# Patient Record
Sex: FEMALE | Race: WHITE | NOT HISPANIC OR LATINO | Employment: FULL TIME | URBAN - METROPOLITAN AREA
[De-identification: names, ages, dates, MRNs, and addresses within clinical notes are randomized per-mention and may not be internally consistent; named-entity substitution may affect disease eponyms.]

---

## 2019-06-05 ENCOUNTER — EH NON-PROVIDER (OUTPATIENT)
Dept: OCCUPATIONAL MEDICINE | Facility: CLINIC | Age: 49
End: 2019-06-05

## 2019-06-05 DIAGNOSIS — Z02.1 PRE-EMPLOYMENT DRUG SCREENING: ICD-10-CM

## 2019-06-05 DIAGNOSIS — Z02.1 ENCOUNTER FOR PRE-EMPLOYMENT HEALTH SCREENING EXAMINATION: ICD-10-CM

## 2019-06-05 LAB
AMP AMPHETAMINE: NORMAL
BAR BARBITURATES: NORMAL
BZO BENZODIAZEPINES: NORMAL
COC COCAINE: NORMAL
INT CON NEG: NORMAL
INT CON POS: NORMAL
MDMA ECSTASY: NORMAL
MET METHAMPHETAMINES: NORMAL
MTD METHADONE: NORMAL
OPI OPIATES: NORMAL
OXY OXYCODONE: NORMAL
PCP PHENCYCLIDINE: NORMAL
POC URINE DRUG SCREEN OCDRS: NORMAL
THC: NORMAL

## 2019-06-05 PROCEDURE — 80305 DRUG TEST PRSMV DIR OPT OBS: CPT | Performed by: PREVENTIVE MEDICINE

## 2019-06-05 PROCEDURE — 8911 PR MRO FEE: Performed by: PREVENTIVE MEDICINE

## 2019-07-05 ENCOUNTER — HOSPITAL ENCOUNTER (EMERGENCY)
Facility: MEDICAL CENTER | Age: 49
End: 2019-07-05
Attending: EMERGENCY MEDICINE
Payer: MEDICAID

## 2019-07-05 VITALS
HEART RATE: 80 BPM | HEIGHT: 64 IN | TEMPERATURE: 98 F | SYSTOLIC BLOOD PRESSURE: 118 MMHG | RESPIRATION RATE: 12 BRPM | DIASTOLIC BLOOD PRESSURE: 90 MMHG | BODY MASS INDEX: 20.1 KG/M2 | WEIGHT: 117.73 LBS | OXYGEN SATURATION: 99 %

## 2019-07-05 DIAGNOSIS — M79.18 MUSCULOSKELETAL PAIN: ICD-10-CM

## 2019-07-05 PROCEDURE — 99283 EMERGENCY DEPT VISIT LOW MDM: CPT

## 2019-07-05 RX ORDER — METHOCARBAMOL 750 MG/1
750 TABLET, FILM COATED ORAL 4 TIMES DAILY
Qty: 20 TAB | Refills: 0 | Status: SHIPPED | OUTPATIENT
Start: 2019-07-05 | End: 2019-12-11

## 2019-07-05 ASSESSMENT — ENCOUNTER SYMPTOMS: FALLS: 0

## 2019-07-05 NOTE — ED TRIAGE NOTES
Chief Complaint   Patient presents with   • Shoulder Pain     Pt ambulatory to triage with above complaint.  Pt states that she began to have left shoulder pain this morning.  Pt +CMS in extremity.  Pt denies any known trauma, cause or previous hx of shoulder pain.     Pt returned to lobby, educated on triage process, and to inform staff of any changes or concerns.

## 2019-07-18 ENCOUNTER — APPOINTMENT (OUTPATIENT)
Dept: RADIOLOGY | Facility: MEDICAL CENTER | Age: 49
End: 2019-07-18
Attending: EMERGENCY MEDICINE
Payer: MEDICAID

## 2019-07-18 ENCOUNTER — HOSPITAL ENCOUNTER (EMERGENCY)
Facility: MEDICAL CENTER | Age: 49
End: 2019-07-18
Attending: EMERGENCY MEDICINE
Payer: MEDICAID

## 2019-07-18 VITALS
RESPIRATION RATE: 16 BRPM | DIASTOLIC BLOOD PRESSURE: 68 MMHG | HEIGHT: 64 IN | WEIGHT: 116 LBS | OXYGEN SATURATION: 98 % | TEMPERATURE: 97.7 F | HEART RATE: 78 BPM | BODY MASS INDEX: 19.81 KG/M2 | SYSTOLIC BLOOD PRESSURE: 103 MMHG

## 2019-07-18 DIAGNOSIS — R42 VERTIGO: ICD-10-CM

## 2019-07-18 LAB
ALBUMIN SERPL BCP-MCNC: 4.7 G/DL (ref 3.2–4.9)
ALBUMIN/GLOB SERPL: 1.7 G/DL
ALP SERPL-CCNC: 55 U/L (ref 30–99)
ALT SERPL-CCNC: 14 U/L (ref 2–50)
ANION GAP SERPL CALC-SCNC: 12 MMOL/L (ref 0–11.9)
AST SERPL-CCNC: 29 U/L (ref 12–45)
BASOPHILS # BLD AUTO: 0.4 % (ref 0–1.8)
BASOPHILS # BLD: 0.02 K/UL (ref 0–0.12)
BILIRUB SERPL-MCNC: 0.9 MG/DL (ref 0.1–1.5)
BUN SERPL-MCNC: 13 MG/DL (ref 8–22)
CALCIUM SERPL-MCNC: 10 MG/DL (ref 8.5–10.5)
CHLORIDE SERPL-SCNC: 101 MMOL/L (ref 96–112)
CO2 SERPL-SCNC: 22 MMOL/L (ref 20–33)
CREAT SERPL-MCNC: 1.16 MG/DL (ref 0.5–1.4)
EKG IMPRESSION: NORMAL
EOSINOPHIL # BLD AUTO: 0.05 K/UL (ref 0–0.51)
EOSINOPHIL NFR BLD: 1.1 % (ref 0–6.9)
ERYTHROCYTE [DISTWIDTH] IN BLOOD BY AUTOMATED COUNT: 50.5 FL (ref 35.9–50)
GLOBULIN SER CALC-MCNC: 2.8 G/DL (ref 1.9–3.5)
GLUCOSE BLD-MCNC: 114 MG/DL (ref 65–99)
GLUCOSE SERPL-MCNC: 103 MG/DL (ref 65–99)
HCT VFR BLD AUTO: 39.7 % (ref 37–47)
HGB BLD-MCNC: 13 G/DL (ref 12–16)
IMM GRANULOCYTES # BLD AUTO: 0.01 K/UL (ref 0–0.11)
IMM GRANULOCYTES NFR BLD AUTO: 0.2 % (ref 0–0.9)
LYMPHOCYTES # BLD AUTO: 1.3 K/UL (ref 1–4.8)
LYMPHOCYTES NFR BLD: 28.5 % (ref 22–41)
MCH RBC QN AUTO: 30.5 PG (ref 27–33)
MCHC RBC AUTO-ENTMCNC: 32.7 G/DL (ref 33.6–35)
MCV RBC AUTO: 93.2 FL (ref 81.4–97.8)
MONOCYTES # BLD AUTO: 0.4 K/UL (ref 0–0.85)
MONOCYTES NFR BLD AUTO: 8.8 % (ref 0–13.4)
NEUTROPHILS # BLD AUTO: 2.78 K/UL (ref 2–7.15)
NEUTROPHILS NFR BLD: 61 % (ref 44–72)
NRBC # BLD AUTO: 0 K/UL
NRBC BLD-RTO: 0 /100 WBC
PLATELET # BLD AUTO: 291 K/UL (ref 164–446)
PMV BLD AUTO: 9.2 FL (ref 9–12.9)
POTASSIUM SERPL-SCNC: 3.7 MMOL/L (ref 3.6–5.5)
PROT SERPL-MCNC: 7.5 G/DL (ref 6–8.2)
RBC # BLD AUTO: 4.26 M/UL (ref 4.2–5.4)
SODIUM SERPL-SCNC: 135 MMOL/L (ref 135–145)
TROPONIN T SERPL-MCNC: <6 NG/L (ref 6–19)
WBC # BLD AUTO: 4.6 K/UL (ref 4.8–10.8)

## 2019-07-18 PROCEDURE — 700117 HCHG RX CONTRAST REV CODE 255: Performed by: EMERGENCY MEDICINE

## 2019-07-18 PROCEDURE — 700102 HCHG RX REV CODE 250 W/ 637 OVERRIDE(OP): Performed by: EMERGENCY MEDICINE

## 2019-07-18 PROCEDURE — A9270 NON-COVERED ITEM OR SERVICE: HCPCS | Performed by: EMERGENCY MEDICINE

## 2019-07-18 PROCEDURE — 93005 ELECTROCARDIOGRAM TRACING: CPT

## 2019-07-18 PROCEDURE — 36415 COLL VENOUS BLD VENIPUNCTURE: CPT

## 2019-07-18 PROCEDURE — 85025 COMPLETE CBC W/AUTO DIFF WBC: CPT

## 2019-07-18 PROCEDURE — 70498 CT ANGIOGRAPHY NECK: CPT

## 2019-07-18 PROCEDURE — 99284 EMERGENCY DEPT VISIT MOD MDM: CPT

## 2019-07-18 PROCEDURE — 84484 ASSAY OF TROPONIN QUANT: CPT

## 2019-07-18 PROCEDURE — 71045 X-RAY EXAM CHEST 1 VIEW: CPT

## 2019-07-18 PROCEDURE — 93005 ELECTROCARDIOGRAM TRACING: CPT | Performed by: EMERGENCY MEDICINE

## 2019-07-18 PROCEDURE — 82962 GLUCOSE BLOOD TEST: CPT

## 2019-07-18 PROCEDURE — 70496 CT ANGIOGRAPHY HEAD: CPT

## 2019-07-18 PROCEDURE — 80053 COMPREHEN METABOLIC PANEL: CPT

## 2019-07-18 RX ORDER — PROMETHAZINE HYDROCHLORIDE 25 MG/1
25 TABLET ORAL ONCE
Status: COMPLETED | OUTPATIENT
Start: 2019-07-18 | End: 2019-07-18

## 2019-07-18 RX ORDER — MECLIZINE HYDROCHLORIDE 25 MG/1
25 TABLET ORAL ONCE
Status: COMPLETED | OUTPATIENT
Start: 2019-07-18 | End: 2019-07-18

## 2019-07-18 RX ORDER — MECLIZINE HYDROCHLORIDE 25 MG/1
25 TABLET ORAL 3 TIMES DAILY PRN
Qty: 30 TAB | Refills: 0 | Status: SHIPPED | OUTPATIENT
Start: 2019-07-18 | End: 2019-12-11

## 2019-07-18 RX ORDER — ONDANSETRON 4 MG/1
4 TABLET, ORALLY DISINTEGRATING ORAL EVERY 6 HOURS PRN
Qty: 12 TAB | Refills: 0 | Status: SHIPPED | OUTPATIENT
Start: 2019-07-18 | End: 2019-12-11

## 2019-07-18 RX ADMIN — PROMETHAZINE HYDROCHLORIDE 25 MG: 25 TABLET ORAL at 12:58

## 2019-07-18 RX ADMIN — IOHEXOL 90 ML: 350 INJECTION, SOLUTION INTRAVENOUS at 14:15

## 2019-07-18 RX ADMIN — MECLIZINE HYDROCHLORIDE 25 MG: 25 TABLET ORAL at 12:58

## 2019-07-18 NOTE — ED PROVIDER NOTES
ED Provider Note    CHIEF COMPLAINT  Chief Complaint   Patient presents with   • Dizziness     Pt BIB self to triage. Pt reports mild feeling of dizziness while working, states to see the rm spinning. chest tightness with onset of symptoms (0930), pt reports hx of symptoms. pts BG in triage 114. pt neg for neuro defecits or slurred speech.    • Lightheadedness   • Vertigo   • Headache     Left side of head   • Chest Pressure   • N/V     x1 emesis while at work.   • Diarrhea       HPI  Rhonda Gould is a 48 y.o. female who presents With dizziness that she describes as room spinning that started yesterday.  She had some nausea and vomiting yesterday.  Today, she is just nauseated.  She did have a little bit of diarrhea this morning.  She has a pressure pain on the left side of her head coming from the back of her neck giving her a severe headache that she states started at the same time.  The dizziness is worse when she is standing up or moving her head around.  Symptoms are only moderate when she rolls over in bed.  She has minimal dizziness when she is sitting still.  She denies any fevers, rashes.  At 930 this morning, she had a little pressure over her chest with shortness of breath that she blames on being anxious due to the dizziness.  She denies any head trauma.    REVIEW OF SYSTEMS  See HPI for further details.  No fever, rash.  All other systems are negative.    PAST MEDICAL HISTORY  Past Medical History:   Diagnosis Date   • Hypertension        FAMILY HISTORY  Family History   Problem Relation Age of Onset   • Diabetes Mother    • Hypertension Mother    • Hypertension Father    • Diabetes Father    • Heart Disease Maternal Grandmother    • Cancer Maternal Grandfather        SOCIAL HISTORY  Social History     Social History   • Marital status:      Spouse name: N/A   • Number of children: N/A   • Years of education: N/A     Social History Main Topics   • Smoking status: Current Every Day Smoker  "    Packs/day: 0.30     Years: 30.00   • Smokeless tobacco: Never Used   • Alcohol use 2.0 oz/week     4 Cans of beer per week      Comment: socially couple times per week   • Drug use: No      Comment: marijuana   • Sexual activity: Not Currently     Other Topics Concern   • Not on file     Social History Narrative   • No narrative on file       SURGICAL HISTORY  Past Surgical History:   Procedure Laterality Date   • HYSTERECTOMY LAPAROSCOPY  6/2005       CURRENT MEDICATIONS    Current Facility-Administered Medications:   •  meclizine (ANTIVERT) tablet 25 mg, 25 mg, Oral, Once, Sherry Malone M.D.  •  promethazine (PHENERGAN) tablet 25 mg, 25 mg, Oral, Once, Sherry Malone M.D.    Current Outpatient Prescriptions:   •  methocarbamol (ROBAXIN-750) 750 MG Tab, Take 1 Tab by mouth 4 times a day., Disp: 20 Tab, Rfl: 0  •  albuterol 108 (90 BASE) MCG/ACT Aero Soln inhalation aerosol, Inhale 2 Puffs by mouth every four hours as needed., Disp: 1 Inhaler, Rfl: 0  •  atorvastatin (LIPITOR) 80 MG tablet, Take 1 Tab by mouth every bedtime., Disp: 30 Tab, Rfl: 0  •  lisinopril (PRINIVIL) 10 MG Tab, Take 1 Tab by mouth every day., Disp: 30 Tab, Rfl: 1      ALLERGIES  Allergies   Allergen Reactions   • Codeine Hives, Itching and Swelling     Throat swells closed  RXN=>10 years ago   • Codeine    • Fish Allergy Shortness of Breath     Swelling to throat.       PHYSICAL EXAM  VITAL SIGNS: BP (!) 96/74   Pulse 92   Temp 36.5 °C (97.7 °F) (Temporal)   Resp 20   Ht 1.626 m (5' 4\")   Wt 52.6 kg (116 lb)   LMP 06/11/2005   SpO2 98%   BMI 19.91 kg/m²  @[facility][[id number]::@   Constitutional: Well developed, thin, No acute respiratory distress, Non-toxic appearance.   HENT: Normocephalic, Atraumatic, Bilateral external ears normal, Oropharynx clear, mucous membranes are moist.  Eyes: PERRLA at 4 mm bilaterally, EOMI, Conjunctiva injected with tears, No discharge. No icterus.  Neck: Normal range of motion. Supple, No stridor.  No " tenderness to palpation  Lymphatic: No cervical lymphadenopathy noted.   Cardiovascular: Normal heart rate, Normal rhythm, No murmurs, No rubs, No gallops.   Thorax & Lungs: Clear to auscultation bilaterally, No respiratory distress, No wheezing.  Abdomen: Bowel sounds normal, Soft, No tenderness, No masses, no rebound or guarding   Skin: Warm, Dry, No erythema, No rash.   Extremities: Intact distal pulses, No edema, No tenderness.  5 out of 5 strength bilateral upper and lower extremities against my resistance  Musculoskeletal: Good range of motion in all major joints. No tenderness to palpation or major deformities noted.   Neurologic: Alert & oriented x 3, cranial nerve and cerebellar exams normal. No focal deficits noted. DTRs 2+ bilateral biceps.  Rapid finger taps symmetric bilaterally.  Sensation intact to the hands feet and face  Psychiatric: Affect normal, Judgment normal, Mood normal.       RADIOLOGY/PROCEDURES  CT-CTA NECK WITH & W/O-POST PROCESSING   Final Result      CT angiogram of the neck within normal limits.      CT-CTA HEAD WITH & W/O-POST PROCESS   Final Result      No thrombosis is seen within the [place].      No acute intracranial abnormality is seen.      Mild mucosal thickening in the right maxillary and ethmoid sinuses.            DX-CHEST-PORTABLE (1 VIEW)   Final Result      No acute cardiopulmonary process is seen.        EKG  Twelve-lead EKG by my interpretation is as below.  No ST or T wave changes to indicate ischemia or infarct    COURSE & MEDICAL DECISION MAKING  Pertinent Labs & Imaging studies reviewed. (See chart for details)  Differential diagnosis includes benign positional vertigo, tumor, mass, bleeding, stroke, vertebral dissection  Patient was given Phenergan and meclizine with good relief of her dizziness.  Results for orders placed or performed during the hospital encounter of 07/18/19   CBC with Differential   Result Value Ref Range    WBC 4.6 (L) 4.8 - 10.8 K/uL     RBC 4.26 4.20 - 5.40 M/uL    Hemoglobin 13.0 12.0 - 16.0 g/dL    Hematocrit 39.7 37.0 - 47.0 %    MCV 93.2 81.4 - 97.8 fL    MCH 30.5 27.0 - 33.0 pg    MCHC 32.7 (L) 33.6 - 35.0 g/dL    RDW 50.5 (H) 35.9 - 50.0 fL    Platelet Count 291 164 - 446 K/uL    MPV 9.2 9.0 - 12.9 fL    Neutrophils-Polys 61.00 44.00 - 72.00 %    Lymphocytes 28.50 22.00 - 41.00 %    Monocytes 8.80 0.00 - 13.40 %    Eosinophils 1.10 0.00 - 6.90 %    Basophils 0.40 0.00 - 1.80 %    Immature Granulocytes 0.20 0.00 - 0.90 %    Nucleated RBC 0.00 /100 WBC    Neutrophils (Absolute) 2.78 2.00 - 7.15 K/uL    Lymphs (Absolute) 1.30 1.00 - 4.80 K/uL    Monos (Absolute) 0.40 0.00 - 0.85 K/uL    Eos (Absolute) 0.05 0.00 - 0.51 K/uL    Baso (Absolute) 0.02 0.00 - 0.12 K/uL    Immature Granulocytes (abs) 0.01 0.00 - 0.11 K/uL    NRBC (Absolute) 0.00 K/uL   Complete Metabolic Panel (CMP)   Result Value Ref Range    Sodium 135 135 - 145 mmol/L    Potassium 3.7 3.6 - 5.5 mmol/L    Chloride 101 96 - 112 mmol/L    Co2 22 20 - 33 mmol/L    Anion Gap 12.0 (H) 0.0 - 11.9    Glucose 103 (H) 65 - 99 mg/dL    Bun 13 8 - 22 mg/dL    Creatinine 1.16 0.50 - 1.40 mg/dL    Calcium 10.0 8.5 - 10.5 mg/dL    AST(SGOT) 29 12 - 45 U/L    ALT(SGPT) 14 2 - 50 U/L    Alkaline Phosphatase 55 30 - 99 U/L    Total Bilirubin 0.9 0.1 - 1.5 mg/dL    Albumin 4.7 3.2 - 4.9 g/dL    Total Protein 7.5 6.0 - 8.2 g/dL    Globulin 2.8 1.9 - 3.5 g/dL    A-G Ratio 1.7 g/dL   Troponin   Result Value Ref Range    Troponin T <6 6 - 19 ng/L   ESTIMATED GFR   Result Value Ref Range    GFR If African American 60 >60 mL/min/1.73 m 2    GFR If Non African American 49 (A) >60 mL/min/1.73 m 2   ACCU-CHEK GLUCOSE   Result Value Ref Range    Glucose - Accu-Ck 114 (H) 65 - 99 mg/dL   EKG (NOW)   Result Value Ref Range    Report       [facility] Emergency Dept.    Test Date:  2019-07-18  Pt Name:    JASON SCHMITZ                Department: ER  MRN:        [id number]                       Room:  Gender:     Female                       Technician: [id number]  :        [id number]                   Requested By:ER TRIAGE PROTOCOL  Order #:    [id number]                    Reading MD: WILD MALONE MD    Measurements  Intervals                                Axis  Rate:       69                           P:          81  MI:         152                          QRS:        77  QRSD:       62                           T:          75  QT:         416  QTc:        446    Interpretive Statements  SINUS RHYTHM  SERG, CONSIDER BIATRIAL ABNORMALITIES  NONSPECIFIC T ABNORMALITIES, ANT-LAT LEADS  ST ELEVATION SUGGESTS PERICARDITIS  Compared to ECG 2016 17:26:29  T-wave abnormality now present  Left ventricular hypertrophy no longer present  ST (T wave) deviation still present    Electronically Signed On  14:52:35 PDT by WILD MALONE MD           The patient will return for new or worsening symptoms and is stable at the time of discharge.  Patient verbalized her understanding that she is not to operate a vehicle or get on a ladder until she has been symptom-free for 48 hours.    The patient is referred to a primary physician for blood pressure management, diabetic screening, and for all other preventative health concerns.      DISPOSITION:  Patient will be discharged home in stable condition.    FOLLOW UP:  your primary care provider    Schedule an appointment as soon as possible for a visit         OUTPATIENT MEDICATIONS:  Discharge Medication List as of 2019  2:57 PM      START taking these medications    Details   meclizine (ANTIVERT) 25 MG Tab Take 1 Tab by mouth 3 times a day as needed for Vertigo., Disp-30 Tab, R-0, Normal      ondansetron (ZOFRAN ODT) 4 MG TABLET DISPERSIBLE Take 1 Tab by mouth every 6 hours as needed for Nausea., Disp-12 Tab, R-0, Normal              FINAL IMPRESSION  1. Vertigo               Electronically signed by: Wild Malone, 2019 12:51 PM

## 2019-07-18 NOTE — ED TRIAGE NOTES
Chief Complaint   Patient presents with   • Dizziness     Pt BIB self to triage. Pt reports mild feeling of dizziness while working, states to see the rm spinning. chest tightness with onset of symptoms (0930), pt reports hx of symptoms. pts BG in triage 114. pt neg for neuro defecits or slurred speech.    • Lightheadedness   • Vertigo   • Headache     Left side of head   • Chest Pressure   • N/V     x1 emesis while at work.   • Diarrhea     Explained to pt triage process, made pt aware to tell this RN/staff of any changes/concerns, pt verbalized understanding of process and instructions given. Pt to ER sancho.

## 2019-07-22 ENCOUNTER — HOSPITAL ENCOUNTER (EMERGENCY)
Facility: MEDICAL CENTER | Age: 49
End: 2019-07-22
Attending: EMERGENCY MEDICINE
Payer: MEDICAID

## 2019-07-22 VITALS
DIASTOLIC BLOOD PRESSURE: 94 MMHG | HEIGHT: 68 IN | TEMPERATURE: 97.1 F | BODY MASS INDEX: 16.97 KG/M2 | WEIGHT: 111.99 LBS | SYSTOLIC BLOOD PRESSURE: 123 MMHG | OXYGEN SATURATION: 95 % | RESPIRATION RATE: 18 BRPM | HEART RATE: 57 BPM

## 2019-07-22 DIAGNOSIS — R09.A2 GLOBUS HYSTERICUS: ICD-10-CM

## 2019-07-22 PROCEDURE — A9270 NON-COVERED ITEM OR SERVICE: HCPCS | Performed by: EMERGENCY MEDICINE

## 2019-07-22 PROCEDURE — 700102 HCHG RX REV CODE 250 W/ 637 OVERRIDE(OP): Performed by: EMERGENCY MEDICINE

## 2019-07-22 PROCEDURE — 99283 EMERGENCY DEPT VISIT LOW MDM: CPT

## 2019-07-22 RX ORDER — OMEPRAZOLE 40 MG/1
40 CAPSULE, DELAYED RELEASE ORAL 2 TIMES DAILY
Qty: 20 CAP | Refills: 0 | Status: SHIPPED | OUTPATIENT
Start: 2019-07-22 | End: 2019-08-01

## 2019-07-22 RX ADMIN — LIDOCAINE HYDROCHLORIDE 30 ML: 20 SOLUTION OROPHARYNGEAL at 23:09

## 2019-07-23 NOTE — ED TRIAGE NOTES
"Rhonda Ivette Gould   48 y.o. female   Chief Complaint   Patient presents with   • Difficulty Swallowing     reports she has had pain with swallowing all day today, has a weak cough, no swelling noted in oropharynx, managing secretions well. pt says it feels like there is something stuck in her throat but does not think anything is      Pt amb to triage with steady gait for above complaint. Pt is a long time smoker. Also reports that she was at a [facility] yesterday where shellfish was served to which she is allergic, does not believe her food had any contact but is unsure.      Pt is alert and oriented, speaking in full sentences, follows commands and responds appropriately to questions. Resp are even and unlabored.   Pt placed in lobby. Pt educated on triage process. Pt encouraged to alert staff for any changes.    /101   Pulse (!) 116   Temp 36.2 °C (97.1 °F) (Temporal)   Resp 18   Ht 1.727 m (5' 8\")   Wt 50.8 kg (111 lb 15.9 oz)   LMP 06/11/2005   SpO2 99%   BMI 17.03 kg/m²     "

## 2019-07-23 NOTE — ED PROVIDER NOTES
ED Provider Note    CHIEF COMPLAINT  Chief Complaint   Patient presents with   • Difficulty Swallowing     reports she has had pain with swallowing all day today, has a weak cough, no swelling noted in oropharynx, managing secretions well. pt says it feels like there is something stuck in her throat but does not think anything is       HPI  Rhonda Gould is a 48 y.o. female who presents with difficulty swallowing.  The patient states she woke up this morning with some difficulty with swallowing.  She states it hurts when she swallows she also feels like she has something stuck in her throat.  She does not have any rhinorrhea nor cough.  She has not had any fevers.  She does not have any difficulty with breathing.  She does not have any chest pain.  She does not have any abdominal pain.    REVIEW OF SYSTEMS  See HPI for further details. All other systems are negative.     PAST MEDICAL HISTORY  Past Medical History:   Diagnosis Date   • Hypertension        FAMILY HISTORY  [unfilled]    SOCIAL HISTORY  Social History     Social History   • Marital status:      Spouse name: N/A   • Number of children: N/A   • Years of education: N/A     Social History Main Topics   • Smoking status: Current Every Day Smoker     Packs/day: 0.30     Years: 30.00   • Smokeless tobacco: Never Used   • Alcohol use 2.0 oz/week     4 Cans of beer per week      Comment: socially couple times per week   • Drug use: No      Comment: marijuana   • Sexual activity: Not Currently     Other Topics Concern   • Not on file     Social History Narrative   • No narrative on file       SURGICAL HISTORY  Past Surgical History:   Procedure Laterality Date   • HYSTERECTOMY LAPAROSCOPY  6/2005       CURRENT MEDICATIONS  Home Medications    **Home medications have not yet been reviewed for this encounter**         ALLERGIES  Allergies   Allergen Reactions   • Codeine Hives, Itching and Swelling     Throat swells closed  RXN=>10 years ago   • Codeine   "  • Fish Allergy Shortness of Breath     Swelling to throat.       PHYSICAL EXAM  VITAL SIGNS: /101   Pulse (!) 116   Temp 36.2 °C (97.1 °F) (Temporal)   Resp 18   Ht 1.727 m (5' 8\")   Wt 50.8 kg (111 lb 15.9 oz)   LMP 06/11/2005   SpO2 99%   BMI 17.03 kg/m²  Room air O2: 99    Constitutional: Anxious but nontoxic  HENT: Normocephalic, Atraumatic, Bilateral external ears normal, Oropharynx moist, No oral exudates, Nose normal.   Eyes: PERRLA, EOMI, Conjunctiva normal, No discharge.   Neck: Normal range of motion, No tenderness, Supple, No stridor.   Lymphatic: No lymphadenopathy noted.   Cardiovascular: Tachycardic heart rate, Normal rhythm, No murmurs, No rubs, No gallops.   Thorax & Lungs: Normal breath sounds, No respiratory distress, No wheezing, No chest tenderness.   Abdomen: Bowel sounds normal, Soft, No tenderness, No masses, No pulsatile masses.   Skin: Warm, Dry, No erythema, No rash.   Back: No tenderness, No CVA tenderness.   Extremities: Intact distal pulses, No edema, No tenderness, No cyanosis, No clubbing.   Musculoskeletal: Good range of motion in all major joints. No tenderness to palpation or major deformities noted.   Neurologic: Alert & oriented x 3, Normal motor function, Normal sensory function, No focal deficits noted.   Psychiatric: Anxious    COURSE & MEDICAL DECISION MAKING  Pertinent Labs & Imaging studies reviewed. (See chart for details)  This a 48-year-old female who presents the emergency department with a sensation some stuck in her throat and she has difficulty with swallowing.  I suspect the patient does have globus hystericus.  She did receive a GI cocktail and has had some relief.  She is able to tolerate oral fluids as well as her secretions.  Her lungs are clear.  The patient will be discharged with instructions to take Prilosec twice a day.  She will eat anything 2 hours prior to bedtime and she will try to refrain from further tobacco abuse.  If the patient is " not significantly better in 48 to 72 hours she will return for repeat examination.    FINAL IMPRESSION  1.  Globus hystericus    Disposition  The patient will be discharged in stable condition      Electronically signed by: Patrick Landaverde, 7/22/2019 10:45 PM

## 2019-08-23 ENCOUNTER — APPOINTMENT (OUTPATIENT)
Dept: RADIOLOGY | Facility: MEDICAL CENTER | Age: 49
DRG: 202 | End: 2019-08-23
Attending: EMERGENCY MEDICINE
Payer: MEDICAID

## 2019-08-23 ENCOUNTER — HOSPITAL ENCOUNTER (INPATIENT)
Facility: MEDICAL CENTER | Age: 49
LOS: 1 days | DRG: 202 | End: 2019-08-24
Attending: EMERGENCY MEDICINE | Admitting: HOSPITALIST
Payer: MEDICAID

## 2019-08-23 DIAGNOSIS — E16.2 HYPOGLYCEMIA: ICD-10-CM

## 2019-08-23 DIAGNOSIS — R11.2 NAUSEA AND VOMITING, INTRACTABILITY OF VOMITING NOT SPECIFIED, UNSPECIFIED VOMITING TYPE: ICD-10-CM

## 2019-08-23 DIAGNOSIS — E87.29 ALCOHOLIC KETOACIDOSIS: ICD-10-CM

## 2019-08-23 DIAGNOSIS — R06.02 SOB (SHORTNESS OF BREATH): ICD-10-CM

## 2019-08-23 PROBLEM — J41.0 SIMPLE CHRONIC BRONCHITIS (HCC): Status: ACTIVE | Noted: 2019-08-23

## 2019-08-23 PROBLEM — Z86.79 HISTORY OF HYPERTENSION: Status: ACTIVE | Noted: 2019-08-23

## 2019-08-23 PROBLEM — Z72.0 TOBACCO ABUSE DISORDER: Status: ACTIVE | Noted: 2019-08-23

## 2019-08-23 PROBLEM — R73.9 HYPERGLYCEMIA: Status: ACTIVE | Noted: 2019-08-23

## 2019-08-23 PROBLEM — F10.20 ALCOHOLISM (HCC): Status: ACTIVE | Noted: 2019-08-23

## 2019-08-23 PROBLEM — E87.20 METABOLIC ACIDOSIS: Status: ACTIVE | Noted: 2019-08-23

## 2019-08-23 LAB
ALBUMIN SERPL BCP-MCNC: 4.4 G/DL (ref 3.2–4.9)
ALBUMIN/GLOB SERPL: 1.8 G/DL
ALP SERPL-CCNC: 54 U/L (ref 30–99)
ALT SERPL-CCNC: 13 U/L (ref 2–50)
AMPHET UR QL SCN: NEGATIVE
ANION GAP SERPL CALC-SCNC: 16 MMOL/L (ref 0–11.9)
ANION GAP SERPL CALC-SCNC: 23 MMOL/L (ref 0–11.9)
ANION GAP SERPL CALC-SCNC: 26 MMOL/L (ref 0–11.9)
AST SERPL-CCNC: 24 U/L (ref 12–45)
BARBITURATES UR QL SCN: NEGATIVE
BASE EXCESS BLDV CALC-SCNC: -13 MMOL/L
BASOPHILS # BLD AUTO: 0.5 % (ref 0–1.8)
BASOPHILS # BLD: 0.03 K/UL (ref 0–0.12)
BENZODIAZ UR QL SCN: NEGATIVE
BILIRUB SERPL-MCNC: 0.8 MG/DL (ref 0.1–1.5)
BODY TEMPERATURE: ABNORMAL CENTIGRADE
BUN SERPL-MCNC: 16 MG/DL (ref 8–22)
BUN SERPL-MCNC: 18 MG/DL (ref 8–22)
BUN SERPL-MCNC: 18 MG/DL (ref 8–22)
BZE UR QL SCN: NEGATIVE
CALCIUM SERPL-MCNC: 9 MG/DL (ref 8.5–10.5)
CALCIUM SERPL-MCNC: 9.1 MG/DL (ref 8.5–10.5)
CALCIUM SERPL-MCNC: 9.4 MG/DL (ref 8.5–10.5)
CANNABINOIDS UR QL SCN: POSITIVE
CHLORIDE SERPL-SCNC: 101 MMOL/L (ref 96–112)
CHLORIDE SERPL-SCNC: 96 MMOL/L (ref 96–112)
CHLORIDE SERPL-SCNC: 98 MMOL/L (ref 96–112)
CO2 SERPL-SCNC: 11 MMOL/L (ref 20–33)
CO2 SERPL-SCNC: 14 MMOL/L (ref 20–33)
CO2 SERPL-SCNC: 21 MMOL/L (ref 20–33)
CREAT SERPL-MCNC: 0.79 MG/DL (ref 0.5–1.4)
CREAT SERPL-MCNC: 0.88 MG/DL (ref 0.5–1.4)
CREAT SERPL-MCNC: 0.93 MG/DL (ref 0.5–1.4)
EKG IMPRESSION: NORMAL
EOSINOPHIL # BLD AUTO: 0.1 K/UL (ref 0–0.51)
EOSINOPHIL NFR BLD: 1.6 % (ref 0–6.9)
ERYTHROCYTE [DISTWIDTH] IN BLOOD BY AUTOMATED COUNT: 52.9 FL (ref 35.9–50)
GLOBULIN SER CALC-MCNC: 2.5 G/DL (ref 1.9–3.5)
GLUCOSE BLD-MCNC: 108 MG/DL (ref 65–99)
GLUCOSE BLD-MCNC: 129 MG/DL (ref 65–99)
GLUCOSE BLD-MCNC: 138 MG/DL (ref 65–99)
GLUCOSE BLD-MCNC: 169 MG/DL (ref 65–99)
GLUCOSE BLD-MCNC: 306 MG/DL (ref 65–99)
GLUCOSE BLD-MCNC: 39 MG/DL (ref 65–99)
GLUCOSE SERPL-MCNC: 123 MG/DL (ref 65–99)
GLUCOSE SERPL-MCNC: 38 MG/DL (ref 65–99)
GLUCOSE SERPL-MCNC: 80 MG/DL (ref 65–99)
HCO3 BLDV-SCNC: 11 MMOL/L (ref 24–28)
HCT VFR BLD AUTO: 43.9 % (ref 37–47)
HGB BLD-MCNC: 15.1 G/DL (ref 12–16)
IMM GRANULOCYTES # BLD AUTO: 0.02 K/UL (ref 0–0.11)
IMM GRANULOCYTES NFR BLD AUTO: 0.3 % (ref 0–0.9)
LIPASE SERPL-CCNC: 14 U/L (ref 11–82)
LYMPHOCYTES # BLD AUTO: 3.72 K/UL (ref 1–4.8)
LYMPHOCYTES NFR BLD: 57.7 % (ref 22–41)
MAGNESIUM SERPL-MCNC: 1.4 MG/DL (ref 1.5–2.5)
MCH RBC QN AUTO: 32.2 PG (ref 27–33)
MCHC RBC AUTO-ENTMCNC: 34.4 G/DL (ref 33.6–35)
MCV RBC AUTO: 93.6 FL (ref 81.4–97.8)
METHADONE UR QL SCN: NEGATIVE
MONOCYTES # BLD AUTO: 0.34 K/UL (ref 0–0.85)
MONOCYTES NFR BLD AUTO: 5.3 % (ref 0–13.4)
NEUTROPHILS # BLD AUTO: 2.24 K/UL (ref 2–7.15)
NEUTROPHILS NFR BLD: 34.6 % (ref 44–72)
NRBC # BLD AUTO: 0 K/UL
NRBC BLD-RTO: 0 /100 WBC
OPIATES UR QL SCN: NEGATIVE
OXYCODONE UR QL SCN: NEGATIVE
PCO2 BLDV: 22.7 MMHG (ref 41–51)
PCP UR QL SCN: NEGATIVE
PH BLDV: 7.32 [PH] (ref 7.31–7.45)
PLATELET # BLD AUTO: 325 K/UL (ref 164–446)
PMV BLD AUTO: 9.1 FL (ref 9–12.9)
PO2 BLDV: 67.5 MMHG (ref 25–40)
POC BREATHALIZER: 0.05 PERCENT (ref 0–0.01)
POC BREATHALIZER: 0.11 PERCENT (ref 0–0.01)
POC BREATHALIZER: 0.31 PERCENT (ref 0–0.01)
POTASSIUM SERPL-SCNC: 3.9 MMOL/L (ref 3.6–5.5)
POTASSIUM SERPL-SCNC: 4.2 MMOL/L (ref 3.6–5.5)
POTASSIUM SERPL-SCNC: 5.3 MMOL/L (ref 3.6–5.5)
PROPOXYPH UR QL SCN: NEGATIVE
PROT SERPL-MCNC: 6.9 G/DL (ref 6–8.2)
RBC # BLD AUTO: 4.69 M/UL (ref 4.2–5.4)
SAO2 % BLDV: 88.7 %
SODIUM SERPL-SCNC: 133 MMOL/L (ref 135–145)
SODIUM SERPL-SCNC: 135 MMOL/L (ref 135–145)
SODIUM SERPL-SCNC: 138 MMOL/L (ref 135–145)
TROPONIN T SERPL-MCNC: 6 NG/L (ref 6–19)
TROPONIN T SERPL-MCNC: 8 NG/L (ref 6–19)
WBC # BLD AUTO: 6.5 K/UL (ref 4.8–10.8)

## 2019-08-23 PROCEDURE — 700101 HCHG RX REV CODE 250: Performed by: HOSPITALIST

## 2019-08-23 PROCEDURE — 96366 THER/PROPH/DIAG IV INF ADDON: CPT

## 2019-08-23 PROCEDURE — 81003 URINALYSIS AUTO W/O SCOPE: CPT

## 2019-08-23 PROCEDURE — 82803 BLOOD GASES ANY COMBINATION: CPT

## 2019-08-23 PROCEDURE — 770020 HCHG ROOM/CARE - TELE (206)

## 2019-08-23 PROCEDURE — 90791 PSYCH DIAGNOSTIC EVALUATION: CPT

## 2019-08-23 PROCEDURE — 71046 X-RAY EXAM CHEST 2 VIEWS: CPT

## 2019-08-23 PROCEDURE — 700111 HCHG RX REV CODE 636 W/ 250 OVERRIDE (IP): Performed by: EMERGENCY MEDICINE

## 2019-08-23 PROCEDURE — 302970 POC BREATHALIZER: Performed by: EMERGENCY MEDICINE

## 2019-08-23 PROCEDURE — 700105 HCHG RX REV CODE 258: Performed by: HOSPITALIST

## 2019-08-23 PROCEDURE — 304561 HCHG STAT O2

## 2019-08-23 PROCEDURE — 700102 HCHG RX REV CODE 250 W/ 637 OVERRIDE(OP): Performed by: HOSPITALIST

## 2019-08-23 PROCEDURE — 96365 THER/PROPH/DIAG IV INF INIT: CPT

## 2019-08-23 PROCEDURE — 80053 COMPREHEN METABOLIC PANEL: CPT

## 2019-08-23 PROCEDURE — 302970 POC BREATHALIZER

## 2019-08-23 PROCEDURE — 99223 1ST HOSP IP/OBS HIGH 75: CPT | Performed by: HOSPITALIST

## 2019-08-23 PROCEDURE — A9270 NON-COVERED ITEM OR SERVICE: HCPCS | Performed by: HOSPITALIST

## 2019-08-23 PROCEDURE — 96375 TX/PRO/DX INJ NEW DRUG ADDON: CPT

## 2019-08-23 PROCEDURE — 84439 ASSAY OF FREE THYROXINE: CPT

## 2019-08-23 PROCEDURE — 36415 COLL VENOUS BLD VENIPUNCTURE: CPT

## 2019-08-23 PROCEDURE — 93005 ELECTROCARDIOGRAM TRACING: CPT | Performed by: EMERGENCY MEDICINE

## 2019-08-23 PROCEDURE — 80048 BASIC METABOLIC PNL TOTAL CA: CPT

## 2019-08-23 PROCEDURE — 84484 ASSAY OF TROPONIN QUANT: CPT | Mod: 91

## 2019-08-23 PROCEDURE — 700105 HCHG RX REV CODE 258: Performed by: EMERGENCY MEDICINE

## 2019-08-23 PROCEDURE — 99285 EMERGENCY DEPT VISIT HI MDM: CPT

## 2019-08-23 PROCEDURE — 96372 THER/PROPH/DIAG INJ SC/IM: CPT

## 2019-08-23 PROCEDURE — 83735 ASSAY OF MAGNESIUM: CPT

## 2019-08-23 PROCEDURE — 80307 DRUG TEST PRSMV CHEM ANLYZR: CPT

## 2019-08-23 PROCEDURE — 700111 HCHG RX REV CODE 636 W/ 250 OVERRIDE (IP): Performed by: HOSPITALIST

## 2019-08-23 PROCEDURE — 96376 TX/PRO/DX INJ SAME DRUG ADON: CPT

## 2019-08-23 PROCEDURE — 83690 ASSAY OF LIPASE: CPT

## 2019-08-23 PROCEDURE — 82962 GLUCOSE BLOOD TEST: CPT | Mod: 91

## 2019-08-23 PROCEDURE — 84443 ASSAY THYROID STIM HORMONE: CPT

## 2019-08-23 PROCEDURE — 85025 COMPLETE CBC W/AUTO DIFF WBC: CPT

## 2019-08-23 RX ORDER — AZITHROMYCIN 250 MG/1
500 TABLET, FILM COATED ORAL DAILY
Status: DISCONTINUED | OUTPATIENT
Start: 2019-08-23 | End: 2019-08-24 | Stop reason: HOSPADM

## 2019-08-23 RX ORDER — OXYCODONE HYDROCHLORIDE 5 MG/1
5 TABLET ORAL
Status: DISCONTINUED | OUTPATIENT
Start: 2019-08-23 | End: 2019-08-24 | Stop reason: HOSPADM

## 2019-08-23 RX ORDER — LORAZEPAM 2 MG/ML
1 INJECTION INTRAMUSCULAR
Status: DISCONTINUED | OUTPATIENT
Start: 2019-08-23 | End: 2019-08-24 | Stop reason: HOSPADM

## 2019-08-23 RX ORDER — VALPROIC ACID 250 MG/1
250 CAPSULE, LIQUID FILLED ORAL EVERY 8 HOURS
Status: DISCONTINUED | OUTPATIENT
Start: 2019-08-23 | End: 2019-08-24 | Stop reason: HOSPADM

## 2019-08-23 RX ORDER — DEXTROSE, SODIUM CHLORIDE, SODIUM LACTATE, POTASSIUM CHLORIDE, AND CALCIUM CHLORIDE 5; .6; .31; .03; .02 G/100ML; G/100ML; G/100ML; G/100ML; G/100ML
INJECTION, SOLUTION INTRAVENOUS CONTINUOUS
Status: DISCONTINUED | OUTPATIENT
Start: 2019-08-23 | End: 2019-08-23

## 2019-08-23 RX ORDER — ONDANSETRON 2 MG/ML
4 INJECTION INTRAMUSCULAR; INTRAVENOUS ONCE
Status: COMPLETED | OUTPATIENT
Start: 2019-08-23 | End: 2019-08-23

## 2019-08-23 RX ORDER — AMOXICILLIN 250 MG
2 CAPSULE ORAL 2 TIMES DAILY
Status: DISCONTINUED | OUTPATIENT
Start: 2019-08-23 | End: 2019-08-24 | Stop reason: HOSPADM

## 2019-08-23 RX ORDER — LORAZEPAM 2 MG/1
4 TABLET ORAL
Status: DISCONTINUED | OUTPATIENT
Start: 2019-08-23 | End: 2019-08-24 | Stop reason: HOSPADM

## 2019-08-23 RX ORDER — CHLORDIAZEPOXIDE HYDROCHLORIDE 25 MG/1
25 CAPSULE, GELATIN COATED ORAL EVERY 6 HOURS
Status: DISCONTINUED | OUTPATIENT
Start: 2019-08-24 | End: 2019-08-23

## 2019-08-23 RX ORDER — DEXTROSE, SODIUM CHLORIDE, SODIUM LACTATE, POTASSIUM CHLORIDE, AND CALCIUM CHLORIDE 5; .6; .31; .03; .02 G/100ML; G/100ML; G/100ML; G/100ML; G/100ML
INJECTION, SOLUTION INTRAVENOUS CONTINUOUS
Status: DISCONTINUED | OUTPATIENT
Start: 2019-08-23 | End: 2019-08-24

## 2019-08-23 RX ORDER — LORAZEPAM 1 MG/1
0.5 TABLET ORAL EVERY 4 HOURS PRN
Status: DISCONTINUED | OUTPATIENT
Start: 2019-08-23 | End: 2019-08-24 | Stop reason: HOSPADM

## 2019-08-23 RX ORDER — ALBUTEROL SULFATE 90 UG/1
2 AEROSOL, METERED RESPIRATORY (INHALATION) EVERY 6 HOURS PRN
Status: ON HOLD | COMMUNITY
End: 2019-08-24

## 2019-08-23 RX ORDER — ATORVASTATIN CALCIUM 80 MG/1
80 TABLET, FILM COATED ORAL NIGHTLY
Status: ON HOLD | COMMUNITY
End: 2019-08-30

## 2019-08-23 RX ORDER — NICOTINE 21 MG/24HR
14 PATCH, TRANSDERMAL 24 HOURS TRANSDERMAL
Status: DISCONTINUED | OUTPATIENT
Start: 2019-08-23 | End: 2019-08-24 | Stop reason: HOSPADM

## 2019-08-23 RX ORDER — BISACODYL 10 MG
10 SUPPOSITORY, RECTAL RECTAL
Status: DISCONTINUED | OUTPATIENT
Start: 2019-08-23 | End: 2019-08-24 | Stop reason: HOSPADM

## 2019-08-23 RX ORDER — THIAMINE MONONITRATE (VIT B1) 100 MG
100 TABLET ORAL DAILY
Status: DISCONTINUED | OUTPATIENT
Start: 2019-08-24 | End: 2019-08-24 | Stop reason: HOSPADM

## 2019-08-23 RX ORDER — ONDANSETRON 4 MG/1
4 TABLET, ORALLY DISINTEGRATING ORAL EVERY 6 HOURS PRN
Status: ON HOLD | COMMUNITY
End: 2019-08-30

## 2019-08-23 RX ORDER — LORAZEPAM 2 MG/ML
0.5 INJECTION INTRAMUSCULAR EVERY 4 HOURS PRN
Status: DISCONTINUED | OUTPATIENT
Start: 2019-08-23 | End: 2019-08-24 | Stop reason: HOSPADM

## 2019-08-23 RX ORDER — ALBUTEROL SULFATE 90 UG/1
2 AEROSOL, METERED RESPIRATORY (INHALATION) EVERY 6 HOURS PRN
Qty: 8.5 G | Refills: 0 | Status: ON HOLD | OUTPATIENT
Start: 2019-08-23 | End: 2019-08-24 | Stop reason: SDUPTHER

## 2019-08-23 RX ORDER — LORAZEPAM 2 MG/ML
1.5 INJECTION INTRAMUSCULAR
Status: DISCONTINUED | OUTPATIENT
Start: 2019-08-23 | End: 2019-08-24 | Stop reason: HOSPADM

## 2019-08-23 RX ORDER — GABAPENTIN 100 MG/1
100 CAPSULE ORAL 3 TIMES DAILY
Status: DISCONTINUED | OUTPATIENT
Start: 2019-08-23 | End: 2019-08-24 | Stop reason: HOSPADM

## 2019-08-23 RX ORDER — CHLORDIAZEPOXIDE HYDROCHLORIDE 25 MG/1
50 CAPSULE, GELATIN COATED ORAL EVERY 6 HOURS
Status: DISCONTINUED | OUTPATIENT
Start: 2019-08-23 | End: 2019-08-23

## 2019-08-23 RX ORDER — LORAZEPAM 2 MG/1
2 TABLET ORAL
Status: DISCONTINUED | OUTPATIENT
Start: 2019-08-23 | End: 2019-08-24 | Stop reason: HOSPADM

## 2019-08-23 RX ORDER — POLYETHYLENE GLYCOL 3350 17 G/17G
1 POWDER, FOR SOLUTION ORAL
Status: DISCONTINUED | OUTPATIENT
Start: 2019-08-23 | End: 2019-08-24 | Stop reason: HOSPADM

## 2019-08-23 RX ORDER — LORAZEPAM 1 MG/1
1 TABLET ORAL EVERY 4 HOURS PRN
Status: DISCONTINUED | OUTPATIENT
Start: 2019-08-23 | End: 2019-08-24 | Stop reason: HOSPADM

## 2019-08-23 RX ORDER — CLONIDINE HYDROCHLORIDE 0.1 MG/1
0.1 TABLET ORAL TWICE DAILY
Status: DISCONTINUED | OUTPATIENT
Start: 2019-08-23 | End: 2019-08-24 | Stop reason: HOSPADM

## 2019-08-23 RX ORDER — MECLIZINE HYDROCHLORIDE 25 MG/1
25 TABLET ORAL 3 TIMES DAILY PRN
Status: DISCONTINUED | OUTPATIENT
Start: 2019-08-23 | End: 2019-08-24 | Stop reason: HOSPADM

## 2019-08-23 RX ORDER — ONDANSETRON 4 MG/1
4 TABLET, ORALLY DISINTEGRATING ORAL ONCE
Status: COMPLETED | OUTPATIENT
Start: 2019-08-23 | End: 2019-08-23

## 2019-08-23 RX ORDER — HYDROMORPHONE HYDROCHLORIDE 1 MG/ML
0.25 INJECTION, SOLUTION INTRAMUSCULAR; INTRAVENOUS; SUBCUTANEOUS
Status: DISCONTINUED | OUTPATIENT
Start: 2019-08-23 | End: 2019-08-24 | Stop reason: HOSPADM

## 2019-08-23 RX ORDER — OXYCODONE HYDROCHLORIDE 5 MG/1
2.5 TABLET ORAL
Status: DISCONTINUED | OUTPATIENT
Start: 2019-08-23 | End: 2019-08-24 | Stop reason: HOSPADM

## 2019-08-23 RX ORDER — MECLIZINE HYDROCHLORIDE 25 MG/1
25 TABLET ORAL 3 TIMES DAILY PRN
Status: ON HOLD | COMMUNITY
End: 2019-08-30

## 2019-08-23 RX ORDER — FOLIC ACID 1 MG/1
1 TABLET ORAL DAILY
Status: DISCONTINUED | OUTPATIENT
Start: 2019-08-24 | End: 2019-08-24 | Stop reason: HOSPADM

## 2019-08-23 RX ORDER — METHOCARBAMOL 750 MG/1
750 TABLET, FILM COATED ORAL 4 TIMES DAILY
Status: ON HOLD | COMMUNITY
End: 2019-08-30

## 2019-08-23 RX ORDER — MAGNESIUM SULFATE HEPTAHYDRATE 40 MG/ML
4 INJECTION, SOLUTION INTRAVENOUS ONCE
Status: COMPLETED | OUTPATIENT
Start: 2019-08-23 | End: 2019-08-23

## 2019-08-23 RX ORDER — LISINOPRIL 10 MG/1
10 TABLET ORAL EVERY MORNING
Status: ON HOLD | COMMUNITY
End: 2019-08-30

## 2019-08-23 RX ORDER — LORAZEPAM 2 MG/ML
2 INJECTION INTRAMUSCULAR
Status: DISCONTINUED | OUTPATIENT
Start: 2019-08-23 | End: 2019-08-24 | Stop reason: HOSPADM

## 2019-08-23 RX ADMIN — SODIUM CHLORIDE, SODIUM LACTATE, POTASSIUM CHLORIDE, CALCIUM CHLORIDE AND DEXTROSE MONOHYDRATE: 5; 600; 310; 30; 20 INJECTION, SOLUTION INTRAVENOUS at 10:49

## 2019-08-23 RX ADMIN — LORAZEPAM 2 MG: 2 TABLET ORAL at 13:35

## 2019-08-23 RX ADMIN — SENNOSIDES, DOCUSATE SODIUM 2 TABLET: 50; 8.6 TABLET, FILM COATED ORAL at 18:07

## 2019-08-23 RX ADMIN — GABAPENTIN 100 MG: 100 CAPSULE ORAL at 18:07

## 2019-08-23 RX ADMIN — OXYCODONE HYDROCHLORIDE 5 MG: 5 TABLET ORAL at 22:06

## 2019-08-23 RX ADMIN — SODIUM CHLORIDE, SODIUM LACTATE, POTASSIUM CHLORIDE, CALCIUM CHLORIDE AND DEXTROSE MONOHYDRATE: 5; 600; 310; 30; 20 INJECTION, SOLUTION INTRAVENOUS at 22:00

## 2019-08-23 RX ADMIN — CLONIDINE HYDROCHLORIDE 0.1 MG: 0.1 TABLET ORAL at 18:07

## 2019-08-23 RX ADMIN — MAGNESIUM SULFATE IN WATER 4 G: 40 INJECTION, SOLUTION INTRAVENOUS at 18:07

## 2019-08-23 RX ADMIN — ONDANSETRON 4 MG: 4 TABLET, ORALLY DISINTEGRATING ORAL at 06:21

## 2019-08-23 RX ADMIN — GABAPENTIN 100 MG: 100 CAPSULE ORAL at 12:42

## 2019-08-23 RX ADMIN — SENNOSIDES, DOCUSATE SODIUM 2 TABLET: 50; 8.6 TABLET, FILM COATED ORAL at 12:44

## 2019-08-23 RX ADMIN — VALPROIC ACID 250 MG: 250 CAPSULE, LIQUID FILLED ORAL at 22:06

## 2019-08-23 RX ADMIN — THIAMINE HYDROCHLORIDE: 100 INJECTION, SOLUTION INTRAMUSCULAR; INTRAVENOUS at 13:10

## 2019-08-23 RX ADMIN — AZITHROMYCIN 500 MG: 250 TABLET, FILM COATED ORAL at 12:43

## 2019-08-23 RX ADMIN — CLONIDINE HYDROCHLORIDE 0.1 MG: 0.1 TABLET ORAL at 12:42

## 2019-08-23 RX ADMIN — SODIUM CHLORIDE, SODIUM LACTATE, POTASSIUM CHLORIDE, CALCIUM CHLORIDE AND DEXTROSE MONOHYDRATE: 5; 600; 310; 30; 20 INJECTION, SOLUTION INTRAVENOUS at 13:10

## 2019-08-23 RX ADMIN — OXYCODONE HYDROCHLORIDE 5 MG: 5 TABLET ORAL at 18:08

## 2019-08-23 RX ADMIN — ONDANSETRON 4 MG: 2 INJECTION INTRAMUSCULAR; INTRAVENOUS at 09:42

## 2019-08-23 RX ADMIN — ENOXAPARIN SODIUM 40 MG: 100 INJECTION SUBCUTANEOUS at 12:44

## 2019-08-23 RX ADMIN — ONDANSETRON 4 MG: 2 INJECTION INTRAMUSCULAR; INTRAVENOUS at 08:42

## 2019-08-23 RX ADMIN — DEXTROSE MONOHYDRATE 250 ML: 100 INJECTION, SOLUTION INTRAVENOUS at 08:45

## 2019-08-23 SDOH — HEALTH STABILITY: MENTAL HEALTH: HOW OFTEN DO YOU HAVE A DRINK CONTAINING ALCOHOL?: 2-3 TIMES A WEEK

## 2019-08-23 ASSESSMENT — LIFESTYLE VARIABLES
ANXIETY: NO ANXIETY (AT EASE)
NAUSEA AND VOMITING: NO NAUSEA AND NO VOMITING
ON A TYPICAL DAY WHEN YOU DRINK ALCOHOL HOW MANY DRINKS DO YOU HAVE: 5
TOTAL SCORE: 4
NAUSEA AND VOMITING: NO NAUSEA AND NO VOMITING
VISUAL DISTURBANCES: NOT PRESENT
AGITATION: NORMAL ACTIVITY
AUDITORY DISTURBANCES: NOT PRESENT
HEADACHE, FULLNESS IN HEAD: SEVERE
AGITATION: NORMAL ACTIVITY
HOW MANY TIMES IN THE PAST YEAR HAVE YOU HAD 5 OR MORE DRINKS IN A DAY: 300
EVER FELT BAD OR GUILTY ABOUT YOUR DRINKING: YES
HEADACHE, FULLNESS IN HEAD: NOT PRESENT
HAVE YOU EVER FELT YOU SHOULD CUT DOWN ON YOUR DRINKING: YES
HAVE PEOPLE ANNOYED YOU BY CRITICIZING YOUR DRINKING: YES
TOTAL SCORE: 4
HEADACHE, FULLNESS IN HEAD: MODERATELY SEVERE
EVER_SMOKED: YES
PAROXYSMAL SWEATS: BARELY PERCEPTIBLE SWEATING. PALMS MOIST
ANXIETY: NO ANXIETY (AT EASE)
ALCOHOL_USE: YES
DO YOU DRINK ALCOHOL: YES
TOTAL SCORE: 4
ORIENTATION AND CLOUDING OF SENSORIUM: ORIENTED AND CAN DO SERIAL ADDITIONS
ORIENTATION AND CLOUDING OF SENSORIUM: ORIENTED AND CAN DO SERIAL ADDITIONS
TREMOR: NO TREMOR
TREMOR: *
TOTAL SCORE: 5
DOES PATIENT WANT TO TALK TO SOMEONE ABOUT QUITTING: YES
AGITATION: NORMAL ACTIVITY
TOTAL SCORE: 11
TOTAL SCORE: 4
TOTAL SCORE: 4
PAROXYSMAL SWEATS: NO SWEAT VISIBLE
HOW MANY TIMES IN THE PAST YEAR HAVE YOU HAD 5 OR MORE DRINKS IN A DAY: 260
AUDITORY DISTURBANCES: NOT PRESENT
AVERAGE NUMBER OF DAYS PER WEEK YOU HAVE A DRINK CONTAINING ALCOHOL: 5
AUDITORY DISTURBANCES: NOT PRESENT
VISUAL DISTURBANCES: NOT PRESENT
TOTAL SCORE: 4
NAUSEA AND VOMITING: NO NAUSEA AND NO VOMITING
VISUAL DISTURBANCES: NOT PRESENT
SUBSTANCE_ABUSE: 1
TREMOR: TREMOR NOT VISIBLE BUT CAN BE FELT, FINGERTIP TO FINGERTIP
DOES PATIENT WANT TO STOP DRINKING: YES
TOTAL SCORE: 3
ORIENTATION AND CLOUDING OF SENSORIUM: ORIENTED AND CAN DO SERIAL ADDITIONS
NAUSEA AND VOMITING: MILD NAUSEA WITH NO VOMITING
ORIENTATION AND CLOUDING OF SENSORIUM: ORIENTED AND CAN DO SERIAL ADDITIONS
HAVE PEOPLE ANNOYED YOU BY CRITICIZING YOUR DRINKING: YES
DOES PATIENT WANT TO STOP DRINKING: YES
CONSUMPTION TOTAL: POSITIVE
PAROXYSMAL SWEATS: BARELY PERCEPTIBLE SWEATING. PALMS MOIST
HEADACHE, FULLNESS IN HEAD: MODERATELY SEVERE
CONSUMPTION TOTAL: POSITIVE
TREMOR: NO TREMOR
AVERAGE NUMBER OF DAYS PER WEEK YOU HAVE A DRINK CONTAINING ALCOHOL: 5
VISUAL DISTURBANCES: NOT PRESENT
PAROXYSMAL SWEATS: NO SWEAT VISIBLE
HAVE YOU EVER FELT YOU SHOULD CUT DOWN ON YOUR DRINKING: YES
EVER HAD A DRINK FIRST THING IN THE MORNING TO STEADY YOUR NERVES TO GET RID OF A HANGOVER: YES
ANXIETY: MILDLY ANXIOUS
AGITATION: NORMAL ACTIVITY
AUDITORY DISTURBANCES: NOT PRESENT
EVER FELT BAD OR GUILTY ABOUT YOUR DRINKING: YES
ANXIETY: *
EVER HAD A DRINK FIRST THING IN THE MORNING TO STEADY YOUR NERVES TO GET RID OF A HANGOVER: YES
TOTAL SCORE: 4
DOES PATIENT WANT TO TALK TO SOMEONE ABOUT QUITTING: YES
ON A TYPICAL DAY WHEN YOU DRINK ALCOHOL HOW MANY DRINKS DO YOU HAVE: 5

## 2019-08-23 ASSESSMENT — COGNITIVE AND FUNCTIONAL STATUS - GENERAL
SUGGESTED CMS G CODE MODIFIER DAILY ACTIVITY: CH
DAILY ACTIVITIY SCORE: 24
MOBILITY SCORE: 23
SUGGESTED CMS G CODE MODIFIER MOBILITY: CI
CLIMB 3 TO 5 STEPS WITH RAILING: A LITTLE

## 2019-08-23 ASSESSMENT — ENCOUNTER SYMPTOMS
HEARTBURN: 0
DEPRESSION: 0
FEVER: 0
NERVOUS/ANXIOUS: 1
COUGH: 1
GASTROINTESTINAL NEGATIVE: 1
NAUSEA: 0
CARDIOVASCULAR NEGATIVE: 1
NECK PAIN: 0
WHEEZING: 1
HEADACHES: 1
POLYDIPSIA: 0
WEAKNESS: 0
MUSCULOSKELETAL NEGATIVE: 1
SPUTUM PRODUCTION: 1
PALPITATIONS: 0
FOCAL WEAKNESS: 0
SHORTNESS OF BREATH: 1
BRUISES/BLEEDS EASILY: 0
VOMITING: 0
EYES NEGATIVE: 1
DIZZINESS: 0
BACK PAIN: 0
CHILLS: 0

## 2019-08-23 ASSESSMENT — COPD QUESTIONNAIRES
HAVE YOU SMOKED AT LEAST 100 CIGARETTES IN YOUR ENTIRE LIFE: YES
COPD SCREENING SCORE: 5
DURING THE PAST 4 WEEKS HOW MUCH DID YOU FEEL SHORT OF BREATH: SOME OF THE TIME
DO YOU EVER COUGH UP ANY MUCUS OR PHLEGM?: NO/ONLY WITH OCCASIONAL COLDS OR INFECTIONS
IN THE PAST 12 MONTHS DO YOU DO LESS THAN YOU USED TO BECAUSE OF YOUR BREATHING PROBLEMS: AGREE

## 2019-08-23 ASSESSMENT — PATIENT HEALTH QUESTIONNAIRE - PHQ9
SUM OF ALL RESPONSES TO PHQ9 QUESTIONS 1 AND 2: 0
1. LITTLE INTEREST OR PLEASURE IN DOING THINGS: NOT AT ALL
2. FEELING DOWN, DEPRESSED, IRRITABLE, OR HOPELESS: NOT AT ALL

## 2019-08-23 NOTE — ED NOTES
Pt states to be feeling a little better at this time. Pt encouraged to drink some juice and try and eat some of the sandwich when she is feeling better and able. Pt states understanding.

## 2019-08-23 NOTE — ASSESSMENT & PLAN NOTE
Quite severe per report, watch for withdrawal symptoms, support, referral for outpatient treatment

## 2019-08-23 NOTE — ED NOTES
Lab called with critical result of BS 38. Critical lab result read back to lab.   Dr. Cai notified of critical lab result at 0620.  Critical lab result read back by Dr. Cai.

## 2019-08-23 NOTE — ED NOTES
Lab called with critical result of BS 38 at 0820. Critical lab result read back to lab. Unable to reach Dr. Cowart x multiple attempts.    Spoke with Dr. Cai. Dr. Cai notified of critical lab result at 0835.  Critical lab result read back by Dr. Cai.

## 2019-08-23 NOTE — ED NOTES
Blood sugar 39 via finger stick. Patient is alert, oriented x3. Respirations even and unlabored. Patient c/o nausea, with emesis. Emesis bag given.     Primary RN notified.

## 2019-08-23 NOTE — ED NOTES
Pt given 8oz of apple juice with four packets of sugar and a meal box. Pt encouraged to drink apple juice to help with hypoglycemia and then follow it with a complex carb like the meal box once nausea is better. ERP notified.

## 2019-08-23 NOTE — ED NOTES
Pt ambulatory to bathroom with steady gait with RN. Pt report nausea improved. Given more water per request. Denies further needs.

## 2019-08-23 NOTE — CONSULTS
"[facility]   TRIAGE ASSESSMENT    Name: Jina Gould  MRN: [id number]  : 1970  Age: 48 y.o.  Date of assessment: 2019  PCP: No primary care provider on file.  Persons in attendance: Patient    CHIEF COMPLAINT/PRESENTING ISSUE (as stated by patient):   48 year old female BIB EMS this AM, ETOH intoxication (0.311), respiratory complaint, and verbalized vague SI; pt is a voluntary pt; pt evaluated when ETOH breathalyzer 0.051; pt alert, oriented x 4; calm; cooperative; pleasant; no delusions, paranoia, hallucinations present; insight, judgement intact; currently denies SI, HI, or self-harm ideation; states \"My dumb butt and drinking, I may have told the paramedics I didn't want to be here anymore\" and states stressor as her father, in [place], has cancer; pt denies h/o self-harm or SA; denies h/o inpt MH or CD tx; denies current outpt MH providers; states \"I have been [facility] get to the [facility]\"; denies psych diagnosis or psych meds; states current substance use includes ETOH, 5 Anisha drinks 2 x/week, last use last night, and THC daily, last use  Last night; denies h/o aggression; with 1 arrest, JUAN CARLOS, ; states lives by herself at the [facility]; employed full-time at [employer]; identifies positive support systems as her son, Gavin, age 32, and his girlfriend, and her ex sister-in-law, Marjorie; positive coping skills include dancing, listening to musc        Chief Complaint   Patient presents with   • Shortness of Breath   • Suicidal Ideation        CURRENT LIVING SITUATION/SOCIAL SUPPORT: states lives by herself at the [employer] motel;   identifies positive support systems as her son, Gavin, age 32, and his girlfriend, and her ex sister-in-law, Marjorie    BEHAVIORAL HEALTH TREATMENT HISTORY  Does patient/parent report a history of prior behavioral health treatment for patient?   No:    SAFETY ASSESSMENT - SELF  Does patient acknowledge current or past " "symptoms of dangerousness to self? no  Does parent/significant other report patient has current or past symptoms of dangerousness to self? N\A  Does presenting problem suggest symptoms of dangerousness to self? No    SAFETY ASSESSMENT - OTHERS  Does patient acknowledge current or past symptoms of aggressive behavior or risk to others? yes  Does parent/significant other report patient has current or past symptoms of aggressive behavior or risk to others?  N\A  Does presenting problem suggest symptoms of dangerousness to others? No    Crisis Safety Plan completed and copy given to patient? yes    ABUSE/NEGLECT SCREENING  Does patient report feeling “unsafe” in his/her home, or afraid of anyone?  no  Does patient report any history of physical, sexual, or emotional abuse?  no  Does parent or significant other report any of the above? N\A  Is there evidence of neglect by self?  no  Is there evidence of neglect by a caregiver? no  Does the patient/parent report any history of CPS/APS/police involvement related to suspected abuse/neglect or domestic violence? no  Based on the information provided during the current assessment, is a mandated report of suspected abuse/neglect being made?  No    SUBSTANCE USE SCREENING  Yes:  Gomez all substances used in the past 30 days:      Last Use Amount   [x]   Alcohol 8/22/19 3 joaquim drnks 2 x/weel   [x]   Marijuana 8/22/19 daily   []   Heroin     []   Prescription Opioids  (used without prescription, for    recreation, or in excess of prescribed amount)     []   Other Prescription  (used without prescription, for    recreation, or in excess of prescribed amount)     []   Cocaine      []   Methamphetamine     []   \"\" drugs (ectasy, MDMA)     []   Other substances        UDS results: + THC  Breathalyzer results: 0.311    What consequences does the patient associate with any of the above substance use and or addictive behaviors? Health problems:     Risk factors for detox (check " all that apply):  [x]  Seizures   [x]  Diaphoretic (sweating)   [x]  Tremors   [x]  Hallucinations   [x]  Increased blood pressure   [x]  Decreased blood pressure   []  Other   []  None      [x] Patient education on risk factors for detoxification and instructed to return to ER as needed.      MENTAL STATUS   Participation: Active verbal participation, Attentive, Engaged and Open to feedback  Grooming: Casual and Neat  Orientation: Alert and Fully Oriented  Behavior: Calm  Eye contact: Good  Mood: Euthymic  Affect: Flexible and Full range  Thought process: Logical and Goal-directed  Thought content: Within normal limits  Speech: Rate within normal limits and Volume within normal limits  Perception: Within normal limits  Memory:  No gross evidence of memory deficits  Insight: Adequate  Judgment:  Adequate  Other:    Collateral information:   Source:  [] Significant other present in person:   [] Significant other by telephone  [] [facility]   [x] [facility]  [x] [facility] Medical Record  [] Other:     [] Unable to complete full assessment due to:  [] Acute intoxication  [] Patient declined to participate/engage  [] Patient verbally unresponsive  [] Significant cognitive deficits  [] Significant perceptual distortions or behavioral disorganization  [] Other:      CLINICAL IMPRESSIONS:  Primary:  R/O Alcohol use disorder  Secondary:         IDENTIFIED NEEDS/PLAN:  [Trigger DISPOSITION list for any items marked]    []  Imminent safety risk - self [] Imminent safety risk - others   []  Acute substance withdrawal []  Psychosis/Impaired reality testing   []  Mood/anxiety [x]  Substance use/Addictive behavior   [x]  Maladaptive behaviro []  Parent/child conflict   []  Family/Couples conflict []  Biomedical   []  Housing []  Financial   []   Legal  Occupational/Educational   []  Domestic violence []  Other:     Disposition: Refer to [facility] Emergency Department, [facility], 12 Step program: 12 step AA  mtgs and [employer] Medicaid insurance MH providers; writer RN reviewed with pt the [facility] Medicaid insurance phone # to call to schedule an outpt MH appt, [phone]; pt verbalized understanding; pt to DC to self    Does patient express agreement with the above plan? yes    Referral appointment(s) scheduled? no    Alert team only:   I have discussed findings and recommendations with Dr. Joyce  who is in agreement with these recommendations. Pt is not on a legal hold    Referral information sent to the following community providers :NA    If applicable : Referred  to : for legal hold follow up at (time): MARIZA Shabazz R.N.  8/23/2019

## 2019-08-23 NOTE — ED NOTES
Medication Reconciliation updated and complete per pt at bedside  Allergies have been verified and updated   No oral ABX within the last 14 days  Pt Home [facility]

## 2019-08-23 NOTE — ED NOTES
Alert team at bedside. Per alert team pt has been cleared. ERP updated and is at bedside. IVF initiated. ERP updated pt on intent to admit.

## 2019-08-23 NOTE — DISCHARGE PLANNING
"Care Transition Team Assessment    Patient does not have an advance directive, declined booklet.  Support is her son, Gavin Mejia ([phone]) and will provide a ride home upon discharge.    Patient lives in a motel alone and can do her ADL's.  Uses alcohol and marijuana.  Last use of marijuana was yesterday.  Uses alcohol \"more that she should.\"  Gets food assistance from food stamps and the food bank.      Scripts may be called into the [facility] on the [facility].    Discharge needs:  1.  Arrangement for a PCP      Information Source  Orientation : Oriented x 4  Information Given By: Patient  Informant's Name: (Jina Gould)  Who is responsible for making decisions for patient? : Patient    Elopement Risk  Legal Hold: No    Interdisciplinary Discharge Planning  Does Admitting Nurse Feel This Could be a Complex Discharge?: No  Primary Care Physician: (Patient states none)  Lives with - Patient's Self Care Capacity: Alone and Able to Care For Self  Support Systems: Children  Housing / Facility: Motel  Able to Return to Previous ADL's: Yes  Mobility Issues: No  Prior Services: None  Patient Expects to be Discharged to:: (Home)  Assistance Needed: No  Durable Medical Equipment: Not Applicable    Discharge Preparedness  What is your plan after discharge?: Home with help  What are your discharge supports?: Child  Prior Functional Level: Ambulatory  Difficulity with ADLs: None  Difficulity with IADLs: None    Functional Assesment  Prior Functional Level: Ambulatory    Finances  Prescription Coverage: Yes    Advance Directive  Advance Directive?: None  Advance Directive offered?: AD Booklet refused    Domestic Abuse  Have you ever been the victim of abuse or violence?: Not Sure    Discharge Risks or Barriers  Discharge risks or barriers?: No PCP, Uninsured / underinsured  Patient risk factors: Uninsured or underinsured, No PCP, Homeless, Vulnerable adult    Anticipated Discharge " Information  Anticipated discharge disposition: [facility]  Discharge Address: ([facility] [address])  Discharge Contact Phone Number: ([phone])

## 2019-08-23 NOTE — DISCHARGE PLANNING
[facility]  Crisis/Safety Plan    Name:  Jina Gould  MRN:  [id number]  Date:  2019    [employer] [place] insurance plan [phone]    Warning signs that a crisis may be developing for me or I may be at risk:  1) getting bad news  2) cravings to drink alcohol  3)    Coping strategies I can use on my own (relaxation, physical activity, etc):  1) dancing  2) listening to music  3)     Ways I can make my environment safe:  1) no guns or weapons  2) no alcohol in the residence  3)    Things I want to tell myself when I feel a crisis developin) I am a good person  2) I am a good mother  3)     People I can contact for support or distraction (and their phone numbers):  1) sonGavin, # in cell phone  2)  3)    If I’m not able to reach my support people, or the above strategies don’t help, I can contact the following professionals, agencies, or hotlines:  1) Crisis Call Center ():  [phone] -OR- [phone]  2) Crisis Text Line ():  Text CARE TO [id number]  3)  [facility] insurance plan [phone]  4)     Rafaela Shabazz R.N.

## 2019-08-23 NOTE — ED NOTES
Pt was previously medicated by sheba Jo. Pt states she has been able to tolerate PO juice without N/V. Pt requested more apple juice. Lab at bedside for VBG. Dr. Joyce updated.

## 2019-08-23 NOTE — ED PROVIDER NOTES
ED Provider Note      ER PROVIDER NOTE    Patient initially received from Dr. Cowart in sign out 830 pending sobriety and psychiatric evaluation.  Her clinical condition changed requiring more acute medical intervention and addressing medical pathology    CHIEF COMPLAINT  Chief Complaint   Patient presents with   • Shortness of Breath   • Suicidal Ideation       HPI  Jina Boyle is a 48 y.o. female who presents to the emergency department initially complaining of shortness of breath after drinking alcohol tonight.  States is similar to past episodes, she did have a slight nonproductive cough as well over the last few days.  She has not had any chest pain or fevers or chills.  And initially no nausea vomiting or abdominal pain.  She received 2 nebulizer treatments prior to arrival at the hospital and reports her symptoms improved.  Additionally patient was reporting depression and passive suicidal thoughts but no active plan.  No prior history of suicide attempts.    Does have heavy alcohol abuse    Patient had been observed in the emergency department and noted to have some new nausea, her metabolic panel was checked with hypoglycemia and continue treatment as below    REVIEW OF SYSTEMS  Pertinent positives include suicidal thoughts, alcohol intoxication. Pertinent negatives include no abdominal pain. See HPI for details. All other systems reviewed and are negative.    PAST MEDICAL HISTORY   has a past medical history of Chronic obstructive pulmonary disease (HCC) and Hypertension.    SURGICAL HISTORY  patient denies any surgical history    FAMILY HISTORY  No family history on file.    SOCIAL HISTORY  Social History     Socioeconomic History   • Marital status: Not on file     Spouse name: Not on file   • Number of children: Not on file   • Years of education: Not on file   • Highest education level: Not on file   Occupational History   • Not on file   Social Needs   • Financial resource strain: Not on file   • Food  insecurity:     Worry: Not on file     Inability: Not on file   • Transportation needs:     Medical: Not on file     Non-medical: Not on file   Tobacco Use   • Smoking status: Current Every Day Smoker     Packs/day: 0.25     Types: Cigarettes   • Smokeless tobacco: Never Used   Substance and Sexual Activity   • Alcohol use: Yes     Frequency: 2-3 times a week   • Drug use: Yes     Types: Inhaled     Comment: marijuana   • Sexual activity: Not on file   Lifestyle   • Physical activity:     Days per week: Not on file     Minutes per session: Not on file   • Stress: Not on file   Relationships   • Social connections:     Talks on phone: Not on file     Gets together: Not on file     Attends [employer] service: Not on file     Active member of club or organization: Not on file     Attends meetings of clubs or organizations: Not on file     Relationship status: Not on file   • Intimate partner violence:     Fear of current or ex partner: Not on file     Emotionally abused: Not on file     Physically abused: Not on file     Forced sexual activity: Not on file   Other Topics Concern   • Not on file   Social History Narrative   • Not on file      Social History     Substance and Sexual Activity   Drug Use Yes   • Types: Inhaled    Comment: marijuana       CURRENT MEDICATIONS  Home Medications     Reviewed by Joshua Barnett (Pharmacy Tech) on 08/23/19 at 1055  Med List Status: Complete   Medication Last Dose Status   albuterol 108 (90 Base) MCG/ACT Aero Soln inhalation aerosol <1month Active   atorvastatin (LIPITOR) 80 MG tablet 8/22/2019 Active   lisinopril (PRINIVIL) 10 MG Tab last week Active   meclizine (ANTIVERT) 25 MG Tab 8/20/2019 Active   methocarbamol (ROBAXIN) 750 MG Tab 8/22/2019 Active   ondansetron (ZOFRAN ODT) 4 MG TABLET DISPERSIBLE 8/21/2019 Active                ALLERGIES  Allergies   Allergen Reactions   • Codeine Hives and Itching   • Shellfish Allergy Anaphylaxis       PHYSICAL EXAM  VITAL SIGNS: BP  "121/88   Pulse (!) 116   Temp 36.3 °C (97.3 °F) (Temporal)   Resp 14   Ht 1.727 m (5' 8\")   Wt 52.2 kg (115 lb)   SpO2 96%   BMI 17.49 kg/m²    Pulse ox interpretation: I interpret this pulse ox as normal.    Constitutional: Alert in no apparent distress.  Thin, chronically ill-appearing  HENT: No signs of trauma, Bilateral external ears normal, Nose normal.   Eyes: Pupils are equal and reactive, Conjunctiva normal, Non-icteric.   Neck: Normal range of motion, No tenderness, Supple, No stridor.   Lymphatic: No lymphadenopathy noted.   Cardiovascular: Tachycardic, no murmurs.   Thorax & Lungs: Normal breath sounds, No respiratory distress, No wheezing, No chest tenderness.   Abdomen: Bowel sounds normal, Soft, No tenderness, No masses, No pulsatile masses. No peritoneal signs.  Skin: Warm, Dry, No erythema, No rash.   Back: No bony tenderness, No CVA tenderness.   Extremities: Intact distal pulses, No edema, No tenderness, No cyanosis,   Musculoskeletal: Good range of motion in all major joints. No tenderness to palpation or major deformities noted.   Neurologic: Alert , Normal motor function, Normal sensory function, No focal deficits noted.   Psychiatric: Affect normal, Judgment normal, Mood normal.     DIAGNOSTIC STUDIES / PROCEDURES      LABS  Labs Reviewed   CBC WITH DIFFERENTIAL - Abnormal; Notable for the following components:       Result Value    RDW 52.9 (*)     Neutrophils-Polys 34.60 (*)     Lymphocytes 57.70 (*)     All other components within normal limits   BASIC METABOLIC PANEL - Abnormal; Notable for the following components:    Co2 14 (*)     Anion Gap 23.0 (*)     All other components within normal limits   URINE DRUG SCREEN - Abnormal; Notable for the following components:    Cannabinoid Metab Positive (*)     All other components within normal limits   BASIC METABOLIC PANEL - Abnormal; Notable for the following components:    Co2 11 (*)     Glucose 38 (*)     Anion Gap 26.0 (*)     All " other components within normal limits   VENOUS BLOOD GAS - Abnormal; Notable for the following components:    Venous Bg Pco2 22.7 (*)     Venous Bg Po2 67.5 (*)     Venous Bg Hco3 11 (*)     All other components within normal limits   POC BREATHALIZER - Abnormal; Notable for the following components:    POC Breathalizer 0.311 (*)     All other components within normal limits   POC BREATHALIZER - Abnormal; Notable for the following components:    POC Breathalizer 0.106 (*)     All other components within normal limits   ACCU-CHEK GLUCOSE - Abnormal; Notable for the following components:    Glucose - Accu-Ck 39 (*)     All other components within normal limits   ACCU-CHEK GLUCOSE - Abnormal; Notable for the following components:    Glucose - Accu-Ck 306 (*)     All other components within normal limits   POC BREATHALIZER - Abnormal; Notable for the following components:    POC Breathalizer 0.051 (*)     All other components within normal limits   ACCU-CHEK GLUCOSE - Abnormal; Notable for the following components:    Glucose - Accu-Ck 169 (*)     All other components within normal limits   TROPONIN   TROPONIN   ESTIMATED GFR   ESTIMATED GFR   BASIC METABOLIC PANEL       All labs reviewed by me.    RADIOLOGY  DX-CHEST-2 VIEWS   Final Result      No evidence of acute cardiopulmonary disease        The radiologist's interpretation of all radiological studies have been reviewed by me.    COURSE & MEDICAL DECISION MAKING  Nursing notes, VS, PMSFHx reviewed in chart.    8:32 AM Patient seen and examined at bedside.  She will be treated with the 10, some mild nausea at this point.  Additionally given food to eat    On reevaluation, patient states she is feeling somewhat improved although still does not feel like eating.  She did try some juice    Repeat blood sugar 306    Patient with return of emesis, Zofran ordered    Patient reevaluated again, she is still nauseated and not tolerating orals, her blood sugar is beginning to  drop again.  I ordered for D5 infusion, will plan for admission      HYDRATION: Based on the patient's presentation of Acute Vomiting and Inability to take oral fluids the patient was given IV fluids. IV Hydration was used because oral hydration was not as rapid as required. Upon recheck following hydration, the patient was improved.     Discussed case with Dr. Gonzalez for admission      The total critical care time spent on this patient was 40 minutes, resuscitating patient, speaking with admitting physician, and interpreting test results. This 40 minutes is exclusive of separately billable procedures.      Decision Making:  This is a 48 y.o. female initially presenting with some depression in the setting of alcohol intoxication.  Patient did sober appropriately however appears to have alcoholic ketoacidosis that she has had persistence nausea and vomiting and inability to tolerate orals as well as hypoglycemia requiring dextrose containing IV fluids.  Patient is admitted as above for further care, and continued and fusion and close blood glucose monitoring.  At this point she does not appear to have any focal infectious symptoms contributing to this today, she did not initially have some shortness of breath, likely mild COPD exacerbation as this did improve with treatments prior to arrival.  No evidence of pneumonia or pulmonary function on her x-ray and her symptoms have improved from this regard.  Regarding her initial depression, as she sobered, she was evaluated by life skills and cleared from a mental health perspective    Patient is admitted in guarded condition    FINAL IMPRESSION  1. SOB (shortness of breath)    2. Nausea and vomiting, intractability of vomiting not specified, unspecified vomiting type    3. Hypoglycemia    4. Alcoholic ketoacidosis          The note accurately reflects work and decisions made by me.  Thai Joyce  8/23/2019  11:01 AM

## 2019-08-23 NOTE — ED PROVIDER NOTES
"ED Provider Note    Scribed for Wily Cowart M.D. by Alli Rosales. 8/23/2019, 12:49 AM.    Primary care provider: None noted  Means of arrival: Ambulance  History obtained from: Patient  History limited by: None    CHIEF COMPLAINT  Chief Complaint   Patient presents with   • Shortness of Breath   • Suicidal Ideation       HPI  Jina Boyle is a 48 y.o. female with a history of COPD who presents to the Emergency Department for evaluation of shortness of breath that onset prior to arrival after consuming several alcoholic beverages earlier tonight. She states her shortness of breath at that time was exacerbated by walking, but was alleviated by rest. The patient notes she has had a mild dry cough but denies any associated chest pain, fever, nausea, vomiting, or hemoptysis. She did use a nebulizer that helped alleviate her symptoms and reportedly received a second DuoNeb treatment in route with EMS.  She notes her shortness of breath has improved upon arrival to the ED. The patient additionally reports she has been mildly depressed as her son currently lives in [facility] and recently had thoughts of \"not wanting to be here anymore\", but she denies any current suicidal ideations. The patient reports no prior suicide attempts in the past and has never been admitted to a psychiatric facility. She does admit to alcohol use at least 3 times a week, but has no history of alcohol withdrawal seizures. The patient has no prior history of deep vein thrombosis or pulmonary embolism and states she is not on any hormones. She further denies leg swelling or any recent long distance travel.  She notes that she is currently out of her albuterol inhaler.    REVIEW OF SYSTEMS  Pertinent positives include shortness of breath, cough, and depresion. Pertinent negatives include no chest pain, fever, nausea, vomiting, or hemoptysis. As above, all other systems reviewed and are negative.   See HPI for further details.     PAST MEDICAL " "HISTORY   has a past medical history of Chronic obstructive pulmonary disease (HCC) and Hypertension.    SURGICAL HISTORY  patient denies any surgical history    SOCIAL HISTORY  Social History     Tobacco Use   • Smoking status: Current Every Day Smoker     Packs/day: 0.25     Types: Cigarettes   • Smokeless tobacco: Never Used   Substance Use Topics   • Alcohol use: Yes     Frequency: 2-3 times a week   • Drug use: Yes     Types: Inhaled     Comment: marijuana      Social History     Substance and Sexual Activity   Drug Use Yes   • Types: Inhaled    Comment: marijuana       FAMILY HISTORY  No family history on file.    CURRENT MEDICATIONS  Home Medications    **Home medications have not yet been reviewed for this encounter**         ALLERGIES  Allergies   Allergen Reactions   • Codeine    • Shellfish Allergy        PHYSICAL EXAM  VITAL SIGNS: /56   Pulse (!) 113   Temp 36.8 °C (98.2 °F) (Temporal)   Resp 20   Ht 1.727 m (5' 8\")   Wt 52.2 kg (115 lb)   SpO2 96%   BMI 17.49 kg/m²   Vitals reviewed.  Constitutional: Alert in no apparent distress.  HENT: No signs of trauma, Bilateral external ears normal, Nose normal. Moist mucous membranes.  Eyes: Pupils are equal and reactive, Conjunctiva normal, Non-icteric.   Neck: Normal range of motion, No tenderness, Supple, No stridor.   Lymphatic: No lymphadenopathy noted.   Cardiovascular: Tachycardic with regular rhythm, no murmurs.   Thorax & Lungs: Vesicular breath sounds, No respiratory distress, No wheezing, No chest tenderness.   Abdomen: Bowel sounds normal, Soft, No tenderness, No peritoneal signs, No masses, No pulsatile masses.   Skin: Warm, Dry, No erythema, No rash.   Back: Normal alignment.   Extremities: Intact distal pulses, No edema, No tenderness, No cyanosis  Musculoskeletal: Good range of motion in all major joints. No major deformities noted.   Neurologic: Alert, Normal motor function, Normal sensory function, No focal deficits noted. "   Psychiatric: Affect normal, Judgment normal, Mood normal.       DIAGNOSTIC STUDIES / PROCEDURES    LABS  Labs Reviewed   CBC WITH DIFFERENTIAL - Abnormal; Notable for the following components:       Result Value    RDW 52.9 (*)     Neutrophils-Polys 34.60 (*)     Lymphocytes 57.70 (*)     All other components within normal limits   BASIC METABOLIC PANEL - Abnormal; Notable for the following components:    Co2 14 (*)     Anion Gap 23.0 (*)     All other components within normal limits   URINE DRUG SCREEN - Abnormal; Notable for the following components:    Cannabinoid Metab Positive (*)     All other components within normal limits   POC BREATHALIZER - Abnormal; Notable for the following components:    POC Breathalizer 0.311 (*)     All other components within normal limits   TROPONIN   TROPONIN   ESTIMATED GFR      All labs reviewed by me.      EKG Interpretation:  Interpreted by me    12 Lead EKG interpreted by me to show:  Normal sinus rhythm  Rate 93  Axis: Normal  Borderline prolonged QT interval  T wave inversion in aVL and V1, peaked T waves.   My impression of this EKG: No STEMI. Potential ischemia.     RADIOLOGY  DX-CHEST-2 VIEWS   Final Result      No evidence of acute cardiopulmonary disease        The radiologist's interpretation of all radiological studies have been reviewed by me.    COURSE & MEDICAL DECISION MAKING  Nursing notes, VS, PMSFHx reviewed in chart.  Differential diagnoses include but not limited to: alcohol intoxication, drug ingestion, suicidal ideation, situational depression, acute coronary syndrome, PE, pneumonia, COPD exacerbation     12:49 AM Patient seen and examined at bedside. Patient arrives tachycardic but afebrile with otherwise normal vital signs. Patient appears well hydrated and non-toxic.  Patient currently denying suicidal ideation but per nursing staff, the patient was quite agitated and tearful upon arrival and was endorsing suicidal ideation.  Low suspicion for PE  given lack of risk factors.  Ordered for DX-Chest (2 views), Urine Drug Screen, CBC with differential, BMP, Troponin, POC Breathalizer, Estimated GFR, and EKG to evaluate.     Chest x-ray unremarkable.  BMP reveals elevated anion gap likely secondary to alcohol intoxication and suspected dehydration.  Patient tolerating p.o. so will attempt p.o. hydration.  Repeat BMP ordered.  2 troponins are negative.    Patient will be seen by behavioral health once her alcohol level drops below 0.08.  Patient care transferred to my colleague pending repeat BMP and behavioral health evaluation.  Albuterol inhaler was refilled at her request.  She is to follow-up with her primary care physician and return to the ER at anytime with new or worsening symptoms.    FINAL IMPRESSION  1. Alcoholic intoxication without complication (HCC)    2. SOB (shortness of breath)          Alli STARR (Clintibkrzysztof), am scribing for, and in the presence of, Wily Cowart M.D..    Electronically signed by: Alli Rosales (Olaf), 8/23/2019    IWily M.D. personally performed the services described in this documentation, as scribed by Alli Rosales in my presence, and it is both accurate and complete.    C.    The note accurately reflects work and decisions made by me.  Wily Cowart  8/23/2019  7:40 AM

## 2019-08-23 NOTE — H&P
Hospital Medicine History & Physical Note    Date of Service  8/23/2019    Primary Care Physician  [facility] clinic    Consultants  None    Code Status  Full code    Chief Complaint  Dyspnea, cough    History of Presenting Illness  48 y.o. female who presented 8/23/2019 with history of reported COPD, ongoing tobacco abuse, hypertension, chronic back pain and alcoholism reported to the emergency room with being intoxicated initially stating that she had suicidal ideation, this was then cleared in the emergency room by psychiatry.  The patient did have difficulty with her dyspnea and wheezing and was treated for COPD.  On follow-up the patient was found with significant metabolic acidosis and labile blood sugars therefore admission is requested by internal medicine.  The patient on examination and evaluation by myself is currently in no distress she is afebrile she declines pain she does have cough which is right now dry and nonproductive.  She did have some nausea and was unable to eat much earlier, the patient drinks several joaquim drinks a day as well as tobacco abuse for 30+ years she uses cannabis products but denies other drug use she is single she currently homeless.    Review of Systems  Review of Systems   Constitutional: Positive for malaise/fatigue. Negative for chills and fever.   HENT: Negative.    Eyes: Negative.    Respiratory: Positive for cough, sputum production, shortness of breath and wheezing.    Cardiovascular: Negative.  Negative for chest pain and palpitations.   Gastrointestinal: Negative.  Negative for heartburn, nausea and vomiting.   Genitourinary: Negative.  Negative for dysuria and frequency.   Musculoskeletal: Negative.  Negative for back pain and neck pain.   Skin: Negative.  Negative for itching and rash.   Neurological: Positive for headaches. Negative for dizziness, focal weakness and weakness.   Endo/Heme/Allergies: Negative.  Negative for polydipsia. Does not bruise/bleed  easily.   Psychiatric/Behavioral: Positive for substance abuse. Negative for depression. The patient is nervous/anxious.        Past Medical History   has a past medical history of Chronic obstructive pulmonary disease (HCC) and Hypertension.  Alcoholism, homeless status, chronic low back pain    Surgical History  Patient denies prior surgery    Family History  Cancer and her father currently he was recently diagnosed with prostate cancer  Mother with diabetes    Social History   reports that she has been smoking cigarettes. She has been smoking about 0.25 packs per day. She has never used smokeless tobacco. She reports that she drinks alcohol. She reports that she has current or past drug history. Drug: Inhaled.  The patient reports to be single  Allergies  Allergies   Allergen Reactions   • Codeine Hives and Itching   • Shellfish Allergy Anaphylaxis       Medications  Prior to Admission Medications   Prescriptions Last Dose Informant Patient Reported? Taking?   albuterol 108 (90 Base) MCG/ACT Aero Soln inhalation aerosol <1month at prn Patient Yes Yes   Sig: Inhale 2 Puffs by mouth every 6 hours as needed for Shortness of Breath.   atorvastatin (LIPITOR) 80 MG tablet 8/22/2019 at hs Patient Yes Yes   Sig: Take 80 mg by mouth every evening.   lisinopril (PRINIVIL) 10 MG Tab last week at ran out Patient Yes Yes   Sig: Take 10 mg by mouth every morning.   meclizine (ANTIVERT) 25 MG Tab 8/20/2019 at unk Patient Yes Yes   Sig: Take 25 mg by mouth 3 times a day as needed for Nausea/Vomiting or Vertigo.   methocarbamol (ROBAXIN) 750 MG Tab 8/22/2019 at am Patient Yes Yes   Sig: Take 750 mg by mouth 4 times a day.   ondansetron (ZOFRAN ODT) 4 MG TABLET DISPERSIBLE 8/21/2019 at am Patient Yes Yes   Sig: Take 4 mg by mouth every 6 hours as needed for Nausea.      Facility-Administered Medications: None       Physical Exam  Temp:  [36.3 °C (97.3 °F)-36.8 °C (98.2 °F)] 36.3 °C (97.3 °F)  Pulse:  [] 96  Resp:  [14-20]  14  BP: ()/(56-88) 109/75  SpO2:  [93 %-98 %] 93 %    Physical Exam   Constitutional: She is oriented to person, place, and time. She appears well-developed and well-nourished.   Disheveled middle-aged female in no acute distress   HENT:   Head: Normocephalic and atraumatic.   Nose: Nose normal.   Mouth/Throat: Oropharynx is clear and moist.   Eyes: Pupils are equal, round, and reactive to light. Conjunctivae and EOM are normal.   Neck: Normal range of motion. Neck supple. No JVD present. No thyromegaly present.   Cardiovascular: Normal rate, regular rhythm, normal heart sounds and intact distal pulses. Exam reveals no gallop and no friction rub.   Capillary refill <3 secs   Pulmonary/Chest: Effort normal. She has wheezes. She has no rales.   Abdominal: Soft. Bowel sounds are normal. She exhibits no distension and no mass. There is no tenderness. There is no rebound and no guarding.   Musculoskeletal: Normal range of motion. She exhibits no edema or tenderness.   Lymphadenopathy:     She has no cervical adenopathy.   Neurological: She is alert and oriented to person, place, and time. No cranial nerve deficit.   Skin: Skin is warm and dry. She is not diaphoretic. No cyanosis.   Psychiatric: She has a normal mood and affect. Her behavior is normal.   Nursing note and vitals reviewed.      Laboratory:  Recent Labs     08/23/19  0010   WBC 6.5   RBC 4.69   HEMOGLOBIN 15.1   HEMATOCRIT 43.9   MCV 93.6   MCH 32.2   MCHC 34.4   RDW 52.9*   PLATELETCT 325   MPV 9.1     Recent Labs     08/23/19  0010 08/23/19  0739 08/23/19  1243   SODIUM 138 135 133*   POTASSIUM 5.3 4.2 3.9   CHLORIDE 101 98 96   CO2 14* 11* 21   GLUCOSE 80 38* 123*   BUN 16 18 18   CREATININE 0.93 0.79 0.88   CALCIUM 9.4 9.1 9.0     Recent Labs     08/23/19  0010 08/23/19  0739 08/23/19  1243   ALTSGPT  --   --  13   ASTSGOT  --   --  24   ALKPHOSPHAT  --   --  54   TBILIRUBIN  --   --  0.8   LIPASE  --   --  14   GLUCOSE 80 38* 123*         No  results for input(s): NTPROBNP in the last 72 hours.      Recent Labs     08/23/19  0010 08/23/19  0320   TROPONINT 8 6       Urinalysis:    No results found     Imaging:  DX-CHEST-2 VIEWS   Final Result      No evidence of acute cardiopulmonary disease      EKG with sinus rhythm rate of 93 without acute ST-T changes      Assessment/Plan:  I anticipate this patient will require at least two midnights for appropriate medical management, necessitating inpatient admission.    Metabolic acidosis- (present on admission)  Assessment & Plan  Likely alcoholic/starvation acidosis, hydrate vigorously, recheck    Hyperglycemia- (present on admission)  Assessment & Plan  With prior hypoglycemia likely secondary to starvation, monitor closely, check hemoglobin A1c    Simple chronic bronchitis (HCC)- (present on admission)  Assessment & Plan  Respiratory protocol, COPD protocol, outpatient referral to    Tobacco abuse disorder  Assessment & Plan  Nicotine replacement, cessation is advised    History of hypertension  Assessment & Plan  Monitor    Alcoholism (HCC)- (present on admission)  Assessment & Plan  Quite severe per report, watch for withdrawal symptoms, support, referral for outpatient treatment  Plan  The patient is quite severe metabolic acidosis likely from starvation but needs to be closely followed, vigorously hydrated and repeat laboratory data is pending currently  Labile blood sugars, possible developing diabetes versus current labile metabolic state  Will need to be monitored closely to avoid further hypoglycemia, the patient is on IV dextrose  Alcoholism and tobacco abuse with significant risk for withdrawal  Further evaluate and monitor closely  Patient this time is unable to be discharged will need to inpatient treatment acutely for the above-mentioned diagnosis and is likely to stay in the hospital 2+ overnight stays  Medically complex high risk  See orders    VTE prophylaxis: Lovenox

## 2019-08-23 NOTE — ED NOTES
Pt reports feeling nauseated, will notify provider. Denies SI/HI this morning. Denies further needs.

## 2019-08-23 NOTE — ED NOTES
Pt has continued N/V. ERP notified. Orders received. Pt medicated, see MAR. Pt now tachycardiac and diaphoretic. Pt remains on monitor.

## 2019-08-23 NOTE — ED NOTES
Report received. Care assumed. Pt contact made. Pt sitting upright in bed with HOB elevated. Pt is calm and cooperative at this time. Pt reports a headache. Pts work was notified that she will not be in today per request.

## 2019-08-23 NOTE — ED TRIAGE NOTES
"Pt brought in by EMS from home, pt called EMS because she thought she was having a COPD exacerbation. Pt took neb tx without effect at home, EMS also gave one tx. On arrival rr even and unlabored. Able to speak full sentences. Pt appears anxious and tearful, reports she has been having thoughts of wanting to \"not be here anymore\", wishes she was dead. +SI. Does not have a plan. Denies HI and hallucinations. Pt also reports she has been drinking tonight, drinks aprox 3-4 times a week. Also admits to smoking marijuana. Per EMS, pt disclosed she is upset because son moved out to his s house and pt has been sad/ anxious since this move. Pt changed into hospital gown. Low risk suicide precautions in place. Awaiting provider eval.  "

## 2019-08-24 VITALS
BODY MASS INDEX: 16.27 KG/M2 | RESPIRATION RATE: 16 BRPM | HEIGHT: 68 IN | WEIGHT: 107.36 LBS | OXYGEN SATURATION: 97 % | SYSTOLIC BLOOD PRESSURE: 113 MMHG | TEMPERATURE: 97.7 F | DIASTOLIC BLOOD PRESSURE: 86 MMHG | HEART RATE: 69 BPM

## 2019-08-24 PROBLEM — E16.2 HYPOGLYCEMIA: Status: ACTIVE | Noted: 2019-08-23

## 2019-08-24 PROBLEM — R79.89 LOW TSH LEVEL: Status: ACTIVE | Noted: 2019-08-24

## 2019-08-24 LAB
ALBUMIN SERPL BCP-MCNC: 3.7 G/DL (ref 3.2–4.9)
ALBUMIN/GLOB SERPL: 1.7 G/DL
ALP SERPL-CCNC: 45 U/L (ref 30–99)
ALT SERPL-CCNC: 9 U/L (ref 2–50)
ANION GAP SERPL CALC-SCNC: 8 MMOL/L (ref 0–11.9)
ANION GAP SERPL CALC-SCNC: 9 MMOL/L (ref 0–11.9)
APPEARANCE UR: CLEAR
AST SERPL-CCNC: 19 U/L (ref 12–45)
BASOPHILS # BLD AUTO: 0.5 % (ref 0–1.8)
BASOPHILS # BLD: 0.02 K/UL (ref 0–0.12)
BILIRUB SERPL-MCNC: 0.9 MG/DL (ref 0.1–1.5)
BILIRUB UR QL STRIP.AUTO: NEGATIVE
BUN SERPL-MCNC: 11 MG/DL (ref 8–22)
BUN SERPL-MCNC: 16 MG/DL (ref 8–22)
CALCIUM SERPL-MCNC: 8.3 MG/DL (ref 8.5–10.5)
CALCIUM SERPL-MCNC: 8.7 MG/DL (ref 8.5–10.5)
CHLORIDE SERPL-SCNC: 103 MMOL/L (ref 96–112)
CHLORIDE SERPL-SCNC: 99 MMOL/L (ref 96–112)
CHOLEST SERPL-MCNC: 146 MG/DL (ref 100–199)
CO2 SERPL-SCNC: 23 MMOL/L (ref 20–33)
CO2 SERPL-SCNC: 27 MMOL/L (ref 20–33)
COLOR UR: YELLOW
CREAT SERPL-MCNC: 0.87 MG/DL (ref 0.5–1.4)
CREAT SERPL-MCNC: 1.03 MG/DL (ref 0.5–1.4)
EOSINOPHIL # BLD AUTO: 0.05 K/UL (ref 0–0.51)
EOSINOPHIL NFR BLD: 1.3 % (ref 0–6.9)
ERYTHROCYTE [DISTWIDTH] IN BLOOD BY AUTOMATED COUNT: 54.7 FL (ref 35.9–50)
EST. AVERAGE GLUCOSE BLD GHB EST-MCNC: 108 MG/DL
GLOBULIN SER CALC-MCNC: 2.2 G/DL (ref 1.9–3.5)
GLUCOSE BLD-MCNC: 112 MG/DL (ref 65–99)
GLUCOSE BLD-MCNC: 133 MG/DL (ref 65–99)
GLUCOSE BLD-MCNC: 136 MG/DL (ref 65–99)
GLUCOSE SERPL-MCNC: 129 MG/DL (ref 65–99)
GLUCOSE SERPL-MCNC: 138 MG/DL (ref 65–99)
GLUCOSE UR STRIP.AUTO-MCNC: NEGATIVE MG/DL
HBA1C MFR BLD: 5.4 % (ref 0–5.6)
HCT VFR BLD AUTO: 34.9 % (ref 37–47)
HDLC SERPL-MCNC: 93 MG/DL
HGB BLD-MCNC: 11.6 G/DL (ref 12–16)
IMM GRANULOCYTES # BLD AUTO: 0.01 K/UL (ref 0–0.11)
IMM GRANULOCYTES NFR BLD AUTO: 0.3 % (ref 0–0.9)
KETONES UR STRIP.AUTO-MCNC: NEGATIVE MG/DL
LDLC SERPL CALC-MCNC: 39 MG/DL
LEUKOCYTE ESTERASE UR QL STRIP.AUTO: NEGATIVE
LYMPHOCYTES # BLD AUTO: 1.86 K/UL (ref 1–4.8)
LYMPHOCYTES NFR BLD: 47.3 % (ref 22–41)
MAGNESIUM SERPL-MCNC: 3.4 MG/DL (ref 1.5–2.5)
MCH RBC QN AUTO: 32 PG (ref 27–33)
MCHC RBC AUTO-ENTMCNC: 33.2 G/DL (ref 33.6–35)
MCV RBC AUTO: 96.1 FL (ref 81.4–97.8)
MICRO URNS: NORMAL
MONOCYTES # BLD AUTO: 0.36 K/UL (ref 0–0.85)
MONOCYTES NFR BLD AUTO: 9.2 % (ref 0–13.4)
NEUTROPHILS # BLD AUTO: 1.63 K/UL (ref 2–7.15)
NEUTROPHILS NFR BLD: 41.4 % (ref 44–72)
NITRITE UR QL STRIP.AUTO: NEGATIVE
NRBC # BLD AUTO: 0 K/UL
NRBC BLD-RTO: 0 /100 WBC
PH UR STRIP.AUTO: 6.5 [PH] (ref 5–8)
PHOSPHATE SERPL-MCNC: 2.1 MG/DL (ref 2.5–4.5)
PLATELET # BLD AUTO: 233 K/UL (ref 164–446)
PMV BLD AUTO: 9.2 FL (ref 9–12.9)
POTASSIUM SERPL-SCNC: 3.6 MMOL/L (ref 3.6–5.5)
POTASSIUM SERPL-SCNC: 4.5 MMOL/L (ref 3.6–5.5)
PROT SERPL-MCNC: 5.9 G/DL (ref 6–8.2)
PROT UR QL STRIP: NEGATIVE MG/DL
RBC # BLD AUTO: 3.63 M/UL (ref 4.2–5.4)
RBC UR QL AUTO: NEGATIVE
SODIUM SERPL-SCNC: 134 MMOL/L (ref 135–145)
SODIUM SERPL-SCNC: 135 MMOL/L (ref 135–145)
SP GR UR STRIP.AUTO: 1.01
T4 FREE SERPL-MCNC: 0.68 NG/DL (ref 0.53–1.43)
TRIGL SERPL-MCNC: 71 MG/DL (ref 0–149)
TSH SERPL DL<=0.005 MIU/L-ACNC: 0.34 UIU/ML (ref 0.38–5.33)
UROBILINOGEN UR STRIP.AUTO-MCNC: 0.2 MG/DL
WBC # BLD AUTO: 3.9 K/UL (ref 4.8–10.8)

## 2019-08-24 PROCEDURE — 36415 COLL VENOUS BLD VENIPUNCTURE: CPT

## 2019-08-24 PROCEDURE — 83036 HEMOGLOBIN GLYCOSYLATED A1C: CPT

## 2019-08-24 PROCEDURE — A9270 NON-COVERED ITEM OR SERVICE: HCPCS | Performed by: FAMILY MEDICINE

## 2019-08-24 PROCEDURE — 80048 BASIC METABOLIC PNL TOTAL CA: CPT

## 2019-08-24 PROCEDURE — 700105 HCHG RX REV CODE 258: Performed by: HOSPITALIST

## 2019-08-24 PROCEDURE — 700102 HCHG RX REV CODE 250 W/ 637 OVERRIDE(OP): Performed by: FAMILY MEDICINE

## 2019-08-24 PROCEDURE — 83735 ASSAY OF MAGNESIUM: CPT

## 2019-08-24 PROCEDURE — 94664 DEMO&/EVAL PT USE INHALER: CPT

## 2019-08-24 PROCEDURE — 80061 LIPID PANEL: CPT

## 2019-08-24 PROCEDURE — A9270 NON-COVERED ITEM OR SERVICE: HCPCS | Performed by: HOSPITALIST

## 2019-08-24 PROCEDURE — 85025 COMPLETE CBC W/AUTO DIFF WBC: CPT

## 2019-08-24 PROCEDURE — 84100 ASSAY OF PHOSPHORUS: CPT

## 2019-08-24 PROCEDURE — 80053 COMPREHEN METABOLIC PANEL: CPT

## 2019-08-24 PROCEDURE — 82962 GLUCOSE BLOOD TEST: CPT

## 2019-08-24 PROCEDURE — 99239 HOSP IP/OBS DSCHRG MGMT >30: CPT | Performed by: FAMILY MEDICINE

## 2019-08-24 PROCEDURE — 700102 HCHG RX REV CODE 250 W/ 637 OVERRIDE(OP): Performed by: HOSPITALIST

## 2019-08-24 RX ORDER — ALBUTEROL SULFATE 90 UG/1
2 AEROSOL, METERED RESPIRATORY (INHALATION) EVERY 6 HOURS PRN
Qty: 1 INHALER | Refills: 0 | Status: ON HOLD | OUTPATIENT
Start: 2019-08-24 | End: 2019-08-30

## 2019-08-24 RX ORDER — LANOLIN ALCOHOL/MO/W.PET/CERES
100 CREAM (GRAM) TOPICAL DAILY
Qty: 30 TAB | Refills: 0 | Status: SHIPPED | OUTPATIENT
Start: 2019-08-25 | End: 2019-12-11

## 2019-08-24 RX ORDER — FOLIC ACID 1 MG/1
1 TABLET ORAL DAILY
Qty: 30 TAB | Refills: 0 | Status: SHIPPED | OUTPATIENT
Start: 2019-08-25 | End: 2019-12-11

## 2019-08-24 RX ORDER — AZITHROMYCIN 500 MG/1
500 TABLET, FILM COATED ORAL DAILY
Qty: 2 TAB | Refills: 0 | Status: SHIPPED | OUTPATIENT
Start: 2019-08-25 | End: 2019-08-27

## 2019-08-24 RX ADMIN — VALPROIC ACID 250 MG: 250 CAPSULE, LIQUID FILLED ORAL at 04:42

## 2019-08-24 RX ADMIN — FOLIC ACID 1 MG: 1 TABLET ORAL at 04:42

## 2019-08-24 RX ADMIN — GABAPENTIN 100 MG: 100 CAPSULE ORAL at 04:42

## 2019-08-24 RX ADMIN — THERA TABS 1 TABLET: TAB at 04:42

## 2019-08-24 RX ADMIN — SODIUM CHLORIDE, SODIUM LACTATE, POTASSIUM CHLORIDE, CALCIUM CHLORIDE AND DEXTROSE MONOHYDRATE: 5; 600; 310; 30; 20 INJECTION, SOLUTION INTRAVENOUS at 08:34

## 2019-08-24 RX ADMIN — AZITHROMYCIN 500 MG: 250 TABLET, FILM COATED ORAL at 04:41

## 2019-08-24 RX ADMIN — CLONIDINE HYDROCHLORIDE 0.1 MG: 0.1 TABLET ORAL at 04:42

## 2019-08-24 RX ADMIN — VALPROIC ACID 250 MG: 250 CAPSULE, LIQUID FILLED ORAL at 13:13

## 2019-08-24 RX ADMIN — ACETAMINOPHEN, ASPIRIN AND CAFFEINE 1 TABLET: 250; 250; 65 TABLET, FILM COATED ORAL at 13:10

## 2019-08-24 RX ADMIN — LORAZEPAM 1 MG: 1 TABLET ORAL at 04:42

## 2019-08-24 RX ADMIN — LORAZEPAM 1 MG: 1 TABLET ORAL at 00:21

## 2019-08-24 RX ADMIN — Medication 100 MG: at 04:42

## 2019-08-24 RX ADMIN — GABAPENTIN 100 MG: 100 CAPSULE ORAL at 11:15

## 2019-08-24 ASSESSMENT — LIFESTYLE VARIABLES
PAROXYSMAL SWEATS: NO SWEAT VISIBLE
TOTAL SCORE: 9
NAUSEA AND VOMITING: NO NAUSEA AND NO VOMITING
NAUSEA AND VOMITING: NO NAUSEA AND NO VOMITING
VISUAL DISTURBANCES: NOT PRESENT
HEADACHE, FULLNESS IN HEAD: MODERATELY SEVERE
TREMOR: *
AUDITORY DISTURBANCES: NOT PRESENT
ANXIETY: MILDLY ANXIOUS
NAUSEA AND VOMITING: NO NAUSEA AND NO VOMITING
TREMOR: *
HEADACHE, FULLNESS IN HEAD: NOT PRESENT
HEADACHE, FULLNESS IN HEAD: NOT PRESENT
AUDITORY DISTURBANCES: NOT PRESENT
ORIENTATION AND CLOUDING OF SENSORIUM: ORIENTED AND CAN DO SERIAL ADDITIONS
TREMOR: TREMOR NOT VISIBLE BUT CAN BE FELT, FINGERTIP TO FINGERTIP
VISUAL DISTURBANCES: NOT PRESENT
TOTAL SCORE: 4
ANXIETY: *
VISUAL DISTURBANCES: NOT PRESENT
AUDITORY DISTURBANCES: NOT PRESENT
AUDITORY DISTURBANCES: NOT PRESENT
NAUSEA AND VOMITING: MILD NAUSEA WITH NO VOMITING
AGITATION: NORMAL ACTIVITY
PAROXYSMAL SWEATS: NO SWEAT VISIBLE
ORIENTATION AND CLOUDING OF SENSORIUM: ORIENTED AND CAN DO SERIAL ADDITIONS
ANXIETY: NO ANXIETY (AT EASE)
TOTAL SCORE: 8
VISUAL DISTURBANCES: NOT PRESENT
ANXIETY: *
TOTAL SCORE: 4
PAROXYSMAL SWEATS: NO SWEAT VISIBLE
ORIENTATION AND CLOUDING OF SENSORIUM: ORIENTED AND CAN DO SERIAL ADDITIONS
ORIENTATION AND CLOUDING OF SENSORIUM: ORIENTED AND CAN DO SERIAL ADDITIONS
HEADACHE, FULLNESS IN HEAD: SEVERE
PAROXYSMAL SWEATS: NO SWEAT VISIBLE
TREMOR: TREMOR NOT VISIBLE BUT CAN BE FELT, FINGERTIP TO FINGERTIP
AGITATION: NORMAL ACTIVITY

## 2019-08-24 NOTE — ED NOTES
"Accepting unit RN states room is not clean yet and it needs to be \"zapped\". RN states she will let us know when it is ready.   "

## 2019-08-24 NOTE — PROGRESS NOTES
2 RN skin check complete with Allison ORTIZ.   Devices in place N/A.  Skin assessed under devices N/A.  Confirmed pressure ulcers found on N/A.  New potential pressure ulcers noted on N/A.   The following interventions in place pillows in use to float heels/positioning.     Skin intact.

## 2019-08-24 NOTE — PROGRESS NOTES
Patient admitted to -9 . Tele box on, patient assessed, vital signs stable. Oriented patient to room, skylight, and how to use call light. Call light within reach, bed alarm on, treaded socks on.

## 2019-08-24 NOTE — RESPIRATORY CARE
COPD EDUCATION by COPD CLINICAL EDUCATOR  8/24/2019  at  11:34 AM by Jeanette Cherry     Patient interviewed by COPD education team.  Patient unable to participate in full program.  Short intervention has been conducted.  A comprehensive packet including information about COPD, treatments, and smoking cessation given.

## 2019-08-24 NOTE — PROGRESS NOTES
Discharge order received. Patient A&Ox4. Discharge instructions reviewed with patient. Instructions include but no limited to newly prescribed medications, specific pharmacy where prescriptions were sent, S/S to monitor for, and when to seek medical attention. Monitor room notified of DC order and cardiac monitor cleaned and placed at nurses station. PIV DCd. RN transported patient down to hospital exit where family member was there to transport patient home.

## 2019-08-24 NOTE — DISCHARGE INSTRUCTIONS
Discharge Instructions    Discharged to home by car with relative. Discharged via wheelchair, hospital escort: Yes.  Special equipment needed: Not Applicable    Be sure to schedule a follow-up appointment with your primary care doctor or any specialists as instructed.     Discharge Plan:   Diet Plan: Discussed  Activity Level: Discussed  Smoking Cessation Offered: Patient Refused  Confirmed Follow up Appointment: Patient to Call and Schedule Appointment  Confirmed Symptoms Management: Discussed  Medication Reconciliation Updated: Yes  Influenza Vaccine Indication: Not indicated: Previously immunized this influenza season and > 8 years of age    I understand that a diet low in cholesterol, fat, and sodium is recommended for good health. Unless I have been given specific instructions below for another diet, I accept this instruction as my diet prescription.       Special Instructions: None    · Is patient discharged on Warfarin / Coumadin? No     Depression / Suicide Risk    As you are discharged from this [facility] facility, it is important to learn how to keep safe from harming yourself.    Recognize the warning signs:  · Abrupt changes in personality, positive or negative- including increase in energy   · Giving away possessions  · Change in eating patterns- significant weight changes-  positive or negative  · Change in sleeping patterns- unable to sleep or sleeping all the time   · Unwillingness or inability to communicate  · Depression  · Unusual sadness, discouragement and loneliness  · Talk of wanting to die  · Neglect of personal appearance   · Rebelliousness- reckless behavior  · Withdrawal from people/activities they love  · Confusion- inability to concentrate     If you or a loved one observes any of these behaviors or has concerns about self-harm, here's what you can do:  · Talk about it- your feelings and reasons for harming yourself  · Remove any means that you might use to hurt yourself (examples:  pills, rope, extension cords, firearm)  · Get professional help from the community (Mental Health, Substance Abuse, psychological counseling)  · Do not be alone:Call your Safe Contact- someone whom you trust who will be there for you.  · Call your local CRISIS HOTLINE 587-4865 or 803-929-6947  · Call your local Children's Mobile Crisis Response Team [place] (421) 937-7690 or www.CL3VER  · Call the toll free National Suicide Prevention Hotlines   · National Suicide Prevention Lifeline 921-682-XZXH (2299)  · [place] [employer] [facility] 800-SUICIDE (377-9381)    You were seen in the ER for alcohol intoxication and shortness of breath.  You were also seen by a behavioral health professional who is given you outpatient resources.  I would also like you to establish with a primary care physician and I have given you a list of local clinics we can do this, please call in today to make a follow-up appointment.  I have refilled your albuterol prescription at your request.  Return to the ER at anytime with new or worsening symptoms.

## 2019-08-24 NOTE — DISCHARGE SUMMARY
Discharge Summary    CHIEF COMPLAINT ON ADMISSION  Chief Complaint   Patient presents with   • Shortness of Breath   • Suicidal Ideation       Reason for Admission  ems     Admission Date  8/23/2019    CODE STATUS  Full Code    HPI & HOSPITAL COURSE  This is a 48 y.o. female here with bronchitis, diabetic acidosis likely secondary to dehydration, and hypoglycemia.  For her bronchitis she was started on azithromycin responded well to treatment.  She had hypoglycemia with blood sugar level of in the 30s, she was started on IVF D5, her blood pressure responded well she is now in the mid 100s.  IVF D5 was stopped and she continued to remain in the mid 100s.  Hemoglobin A1c level was 5.4.  She does not have any history of diabetes.  She does have a history of alcoholism CIWA protocol was done and her scores remained low.  TSH level was low, free T4 and free T3 were done which are still pending at this time.  Patient denies any symptoms of tachycardia, palpitations, weight loss, diarrhea or tremors.  Patient did not want to wait for the results and would prefer to follow-up as outpatient.       Therefore, she is discharged in good and stable condition to home with close outpatient follow-up.    The patient recovered much more quickly than anticipated on admission.    Discharge Date  8/24/2019    FOLLOW UP ITEMS POST DISCHARGE  Aloe up with PCP to follow-up with low TSH level, FT4 and FT3 are pending    DISCHARGE DIAGNOSES  Active Problems:    Metabolic acidosis POA: Yes    Simple chronic bronchitis (HCC) POA: Yes    Hypoglycemia POA: Yes    Alcoholism (HCC) POA: Yes    History of hypertension POA: Unknown    Tobacco abuse disorder POA: Unknown    Low TSH level POA: Unknown  Resolved Problems:    * No resolved hospital problems. *      FOLLOW UP  No future appointments.  [address] [address]  [phone]  Schedule an appointment as soon as possible for a visit in 1 day  to  establish with a PCP, For recheck    [address]  [place]iel Ragland [id number] [phone]  Schedule an appointment as soon as possible for a visit in 1 day  to establish with a PCP, For recheck      MEDICATIONS ON DISCHARGE     Medication List      START taking these medications      Instructions   azithromycin 500 MG tablet  Start taking on:  8/25/2019  Commonly known as:  ZITHROMAX   Take 1 Tab by mouth every day for 2 days.  Dose:  500 mg     folic acid 1 MG Tabs  Start taking on:  8/25/2019  Commonly known as:  FOLVITE   Take 1 Tab by mouth every day.  Dose:  1 mg     thiamine 100 MG tablet  Start taking on:  8/25/2019  Commonly known as:  THIAMINE   Take 1 Tab by mouth every day.  Dose:  100 mg        CHANGE how you take these medications      Instructions   albuterol 108 (90 Base) MCG/ACT Aers inhalation aerosol  What changed:  Another medication with the same name was removed. Continue taking this medication, and follow the directions you see here.   Inhale 2 Puffs by mouth every 6 hours as needed for Shortness of Breath.  Dose:  2 Puff        CONTINUE taking these medications      Instructions   LIPITOR 80 MG tablet  Generic drug:  atorvastatin   Take 80 mg by mouth every evening.  Dose:  80 mg     lisinopril 10 MG Tabs  Commonly known as:  PRINIVIL   Take 10 mg by mouth every morning.  Dose:  10 mg     meclizine 25 MG Tabs  Commonly known as:  ANTIVERT   Take 25 mg by mouth 3 times a day as needed for Nausea/Vomiting or Vertigo.  Dose:  25 mg     methocarbamol 750 MG Tabs  Commonly known as:  ROBAXIN   Take 750 mg by mouth 4 times a day.  Dose:  750 mg     ondansetron 4 MG Tbdp  Commonly known as:  ZOFRAN ODT   Take 4 mg by mouth every 6 hours as needed for Nausea.  Dose:  4 mg            Allergies  Allergies   Allergen Reactions   • Codeine Hives and Itching   • Shellfish Allergy Anaphylaxis       DIET  Orders Placed This Encounter   Procedures   • Diet Order Regular      Standing Status:   Standing     Number of Occurrences:   1     Order Specific Question:   Diet:     Answer:   Regular [1]   • Discontinue Diet Tray     Standing Status:   Standing     Number of Occurrences:   1       ACTIVITY  As tolerated.  Weight bearing as tolerated    CONSULTATIONS  None    PROCEDURES  None    LABORATORY  Lab Results   Component Value Date    SODIUM 134 (L) 08/24/2019    POTASSIUM 3.6 08/24/2019    CHLORIDE 103 08/24/2019    CO2 23 08/24/2019    GLUCOSE 138 (H) 08/24/2019    BUN 11 08/24/2019    CREATININE 0.87 08/24/2019        Lab Results   Component Value Date    WBC 3.9 (L) 08/24/2019    HEMOGLOBIN 11.6 (L) 08/24/2019    HEMATOCRIT 34.9 (L) 08/24/2019    PLATELETCT 233 08/24/2019        Total time of the discharge process exceeds 40 minutes.

## 2019-08-28 ENCOUNTER — HOSPITAL ENCOUNTER (INPATIENT)
Facility: MEDICAL CENTER | Age: 49
LOS: 1 days | DRG: 641 | End: 2019-08-30
Attending: EMERGENCY MEDICINE | Admitting: HOSPITALIST
Payer: MEDICAID

## 2019-08-28 DIAGNOSIS — E87.29 ALCOHOLIC KETOACIDOSIS: ICD-10-CM

## 2019-08-28 LAB
ALBUMIN SERPL BCP-MCNC: 4.1 G/DL (ref 3.2–4.9)
ALBUMIN/GLOB SERPL: 1.7 G/DL
ALP SERPL-CCNC: 63 U/L (ref 30–99)
ALT SERPL-CCNC: 28 U/L (ref 2–50)
ANION GAP SERPL CALC-SCNC: 26 MMOL/L (ref 0–11.9)
AST SERPL-CCNC: 52 U/L (ref 12–45)
BASOPHILS # BLD AUTO: 0.3 % (ref 0–1.8)
BASOPHILS # BLD: 0.02 K/UL (ref 0–0.12)
BILIRUB SERPL-MCNC: 0.8 MG/DL (ref 0.1–1.5)
BUN SERPL-MCNC: 18 MG/DL (ref 8–22)
CALCIUM SERPL-MCNC: 8.6 MG/DL (ref 8.5–10.5)
CHLORIDE SERPL-SCNC: 93 MMOL/L (ref 96–112)
CO2 SERPL-SCNC: 11 MMOL/L (ref 20–33)
CREAT SERPL-MCNC: 0.85 MG/DL (ref 0.5–1.4)
EOSINOPHIL # BLD AUTO: 0 K/UL (ref 0–0.51)
EOSINOPHIL NFR BLD: 0 % (ref 0–6.9)
ERYTHROCYTE [DISTWIDTH] IN BLOOD BY AUTOMATED COUNT: 52.5 FL (ref 35.9–50)
ETHANOL BLD-MCNC: 0.01 G/DL
GLOBULIN SER CALC-MCNC: 2.4 G/DL (ref 1.9–3.5)
GLUCOSE SERPL-MCNC: 72 MG/DL (ref 65–99)
HCT VFR BLD AUTO: 36.7 % (ref 37–47)
HGB BLD-MCNC: 11.9 G/DL (ref 12–16)
IMM GRANULOCYTES # BLD AUTO: 0.01 K/UL (ref 0–0.11)
IMM GRANULOCYTES NFR BLD AUTO: 0.1 % (ref 0–0.9)
LIPASE SERPL-CCNC: 19 U/L (ref 11–82)
LYMPHOCYTES # BLD AUTO: 1.24 K/UL (ref 1–4.8)
LYMPHOCYTES NFR BLD: 16.9 % (ref 22–41)
MCH RBC QN AUTO: 31.1 PG (ref 27–33)
MCHC RBC AUTO-ENTMCNC: 32.4 G/DL (ref 33.6–35)
MCV RBC AUTO: 95.8 FL (ref 81.4–97.8)
MONOCYTES # BLD AUTO: 0.49 K/UL (ref 0–0.85)
MONOCYTES NFR BLD AUTO: 6.7 % (ref 0–13.4)
NEUTROPHILS # BLD AUTO: 5.57 K/UL (ref 2–7.15)
NEUTROPHILS NFR BLD: 76 % (ref 44–72)
NRBC # BLD AUTO: 0 K/UL
NRBC BLD-RTO: 0 /100 WBC
PLATELET # BLD AUTO: 256 K/UL (ref 164–446)
PMV BLD AUTO: 10 FL (ref 9–12.9)
POTASSIUM SERPL-SCNC: 4.8 MMOL/L (ref 3.6–5.5)
PROT SERPL-MCNC: 6.5 G/DL (ref 6–8.2)
RBC # BLD AUTO: 3.83 M/UL (ref 4.2–5.4)
SODIUM SERPL-SCNC: 130 MMOL/L (ref 135–145)
WBC # BLD AUTO: 7.3 K/UL (ref 4.8–10.8)

## 2019-08-28 PROCEDURE — 85025 COMPLETE CBC W/AUTO DIFF WBC: CPT

## 2019-08-28 PROCEDURE — 83690 ASSAY OF LIPASE: CPT

## 2019-08-28 PROCEDURE — 700111 HCHG RX REV CODE 636 W/ 250 OVERRIDE (IP): Performed by: EMERGENCY MEDICINE

## 2019-08-28 PROCEDURE — 99285 EMERGENCY DEPT VISIT HI MDM: CPT

## 2019-08-28 PROCEDURE — 80307 DRUG TEST PRSMV CHEM ANLYZR: CPT

## 2019-08-28 PROCEDURE — 82010 KETONE BODYS QUAN: CPT

## 2019-08-28 PROCEDURE — 96375 TX/PRO/DX INJ NEW DRUG ADDON: CPT

## 2019-08-28 PROCEDURE — 700105 HCHG RX REV CODE 258: Performed by: EMERGENCY MEDICINE

## 2019-08-28 PROCEDURE — 94760 N-INVAS EAR/PLS OXIMETRY 1: CPT

## 2019-08-28 PROCEDURE — 93005 ELECTROCARDIOGRAM TRACING: CPT

## 2019-08-28 PROCEDURE — 93005 ELECTROCARDIOGRAM TRACING: CPT | Performed by: EMERGENCY MEDICINE

## 2019-08-28 PROCEDURE — 80053 COMPREHEN METABOLIC PANEL: CPT

## 2019-08-28 PROCEDURE — 36415 COLL VENOUS BLD VENIPUNCTURE: CPT

## 2019-08-28 RX ORDER — LORAZEPAM 2 MG/ML
1.5 INJECTION INTRAMUSCULAR
Status: DISCONTINUED | OUTPATIENT
Start: 2019-08-28 | End: 2019-08-29

## 2019-08-28 RX ORDER — FOLIC ACID 1 MG/1
1 TABLET ORAL DAILY
Status: DISCONTINUED | OUTPATIENT
Start: 2019-08-29 | End: 2019-08-29

## 2019-08-28 RX ORDER — LORAZEPAM 2 MG/ML
0.5 INJECTION INTRAMUSCULAR EVERY 4 HOURS PRN
Status: DISCONTINUED | OUTPATIENT
Start: 2019-08-28 | End: 2019-08-29

## 2019-08-28 RX ORDER — THIAMINE MONONITRATE (VIT B1) 100 MG
100 TABLET ORAL DAILY
Status: DISCONTINUED | OUTPATIENT
Start: 2019-08-29 | End: 2019-08-29

## 2019-08-28 RX ORDER — LORAZEPAM 2 MG/1
2 TABLET ORAL
Status: DISCONTINUED | OUTPATIENT
Start: 2019-08-28 | End: 2019-08-29

## 2019-08-28 RX ORDER — LORAZEPAM 2 MG/1
4 TABLET ORAL
Status: DISCONTINUED | OUTPATIENT
Start: 2019-08-28 | End: 2019-08-29

## 2019-08-28 RX ORDER — LORAZEPAM 2 MG/ML
1 INJECTION INTRAMUSCULAR
Status: DISCONTINUED | OUTPATIENT
Start: 2019-08-28 | End: 2019-08-29

## 2019-08-28 RX ORDER — LORAZEPAM 1 MG/1
1 TABLET ORAL EVERY 4 HOURS PRN
Status: DISCONTINUED | OUTPATIENT
Start: 2019-08-28 | End: 2019-08-29

## 2019-08-28 RX ORDER — LORAZEPAM 2 MG/ML
2 INJECTION INTRAMUSCULAR ONCE
Status: COMPLETED | OUTPATIENT
Start: 2019-08-28 | End: 2019-08-28

## 2019-08-28 RX ORDER — LORAZEPAM 2 MG/ML
2 INJECTION INTRAMUSCULAR
Status: DISCONTINUED | OUTPATIENT
Start: 2019-08-28 | End: 2019-08-29

## 2019-08-28 RX ORDER — LORAZEPAM 1 MG/1
0.5 TABLET ORAL EVERY 4 HOURS PRN
Status: DISCONTINUED | OUTPATIENT
Start: 2019-08-28 | End: 2019-08-29

## 2019-08-28 RX ORDER — SODIUM CHLORIDE 9 MG/ML
2000 INJECTION, SOLUTION INTRAVENOUS ONCE
Status: COMPLETED | OUTPATIENT
Start: 2019-08-29 | End: 2019-08-29

## 2019-08-28 RX ORDER — DEXTROSE MONOHYDRATE 25 G/50ML
50 INJECTION, SOLUTION INTRAVENOUS ONCE
Status: DISCONTINUED | OUTPATIENT
Start: 2019-08-29 | End: 2019-08-29

## 2019-08-28 RX ADMIN — SODIUM CHLORIDE 2000 ML: 9 INJECTION, SOLUTION INTRAVENOUS at 23:53

## 2019-08-28 RX ADMIN — LORAZEPAM 2 MG: 2 INJECTION INTRAMUSCULAR; INTRAVENOUS at 22:46

## 2019-08-28 ASSESSMENT — LIFESTYLE VARIABLES
DOES PATIENT WANT TO TALK TO SOMEONE ABOUT QUITTING: YES
AVERAGE NUMBER OF DAYS PER WEEK YOU HAVE A DRINK CONTAINING ALCOHOL: 7
HOW MANY TIMES IN THE PAST YEAR HAVE YOU HAD 5 OR MORE DRINKS IN A DAY: 300
EVER HAD A DRINK FIRST THING IN THE MORNING TO STEADY YOUR NERVES TO GET RID OF A HANGOVER: YES
ON A TYPICAL DAY WHEN YOU DRINK ALCOHOL HOW MANY DRINKS DO YOU HAVE: 15
HAVE YOU EVER FELT YOU SHOULD CUT DOWN ON YOUR DRINKING: YES
DO YOU DRINK ALCOHOL: YES
CONSUMPTION TOTAL: POSITIVE
TOTAL SCORE: 4
EVER FELT BAD OR GUILTY ABOUT YOUR DRINKING: YES
TOTAL SCORE: 4
TOTAL SCORE: 4
DOES PATIENT WANT TO STOP DRINKING: YES
HAVE PEOPLE ANNOYED YOU BY CRITICIZING YOUR DRINKING: YES

## 2019-08-29 ENCOUNTER — PATIENT OUTREACH (OUTPATIENT)
Dept: HEALTH INFORMATION MANAGEMENT | Facility: OTHER | Age: 49
End: 2019-08-29

## 2019-08-29 PROBLEM — F10.939 ALCOHOL WITHDRAWAL (HCC): Status: ACTIVE | Noted: 2019-08-29

## 2019-08-29 PROBLEM — E78.5 HLD (HYPERLIPIDEMIA): Status: ACTIVE | Noted: 2019-08-29

## 2019-08-29 PROBLEM — E87.1 HYPONATREMIA: Status: ACTIVE | Noted: 2019-08-29

## 2019-08-29 PROBLEM — D64.9 ANEMIA: Status: ACTIVE | Noted: 2019-08-29

## 2019-08-29 PROBLEM — E87.29 METABOLIC ACIDOSIS, INCREASED ANION GAP: Status: ACTIVE | Noted: 2019-08-29

## 2019-08-29 LAB
ANION GAP SERPL CALC-SCNC: 10 MMOL/L (ref 0–11.9)
ANION GAP SERPL CALC-SCNC: 9 MMOL/L (ref 0–11.9)
B-OH-BUTYR SERPL-MCNC: 6.38 MMOL/L (ref 0.02–0.27)
BASE EXCESS BLDV CALC-SCNC: -5 MMOL/L
BODY TEMPERATURE: ABNORMAL CENTIGRADE
BUN SERPL-MCNC: 11 MG/DL (ref 8–22)
BUN SERPL-MCNC: 12 MG/DL (ref 8–22)
CALCIUM SERPL-MCNC: 7.8 MG/DL (ref 8.5–10.5)
CALCIUM SERPL-MCNC: 8 MG/DL (ref 8.5–10.5)
CHLORIDE SERPL-SCNC: 105 MMOL/L (ref 96–112)
CHLORIDE SERPL-SCNC: 108 MMOL/L (ref 96–112)
CO2 SERPL-SCNC: 20 MMOL/L (ref 20–33)
CO2 SERPL-SCNC: 21 MMOL/L (ref 20–33)
CREAT SERPL-MCNC: 0.73 MG/DL (ref 0.5–1.4)
CREAT SERPL-MCNC: 0.81 MG/DL (ref 0.5–1.4)
EKG IMPRESSION: NORMAL
FERRITIN SERPL-MCNC: 172.8 NG/ML (ref 10–291)
FOLATE SERPL-MCNC: >22.4 NG/ML
GLUCOSE BLD-MCNC: 112 MG/DL (ref 65–99)
GLUCOSE BLD-MCNC: 151 MG/DL (ref 65–99)
GLUCOSE BLD-MCNC: 171 MG/DL (ref 65–99)
GLUCOSE BLD-MCNC: 239 MG/DL (ref 65–99)
GLUCOSE SERPL-MCNC: 119 MG/DL (ref 65–99)
GLUCOSE SERPL-MCNC: 225 MG/DL (ref 65–99)
HCO3 BLDV-SCNC: 18 MMOL/L (ref 24–28)
HGB RETIC QN AUTO: 36.4 PG/CELL (ref 29–35)
IMM RETICS NFR: 13.7 % (ref 9.3–17.4)
IRON SATN MFR SERPL: 94 % (ref 15–55)
IRON SERPL-MCNC: 224 UG/DL (ref 40–170)
LACTATE BLD-SCNC: 1.1 MMOL/L (ref 0.5–2)
LACTATE BLD-SCNC: 2.4 MMOL/L (ref 0.5–2)
PCO2 BLDV: 29.1 MMHG (ref 41–51)
PH BLDV: 7.42 [PH] (ref 7.31–7.45)
PO2 BLDV: 50.6 MMHG (ref 25–40)
POTASSIUM SERPL-SCNC: 3.7 MMOL/L (ref 3.6–5.5)
POTASSIUM SERPL-SCNC: 3.8 MMOL/L (ref 3.6–5.5)
RETICS # AUTO: 0.03 M/UL (ref 0.04–0.06)
RETICS/RBC NFR: 0.9 % (ref 0.8–2.1)
SAO2 % BLDV: 81.8 %
SODIUM SERPL-SCNC: 135 MMOL/L (ref 135–145)
SODIUM SERPL-SCNC: 138 MMOL/L (ref 135–145)
TIBC SERPL-MCNC: 239 UG/DL (ref 250–450)
VIT B12 SERPL-MCNC: 671 PG/ML (ref 211–911)

## 2019-08-29 PROCEDURE — 770020 HCHG ROOM/CARE - TELE (206)

## 2019-08-29 PROCEDURE — 96365 THER/PROPH/DIAG IV INF INIT: CPT

## 2019-08-29 PROCEDURE — 82607 VITAMIN B-12: CPT

## 2019-08-29 PROCEDURE — 36415 COLL VENOUS BLD VENIPUNCTURE: CPT

## 2019-08-29 PROCEDURE — 82962 GLUCOSE BLOOD TEST: CPT

## 2019-08-29 PROCEDURE — 83540 ASSAY OF IRON: CPT

## 2019-08-29 PROCEDURE — 96367 TX/PROPH/DG ADDL SEQ IV INF: CPT

## 2019-08-29 PROCEDURE — 700101 HCHG RX REV CODE 250: Performed by: HOSPITALIST

## 2019-08-29 PROCEDURE — 700102 HCHG RX REV CODE 250 W/ 637 OVERRIDE(OP): Performed by: HOSPITALIST

## 2019-08-29 PROCEDURE — 82746 ASSAY OF FOLIC ACID SERUM: CPT

## 2019-08-29 PROCEDURE — 83550 IRON BINDING TEST: CPT

## 2019-08-29 PROCEDURE — 82803 BLOOD GASES ANY COMBINATION: CPT

## 2019-08-29 PROCEDURE — 82728 ASSAY OF FERRITIN: CPT

## 2019-08-29 PROCEDURE — 700105 HCHG RX REV CODE 258: Performed by: EMERGENCY MEDICINE

## 2019-08-29 PROCEDURE — 85046 RETICYTE/HGB CONCENTRATE: CPT

## 2019-08-29 PROCEDURE — A9270 NON-COVERED ITEM OR SERVICE: HCPCS | Performed by: HOSPITALIST

## 2019-08-29 PROCEDURE — 94760 N-INVAS EAR/PLS OXIMETRY 1: CPT

## 2019-08-29 PROCEDURE — 99407 BEHAV CHNG SMOKING > 10 MIN: CPT | Performed by: HOSPITALIST

## 2019-08-29 PROCEDURE — 700105 HCHG RX REV CODE 258: Performed by: HOSPITALIST

## 2019-08-29 PROCEDURE — 99223 1ST HOSP IP/OBS HIGH 75: CPT | Mod: 25 | Performed by: HOSPITALIST

## 2019-08-29 PROCEDURE — 83605 ASSAY OF LACTIC ACID: CPT

## 2019-08-29 PROCEDURE — 700111 HCHG RX REV CODE 636 W/ 250 OVERRIDE (IP): Performed by: HOSPITALIST

## 2019-08-29 PROCEDURE — 80048 BASIC METABOLIC PNL TOTAL CA: CPT | Mod: 91

## 2019-08-29 RX ORDER — THIAMINE MONONITRATE (VIT B1) 100 MG
100 TABLET ORAL DAILY
Status: DISCONTINUED | OUTPATIENT
Start: 2019-08-30 | End: 2019-08-30 | Stop reason: HOSPADM

## 2019-08-29 RX ORDER — PROMETHAZINE HYDROCHLORIDE 25 MG/1
12.5-25 TABLET ORAL EVERY 4 HOURS PRN
Status: DISCONTINUED | OUTPATIENT
Start: 2019-08-29 | End: 2019-08-30 | Stop reason: HOSPADM

## 2019-08-29 RX ORDER — ONDANSETRON 4 MG/1
4 TABLET, ORALLY DISINTEGRATING ORAL EVERY 4 HOURS PRN
Status: DISCONTINUED | OUTPATIENT
Start: 2019-08-29 | End: 2019-08-30 | Stop reason: HOSPADM

## 2019-08-29 RX ORDER — PROCHLORPERAZINE EDISYLATE 5 MG/ML
5-10 INJECTION INTRAMUSCULAR; INTRAVENOUS EVERY 4 HOURS PRN
Status: DISCONTINUED | OUTPATIENT
Start: 2019-08-29 | End: 2019-08-30 | Stop reason: HOSPADM

## 2019-08-29 RX ORDER — BISACODYL 10 MG
10 SUPPOSITORY, RECTAL RECTAL
Status: DISCONTINUED | OUTPATIENT
Start: 2019-08-29 | End: 2019-08-30 | Stop reason: HOSPADM

## 2019-08-29 RX ORDER — LORAZEPAM 2 MG/ML
1.5 INJECTION INTRAMUSCULAR
Status: DISCONTINUED | OUTPATIENT
Start: 2019-08-29 | End: 2019-08-30 | Stop reason: HOSPADM

## 2019-08-29 RX ORDER — LORAZEPAM 2 MG/1
4 TABLET ORAL
Status: DISCONTINUED | OUTPATIENT
Start: 2019-08-29 | End: 2019-08-30 | Stop reason: HOSPADM

## 2019-08-29 RX ORDER — LORAZEPAM 2 MG/1
2 TABLET ORAL
Status: DISCONTINUED | OUTPATIENT
Start: 2019-08-29 | End: 2019-08-30 | Stop reason: HOSPADM

## 2019-08-29 RX ORDER — METHOCARBAMOL 750 MG/1
750 TABLET, FILM COATED ORAL 4 TIMES DAILY
Status: DISCONTINUED | OUTPATIENT
Start: 2019-08-29 | End: 2019-08-30 | Stop reason: HOSPADM

## 2019-08-29 RX ORDER — MORPHINE SULFATE 4 MG/ML
2 INJECTION, SOLUTION INTRAMUSCULAR; INTRAVENOUS
Status: DISCONTINUED | OUTPATIENT
Start: 2019-08-29 | End: 2019-08-30 | Stop reason: HOSPADM

## 2019-08-29 RX ORDER — LORAZEPAM 2 MG/ML
1 INJECTION INTRAMUSCULAR
Status: DISCONTINUED | OUTPATIENT
Start: 2019-08-29 | End: 2019-08-30 | Stop reason: HOSPADM

## 2019-08-29 RX ORDER — LORAZEPAM 1 MG/1
0.5 TABLET ORAL EVERY 4 HOURS PRN
Status: DISCONTINUED | OUTPATIENT
Start: 2019-08-29 | End: 2019-08-30 | Stop reason: HOSPADM

## 2019-08-29 RX ORDER — ONDANSETRON 2 MG/ML
4 INJECTION INTRAMUSCULAR; INTRAVENOUS EVERY 4 HOURS PRN
Status: DISCONTINUED | OUTPATIENT
Start: 2019-08-29 | End: 2019-08-30 | Stop reason: HOSPADM

## 2019-08-29 RX ORDER — AMOXICILLIN 250 MG
2 CAPSULE ORAL 2 TIMES DAILY
Status: DISCONTINUED | OUTPATIENT
Start: 2019-08-29 | End: 2019-08-30 | Stop reason: HOSPADM

## 2019-08-29 RX ORDER — PROMETHAZINE HYDROCHLORIDE 25 MG/1
12.5-25 SUPPOSITORY RECTAL EVERY 4 HOURS PRN
Status: DISCONTINUED | OUTPATIENT
Start: 2019-08-29 | End: 2019-08-30 | Stop reason: HOSPADM

## 2019-08-29 RX ORDER — POLYETHYLENE GLYCOL 3350 17 G/17G
1 POWDER, FOR SOLUTION ORAL
Status: DISCONTINUED | OUTPATIENT
Start: 2019-08-29 | End: 2019-08-30 | Stop reason: HOSPADM

## 2019-08-29 RX ORDER — NICOTINE 21 MG/24HR
14 PATCH, TRANSDERMAL 24 HOURS TRANSDERMAL
Status: DISCONTINUED | OUTPATIENT
Start: 2019-08-29 | End: 2019-08-30 | Stop reason: HOSPADM

## 2019-08-29 RX ORDER — ACETAMINOPHEN 325 MG/1
650 TABLET ORAL EVERY 6 HOURS PRN
Status: DISCONTINUED | OUTPATIENT
Start: 2019-08-29 | End: 2019-08-30 | Stop reason: HOSPADM

## 2019-08-29 RX ORDER — HEPARIN SODIUM 5000 [USP'U]/ML
5000 INJECTION, SOLUTION INTRAVENOUS; SUBCUTANEOUS EVERY 8 HOURS
Status: DISCONTINUED | OUTPATIENT
Start: 2019-08-29 | End: 2019-08-30 | Stop reason: HOSPADM

## 2019-08-29 RX ORDER — LISINOPRIL 10 MG/1
10 TABLET ORAL DAILY
Status: DISCONTINUED | OUTPATIENT
Start: 2019-08-29 | End: 2019-08-30 | Stop reason: HOSPADM

## 2019-08-29 RX ORDER — LORAZEPAM 2 MG/ML
0.5 INJECTION INTRAMUSCULAR EVERY 4 HOURS PRN
Status: DISCONTINUED | OUTPATIENT
Start: 2019-08-29 | End: 2019-08-30 | Stop reason: HOSPADM

## 2019-08-29 RX ORDER — ATORVASTATIN CALCIUM 80 MG/1
80 TABLET, FILM COATED ORAL
Status: DISCONTINUED | OUTPATIENT
Start: 2019-08-30 | End: 2019-08-30 | Stop reason: HOSPADM

## 2019-08-29 RX ORDER — DEXTROSE AND SODIUM CHLORIDE 5; .9 G/100ML; G/100ML
INJECTION, SOLUTION INTRAVENOUS CONTINUOUS
Status: DISCONTINUED | OUTPATIENT
Start: 2019-08-29 | End: 2019-08-30 | Stop reason: HOSPADM

## 2019-08-29 RX ORDER — LORAZEPAM 2 MG/ML
2 INJECTION INTRAMUSCULAR
Status: DISCONTINUED | OUTPATIENT
Start: 2019-08-29 | End: 2019-08-30 | Stop reason: HOSPADM

## 2019-08-29 RX ORDER — LORAZEPAM 1 MG/1
1 TABLET ORAL EVERY 4 HOURS PRN
Status: DISCONTINUED | OUTPATIENT
Start: 2019-08-29 | End: 2019-08-30 | Stop reason: HOSPADM

## 2019-08-29 RX ORDER — FOLIC ACID 1 MG/1
1 TABLET ORAL DAILY
Status: DISCONTINUED | OUTPATIENT
Start: 2019-08-30 | End: 2019-08-30 | Stop reason: HOSPADM

## 2019-08-29 RX ADMIN — MORPHINE SULFATE 2 MG: 4 INJECTION INTRAVENOUS at 02:33

## 2019-08-29 RX ADMIN — LISINOPRIL 10 MG: 10 TABLET ORAL at 05:40

## 2019-08-29 RX ADMIN — ACETAMINOPHEN 650 MG: 325 TABLET, FILM COATED ORAL at 12:16

## 2019-08-29 RX ADMIN — INSULIN HUMAN 2 UNITS: 100 INJECTION, SOLUTION PARENTERAL at 03:32

## 2019-08-29 RX ADMIN — HEPARIN SODIUM 5000 UNITS: 5000 INJECTION, SOLUTION INTRAVENOUS; SUBCUTANEOUS at 23:35

## 2019-08-29 RX ADMIN — INSULIN HUMAN 1 UNITS: 100 INJECTION, SOLUTION PARENTERAL at 12:24

## 2019-08-29 RX ADMIN — DEXTROSE AND SODIUM CHLORIDE: 5; 900 INJECTION, SOLUTION INTRAVENOUS at 05:40

## 2019-08-29 RX ADMIN — DEXTROSE AND SODIUM CHLORIDE: 5; 900 INJECTION, SOLUTION INTRAVENOUS at 13:43

## 2019-08-29 RX ADMIN — DEXTROSE MONOHYDRATE 250 ML: 100 INJECTION, SOLUTION INTRAVENOUS at 00:35

## 2019-08-29 RX ADMIN — HEPARIN SODIUM 5000 UNITS: 5000 INJECTION, SOLUTION INTRAVENOUS; SUBCUTANEOUS at 01:57

## 2019-08-29 RX ADMIN — HEPARIN SODIUM 5000 UNITS: 5000 INJECTION, SOLUTION INTRAVENOUS; SUBCUTANEOUS at 15:26

## 2019-08-29 RX ADMIN — DEXTROSE AND SODIUM CHLORIDE: 5; 900 INJECTION, SOLUTION INTRAVENOUS at 20:17

## 2019-08-29 RX ADMIN — HEPARIN SODIUM 5000 UNITS: 5000 INJECTION, SOLUTION INTRAVENOUS; SUBCUTANEOUS at 09:36

## 2019-08-29 RX ADMIN — INSULIN HUMAN 1 UNITS: 100 INJECTION, SOLUTION PARENTERAL at 17:04

## 2019-08-29 RX ADMIN — THIAMINE HYDROCHLORIDE: 100 INJECTION, SOLUTION INTRAMUSCULAR; INTRAVENOUS at 01:00

## 2019-08-29 ASSESSMENT — ENCOUNTER SYMPTOMS
WEIGHT LOSS: 1
SPEECH CHANGE: 0
WEAKNESS: 0
MYALGIAS: 0
HEMOPTYSIS: 0
CHILLS: 0
NAUSEA: 1
FLANK PAIN: 0
BLURRED VISION: 0
COUGH: 0
DIZZINESS: 0
SENSORY CHANGE: 0
SHORTNESS OF BREATH: 0
BRUISES/BLEEDS EASILY: 0
VOMITING: 0
ABDOMINAL PAIN: 1
FEVER: 0
FOCAL WEAKNESS: 0
DEPRESSION: 0
HALLUCINATIONS: 0
DOUBLE VISION: 0
PALPITATIONS: 0
HEARTBURN: 0
EYE DISCHARGE: 0

## 2019-08-29 ASSESSMENT — LIFESTYLE VARIABLES
AUDITORY DISTURBANCES: NOT PRESENT
AUDITORY DISTURBANCES: NOT PRESENT
NAUSEA AND VOMITING: NO NAUSEA AND NO VOMITING
VISUAL DISTURBANCES: NOT PRESENT
PAROXYSMAL SWEATS: NO SWEAT VISIBLE
VISUAL DISTURBANCES: NOT PRESENT
HEADACHE, FULLNESS IN HEAD: NOT PRESENT
PAROXYSMAL SWEATS: NO SWEAT VISIBLE
ANXIETY: NO ANXIETY (AT EASE)
VISUAL DISTURBANCES: NOT PRESENT
VISUAL DISTURBANCES: NOT PRESENT
HEADACHE, FULLNESS IN HEAD: MILD
HEADACHE, FULLNESS IN HEAD: VERY MILD
TOTAL SCORE: 3
NAUSEA AND VOMITING: NO NAUSEA AND NO VOMITING
TOTAL SCORE: 0
AUDITORY DISTURBANCES: NOT PRESENT
AUDITORY DISTURBANCES: NOT PRESENT
EVER_SMOKED: YES
HEADACHE, FULLNESS IN HEAD: NOT PRESENT
NAUSEA AND VOMITING: NO NAUSEA AND NO VOMITING
TREMOR: NO TREMOR
NAUSEA AND VOMITING: NO NAUSEA AND NO VOMITING
HEADACHE, FULLNESS IN HEAD: MILD
AGITATION: NORMAL ACTIVITY
ORIENTATION AND CLOUDING OF SENSORIUM: ORIENTED AND CAN DO SERIAL ADDITIONS
SUBSTANCE_ABUSE: 0
VISUAL DISTURBANCES: NOT PRESENT
PAROXYSMAL SWEATS: NO SWEAT VISIBLE
TOTAL SCORE: 2
VISUAL DISTURBANCES: NOT PRESENT
TOTAL SCORE: 0
EVER_SMOKED: YES
NAUSEA AND VOMITING: NO NAUSEA AND NO VOMITING
ORIENTATION AND CLOUDING OF SENSORIUM: ORIENTED AND CAN DO SERIAL ADDITIONS
TREMOR: NO TREMOR
TOTAL SCORE: 1
NAUSEA AND VOMITING: NO NAUSEA AND NO VOMITING
PAROXYSMAL SWEATS: NO SWEAT VISIBLE
AUDITORY DISTURBANCES: NOT PRESENT
ANXIETY: NO ANXIETY (AT EASE)
HEADACHE, FULLNESS IN HEAD: MODERATELY SEVERE
ANXIETY: NO ANXIETY (AT EASE)
TREMOR: NO TREMOR
PAROXYSMAL SWEATS: NO SWEAT VISIBLE
ORIENTATION AND CLOUDING OF SENSORIUM: ORIENTED AND CAN DO SERIAL ADDITIONS
TOTAL SCORE: 4
ANXIETY: NO ANXIETY (AT EASE)
TREMOR: NO TREMOR
TREMOR: TREMOR NOT VISIBLE BUT CAN BE FELT, FINGERTIP TO FINGERTIP
AGITATION: NORMAL ACTIVITY
TREMOR: NO TREMOR
AGITATION: NORMAL ACTIVITY
ORIENTATION AND CLOUDING OF SENSORIUM: ORIENTED AND CAN DO SERIAL ADDITIONS
AUDITORY DISTURBANCES: NOT PRESENT
ANXIETY: NO ANXIETY (AT EASE)
ORIENTATION AND CLOUDING OF SENSORIUM: ORIENTED AND CAN DO SERIAL ADDITIONS
PAROXYSMAL SWEATS: NO SWEAT VISIBLE
ORIENTATION AND CLOUDING OF SENSORIUM: ORIENTED AND CAN DO SERIAL ADDITIONS
ANXIETY: NO ANXIETY (AT EASE)

## 2019-08-29 ASSESSMENT — COGNITIVE AND FUNCTIONAL STATUS - GENERAL
SUGGESTED CMS G CODE MODIFIER DAILY ACTIVITY: CK
HELP NEEDED FOR BATHING: A LITTLE
TOILETING: A LITTLE
DAILY ACTIVITIY SCORE: 18
PERSONAL GROOMING: A LITTLE
DRESSING REGULAR LOWER BODY CLOTHING: A LITTLE
DRESSING REGULAR UPPER BODY CLOTHING: A LITTLE
SUGGESTED CMS G CODE MODIFIER MOBILITY: CH
EATING MEALS: A LITTLE
MOBILITY SCORE: 24

## 2019-08-29 ASSESSMENT — COPD QUESTIONNAIRES
DURING THE PAST 4 WEEKS HOW MUCH DID YOU FEEL SHORT OF BREATH: SOME OF THE TIME
COPD SCREENING SCORE: 5
HAVE YOU SMOKED AT LEAST 100 CIGARETTES IN YOUR ENTIRE LIFE: YES
DO YOU EVER COUGH UP ANY MUCUS OR PHLEGM?: NO/ONLY WITH OCCASIONAL COLDS OR INFECTIONS

## 2019-08-29 ASSESSMENT — PATIENT HEALTH QUESTIONNAIRE - PHQ9
1. LITTLE INTEREST OR PLEASURE IN DOING THINGS: NOT AT ALL
2. FEELING DOWN, DEPRESSED, IRRITABLE, OR HOPELESS: NOT AT ALL
SUM OF ALL RESPONSES TO PHQ9 QUESTIONS 1 AND 2: 0

## 2019-08-29 NOTE — DISCHARGE PLANNING
Care Transition Team Assessment     RN ROBBIE met with patient at bedside to verify information from last admit 6 days ago was still current. Patient expressing that she would like assistance with getting a PCP, scheduling notified for assistance. Patient gets assistance from food bank and uses food stamps.    Patient currently employed at [employer] and has been for about one month. She takes the bus to work, and occasionally gets rides from her son who will be ride home upon discharge.   Resources given for alcohol and drug abuse. Patient states that she is hoping to find a facility that is in walking distance of her home.        Information Source  Orientation : Oriented x 4  Information Given By: Patient  Informant's Name: Rhonda  Who is responsible for making decisions for patient? : Patient    Readmission Evaluation  Is this a readmission?: Yes - unplanned readmission  Was an appointment arranged for you prior to discharge?: No  Did you understand your discharge instructions?: Yes  Did you have enough support after your last discharge?: Yes    Elopement Risk  Legal Hold: No  Ambulatory or Self Mobile in Wheelchair: No-Not an Elopement Risk  Disoriented: No  Psychiatric Symptoms: None  History of Wandering: No  Elopement this Admit: No  Vocalizing Wanting to Leave: No  Displays Behaviors, Body Language Wanting to Leave: No-Not at Risk for Elopement    Interdisciplinary Discharge Planning  Does Admitting Nurse Feel This Could be a Complex Discharge?: No  Lives with - Patient's Self Care Capacity: Alone and Able to Care For Self  Patient or legal guardian wants to designate a caregiver (see row info): No  Support Systems: Children  Housing / Facility: Motel  Able to Return to Previous ADL's: Yes  Mobility Issues: No  Prior Services: Other (Comments)(Food bank/food stamps)  Patient Expects to be Discharged to:: home  Assistance Needed: Unknown at this Time  Durable Medical Equipment: Not Applicable    Discharge  Preparedness  What is your plan after discharge?: (home)  What are your discharge supports?: Child  Prior Functional Level: Ambulatory, Independent with Activities of Daily Living, Independent with Medication Management  Difficulity with ADLs: None  Difficulity with IADLs: None    Functional Assesment  Prior Functional Level: Ambulatory, Independent with Activities of Daily Living, Independent with Medication Management    Finances  Prescription Coverage: Yes    Vision / Hearing Impairment  Vision Impairment : No  Right Eye Vision: Wears Glasses  Left Eye Vision: Wears Glasses  Hearing Impairment : No     Advance Directive  Advance Directive?: None  Advance Directive offered?: AD Booklet refused    Domestic Abuse  Have you ever been the victim of abuse or violence?: No  Physical Abuse or Sexual Abuse: No  Verbal Abuse or Emotional Abuse: No  Possible Abuse Reported to:: Not Applicable    Psychological Assessment  History of Substance Abuse: Alcohol, Marijuana  Date Last Used - Alcohol: 8/26/19    Discharge Risks or Barriers  Discharge risks or barriers?: Uninsured / underinsured, Substance abuse  Patient risk factors: Uninsured or underinsured, No PCP, Homeless, Vulnerable adult    Anticipated Discharge Information  Anticipated discharge disposition: Home  Discharge Address: [facility] [address] [place] [address] [address] [place] [address]  Discharge Contact Phone Number: [phone]

## 2019-08-29 NOTE — DISCHARGE PLANNING
Patient is eligible for Medicaid Meds to Beds at discharge Monday-Friday 8 a.m. - 4 p.m. Preferred pharmacy changed to [facility]. Please call [phone] prior to discharge.

## 2019-08-29 NOTE — ED TRIAGE NOTES
"Chief Complaint   Patient presents with   • Palpitations     PT reports palpiations starting this AM. Hx HTN. Out of lisinopril past week.   • Detox     PT was drinking pint hard alcohol daily, last drink was sunday. EMS EKG strip shows peaked T waves.     /112   Pulse (!) 112   Temp 37 °C (98.6 °F) (Temporal)   Resp 20   Ht 1.727 m (5' 8\")   Wt 47.6 kg (105 lb)   LMP 06/11/2005   SpO2 100%   BMI 15.97 kg/m²     PT to ER for above complaint.  PT reports her last drink was Sunday, pt is normally a heavy drinker. PT is tachycardic and hypertensive upon arrival.    PT received 200 ml NS from EMS.  "

## 2019-08-29 NOTE — DIETARY
"Nutrition services: Day 0 of admit.  Rhonda Gould is a 48 y.o. female with admitting DX of Alcohol ketoacidosis, Alcohol Withdrawal.  History includes Diabetes, HTN, COPD.  Pt with BMI <19, MST score of 1 for decreased appetite.  Pt with recent admit with nutrition screen 8/23 indicating unplanned weight loss of 2-13 lbs x 3 months.      Assessment:  Height: 172.7 cm (5' 8\")  Weight: 47.9 kg (105 lb 9.6 oz)  Body mass index is 16.06 kg/m²., BMI classification: Underweight  Diet/Intake: Diabetic    Evaluation:   1. Discussed weight history with pt.  Current weight of 105 lbs is about her normal.  States the highest she's weighed is 117 lbs.  2. Pt admits to poor intake past few days.  Pt states she is now hungry and would like extra snacks.  3. Medications include SSI, MVI, Thiamine, Folic Acid.  4. Labs include (8/29) Glu 119 (H), POC glucose 8/29 ranging 112-239.    Malnutrition Risk: Pt at risk with low BMI, unable to meet criteria at this time.    Recommendations/Plan:  1. Diabetic diet as ordered.  2. Offer snacks between meals.   3. Encourage intake of >50% of meals and snacks.  4. Document intake of all meals and snacks as % taken in ADL's to provide interdisciplinary communication across all shifts.   5. Monitor weight.  6. Nutrition rep will continue to see patient for ongoing meal and snack preferences.   7. RD following.            "

## 2019-08-29 NOTE — CARE PLAN
Problem: Nutritional:  Goal: Achieve adequate nutritional intake  Description  Patient will consume 50% of meals   Outcome: PROGRESSING AS EXPECTED

## 2019-08-29 NOTE — PROGRESS NOTES
Please refer to full H&P by Dr. Hanna,     She was admitted for etoh withdrawal and placed on CIWA, however per patient her last drink last weekend, ciwa scores are low.     Replace electrolytes  BMP normalized    If stable tomorrow will dc tomorrow

## 2019-08-29 NOTE — ASSESSMENT & PLAN NOTE
Greater than 10 minutes spent with patient smoking cessation counseling, discussed cardiovascular risk factors of smoking. Nicotine patch provided.

## 2019-08-29 NOTE — H&P
Hospital Medicine History & Physical Note    Date of Service  8/29/2019    Primary Care Physician  No primary care provider on file.    Consultants  none    Code Status  full    Chief Complaint  Alcohol abuse and withdrawal     History of Presenting Illness  48 y.o. female who presented 8/28/2019 with Past medical history of COPD and hypertension who presents with alcohol withdrawals.  This patient drinks a pint of hard liquor daily.  Her last drink was 2 days ago.  She has had poor oral intake since that time.  She is also noticed increased tremulous.  She is also noticed agitation and shakiness.  In the emergency department she appears to be in alcohol withdrawal.  She is also noticed to be with significant ketoacidosis.  She will be admitted to the hospital for IV hydration and monitor for withdrawal.  Otherwise she has no known alleviating or exacerbating factors to her symptoms.    Review of Systems  Review of Systems   Constitutional: Positive for weight loss. Negative for chills and fever.   HENT: Negative for congestion, hearing loss and tinnitus.    Eyes: Negative for blurred vision, double vision and discharge.   Respiratory: Negative for cough, hemoptysis and shortness of breath.    Cardiovascular: Negative for chest pain, palpitations and leg swelling.   Gastrointestinal: Positive for abdominal pain and nausea. Negative for heartburn and vomiting.   Genitourinary: Negative for dysuria and flank pain.   Musculoskeletal: Negative for joint pain and myalgias.   Skin: Negative for rash.   Neurological: Negative for dizziness, sensory change, speech change, focal weakness and weakness.   Endo/Heme/Allergies: Negative for environmental allergies. Does not bruise/bleed easily.   Psychiatric/Behavioral: Negative for depression, hallucinations and substance abuse.       Past Medical History   has a past medical history of Chronic obstructive pulmonary disease (HCC) and Hypertension.    Surgical History   has a  past surgical history that includes hysterectomy laparoscopy (6/2005).     Family History  family history includes Cancer in her maternal grandfather; Diabetes in her father and mother; Heart Disease in her maternal grandmother; Hypertension in her father and mother.     Social History   reports that she has been smoking cigarettes. She has a 7.50 pack-year smoking history. She has never used smokeless tobacco. She reports that she drinks alcohol. She reports that she has current or past drug history. Drugs: Inhaled and Marijuana. Frequency: 2.00 times per week.    Allergies  Allergies   Allergen Reactions   • Codeine Hives and Itching   • Shellfish Allergy Anaphylaxis   • Codeine Hives, Itching and Swelling     Throat swells closed  RXN=>10 years ago   • Codeine    • Fish Allergy Shortness of Breath     Swelling to throat.       Medications  Prior to Admission Medications   Prescriptions Last Dose Informant Patient Reported? Taking?   albuterol 108 (90 BASE) MCG/ACT Aero Soln inhalation aerosol   No No   Sig: Inhale 2 Puffs by mouth every four hours as needed.   albuterol 108 (90 Base) MCG/ACT Aero Soln inhalation aerosol   No No   Sig: Inhale 2 Puffs by mouth every 6 hours as needed for Shortness of Breath.   atorvastatin (LIPITOR) 80 MG tablet   No No   Sig: Take 1 Tab by mouth every bedtime.   atorvastatin (LIPITOR) 80 MG tablet  Patient Yes No   Sig: Take 80 mg by mouth every evening.   folic acid (FOLVITE) 1 MG Tab   No No   Sig: Take 1 Tab by mouth every day.   lisinopril (PRINIVIL) 10 MG Tab   No No   Sig: Take 1 Tab by mouth every day.   lisinopril (PRINIVIL) 10 MG Tab  Patient Yes No   Sig: Take 10 mg by mouth every morning.   meclizine (ANTIVERT) 25 MG Tab   No No   Sig: Take 1 Tab by mouth 3 times a day as needed for Vertigo.   meclizine (ANTIVERT) 25 MG Tab  Patient Yes No   Sig: Take 25 mg by mouth 3 times a day as needed for Nausea/Vomiting or Vertigo.   methocarbamol (ROBAXIN) 750 MG Tab  Patient Yes  No   Sig: Take 750 mg by mouth 4 times a day.   methocarbamol (ROBAXIN-750) 750 MG Tab   No No   Sig: Take 1 Tab by mouth 4 times a day.   ondansetron (ZOFRAN ODT) 4 MG TABLET DISPERSIBLE   No No   Sig: Take 1 Tab by mouth every 6 hours as needed for Nausea.   ondansetron (ZOFRAN ODT) 4 MG TABLET DISPERSIBLE  Patient Yes No   Sig: Take 4 mg by mouth every 6 hours as needed for Nausea.   thiamine (THIAMINE) 100 MG tablet   No No   Sig: Take 1 Tab by mouth every day.      Facility-Administered Medications: None       Physical Exam  Temp:  [37 °C (98.6 °F)] 37 °C (98.6 °F)  Pulse:  [112-116] 116  Resp:  [20] 20  BP: (126-157)/() 126/98  SpO2:  [98 %-100 %] 98 %    Physical Exam   Constitutional: She is oriented to person, place, and time. She appears well-developed and well-nourished. No distress.   HENT:   Head: Normocephalic and atraumatic.   Dry oral mucosa    Eyes: Pupils are equal, round, and reactive to light. Conjunctivae and EOM are normal.   Neck: Normal range of motion. Neck supple. No JVD present.   Cardiovascular: Regular rhythm, normal heart sounds and intact distal pulses.   No murmur heard.  Tachycardic    Pulmonary/Chest: Effort normal and breath sounds normal. No respiratory distress. She has no wheezes.   Abdominal: Soft. Bowel sounds are normal. She exhibits no distension. There is tenderness.   Epigastric ttp    Musculoskeletal: Normal range of motion. She exhibits no edema.   Neurological: She is alert and oriented to person, place, and time. She exhibits normal muscle tone.   Tremulous    Skin: Skin is warm and dry.   Psychiatric: She has a normal mood and affect. Her behavior is normal. Judgment and thought content normal.   Nursing note and vitals reviewed.      Laboratory:  Recent Labs     08/28/19 2153   WBC 7.3   RBC 3.83*   HEMOGLOBIN 11.9*   HEMATOCRIT 36.7*   MCV 95.8   MCH 31.1   MCHC 32.4*   RDW 52.5*   PLATELETCT 256   MPV 10.0     Recent Labs     08/28/19 2153   SODIUM 130*    POTASSIUM 4.8   CHLORIDE 93*   CO2 11*   GLUCOSE 72   BUN 18   CREATININE 0.85   CALCIUM 8.6     Recent Labs     08/28/19  2153   ALTSGPT 28   ASTSGOT 52*   ALKPHOSPHAT 63   TBILIRUBIN 0.8   LIPASE 19   GLUCOSE 72         No results for input(s): NTPROBNP in the last 72 hours.      No results for input(s): TROPONINT in the last 72 hours.    Urinalysis:    No results found     Imaging:  No orders to display         Assessment/Plan:  I anticipate this patient will require at least two midnights for appropriate medical management, necessitating inpatient admission.    * Alcohol withdrawal (HCC)  Assessment & Plan  Drinks pint or alcohol daily   Cont with CIWA Protocol   Rally bag   Prn ativan   Monitor and replace lytes as appropriate  Cardiac monitoring for now     Hyponatremia  Assessment & Plan  Appears hypovolemic   Cont with IVF and montior closely     Anemia  Assessment & Plan  Mild, no evidence of bleeding  Cont to monitor hgb closely  Lab workup ordered    HLD (hyperlipidemia)  Assessment & Plan  Resume high dose statin therapy     Metabolic acidosis, increased anion gap  Assessment & Plan  Suspect likely combined starvation ketosis and alcoholic ketoacidosis   Rally bag now   Cont with D5NS   Serial BMP   Check vbg       Tobacco use- (present on admission)  Assessment & Plan  Greater than 10 minutes spent with patient smoking cessation counseling, discussed cardiovascular risk factors of smoking. Nicotine patch provided.     Hypertension- (present on admission)  Assessment & Plan  Resume home ace       VTE prophylaxis: heparin

## 2019-08-29 NOTE — PROGRESS NOTES
Received bedside report from PM nurse. Assumed patient care. Chart reviewed. Pt was resting in bed. A&O x 4. No concerns, complaints or distress. Patient denies pain. CIWA negative. POC updated with pt and on the patient communication board. Bed locked and in the lowest position. Call light within reach. Tele box on. Will continue to monitor.

## 2019-08-29 NOTE — ASSESSMENT & PLAN NOTE
Suspect likely combined starvation ketosis and alcoholic ketoacidosis   Rally bag now   Cont with D5NS   Serial BMP   Check vbg

## 2019-08-29 NOTE — ASSESSMENT & PLAN NOTE
Drinks pint or alcohol daily   Cont with CIWA Protocol   Rally bag   Prn ativan   Monitor and replace lytes as appropriate  Cardiac monitoring for now

## 2019-08-29 NOTE — ED PROVIDER NOTES
ED Provider Note    ED Provider Note      Primary care provider: No primary care provider on file.    CHIEF COMPLAINT  Chief Complaint   Patient presents with   • Palpitations     PT reports palpiations starting this AM. Hx HTN. Out of lisinopril past week.   • Detox     PT was drinking pint hard alcohol daily, last drink was sunday. EMS EKG strip shows peaked T waves.       HPI  Rhonda Gould is a 48 y.o. female who presents to the Emergency Department with chief complaint of palpitations alcohol withdrawal.  Patient was admitted to the hospital last week for several days for diagnosis of patient returns this evening stating that earlier in the heart was racing out of her chest.  Also had minor shortness of breath.  She reports that she is also felt very shaky and had some moderate nausea patient reports that she does think she is the detoxing from alcohol.  She states that she was drinking approximately 2 pints of hard alcohol daily for the last several months.  Moderate emesis no blood in emesis no bleeding or discharge.  She is a type II diabetic.    REVIEW OF SYSTEMS  10 systems reviewed and otherwise negative, pertinent positives and negatives listed in the history of present illness.    PAST MEDICAL HISTORY   has a past medical history of Chronic obstructive pulmonary disease (HCC) and Hypertension.    SURGICAL HISTORY   has a past surgical history that includes hysterectomy laparoscopy (6/2005).    SOCIAL HISTORY  Social History     Tobacco Use   • Smoking status: Current Every Day Smoker     Packs/day: 0.25     Years: 30.00     Pack years: 7.50     Types: Cigarettes   • Smokeless tobacco: Never Used   Substance Use Topics   • Alcohol use: Yes     Frequency: 2-3 times a week     Comment: socially couple times per week   • Drug use: Yes     Frequency: 2.0 times per week     Types: Inhaled, Marijuana     Comment: marijuana      Social History     Substance and Sexual Activity   Drug Use Yes   • Frequency:  "2.0 times per week   • Types: Inhaled, Marijuana    Comment: marijuana       FAMILY HISTORY  Non-Contributory    CURRENT MEDICATIONS  Home Medications    **Home medications have not yet been reviewed for this encounter**         ALLERGIES  Allergies   Allergen Reactions   • Codeine Hives and Itching   • Shellfish Allergy Anaphylaxis   • Codeine Hives, Itching and Swelling     Throat swells closed  RXN=>10 years ago   • Codeine    • Fish Allergy Shortness of Breath     Swelling to throat.       PHYSICAL EXAM  VITAL SIGNS: /112   Pulse (!) 112   Temp 37 °C (98.6 °F) (Temporal)   Resp 20   Ht 1.727 m (5' 8\")   Wt 47.6 kg (105 lb)   LMP 06/11/2005   SpO2 100%   BMI 15.97 kg/m²   Pulse ox interpretation: I interpret this pulse ox as normal.  Constitutional: Alert and oriented x 3, minimal distress  HEENT: Atraumatic normocephalic, pupils are equal round reactive to light extraocular movements are intact. The nares is clear, external ears are normal, mouth shows moist mucous membranes  Neck: Supple, no JVD no tracheal deviation  Cardiovascular: Regular rate and rhythm no murmur rub or gallop 2+ pulses peripherally x4  Thorax & Lungs: No respiratory distress, no wheezes rales or rhonchi, No chest tenderness.   GI: Soft nontender nondistended positive bowel sounds, no peritoneal signs  Skin: Warm dry no acute rash or lesion  Musculoskeletal: Moving all extremities with full range and 5 of 5 strength, no acute  deformity  Neurologic: Cranial nerves III through XII are grossly intact, no sensory deficit, no cerebellar dysfunction   Psychiatric: Appropriate affect for situation at this time      DIAGNOSTIC STUDIES / PROCEDURES  LABS    Results for orders placed or performed during the hospital encounter of 08/28/19   CBC WITH DIFFERENTIAL   Result Value Ref Range    WBC 7.3 4.8 - 10.8 K/uL    RBC 3.83 (L) 4.20 - 5.40 M/uL    Hemoglobin 11.9 (L) 12.0 - 16.0 g/dL    Hematocrit 36.7 (L) 37.0 - 47.0 %    MCV 95.8 " 81.4 - 97.8 fL    MCH 31.1 27.0 - 33.0 pg    MCHC 32.4 (L) 33.6 - 35.0 g/dL    RDW 52.5 (H) 35.9 - 50.0 fL    Platelet Count 256 164 - 446 K/uL    MPV 10.0 9.0 - 12.9 fL    Neutrophils-Polys 76.00 (H) 44.00 - 72.00 %    Lymphocytes 16.90 (L) 22.00 - 41.00 %    Monocytes 6.70 0.00 - 13.40 %    Eosinophils 0.00 0.00 - 6.90 %    Basophils 0.30 0.00 - 1.80 %    Immature Granulocytes 0.10 0.00 - 0.90 %    Nucleated RBC 0.00 /100 WBC    Neutrophils (Absolute) 5.57 2.00 - 7.15 K/uL    Lymphs (Absolute) 1.24 1.00 - 4.80 K/uL    Monos (Absolute) 0.49 0.00 - 0.85 K/uL    Eos (Absolute) 0.00 0.00 - 0.51 K/uL    Baso (Absolute) 0.02 0.00 - 0.12 K/uL    Immature Granulocytes (abs) 0.01 0.00 - 0.11 K/uL    NRBC (Absolute) 0.00 K/uL   COMP METABOLIC PANEL   Result Value Ref Range    Sodium 130 (L) 135 - 145 mmol/L    Potassium 4.8 3.6 - 5.5 mmol/L    Chloride 93 (L) 96 - 112 mmol/L    Co2 11 (L) 20 - 33 mmol/L    Anion Gap 26.0 (H) 0.0 - 11.9    Glucose 72 65 - 99 mg/dL    Bun 18 8 - 22 mg/dL    Creatinine 0.85 0.50 - 1.40 mg/dL    Calcium 8.6 8.5 - 10.5 mg/dL    AST(SGOT) 52 (H) 12 - 45 U/L    ALT(SGPT) 28 2 - 50 U/L    Alkaline Phosphatase 63 30 - 99 U/L    Total Bilirubin 0.8 0.1 - 1.5 mg/dL    Albumin 4.1 3.2 - 4.9 g/dL    Total Protein 6.5 6.0 - 8.2 g/dL    Globulin 2.4 1.9 - 3.5 g/dL    A-G Ratio 1.7 g/dL   LIPASE   Result Value Ref Range    Lipase 19 11 - 82 U/L   DIAGNOSTIC ALCOHOL   Result Value Ref Range    Diagnostic Alcohol 0.01 (H) 0.00 g/dL   ESTIMATED GFR   Result Value Ref Range    GFR If African American >60 >60 mL/min/1.73 m 2    GFR If Non African American >60 >60 mL/min/1.73 m 2   BETA-HYDROXYBUTYRIC ACID   Result Value Ref Range    beta-Hydroxybutyric Acid 6.38 (H) 0.02 - 0.27 mmol/L   LACTIC ACID   Result Value Ref Range    Lactic Acid 2.4 (H) 0.5 - 2.0 mmol/L   Basic Metabolic Panel   Result Value Ref Range    Sodium 135 135 - 145 mmol/L    Potassium 3.8 3.6 - 5.5 mmol/L    Chloride 105 96 - 112 mmol/L     Co2 20 20 - 33 mmol/L    Glucose 225 (H) 65 - 99 mg/dL    Bun 12 8 - 22 mg/dL    Creatinine 0.81 0.50 - 1.40 mg/dL    Calcium 8.0 (L) 8.5 - 10.5 mg/dL    Anion Gap 10.0 0.0 - 11.9   VENOUS BLOOD GAS   Result Value Ref Range    Venous Bg Ph 7.42 7.31 - 7.45    Venous Bg Pco2 29.1 (L) 41.0 - 51.0 mmHg    Venous Bg Po2 50.6 (H) 25.0 - 40.0 mmHg    Venous Bg O2 Saturation 81.8 %    Venous Bg Hco3 18 (L) 24 - 28 mmol/L    Venous Bg Base Excess -5 mmol/L    Body Temp see below Centigrade   ESTIMATED GFR   Result Value Ref Range    GFR If African American >60 >60 mL/min/1.73 m 2    GFR If Non African American >60 >60 mL/min/1.73 m 2   LACTIC ACID   Result Value Ref Range    Lactic Acid 1.1 0.5 - 2.0 mmol/L   ACCU-CHEK GLUCOSE   Result Value Ref Range    Glucose - Accu-Ck 239 (H) 65 - 99 mg/dL   EKG (NOW)   Result Value Ref Range    Report       [facility] Emergency [facility]    Test Date:  2019  Pt Name:    JASON SCHMITZ                Department: ER  MRN:        [id number]                      Room:        04  Gender:     Female                       Technician: [id number]  :        1970                   Requested By:ER TRIAGE PROTOCOL  Order #:    [id number]                    Reading MD:    Measurements  Intervals                                Axis  Rate:       108                          P:          85  PA:         128                          QRS:        72  QRSD:       68                           T:          74  QT:         348  QTc:        467    Interpretive Statements  SINUS TACHYCARDIA  BIATRIAL ABNORMALITIES  RSR' IN V1 OR V2, PROBABLY NORMAL VARIANT  BORDERLINE T ABNORMALITIES, ANT-LAT LEADS  Compared to ECG 2019 01:11:51  T-wave abnormality now present  Sinus rhythm no longer present         All labs reviewed by me.      RADIOLOGY  No orders to display     The radiologist's interpretation of all radiological studies have been reviewed by me.    COURSE & MEDICAL DECISION  "MAKING  Pertinent Labs & Imaging studies reviewed. (See chart for details)    10:42 PM - Patient seen and examined at bedside.         Patient noted to have slightly elevated blood pressure likely circumstantial secondary to presenting complaint. Referred to primary care physician for further evaluation.     Patient was given IV fluids based on anion gap metabolic acidosis, oral hydration was not attempted due to insufficiency for hydration, after fluids had improvement of vital signs    Medical Decision Making: Patient presents with profound tachycardia labs demonstrate an anion gap of 26.  Her glucose is 72.  She does have a history of diabetes this is much more likely alcoholic ketoacidosis rather than diabetic ketoacidosis therefore patient was given glucose while in the emergency department as well as 2 L of fluid.  Beta hydroxybutyric acid is pending at this point as his lactic acid I discussed case with hospitalist Dr. Hanna and the patient's admitted to the ICU for ongoing management.    /72   Pulse (!) 101   Temp 36.6 °C (97.9 °F) (Temporal)   Resp 16   Ht 1.727 m (5' 8\")   Wt 47.9 kg (105 lb 9.6 oz)   LMP 06/11/2005   SpO2 98%   BMI 16.06 kg/m²             FINAL IMPRESSION  1. Alcoholic ketoacidosis Active    2.  Alcohol withdrawal  3.  Nausea vomiting  4.  Metabolic acidosis    This dictation has been created using voice recognition software and/or scribes. The accuracy of the dictation is limited by the abilities of the software and the expertise of the scribes. I expect there may be some errors of grammar and possibly content. I made every attempt to manually correct the errors within my dictation. However, errors related to voice recognition software and/or scribes may still exist and should be interpreted within the appropriate context.            "

## 2019-08-29 NOTE — PROGRESS NOTES
2 RN skin check completed with Michel ORTIZ.  Devices in place, none.  Skin assessed, no evidence of breakdown or ulcers.    The following interventions in place: Pressure redistribution mattress and education on frequent position changes.

## 2019-08-29 NOTE — CARE PLAN
Problem: Safety  Goal: Will remain free from injury  Outcome: PROGRESSING AS EXPECTED  Goal: Will remain free from falls  Outcome: PROGRESSING AS EXPECTED     Problem: Pain Management  Goal: Pain level will decrease to patient's comfort goal  Outcome: PROGRESSING AS EXPECTED     Problem: Fluid Volume:  Goal: Will maintain balanced intake and output  Outcome: PROGRESSING AS EXPECTED     Problem: Safety:  Goal: Will remain free from injury  Outcome: PROGRESSING AS EXPECTED     Problem: Respiratory:  Goal: Ability to maintain adequate ventilation will improve  Outcome: PROGRESSING AS EXPECTED

## 2019-08-30 VITALS
HEART RATE: 96 BPM | SYSTOLIC BLOOD PRESSURE: 134 MMHG | TEMPERATURE: 97.6 F | WEIGHT: 108.25 LBS | OXYGEN SATURATION: 95 % | BODY MASS INDEX: 16.41 KG/M2 | HEIGHT: 68 IN | RESPIRATION RATE: 16 BRPM | DIASTOLIC BLOOD PRESSURE: 99 MMHG

## 2019-08-30 PROBLEM — E87.29 METABOLIC ACIDOSIS, INCREASED ANION GAP: Status: RESOLVED | Noted: 2019-08-29 | Resolved: 2019-08-30

## 2019-08-30 PROBLEM — F10.939 ALCOHOL WITHDRAWAL (HCC): Status: RESOLVED | Noted: 2019-08-29 | Resolved: 2019-08-30

## 2019-08-30 PROBLEM — E87.1 HYPONATREMIA: Status: RESOLVED | Noted: 2019-08-29 | Resolved: 2019-08-30

## 2019-08-30 LAB
ALBUMIN SERPL BCP-MCNC: 3.3 G/DL (ref 3.2–4.9)
ALBUMIN/GLOB SERPL: 1.7 G/DL
ALP SERPL-CCNC: 52 U/L (ref 30–99)
ALT SERPL-CCNC: 20 U/L (ref 2–50)
ANION GAP SERPL CALC-SCNC: 9 MMOL/L (ref 0–11.9)
AST SERPL-CCNC: 38 U/L (ref 12–45)
BASOPHILS # BLD AUTO: 0.4 % (ref 0–1.8)
BASOPHILS # BLD: 0.01 K/UL (ref 0–0.12)
BILIRUB SERPL-MCNC: 0.7 MG/DL (ref 0.1–1.5)
BUN SERPL-MCNC: 4 MG/DL (ref 8–22)
CALCIUM SERPL-MCNC: 8.2 MG/DL (ref 8.5–10.5)
CHLORIDE SERPL-SCNC: 109 MMOL/L (ref 96–112)
CO2 SERPL-SCNC: 22 MMOL/L (ref 20–33)
CREAT SERPL-MCNC: 0.71 MG/DL (ref 0.5–1.4)
EOSINOPHIL # BLD AUTO: 0.08 K/UL (ref 0–0.51)
EOSINOPHIL NFR BLD: 2.9 % (ref 0–6.9)
ERYTHROCYTE [DISTWIDTH] IN BLOOD BY AUTOMATED COUNT: 53.1 FL (ref 35.9–50)
GLOBULIN SER CALC-MCNC: 2 G/DL (ref 1.9–3.5)
GLUCOSE BLD-MCNC: 117 MG/DL (ref 65–99)
GLUCOSE BLD-MCNC: 145 MG/DL (ref 65–99)
GLUCOSE SERPL-MCNC: 127 MG/DL (ref 65–99)
HCT VFR BLD AUTO: 33.8 % (ref 37–47)
HGB BLD-MCNC: 10.9 G/DL (ref 12–16)
IMM GRANULOCYTES # BLD AUTO: 0 K/UL (ref 0–0.11)
IMM GRANULOCYTES NFR BLD AUTO: 0 % (ref 0–0.9)
LYMPHOCYTES # BLD AUTO: 1.49 K/UL (ref 1–4.8)
LYMPHOCYTES NFR BLD: 53.6 % (ref 22–41)
MCH RBC QN AUTO: 30.9 PG (ref 27–33)
MCHC RBC AUTO-ENTMCNC: 32.2 G/DL (ref 33.6–35)
MCV RBC AUTO: 95.8 FL (ref 81.4–97.8)
MONOCYTES # BLD AUTO: 0.24 K/UL (ref 0–0.85)
MONOCYTES NFR BLD AUTO: 8.6 % (ref 0–13.4)
NEUTROPHILS # BLD AUTO: 0.96 K/UL (ref 2–7.15)
NEUTROPHILS NFR BLD: 34.5 % (ref 44–72)
NRBC # BLD AUTO: 0 K/UL
NRBC BLD-RTO: 0 /100 WBC
PLATELET # BLD AUTO: 198 K/UL (ref 164–446)
PMV BLD AUTO: 9.9 FL (ref 9–12.9)
POTASSIUM SERPL-SCNC: 3.2 MMOL/L (ref 3.6–5.5)
PROT SERPL-MCNC: 5.3 G/DL (ref 6–8.2)
RBC # BLD AUTO: 3.53 M/UL (ref 4.2–5.4)
SODIUM SERPL-SCNC: 140 MMOL/L (ref 135–145)
WBC # BLD AUTO: 2.8 K/UL (ref 4.8–10.8)

## 2019-08-30 PROCEDURE — 700102 HCHG RX REV CODE 250 W/ 637 OVERRIDE(OP): Performed by: HOSPITALIST

## 2019-08-30 PROCEDURE — 99239 HOSP IP/OBS DSCHRG MGMT >30: CPT | Performed by: HOSPITALIST

## 2019-08-30 PROCEDURE — 700105 HCHG RX REV CODE 258: Performed by: HOSPITALIST

## 2019-08-30 PROCEDURE — 80053 COMPREHEN METABOLIC PANEL: CPT

## 2019-08-30 PROCEDURE — 99407 BEHAV CHNG SMOKING > 10 MIN: CPT

## 2019-08-30 PROCEDURE — 85025 COMPLETE CBC W/AUTO DIFF WBC: CPT

## 2019-08-30 PROCEDURE — 82962 GLUCOSE BLOOD TEST: CPT

## 2019-08-30 PROCEDURE — A9270 NON-COVERED ITEM OR SERVICE: HCPCS | Performed by: HOSPITALIST

## 2019-08-30 PROCEDURE — 94664 DEMO&/EVAL PT USE INHALER: CPT

## 2019-08-30 PROCEDURE — 36415 COLL VENOUS BLD VENIPUNCTURE: CPT

## 2019-08-30 PROCEDURE — 700111 HCHG RX REV CODE 636 W/ 250 OVERRIDE (IP): Performed by: HOSPITALIST

## 2019-08-30 RX ORDER — ATORVASTATIN CALCIUM 80 MG/1
80 TABLET, FILM COATED ORAL
Qty: 30 TAB | Refills: 0 | Status: SHIPPED
Start: 2019-08-30 | End: 2019-12-11

## 2019-08-30 RX ORDER — LISINOPRIL 10 MG/1
10 TABLET ORAL DAILY
Qty: 30 TAB | Refills: 1 | Status: SHIPPED
Start: 2019-08-30 | End: 2019-12-11

## 2019-08-30 RX ORDER — POTASSIUM CHLORIDE 20 MEQ/1
20 TABLET, EXTENDED RELEASE ORAL DAILY
Qty: 5 TAB | Refills: 0 | Status: SHIPPED | OUTPATIENT
Start: 2019-08-30 | End: 2019-09-04

## 2019-08-30 RX ADMIN — METHOCARBAMOL TABLETS 750 MG: 750 TABLET, COATED ORAL at 08:01

## 2019-08-30 RX ADMIN — THERA TABS 1 TABLET: TAB at 05:08

## 2019-08-30 RX ADMIN — DEXTROSE AND SODIUM CHLORIDE: 5; 900 INJECTION, SOLUTION INTRAVENOUS at 05:07

## 2019-08-30 RX ADMIN — LISINOPRIL 10 MG: 10 TABLET ORAL at 05:09

## 2019-08-30 RX ADMIN — Medication 100 MG: at 05:08

## 2019-08-30 RX ADMIN — FOLIC ACID 1 MG: 1 TABLET ORAL at 05:09

## 2019-08-30 RX ADMIN — HEPARIN SODIUM 5000 UNITS: 5000 INJECTION, SOLUTION INTRAVENOUS; SUBCUTANEOUS at 05:13

## 2019-08-30 NOTE — CARE PLAN
Problem: Communication  Goal: The ability to communicate needs accurately and effectively will improve  Outcome: PROGRESSING AS EXPECTED  Intervention: Educate patient and significant other/support system about the plan of care, procedures, treatments, medications and allow for questions  Note:   White board updated with POC and care team information during bedside report.      Problem: Safety  Goal: Will remain free from injury  Outcome: PROGRESSING AS EXPECTED  Goal: Will remain free from falls  Outcome: PROGRESSING AS EXPECTED     Problem: Pain Management  Goal: Pain level will decrease to patient's comfort goal  Outcome: PROGRESSING AS EXPECTED     Problem: Safety:  Goal: Will remain free from injury  Outcome: PROGRESSING AS EXPECTED

## 2019-08-30 NOTE — RESPIRATORY CARE
COPD EDUCATION by COPD CLINICAL EDUCATOR  8/30/2019  at  9:02 AM by Lemuel Ponec     Patient interviewed by COPD education team.  Patient unable to participate in full program due to being homeless.  Pt. States she would like to participate in OP program when she obtains a residence  Short intervention has been conducted.  A comprehensive packet including information about COPD, treatments, and smoking cessation given.

## 2019-08-30 NOTE — DISCHARGE SUMMARY
Discharge Summary    CHIEF COMPLAINT ON ADMISSION  Chief Complaint   Patient presents with   • Palpitations     PT reports palpiations starting this AM. Hx HTN. Out of lisinopril past week.   • Detox     PT was drinking pint hard alcohol daily, last drink was sunday. EMS EKG strip shows peaked T waves.       Reason for Admission  EMS     Admission Date  8/28/2019    CODE STATUS  Full Code    HPI & HOSPITAL COURSE  This is a 48 y.o. female here with COPD and hypertension who presented with symptoms of presumed alcohol withdrawal. She was admitted however patient admitted to her last drink being last weekend.  She denied drinking alcohol daily.  She was placed on the CIWA protocol given thiamine folic acid and multivitamin.  Patient did not show any signs or symptoms of alcohol withdrawal however she had significant keto acidosis during admission she was given IV fluids , with resolution of her ketoacidosis.  She is monitored on telemetry which was uneventful.  Nitrites were replaced as needed.  Patient recovered quicker than anticipated and did not go to alcohol withdrawal.  At this time patient is medically stable to be discharged home and will follow-up with the primary care doctor.  Alcohol cessation counseling was provided in detail during this admission.         Therefore, she is discharged in good and stable condition to home with close outpatient follow-up.    The patient recovered much more quickly than anticipated on admission.    Discharge Date  8/30/19    FOLLOW UP ITEMS POST DISCHARGE  pcp    DISCHARGE DIAGNOSES  Principal Problem (Resolved):    Alcohol withdrawal (HCC) POA: Unknown  Active Problems:    Hypertension (Chronic) POA: Yes      Overview: Hypertension since 2010.    Tobacco use (Chronic) POA: Yes    HLD (hyperlipidemia) POA: Unknown    Anemia POA: Unknown  Resolved Problems:    Metabolic acidosis, increased anion gap POA: Unknown    Hyponatremia POA: Unknown      FOLLOW UP  No future  appointments.  [facility]  [address]  [id number]  [address]  [place] [address]  [phone]    Go on 9/11/2019  Please arrive at 8:30 am for your appointment at 9 am . Thank you       MEDICATIONS ON DISCHARGE     Medication List      START taking these medications      Instructions   potassium chloride SA 20 MEQ Tbcr  Commonly known as:  Kdur   Take 1 Tab by mouth every day for 5 days.  Dose:  20 mEq        CHANGE how you take these medications      Instructions   albuterol 108 (90 Base) MCG/ACT Aers inhalation aerosol  What changed:  Another medication with the same name was removed. Continue taking this medication, and follow the directions you see here.   Inhale 2 Puffs by mouth every four hours as needed.  Dose:  2 Puff     atorvastatin 80 MG tablet  What changed:  Another medication with the same name was removed. Continue taking this medication, and follow the directions you see here.  Commonly known as:  LIPITOR   Take 1 Tab by mouth every bedtime.  Dose:  80 mg     lisinopril 10 MG Tabs  What changed:  Another medication with the same name was removed. Continue taking this medication, and follow the directions you see here.  Commonly known as:  PRINIVIL   Take 1 Tab by mouth every day.  Dose:  10 mg     meclizine 25 MG Tabs  What changed:  Another medication with the same name was removed. Continue taking this medication, and follow the directions you see here.  Commonly known as:  ANTIVERT   Take 1 Tab by mouth 3 times a day as needed for Vertigo.  Dose:  25 mg     methocarbamol 750 MG Tabs  What changed:  Another medication with the same name was removed. Continue taking this medication, and follow the directions you see here.  Commonly known as:  ROBAXIN   Take 1 Tab by mouth 4 times a day.  Dose:  750 mg     ondansetron 4 MG Tbdp  What changed:  Another medication with the same name was removed. Continue taking this medication, and follow the directions you see here.  Commonly known as:  ZOFRAN ODT    Take 1 Tab by mouth every 6 hours as needed for Nausea.  Dose:  4 mg        CONTINUE taking these medications      Instructions   folic acid 1 MG Tabs  Commonly known as:  FOLVITE   Take 1 Tab by mouth every day.  Dose:  1 mg     thiamine 100 MG tablet  Commonly known as:  THIAMINE   Take 1 Tab by mouth every day.  Dose:  100 mg            Allergies  Allergies   Allergen Reactions   • Codeine Hives and Itching     Tolerated morphine previously  Allergy updated from 2015, morphine given in 2016   • Shellfish Allergy Anaphylaxis   • Codeine Hives, Itching and Swelling     Throat swells closed  RXN=>10 years ago   • Codeine    • Fish Allergy Shortness of Breath     Swelling to throat.       DIET  Orders Placed This Encounter   Procedures   • Diet Order Diabetic     Standing Status:   Standing     Number of Occurrences:   1     Order Specific Question:   Diet:     Answer:   Diabetic [3]       ACTIVITY  As tolerated.  Weight bearing as tolerated    CONSULTATIONS  none    PROCEDURES  none    LABORATORY  Lab Results   Component Value Date    SODIUM 140 08/30/2019    POTASSIUM 3.2 (L) 08/30/2019    CHLORIDE 109 08/30/2019    CO2 22 08/30/2019    GLUCOSE 127 (H) 08/30/2019    BUN 4 (L) 08/30/2019    CREATININE 0.71 08/30/2019        Lab Results   Component Value Date    WBC 2.8 (L) 08/30/2019    HEMOGLOBIN 10.9 (L) 08/30/2019    HEMATOCRIT 33.8 (L) 08/30/2019    PLATELETCT 198 08/30/2019        Total time of the discharge process exceeds 45 minutes.

## 2019-08-30 NOTE — DISCHARGE INSTRUCTIONS
"Discharge Instructions    Discharged to home by car with relative. Discharged via wheelchair, hospital escort: Yes.  Special equipment needed: Not Applicable    Be sure to schedule a follow-up appointment with your primary care doctor or any specialists as instructed.     Discharge Plan:   Diet Plan: Discussed  Smoking Cessation Offered: Patient Counseled  Confirmed Follow up Appointment: Appointment Scheduled  Confirmed Symptoms Management: Discussed  Medication Reconciliation Updated: Yes  Influenza Vaccine Indication: Not indicated: Previously immunized this influenza season and > 8 years of age    I understand that a diet low in cholesterol, fat, and sodium is recommended for good health. Unless I have been given specific instructions below for another diet, I accept this instruction as my diet prescription.   Other diet: NA    Special Instructions: None    · Is patient discharged on Warfarin / Coumadin?   No         How Much is Too Much Alcohol?  Drinking too much alcohol can cause injury, accidents, and health problems. These types of problems can include:   · Car crashes.  · Falls.  · Family fighting (domestic violence).  · Drowning.  · Fights.  · Injuries.  · Burns.  · Damage to certain organs.  · Having a baby with birth defects.  ONE DRINK CAN BE TOO MUCH WHEN YOU ARE:  · Working.  · Pregnant or breastfeeding.  · Taking medicines. Ask your doctor.  · Driving or planning to drive.  WHAT IS A STANDARD DRINK?   · 1 regular beer (12 ounces or 360 milliliters).  · 1 glass of wine (5 ounces or 150 milliliters).  · 1 shot of liquor (1.5 ounces or 45 milliliters).  BLOOD ALCOHOL LEVELS   · .00 A person is sober.  · .03 A person has no trouble keeping balance, talking, or seeing right, but a \"buzz\" may be felt.  · .05 A person feels \"buzzed\" and relaxed.  · .08 or .10  A person is drunk. He or she has trouble talking, seeing right, and keeping his or her balance.  · .15 A person loses body control and may pass out " (blackout).  · .20 A person has trouble walking (staggering) and throws up (vomits).  · .30 A person will pass out (unconscious).  · .40+ A person will be in a coma. Death is possible.  If you or someone you know has a drinking problem, get help from a doctor.   Document Released: 10/14/2010 Document Revised: 03/11/2013 Document Reviewed: 10/14/2010  ExitCare® Patient Information ©2014 [employer].    Metabolic Acidosis  Metabolic acidosis is a condition in which there is too much acid in the blood. It happens because of a chemical imbalance in your cells. Metabolic acidosis can happen at any age, and there are many different causes. It may be a symptom of a sudden, short-lived (acute) condition, or of a lifelong (chronic) condition.  Metabolic acidosis can be mild, or it can be severe and life-threatening, depending on the cause. Metabolic acidosis can be corrected if the cause is identified and properly treated.  What are the causes?  There are many possible causes of metabolic acidosis. The most common causes are:  · The body producing too much acid.  · Losing too much of a chemical that balances acid levels (bicarbonate). This can result from diarrhea or from certain surgeries on the stomach and intestines.  · Excessive buildup of acidic substances (ketone bodies) in the blood due to untreated type 1 diabetes.  · Excessive buildup of a chemical (lactic acid) that forms when the body is low on oxygen. Lactic acid buildup can result from:  ¨ Decreased flow of blood, oxygen, and nutrients to vital organs (shock).  ¨ Intense physical activity.  ¨ Disease.  ¨ Infection.  · Exposure to a poisonous substance (toxin), such as:  ¨ Carbon monoxide.  ¨ Cyanide.  ¨ Antifreeze (ethylene glycol).  ¨ Methanol.  · Certain medicines, such as acetazolamide or large doses of aspirin.  · Kidney disease or kidney infection.  · Too much iron in the body.  · Excessive alcohol use.  · Lack of healthy red blood cells (anemia).  What  are the signs or symptoms?  Symptoms of this condition vary depending on the cause and severity. Common symptoms include:  · Rapid breathing.  · Difficulty breathing.  · Nausea or vomiting.  · Headache.  · Confusion.  · Weakness.  · Feeling restless, drowsy, and emotionless (lethargy).  Mild acidosis may not cause any symptoms. Very severe acidosis can result in shock, coma, or death.  How is this diagnosed?  This condition is diagnosed based on a physical exam and your medical history. You may have tests, such as:  · Blood tests. These may include:  ¨ An arterial blood gas (ABG) test or a venous blood gas (VBG) test. These tests measure the acidity levels (pH balance) of your blood.  ¨ A serum electrolytes test. This test measures the amounts of minerals in your blood.  · Urine tests.  How is this treated?  Treatment for metabolic acidosis depends on the underlying condition and the severity of symptoms. The goal of treatment is to balance the amount of acid in the body and to treat the underlying cause of metabolic acidosis. Mild metabolic acidosis can be treated by fluids given through an IV tube. Severe forms of metabolic acidosis require hospitalization and medicines to help treat the underlying problem. Bicarbonates may be needed if your condition is very severe.  Follow these instructions at home:  · Take over-the-counter and prescription medicines only as told by your health care provider.  · If you were prescribed an antibiotic medicine, take it as told by your health care provider. Do not stop taking the antibiotic even if you start to feel better.  · Drink enough fluid to keep your urine clear or pale yellow.  · Follow instructions from your health care provider about eating or drinking restrictions.  · Pay attention to your symptoms and any changes in your symptoms.  · Keep all follow-up visits as told by your health care provider. This is important.  How is this prevented?  Take these actions to prevent  metabolic acidosis:  · Manage any chronic conditions you have.  · Avoid exposure to toxins.  · Avoid drinking excessive amounts of alcohol.  · Understand your medicines and any supplements you take, and their possible side effects.  · Watch for any symptoms of metabolic acidosis to develop.  Contact a health care provider if:  · You have any new symptoms.  · You have side effects from medicines you are taking.  · You have nausea, vomiting, or diarrhea that does not get better with medicine or that gets worse.  · You have body aches or lethargy that gets worse.  · You have dark urine or you urinate less often than you usually do.  · You have a decreased appetite.  · You have a fever.  Get help right away if:  · You have shortness of breath, chest pain, or a fast heartbeat (palpitations).  · You feel like you are losing consciousness, or you faint.  · You feel drowsy or confused.  · You have severe pain in your joints and limbs.  · You have severe abdominal pain.  These symptoms may represent a serious problem that is an emergency. Do not wait to see if the symptoms will go away. Get medical help right away. Call your local emergency services (911 in the [place] Do not drive yourself to the hospital.   This information is not intended to replace advice given to you by your health care provider. Make sure you discuss any questions you have with your health care provider.  Document Released: 04/03/2012 Document Revised: 05/22/2017 Document Reviewed: 05/04/2016  [employer] Interactive Patient Education © 2017 [employer]      Depression / Suicide Risk    As you are discharged from this [facility] facility, it is important to learn how to keep safe from harming yourself.    Recognize the warning signs:  · Abrupt changes in personality, positive or negative- including increase in energy   · Giving away possessions  · Change in eating patterns- significant weight changes-  positive or negative  · Change in sleeping  patterns- unable to sleep or sleeping all the time   · Unwillingness or inability to communicate  · Depression  · Unusual sadness, discouragement and loneliness  · Talk of wanting to die  · Neglect of personal appearance   · Rebelliousness- reckless behavior  · Withdrawal from people/activities they love  · Confusion- inability to concentrate     If you or a loved one observes any of these behaviors or has concerns about self-harm, here's what you can do:  · Talk about it- your feelings and reasons for harming yourself  · Remove any means that you might use to hurt yourself (examples: pills, rope, extension cords, firearm)  · Get professional help from the community (Mental Health, Substance Abuse, psychological counseling)  · Do not be alone:Call your Safe Contact- someone whom you trust who will be there for you.  · Call your local CRISIS HOTLINE 494-3580 or 471-666-4000  · Call your local Children's Mobile Crisis Response Team [place] (652) 721-3383 or www.[employer]  · Call the toll free National Suicide Prevention Hotlines   · National Suicide Prevention Lifeline 664-559-QSRW (4509)  · National Hope Line Network 800-SUICIDE (429-0280)

## 2019-08-30 NOTE — PROGRESS NOTES
Pt dc, all IVs have been removed and the tele box has been taken off. The monitor room has been notified. Meds to beds has given the patient her medications. Pt has been educated and walked down to her ride.

## 2019-08-30 NOTE — DISCHARGE PLANNING
Medicaid Meds-to-Beds: Discharge prescription orders listed below delivered to patient's bedside. Patient counseled.  Patient states she has albuterol inhaler, thiamine and folic acid available. Provided my contact information for additional medication questions/ concerns.      Rhonda Gould   Home Medication Instructions VALDEZ:[id number]    Printed on:08/30/19 6650   Medication Information                      atorvastatin (LIPITOR) 80 MG tablet  Take 1 Tab by mouth every bedtime.             lisinopril (PRINIVIL) 10 MG Tab  Take 1 Tab by mouth every day.             potassium chloride SA (KDUR) 20 MEQ Tab CR  Take 1 Tab by mouth every day for 5 days.               Selin Lamb, PharmD

## 2019-08-30 NOTE — PROGRESS NOTES
Assumed care of pt, beside report received from DIANA Gomes. Updated on POC, call light within reach all fall precautions within place. Bed locked and lowered. Pt instructed to call for assistance before getting up. All questions answered, no other needs at this time.

## 2019-09-11 NOTE — DOCUMENTATION QUERY
[facility]                                                                       Query Response Note      PATIENT:               JASON SCHMITZ  ACCT #:                  [id number]  MRN:                     [id number]  :                      1970  ADMIT DATE:       2019 9:49 PM  DISCH DATE:        2019 12:25 PM  RESPONDING  PROVIDER #:        [id number]           QUERY TEXT:    There is conflicting documentation in the medical record.      Alcohol withdrawal is documented in H&P and progress notes.      Did not show any signs or symptoms of alcohol withdrawal and recovered quicker than anticipated and did not go to alcohol withdrawal: is documented in the discharge summary    Based on treatment, clinical findings and risk factors, can this documentation be further clarified?     NOTE:  If an appropriate response is not listed below, please respond with a new note.    The patient's Clinical Indicators include:  H&P-Presented with Alcohol withdrawal. this patient drinks hard liquor daily. [facility] protocol    Discharge Summary-presumed alcohol withdrawal, patient admitted to her last drink being last weekend. she denied drinking alcohol daily. patient did not show any signs of withdrawal.  Options provided:   -- Alcohol withdrawal ruled in   -- Alcohol withdrawal ruled out   -- Unable to determine      Query created by: Daniela Hayward on 2019 4:18 PM    RESPONSE TEXT:    Alcohol withdrawal ruled out          Electronically signed by:  ALYSSA NUNEZ 2019 12:48 PM

## 2019-12-11 ENCOUNTER — HOSPITAL ENCOUNTER (INPATIENT)
Facility: MEDICAL CENTER | Age: 49
LOS: 3 days | DRG: 897 | End: 2019-12-14
Attending: EMERGENCY MEDICINE | Admitting: HOSPITALIST
Payer: MEDICAID

## 2019-12-11 DIAGNOSIS — F10.10 ALCOHOL ABUSE: ICD-10-CM

## 2019-12-11 DIAGNOSIS — R11.2 NAUSEA AND VOMITING, INTRACTABILITY OF VOMITING NOT SPECIFIED, UNSPECIFIED VOMITING TYPE: ICD-10-CM

## 2019-12-11 DIAGNOSIS — E16.2 HYPOGLYCEMIA: ICD-10-CM

## 2019-12-11 DIAGNOSIS — F10.939 ALCOHOL WITHDRAWAL SYNDROME WITH COMPLICATION (HCC): ICD-10-CM

## 2019-12-11 PROBLEM — Z72.0 TOBACCO ABUSE: Status: ACTIVE | Noted: 2019-12-11

## 2019-12-11 LAB
ALBUMIN SERPL BCP-MCNC: 5 G/DL (ref 3.2–4.9)
ALBUMIN/GLOB SERPL: 2.1 G/DL
ALP SERPL-CCNC: 69 U/L (ref 30–99)
ALT SERPL-CCNC: 11 U/L (ref 2–50)
ANION GAP SERPL CALC-SCNC: 30 MMOL/L (ref 0–11.9)
APPEARANCE UR: CLEAR
AST SERPL-CCNC: 23 U/L (ref 12–45)
BACTERIA #/AREA URNS HPF: NEGATIVE /HPF
BASE EXCESS BLDV CALC-SCNC: -12 MMOL/L
BASOPHILS # BLD AUTO: 0.5 % (ref 0–1.8)
BASOPHILS # BLD: 0.05 K/UL (ref 0–0.12)
BILIRUB SERPL-MCNC: 0.3 MG/DL (ref 0.1–1.5)
BILIRUB UR QL STRIP.AUTO: NEGATIVE
BODY TEMPERATURE: ABNORMAL CENTIGRADE
BUN SERPL-MCNC: 19 MG/DL (ref 8–22)
CALCIUM SERPL-MCNC: 10 MG/DL (ref 8.5–10.5)
CHLORIDE SERPL-SCNC: 98 MMOL/L (ref 96–112)
CO2 SERPL-SCNC: 13 MMOL/L (ref 20–33)
COLOR UR: YELLOW
CREAT SERPL-MCNC: 0.92 MG/DL (ref 0.5–1.4)
EKG IMPRESSION: NORMAL
EOSINOPHIL # BLD AUTO: 0.04 K/UL (ref 0–0.51)
EOSINOPHIL NFR BLD: 0.4 % (ref 0–6.9)
EPI CELLS #/AREA URNS HPF: NEGATIVE /HPF
ERYTHROCYTE [DISTWIDTH] IN BLOOD BY AUTOMATED COUNT: 54.4 FL (ref 35.9–50)
GLOBULIN SER CALC-MCNC: 2.4 G/DL (ref 1.9–3.5)
GLUCOSE BLD-MCNC: 200 MG/DL (ref 65–99)
GLUCOSE BLD-MCNC: 232 MG/DL (ref 65–99)
GLUCOSE BLD-MCNC: 42 MG/DL (ref 65–99)
GLUCOSE SERPL-MCNC: 47 MG/DL (ref 65–99)
GLUCOSE UR STRIP.AUTO-MCNC: 250 MG/DL
HCO3 BLDV-SCNC: 12 MMOL/L (ref 24–28)
HCT VFR BLD AUTO: 43.2 % (ref 37–47)
HGB BLD-MCNC: 14 G/DL (ref 12–16)
HYALINE CASTS #/AREA URNS LPF: NORMAL /LPF
IMM GRANULOCYTES # BLD AUTO: 0.03 K/UL (ref 0–0.11)
IMM GRANULOCYTES NFR BLD AUTO: 0.3 % (ref 0–0.9)
KETONES UR STRIP.AUTO-MCNC: 15 MG/DL
LEUKOCYTE ESTERASE UR QL STRIP.AUTO: NEGATIVE
LIPASE SERPL-CCNC: 22 U/L (ref 11–82)
LYMPHOCYTES # BLD AUTO: 2.66 K/UL (ref 1–4.8)
LYMPHOCYTES NFR BLD: 27.3 % (ref 22–41)
MCH RBC QN AUTO: 31.1 PG (ref 27–33)
MCHC RBC AUTO-ENTMCNC: 32.4 G/DL (ref 33.6–35)
MCV RBC AUTO: 96 FL (ref 81.4–97.8)
MICRO URNS: ABNORMAL
MONOCYTES # BLD AUTO: 0.61 K/UL (ref 0–0.85)
MONOCYTES NFR BLD AUTO: 6.3 % (ref 0–13.4)
NEUTROPHILS # BLD AUTO: 6.36 K/UL (ref 2–7.15)
NEUTROPHILS NFR BLD: 65.2 % (ref 44–72)
NITRITE UR QL STRIP.AUTO: NEGATIVE
NRBC # BLD AUTO: 0 K/UL
NRBC BLD-RTO: 0 /100 WBC
PCO2 BLDV: 25.8 MMHG (ref 41–51)
PH BLDV: 7.3 [PH] (ref 7.31–7.45)
PH UR STRIP.AUTO: 5 [PH] (ref 5–8)
PLATELET # BLD AUTO: 350 K/UL (ref 164–446)
PMV BLD AUTO: 9.5 FL (ref 9–12.9)
PO2 BLDV: 51.4 MMHG (ref 25–40)
POC BREATHALIZER: 0.01 PERCENT (ref 0–0.01)
POTASSIUM SERPL-SCNC: 3.3 MMOL/L (ref 3.6–5.5)
PROT SERPL-MCNC: 7.4 G/DL (ref 6–8.2)
PROT UR QL STRIP: 30 MG/DL
RBC # BLD AUTO: 4.5 M/UL (ref 4.2–5.4)
RBC # URNS HPF: NORMAL /HPF
RBC UR QL AUTO: NEGATIVE
SAO2 % BLDV: 80.1 %
SODIUM SERPL-SCNC: 141 MMOL/L (ref 135–145)
SP GR UR STRIP.AUTO: 1.02
TROPONIN T SERPL-MCNC: <6 NG/L (ref 6–19)
TSH SERPL DL<=0.005 MIU/L-ACNC: 0.6 UIU/ML (ref 0.38–5.33)
UROBILINOGEN UR STRIP.AUTO-MCNC: 0.2 MG/DL
WBC # BLD AUTO: 9.8 K/UL (ref 4.8–10.8)
WBC #/AREA URNS HPF: NORMAL /HPF

## 2019-12-11 PROCEDURE — 84443 ASSAY THYROID STIM HORMONE: CPT

## 2019-12-11 PROCEDURE — 302970 POC BREATHALIZER

## 2019-12-11 PROCEDURE — 85025 COMPLETE CBC W/AUTO DIFF WBC: CPT

## 2019-12-11 PROCEDURE — 81001 URINALYSIS AUTO W/SCOPE: CPT

## 2019-12-11 PROCEDURE — A9270 NON-COVERED ITEM OR SERVICE: HCPCS | Performed by: HOSPITALIST

## 2019-12-11 PROCEDURE — 82803 BLOOD GASES ANY COMBINATION: CPT

## 2019-12-11 PROCEDURE — 96365 THER/PROPH/DIAG IV INF INIT: CPT

## 2019-12-11 PROCEDURE — 82962 GLUCOSE BLOOD TEST: CPT

## 2019-12-11 PROCEDURE — 700102 HCHG RX REV CODE 250 W/ 637 OVERRIDE(OP): Performed by: HOSPITALIST

## 2019-12-11 PROCEDURE — 770020 HCHG ROOM/CARE - TELE (206)

## 2019-12-11 PROCEDURE — 700111 HCHG RX REV CODE 636 W/ 250 OVERRIDE (IP): Performed by: HOSPITALIST

## 2019-12-11 PROCEDURE — 96366 THER/PROPH/DIAG IV INF ADDON: CPT

## 2019-12-11 PROCEDURE — A9270 NON-COVERED ITEM OR SERVICE: HCPCS | Performed by: EMERGENCY MEDICINE

## 2019-12-11 PROCEDURE — 96375 TX/PRO/DX INJ NEW DRUG ADDON: CPT

## 2019-12-11 PROCEDURE — HZ2ZZZZ DETOXIFICATION SERVICES FOR SUBSTANCE ABUSE TREATMENT: ICD-10-PCS | Performed by: HOSPITALIST

## 2019-12-11 PROCEDURE — 96367 TX/PROPH/DG ADDL SEQ IV INF: CPT

## 2019-12-11 PROCEDURE — 99285 EMERGENCY DEPT VISIT HI MDM: CPT

## 2019-12-11 PROCEDURE — 93005 ELECTROCARDIOGRAM TRACING: CPT | Performed by: EMERGENCY MEDICINE

## 2019-12-11 PROCEDURE — 99223 1ST HOSP IP/OBS HIGH 75: CPT | Performed by: HOSPITALIST

## 2019-12-11 PROCEDURE — 83690 ASSAY OF LIPASE: CPT

## 2019-12-11 PROCEDURE — 700105 HCHG RX REV CODE 258: Performed by: HOSPITALIST

## 2019-12-11 PROCEDURE — 80053 COMPREHEN METABOLIC PANEL: CPT

## 2019-12-11 PROCEDURE — 700105 HCHG RX REV CODE 258

## 2019-12-11 PROCEDURE — 700111 HCHG RX REV CODE 636 W/ 250 OVERRIDE (IP): Performed by: EMERGENCY MEDICINE

## 2019-12-11 PROCEDURE — 84484 ASSAY OF TROPONIN QUANT: CPT

## 2019-12-11 PROCEDURE — 302970 POC BREATHALIZER: Performed by: EMERGENCY MEDICINE

## 2019-12-11 PROCEDURE — 700102 HCHG RX REV CODE 250 W/ 637 OVERRIDE(OP): Performed by: EMERGENCY MEDICINE

## 2019-12-11 PROCEDURE — 96368 THER/DIAG CONCURRENT INF: CPT

## 2019-12-11 PROCEDURE — 700105 HCHG RX REV CODE 258: Performed by: EMERGENCY MEDICINE

## 2019-12-11 RX ORDER — DEXTROSE, SODIUM CHLORIDE, SODIUM LACTATE, POTASSIUM CHLORIDE, AND CALCIUM CHLORIDE 5; .6; .31; .03; .02 G/100ML; G/100ML; G/100ML; G/100ML; G/100ML
INJECTION, SOLUTION INTRAVENOUS CONTINUOUS
Status: DISCONTINUED | OUTPATIENT
Start: 2019-12-11 | End: 2019-12-11

## 2019-12-11 RX ORDER — LORAZEPAM 2 MG/ML
2 INJECTION INTRAMUSCULAR
Status: DISCONTINUED | OUTPATIENT
Start: 2019-12-11 | End: 2019-12-14 | Stop reason: HOSPADM

## 2019-12-11 RX ORDER — ALBUTEROL SULFATE 90 UG/1
2 AEROSOL, METERED RESPIRATORY (INHALATION) EVERY 4 HOURS PRN
COMMUNITY
Start: 2017-08-24 | End: 2019-12-11

## 2019-12-11 RX ORDER — BISACODYL 10 MG
10 SUPPOSITORY, RECTAL RECTAL
Status: DISCONTINUED | OUTPATIENT
Start: 2019-12-11 | End: 2019-12-14 | Stop reason: HOSPADM

## 2019-12-11 RX ORDER — TRAZODONE HYDROCHLORIDE 50 MG/1
50 TABLET ORAL
Status: ON HOLD | COMMUNITY
Start: 2018-05-16 | End: 2021-06-23

## 2019-12-11 RX ORDER — THIAMINE MONONITRATE (VIT B1) 100 MG
100 TABLET ORAL DAILY
Status: DISCONTINUED | OUTPATIENT
Start: 2019-12-12 | End: 2019-12-14 | Stop reason: HOSPADM

## 2019-12-11 RX ORDER — DEXTROSE MONOHYDRATE 100 MG/ML
INJECTION, SOLUTION INTRAVENOUS
Status: COMPLETED
Start: 2019-12-11 | End: 2019-12-11

## 2019-12-11 RX ORDER — ONDANSETRON 2 MG/ML
4 INJECTION INTRAMUSCULAR; INTRAVENOUS ONCE
Status: COMPLETED | OUTPATIENT
Start: 2019-12-11 | End: 2019-12-11

## 2019-12-11 RX ORDER — ACETAMINOPHEN 325 MG/1
650 TABLET ORAL ONCE
Status: COMPLETED | OUTPATIENT
Start: 2019-12-11 | End: 2019-12-11

## 2019-12-11 RX ORDER — POTASSIUM CHLORIDE 7.45 MG/ML
10 INJECTION INTRAVENOUS ONCE
Status: COMPLETED | OUTPATIENT
Start: 2019-12-11 | End: 2019-12-11

## 2019-12-11 RX ORDER — ONDANSETRON 4 MG/1
4 TABLET, ORALLY DISINTEGRATING ORAL EVERY 4 HOURS PRN
Status: DISCONTINUED | OUTPATIENT
Start: 2019-12-11 | End: 2019-12-14 | Stop reason: HOSPADM

## 2019-12-11 RX ORDER — NIFEDIPINE 30 MG/1
30 TABLET, EXTENDED RELEASE ORAL DAILY
Status: ON HOLD | COMMUNITY
Start: 2018-08-14 | End: 2020-03-04

## 2019-12-11 RX ORDER — LORAZEPAM 2 MG/ML
0.5 INJECTION INTRAMUSCULAR EVERY 4 HOURS PRN
Status: DISCONTINUED | OUTPATIENT
Start: 2019-12-11 | End: 2019-12-14 | Stop reason: HOSPADM

## 2019-12-11 RX ORDER — LISINOPRIL 10 MG/1
10 TABLET ORAL DAILY
COMMUNITY
Start: 2019-02-12 | End: 2019-12-11

## 2019-12-11 RX ORDER — MAGNESIUM SULFATE HEPTAHYDRATE 40 MG/ML
2 INJECTION, SOLUTION INTRAVENOUS ONCE
Status: COMPLETED | OUTPATIENT
Start: 2019-12-11 | End: 2019-12-11

## 2019-12-11 RX ORDER — LORAZEPAM 1 MG/1
1 TABLET ORAL EVERY 4 HOURS PRN
Status: DISCONTINUED | OUTPATIENT
Start: 2019-12-11 | End: 2019-12-14 | Stop reason: HOSPADM

## 2019-12-11 RX ORDER — CLONIDINE HYDROCHLORIDE 0.1 MG/1
0.1 TABLET ORAL EVERY 6 HOURS PRN
Status: DISCONTINUED | OUTPATIENT
Start: 2019-12-11 | End: 2019-12-14 | Stop reason: HOSPADM

## 2019-12-11 RX ORDER — ONDANSETRON 2 MG/ML
4 INJECTION INTRAMUSCULAR; INTRAVENOUS EVERY 4 HOURS PRN
Status: DISCONTINUED | OUTPATIENT
Start: 2019-12-11 | End: 2019-12-14 | Stop reason: HOSPADM

## 2019-12-11 RX ORDER — MORPHINE SULFATE 4 MG/ML
2 INJECTION, SOLUTION INTRAMUSCULAR; INTRAVENOUS
Status: DISCONTINUED | OUTPATIENT
Start: 2019-12-11 | End: 2019-12-14 | Stop reason: HOSPADM

## 2019-12-11 RX ORDER — FOLIC ACID 1 MG/1
1 TABLET ORAL DAILY
Status: DISCONTINUED | OUTPATIENT
Start: 2019-12-12 | End: 2019-12-14 | Stop reason: HOSPADM

## 2019-12-11 RX ORDER — OXYCODONE HYDROCHLORIDE 5 MG/1
5 TABLET ORAL
Status: DISCONTINUED | OUTPATIENT
Start: 2019-12-11 | End: 2019-12-14 | Stop reason: HOSPADM

## 2019-12-11 RX ORDER — ACETAMINOPHEN 325 MG/1
650 TABLET ORAL EVERY 6 HOURS PRN
Status: DISCONTINUED | OUTPATIENT
Start: 2019-12-11 | End: 2019-12-14 | Stop reason: HOSPADM

## 2019-12-11 RX ORDER — LORAZEPAM 2 MG/1
4 TABLET ORAL
Status: DISCONTINUED | OUTPATIENT
Start: 2019-12-11 | End: 2019-12-14 | Stop reason: HOSPADM

## 2019-12-11 RX ORDER — LORAZEPAM 1 MG/1
0.5 TABLET ORAL EVERY 4 HOURS PRN
Status: DISCONTINUED | OUTPATIENT
Start: 2019-12-11 | End: 2019-12-14 | Stop reason: HOSPADM

## 2019-12-11 RX ORDER — POLYETHYLENE GLYCOL 3350 17 G/17G
1 POWDER, FOR SOLUTION ORAL
Status: DISCONTINUED | OUTPATIENT
Start: 2019-12-11 | End: 2019-12-14 | Stop reason: HOSPADM

## 2019-12-11 RX ORDER — SODIUM CHLORIDE, SODIUM LACTATE, POTASSIUM CHLORIDE, CALCIUM CHLORIDE 600; 310; 30; 20 MG/100ML; MG/100ML; MG/100ML; MG/100ML
500 INJECTION, SOLUTION INTRAVENOUS ONCE
Status: COMPLETED | OUTPATIENT
Start: 2019-12-11 | End: 2019-12-11

## 2019-12-11 RX ORDER — OXYCODONE HYDROCHLORIDE 5 MG/1
2.5 TABLET ORAL
Status: DISCONTINUED | OUTPATIENT
Start: 2019-12-11 | End: 2019-12-14 | Stop reason: HOSPADM

## 2019-12-11 RX ORDER — ATORVASTATIN CALCIUM 80 MG/1
80 TABLET, FILM COATED ORAL
COMMUNITY
Start: 2018-07-24 | End: 2019-12-11

## 2019-12-11 RX ORDER — PROMETHAZINE HYDROCHLORIDE 25 MG/1
12.5-25 TABLET ORAL EVERY 4 HOURS PRN
Status: DISCONTINUED | OUTPATIENT
Start: 2019-12-11 | End: 2019-12-14 | Stop reason: HOSPADM

## 2019-12-11 RX ORDER — DEXTROSE MONOHYDRATE 100 MG/ML
INJECTION, SOLUTION INTRAVENOUS ONCE
Status: COMPLETED | OUTPATIENT
Start: 2019-12-11 | End: 2019-12-11

## 2019-12-11 RX ORDER — LORAZEPAM 2 MG/ML
1.5 INJECTION INTRAMUSCULAR
Status: DISCONTINUED | OUTPATIENT
Start: 2019-12-11 | End: 2019-12-14 | Stop reason: HOSPADM

## 2019-12-11 RX ORDER — NICOTINE 21 MG/24HR
14 PATCH, TRANSDERMAL 24 HOURS TRANSDERMAL
Status: DISCONTINUED | OUTPATIENT
Start: 2019-12-12 | End: 2019-12-14 | Stop reason: HOSPADM

## 2019-12-11 RX ORDER — LISINOPRIL 5 MG/1
10 TABLET ORAL DAILY
Status: DISCONTINUED | OUTPATIENT
Start: 2019-12-12 | End: 2019-12-14 | Stop reason: HOSPADM

## 2019-12-11 RX ORDER — PROCHLORPERAZINE EDISYLATE 5 MG/ML
5-10 INJECTION INTRAMUSCULAR; INTRAVENOUS EVERY 4 HOURS PRN
Status: DISCONTINUED | OUTPATIENT
Start: 2019-12-11 | End: 2019-12-14 | Stop reason: HOSPADM

## 2019-12-11 RX ORDER — LORAZEPAM 2 MG/1
2 TABLET ORAL
Status: DISCONTINUED | OUTPATIENT
Start: 2019-12-11 | End: 2019-12-14 | Stop reason: HOSPADM

## 2019-12-11 RX ORDER — AMOXICILLIN 250 MG
2 CAPSULE ORAL 2 TIMES DAILY
Status: DISCONTINUED | OUTPATIENT
Start: 2019-12-11 | End: 2019-12-14 | Stop reason: HOSPADM

## 2019-12-11 RX ORDER — LISINOPRIL 20 MG/1
20 TABLET ORAL DAILY
COMMUNITY
End: 2021-06-22

## 2019-12-11 RX ORDER — PROMETHAZINE HYDROCHLORIDE 25 MG/1
12.5-25 SUPPOSITORY RECTAL EVERY 4 HOURS PRN
Status: DISCONTINUED | OUTPATIENT
Start: 2019-12-11 | End: 2019-12-14 | Stop reason: HOSPADM

## 2019-12-11 RX ORDER — LORAZEPAM 2 MG/ML
1 INJECTION INTRAMUSCULAR
Status: DISCONTINUED | OUTPATIENT
Start: 2019-12-11 | End: 2019-12-14 | Stop reason: HOSPADM

## 2019-12-11 RX ADMIN — DEXTROSE MONOHYDRATE 250 ML: 100 INJECTION, SOLUTION INTRAVENOUS at 19:54

## 2019-12-11 RX ADMIN — SODIUM CHLORIDE, POTASSIUM CHLORIDE, SODIUM LACTATE AND CALCIUM CHLORIDE 500 ML: 600; 310; 30; 20 INJECTION, SOLUTION INTRAVENOUS at 20:16

## 2019-12-11 RX ADMIN — ACETAMINOPHEN 650 MG: 325 TABLET, FILM COATED ORAL at 21:34

## 2019-12-11 RX ADMIN — MAGNESIUM SULFATE IN WATER 2 G: 40 INJECTION, SOLUTION INTRAVENOUS at 21:24

## 2019-12-11 RX ADMIN — OXYCODONE HYDROCHLORIDE 5 MG: 5 TABLET ORAL at 22:59

## 2019-12-11 RX ADMIN — ONDANSETRON 4 MG: 2 INJECTION INTRAMUSCULAR; INTRAVENOUS at 20:16

## 2019-12-11 RX ADMIN — POTASSIUM CHLORIDE 10 MEQ: 7.46 INJECTION, SOLUTION INTRAVENOUS at 21:24

## 2019-12-11 RX ADMIN — SODIUM BICARBONATE: 84 INJECTION, SOLUTION INTRAVENOUS at 23:31

## 2019-12-11 RX ADMIN — DEXTROSE 250 ML: 10 SOLUTION INTRAVENOUS at 19:54

## 2019-12-11 ASSESSMENT — ENCOUNTER SYMPTOMS
ABDOMINAL PAIN: 0
CONSTIPATION: 0
CONSTITUTIONAL NEGATIVE: 1
NEUROLOGICAL NEGATIVE: 1
VOMITING: 1
CARDIOVASCULAR NEGATIVE: 1
RESPIRATORY NEGATIVE: 1
NAUSEA: 1
DIARRHEA: 0
MUSCULOSKELETAL NEGATIVE: 1
EYES NEGATIVE: 1
PSYCHIATRIC NEGATIVE: 1

## 2019-12-11 ASSESSMENT — LIFESTYLE VARIABLES
TOTAL SCORE: 3
VISUAL DISTURBANCES: NOT PRESENT
PAROXYSMAL SWEATS: NO SWEAT VISIBLE
EVER HAD A DRINK FIRST THING IN THE MORNING TO STEADY YOUR NERVES TO GET RID OF A HANGOVER: NO
CONSUMPTION TOTAL: INCOMPLETE
DO YOU DRINK ALCOHOL: YES
HAVE YOU EVER FELT YOU SHOULD CUT DOWN ON YOUR DRINKING: YES
DOES PATIENT WANT TO STOP DRINKING: YES
AGITATION: NORMAL ACTIVITY
HAVE PEOPLE ANNOYED YOU BY CRITICIZING YOUR DRINKING: YES
AVERAGE NUMBER OF DAYS PER WEEK YOU HAVE A DRINK CONTAINING ALCOHOL: 7
ANXIETY: MILDLY ANXIOUS
TOTAL SCORE: 3
DO YOU DRINK ALCOHOL: YES
HEADACHE, FULLNESS IN HEAD: SEVERE
TREMOR: *
TOTAL SCORE: 9
ORIENTATION AND CLOUDING OF SENSORIUM: ORIENTED AND CAN DO SERIAL ADDITIONS
TOTAL SCORE: 3
DOES PATIENT WANT TO TALK TO SOMEONE ABOUT QUITTING: YES
NAUSEA AND VOMITING: NO NAUSEA AND NO VOMITING
AUDITORY DISTURBANCES: NOT PRESENT
EVER FELT BAD OR GUILTY ABOUT YOUR DRINKING: YES

## 2019-12-12 LAB
ANION GAP SERPL CALC-SCNC: 17 MMOL/L (ref 0–11.9)
BASOPHILS # BLD AUTO: 0.5 % (ref 0–1.8)
BASOPHILS # BLD: 0.03 K/UL (ref 0–0.12)
BUN SERPL-MCNC: 15 MG/DL (ref 8–22)
CALCIUM SERPL-MCNC: 8.7 MG/DL (ref 8.5–10.5)
CHLORIDE SERPL-SCNC: 95 MMOL/L (ref 96–112)
CO2 SERPL-SCNC: 24 MMOL/L (ref 20–33)
CREAT SERPL-MCNC: 0.81 MG/DL (ref 0.5–1.4)
EOSINOPHIL # BLD AUTO: 0.02 K/UL (ref 0–0.51)
EOSINOPHIL NFR BLD: 0.3 % (ref 0–6.9)
ERYTHROCYTE [DISTWIDTH] IN BLOOD BY AUTOMATED COUNT: 51.6 FL (ref 35.9–50)
GLUCOSE SERPL-MCNC: 79 MG/DL (ref 65–99)
HCT VFR BLD AUTO: 38.9 % (ref 37–47)
HGB BLD-MCNC: 12.8 G/DL (ref 12–16)
IMM GRANULOCYTES # BLD AUTO: 0.01 K/UL (ref 0–0.11)
IMM GRANULOCYTES NFR BLD AUTO: 0.2 % (ref 0–0.9)
LYMPHOCYTES # BLD AUTO: 1.84 K/UL (ref 1–4.8)
LYMPHOCYTES NFR BLD: 32 % (ref 22–41)
MAGNESIUM SERPL-MCNC: 2.1 MG/DL (ref 1.5–2.5)
MCH RBC QN AUTO: 30.8 PG (ref 27–33)
MCHC RBC AUTO-ENTMCNC: 32.9 G/DL (ref 33.6–35)
MCV RBC AUTO: 93.7 FL (ref 81.4–97.8)
MONOCYTES # BLD AUTO: 0.49 K/UL (ref 0–0.85)
MONOCYTES NFR BLD AUTO: 8.5 % (ref 0–13.4)
NEUTROPHILS # BLD AUTO: 3.36 K/UL (ref 2–7.15)
NEUTROPHILS NFR BLD: 58.5 % (ref 44–72)
NRBC # BLD AUTO: 0 K/UL
NRBC BLD-RTO: 0 /100 WBC
PHOSPHATE SERPL-MCNC: 3.9 MG/DL (ref 2.5–4.5)
PLATELET # BLD AUTO: 288 K/UL (ref 164–446)
PMV BLD AUTO: 9.4 FL (ref 9–12.9)
POTASSIUM SERPL-SCNC: 4.1 MMOL/L (ref 3.6–5.5)
RBC # BLD AUTO: 4.15 M/UL (ref 4.2–5.4)
SODIUM SERPL-SCNC: 136 MMOL/L (ref 135–145)
WBC # BLD AUTO: 5.8 K/UL (ref 4.8–10.8)

## 2019-12-12 PROCEDURE — A9270 NON-COVERED ITEM OR SERVICE: HCPCS | Performed by: HOSPITALIST

## 2019-12-12 PROCEDURE — 36415 COLL VENOUS BLD VENIPUNCTURE: CPT

## 2019-12-12 PROCEDURE — 84100 ASSAY OF PHOSPHORUS: CPT

## 2019-12-12 PROCEDURE — 80048 BASIC METABOLIC PNL TOTAL CA: CPT

## 2019-12-12 PROCEDURE — 700105 HCHG RX REV CODE 258: Performed by: HOSPITALIST

## 2019-12-12 PROCEDURE — 85025 COMPLETE CBC W/AUTO DIFF WBC: CPT

## 2019-12-12 PROCEDURE — 770020 HCHG ROOM/CARE - TELE (206)

## 2019-12-12 PROCEDURE — 83735 ASSAY OF MAGNESIUM: CPT

## 2019-12-12 PROCEDURE — 700102 HCHG RX REV CODE 250 W/ 637 OVERRIDE(OP): Performed by: HOSPITALIST

## 2019-12-12 PROCEDURE — 700111 HCHG RX REV CODE 636 W/ 250 OVERRIDE (IP): Performed by: HOSPITALIST

## 2019-12-12 RX ORDER — ALBUTEROL SULFATE 90 UG/1
1-2 AEROSOL, METERED RESPIRATORY (INHALATION) EVERY 4 HOURS PRN
COMMUNITY
End: 2023-05-30

## 2019-12-12 RX ADMIN — SODIUM BICARBONATE: 84 INJECTION, SOLUTION INTRAVENOUS at 11:12

## 2019-12-12 RX ADMIN — LISINOPRIL 10 MG: 5 TABLET ORAL at 06:00

## 2019-12-12 RX ADMIN — LORAZEPAM 1 MG: 1 TABLET ORAL at 00:35

## 2019-12-12 RX ADMIN — ENOXAPARIN SODIUM 40 MG: 100 INJECTION SUBCUTANEOUS at 06:00

## 2019-12-12 RX ADMIN — NICOTINE 14 MG: 14 PATCH TRANSDERMAL at 06:01

## 2019-12-12 RX ADMIN — THERA TABS 1 TABLET: TAB at 06:00

## 2019-12-12 RX ADMIN — ACETAMINOPHEN 650 MG: 325 TABLET, FILM COATED ORAL at 06:00

## 2019-12-12 RX ADMIN — Medication 100 MG: at 06:00

## 2019-12-12 RX ADMIN — FOLIC ACID 1 MG: 1 TABLET ORAL at 06:00

## 2019-12-12 RX ADMIN — ONDANSETRON 4 MG: 4 TABLET, ORALLY DISINTEGRATING ORAL at 12:47

## 2019-12-12 ASSESSMENT — LIFESTYLE VARIABLES
VISUAL DISTURBANCES: NOT PRESENT
AUDITORY DISTURBANCES: NOT PRESENT
ORIENTATION AND CLOUDING OF SENSORIUM: ORIENTED AND CAN DO SERIAL ADDITIONS
ORIENTATION AND CLOUDING OF SENSORIUM: ORIENTED AND CAN DO SERIAL ADDITIONS
VISUAL DISTURBANCES: NOT PRESENT
TOTAL SCORE: 11
ANXIETY: NO ANXIETY (AT EASE)
AVERAGE NUMBER OF DAYS PER WEEK YOU HAVE A DRINK CONTAINING ALCOHOL: 5
NAUSEA AND VOMITING: NO NAUSEA AND NO VOMITING
ORIENTATION AND CLOUDING OF SENSORIUM: ORIENTED AND CAN DO SERIAL ADDITIONS
AGITATION: NORMAL ACTIVITY
PAROXYSMAL SWEATS: NO SWEAT VISIBLE
TOTAL SCORE: 0
EVER FELT BAD OR GUILTY ABOUT YOUR DRINKING: YES
CONSUMPTION TOTAL: POSITIVE
PAROXYSMAL SWEATS: NO SWEAT VISIBLE
AUDITORY DISTURBANCES: NOT PRESENT
TOTAL SCORE: 0
PAROXYSMAL SWEATS: NO SWEAT VISIBLE
TREMOR: NO TREMOR
VISUAL DISTURBANCES: NOT PRESENT
AGITATION: NORMAL ACTIVITY
ANXIETY: NO ANXIETY (AT EASE)
PAROXYSMAL SWEATS: BARELY PERCEPTIBLE SWEATING. PALMS MOIST
PAROXYSMAL SWEATS: NO SWEAT VISIBLE
ORIENTATION AND CLOUDING OF SENSORIUM: ORIENTED AND CAN DO SERIAL ADDITIONS
ANXIETY: NO ANXIETY (AT EASE)
AGITATION: NORMAL ACTIVITY
TREMOR: NO TREMOR
AUDITORY DISTURBANCES: NOT PRESENT
ALCOHOL_USE: YES
TOTAL SCORE: 3
VISUAL DISTURBANCES: NOT PRESENT
AGITATION: NORMAL ACTIVITY
AGITATION: SOMEWHAT MORE THAN NORMAL ACTIVITY
TOTAL SCORE: 0
DOES PATIENT WANT TO TALK TO SOMEONE ABOUT QUITTING: YES
PAROXYSMAL SWEATS: NO SWEAT VISIBLE
ANXIETY: NO ANXIETY (AT EASE)
VISUAL DISTURBANCES: NOT PRESENT
ORIENTATION AND CLOUDING OF SENSORIUM: ORIENTED AND CAN DO SERIAL ADDITIONS
DOES PATIENT WANT TO STOP DRINKING: YES
NAUSEA AND VOMITING: NO NAUSEA AND NO VOMITING
EVER_SMOKED: YES
TOTAL SCORE: 3
AUDITORY DISTURBANCES: NOT PRESENT
ON A TYPICAL DAY WHEN YOU DRINK ALCOHOL HOW MANY DRINKS DO YOU HAVE: 2
TOTAL SCORE: 3
HOW MANY TIMES IN THE PAST YEAR HAVE YOU HAD 5 OR MORE DRINKS IN A DAY: 365
NAUSEA AND VOMITING: NO NAUSEA AND NO VOMITING
ORIENTATION AND CLOUDING OF SENSORIUM: ORIENTED AND CAN DO SERIAL ADDITIONS
AUDITORY DISTURBANCES: NOT PRESENT
TREMOR: *
HEADACHE, FULLNESS IN HEAD: SEVERE
HAVE PEOPLE ANNOYED YOU BY CRITICIZING YOUR DRINKING: YES
TOTAL SCORE: 0
VISUAL DISTURBANCES: NOT PRESENT
NAUSEA AND VOMITING: NO NAUSEA AND NO VOMITING
TREMOR: NO TREMOR
HEADACHE, FULLNESS IN HEAD: NOT PRESENT
EVER HAD A DRINK FIRST THING IN THE MORNING TO STEADY YOUR NERVES TO GET RID OF A HANGOVER: NO
NAUSEA AND VOMITING: NO NAUSEA AND NO VOMITING
NAUSEA AND VOMITING: NO NAUSEA AND NO VOMITING
ANXIETY: NO ANXIETY (AT EASE)
TREMOR: NO TREMOR
HEADACHE, FULLNESS IN HEAD: NOT PRESENT
TOTAL SCORE: 0
TREMOR: NO TREMOR
HEADACHE, FULLNESS IN HEAD: NOT PRESENT
HAVE YOU EVER FELT YOU SHOULD CUT DOWN ON YOUR DRINKING: YES
HEADACHE, FULLNESS IN HEAD: NOT PRESENT
AUDITORY DISTURBANCES: NOT PRESENT
AGITATION: NORMAL ACTIVITY
ANXIETY: MILDLY ANXIOUS
HEADACHE, FULLNESS IN HEAD: NOT PRESENT

## 2019-12-12 ASSESSMENT — COGNITIVE AND FUNCTIONAL STATUS - GENERAL
SUGGESTED CMS G CODE MODIFIER MOBILITY: CH
DAILY ACTIVITIY SCORE: 24
MOBILITY SCORE: 24
SUGGESTED CMS G CODE MODIFIER DAILY ACTIVITY: CH

## 2019-12-12 ASSESSMENT — PATIENT HEALTH QUESTIONNAIRE - PHQ9
6. FEELING BAD ABOUT YOURSELF - OR THAT YOU ARE A FAILURE OR HAVE LET YOURSELF OR YOUR FAMILY DOWN: NEARLY EVERY DAY
9. THOUGHTS THAT YOU WOULD BE BETTER OFF DEAD, OR OF HURTING YOURSELF: NOT AT ALL
2. FEELING DOWN, DEPRESSED, IRRITABLE, OR HOPELESS: NEARLY EVERY DAY
8. MOVING OR SPEAKING SO SLOWLY THAT OTHER PEOPLE COULD HAVE NOTICED. OR THE OPPOSITE, BEING SO FIGETY OR RESTLESS THAT YOU HAVE BEEN MOVING AROUND A LOT MORE THAN USUAL: NOT AT ALL
4. FEELING TIRED OR HAVING LITTLE ENERGY: NEARLY EVERY DAY
SUM OF ALL RESPONSES TO PHQ QUESTIONS 1-9: 19
9. THOUGHTS THAT YOU WOULD BE BETTER OFF DEAD, OR OF HURTING YOURSELF: NOT AT ALL
3. TROUBLE FALLING OR STAYING ASLEEP OR SLEEPING TOO MUCH: NEARLY EVERY DAY
7. TROUBLE CONCENTRATING ON THINGS, SUCH AS READING THE NEWSPAPER OR WATCHING TELEVISION: SEVERAL DAYS
3. TROUBLE FALLING OR STAYING ASLEEP OR SLEEPING TOO MUCH: NEARLY EVERY DAY
2. FEELING DOWN, DEPRESSED, IRRITABLE, OR HOPELESS: NEARLY EVERY DAY
5. POOR APPETITE OR OVEREATING: NEARLY EVERY DAY
1. LITTLE INTEREST OR PLEASURE IN DOING THINGS: NEARLY EVERY DAY
SUM OF ALL RESPONSES TO PHQ QUESTIONS 1-9: 19
6. FEELING BAD ABOUT YOURSELF - OR THAT YOU ARE A FAILURE OR HAVE LET YOURSELF OR YOUR FAMILY DOWN: NEARLY EVERY DAY
5. POOR APPETITE OR OVEREATING: NEARLY EVERY DAY
4. FEELING TIRED OR HAVING LITTLE ENERGY: NEARLY EVERY DAY
SUM OF ALL RESPONSES TO PHQ9 QUESTIONS 1 AND 2: 6
8. MOVING OR SPEAKING SO SLOWLY THAT OTHER PEOPLE COULD HAVE NOTICED. OR THE OPPOSITE, BEING SO FIGETY OR RESTLESS THAT YOU HAVE BEEN MOVING AROUND A LOT MORE THAN USUAL: NOT AT ALL
1. LITTLE INTEREST OR PLEASURE IN DOING THINGS: NEARLY EVERY DAY
SUM OF ALL RESPONSES TO PHQ9 QUESTIONS 1 AND 2: 6
7. TROUBLE CONCENTRATING ON THINGS, SUCH AS READING THE NEWSPAPER OR WATCHING TELEVISION: SEVERAL DAYS

## 2019-12-12 ASSESSMENT — COPD QUESTIONNAIRES
DURING THE PAST 4 WEEKS HOW MUCH DID YOU FEEL SHORT OF BREATH: NONE/LITTLE OF THE TIME
DO YOU EVER COUGH UP ANY MUCUS OR PHLEGM?: YES, A FEW DAYS A WEEK OR MONTH
COPD SCREENING SCORE: 5
IN THE PAST 12 MONTHS DO YOU DO LESS THAN YOU USED TO BECAUSE OF YOUR BREATHING PROBLEMS: AGREE
HAVE YOU SMOKED AT LEAST 100 CIGARETTES IN YOUR ENTIRE LIFE: YES

## 2019-12-12 NOTE — DISCHARGE PLANNING
Patient is eligible for Medicaid Meds to Beds at discharge if they have coverage with [place] [employer], Medicaid FFS, Medicaid HMO ([facility]), or [place]. This service is provided through the [facility] if orders are received prior to 4 p.m. Monday through Friday, excluding holidays. Please call [phone] prior to discharge.

## 2019-12-12 NOTE — ED NOTES
Med rec updated and complete allergies reviewed. No antibiotic use in last 14 days.  Home pharmacy [facility] jeremy

## 2019-12-12 NOTE — H&P
Hospital Medicine History & Physical Note    Date of Service  12/11/2019    Primary Care Physician  No primary care provider on file.    Consultants  None    Code Status  Full code     Chief Complaint  Palpitations    History of Presenting Illness  49 y.o. female with prior history of alcoholism which is been ongoing, essential hypertension for which she takes a medical regimen, and apparent history of chronic obstructive pulmonary disease without current medical regimen, was in her usual state of health until several days prior to admission.  She reports increasing her usual alcohol intake in the setting of losing her best friend cousin.  On the day of admission she had her last drink, which captured several days of drinking without real oral intake.  As she laid in her house, she felt very ill, with palpitations, and anxiety, and decided to call the emergency medical services.  Apparently on their arrival, she was noted to be hypoglycemic, and was brought to the hospital for further evaluation.  She currently denies headache or vision changes she has no chest pain or shortness of breath, she has no abdominal pain, she does report days of vomiting and nausea, she denies diarrhea or constipation, she has no fever or chills.    Review of Systems  Review of Systems   Constitutional: Negative.    Eyes: Negative.    Respiratory: Negative.    Cardiovascular: Negative.    Gastrointestinal: Positive for nausea and vomiting. Negative for abdominal pain, constipation and diarrhea.   Genitourinary: Negative.    Musculoskeletal: Negative.    Skin: Negative.    Neurological: Negative.    Endo/Heme/Allergies: Negative.    Psychiatric/Behavioral: Negative.        Past Medical History   has a past medical history of Chronic obstructive pulmonary disease (HCC) and Hypertension.    Surgical History   has a past surgical history that includes hysterectomy laparoscopy (6/2005).     Family History  family history includes Cancer in her  maternal grandfather; Diabetes in her father and mother; Heart Disease in her maternal grandmother; Hypertension in her father and mother.     Social History   reports that she has been smoking cigarettes. She has a 7.50 pack-year smoking history. She has never used smokeless tobacco. She reports current alcohol use. She reports current drug use. Frequency: 2.00 times per week. Drugs: Inhaled and Marijuana.    Allergies  Allergies   Allergen Reactions   • Codeine Hives and Itching     Tolerated morphine previously  Allergy updated from 2015, morphine given in 2016   • Shellfish Allergy Anaphylaxis   • Codeine Hives, Itching and Swelling     Throat swells closed  RXN=>10 years ago   • Codeine    • Fish Allergy Shortness of Breath     Swelling to throat.       Medications  Prior to Admission Medications   Prescriptions Last Dose Informant Patient Reported? Taking?   NIFEdipine SR (PROCARDIA-XL) 30 MG tablet 12/11/2019 at 0900 Patient Yes Yes   Sig: Take 30 mg by mouth every day.   lisinopril (PRINIVIL) 10 MG Tab 12/11/2019 at 0900 Patient Yes Yes   Sig: Take 10 mg by mouth every day.   traZODone (DESYREL) 50 MG Tab 12/10/2019 at 2200 Patient Yes Yes   Sig: Take 50 mg by mouth every bedtime.      Facility-Administered Medications: None       Physical Exam  Temp:  [36.6 °C (97.9 °F)] 36.6 °C (97.9 °F)  Pulse:  [101-113] 103  Resp:  [16-18] 18  BP: (119-137)/() 135/98  SpO2:  [94 %-97 %] 95 %    Physical Exam  Vitals signs and nursing note reviewed.   Constitutional:       Appearance: Normal appearance. She is normal weight.   HENT:      Head: Normocephalic and atraumatic.      Nose: Nose normal.      Mouth/Throat:      Mouth: Mucous membranes are moist.      Pharynx: Oropharynx is clear.   Eyes:      Extraocular Movements: Extraocular movements intact.      Conjunctiva/sclera: Conjunctivae normal.      Pupils: Pupils are equal, round, and reactive to light.   Neck:      Musculoskeletal: Normal range of motion and  neck supple.   Cardiovascular:      Rate and Rhythm: Regular rhythm. Tachycardia present.      Pulses: Normal pulses.      Heart sounds: Normal heart sounds. No murmur. No friction rub. No gallop.    Pulmonary:      Effort: Pulmonary effort is normal. No respiratory distress.      Breath sounds: Normal breath sounds. No wheezing or rales.   Abdominal:      General: Abdomen is flat. Bowel sounds are normal. There is no distension.      Palpations: Abdomen is soft.      Tenderness: There is no tenderness.   Musculoskeletal: Normal range of motion.      Right lower leg: No edema.      Left lower leg: No edema.   Skin:     General: Skin is warm and dry.   Neurological:      General: No focal deficit present.      Mental Status: She is alert and oriented to person, place, and time.      Cranial Nerves: No cranial nerve deficit.      Motor: No weakness.   Psychiatric:         Mood and Affect: Mood normal.         Behavior: Behavior normal.         Laboratory:  Recent Labs     12/11/19 1945   WBC 9.8   RBC 4.50   HEMOGLOBIN 14.0   HEMATOCRIT 43.2   MCV 96.0   MCH 31.1   MCHC 32.4*   RDW 54.4*   PLATELETCT 350   MPV 9.5     Recent Labs     12/11/19 1945   SODIUM 141   POTASSIUM 3.3*   CHLORIDE 98   CO2 13*   GLUCOSE 47*   BUN 19   CREATININE 0.92   CALCIUM 10.0     Recent Labs     12/11/19 1945   ALTSGPT 11   ASTSGOT 23   ALKPHOSPHAT 69   TBILIRUBIN 0.3   LIPASE 22   GLUCOSE 47*         No results for input(s): NTPROBNP in the last 72 hours.      Recent Labs     12/11/19 1945   TROPONINT <6       Urinalysis:    No results found     Imaging:  No orders to display         Assessment/Plan:  I anticipate this patient will require at least two midnights for appropriate medical management, necessitating inpatient admission.    * Alcohol withdrawal (HCC)  Assessment & Plan  Concern for the same, especially given recent binge drinking, last drink early on day of admission.  Plan for admission with [facility] protocol.     Metabolic  acidosis- (present on admission)  Assessment & Plan  Suspect alcoholic ketoacidosis in the setting of binge drinking with limited oral intake.  Hydrate 2L with sodium bicarbonate infusion.     Tobacco abuse  Assessment & Plan  Will provide with nicoderm during admission.  Monitor.     Alcoholism (HCC)- (present on admission)  Assessment & Plan  Ongoing with recent increased intake in setting of grief.  Patient reports desire for cessation     Essential hypertension- (present on admission)  Assessment & Plan  Controlled with current medication regimen of lisinopril.        VTE prophylaxis: SCD, lovenox

## 2019-12-12 NOTE — DIETARY
"Nutrition services: Day 1 of admit.  Rhonda Gould is a 49 y.o. female with admitting DX of Alcohol withdrawal  Consult received for Low BMI, MST 2 (Poor PO/weight loss 2-13 lb in 1 month)    Visit with pt at bedside. Pt reports  lb (has always been thin), and poor PO x1 month (<50% of normal). Appetite remains low due to nausea, Zofran given. Pt agreeable to Boost Plus between meals.    Assessment:  Height: 172.7 cm (5' 8\")  Weight: 47.2 kg (104 lb 0.9 oz)  Body mass index is 15.82 kg/m²., BMI classification: Underweight  Diet/Intake: Regular, % x1 meal    Evaluation:   1. 13% weight loss over 1 month (severe) with poor PO intake <50% x1 month.  2. PMH: ETOH abuse, COPD, HTN   3. Labs: Phos/Mg WNL  4. Meds: Thiamine, MVI, Folic Acid    Malnutrition Risk: Pt with severe malnutrition in the context of acute on chronic illness related to alcoholism as evidenced by 13% weight loss over 1 month (severe) with poor PO intake <50% x1 month.     Recommendations/Plan:  1. Boost Plus BID between meals   2. Encourage intake of meals  3. Document intake of all meals as % taken in ADL's to provide interdisciplinary communication across all shifts.   4. Monitor weight.  5. Nutrition rep will continue to see patient for ongoing meal and snack preferences.     RD following        "

## 2019-12-12 NOTE — ASSESSMENT & PLAN NOTE
Suspect alcoholic ketoacidosis in the setting of binge drinking with limited oral intake.  Hydrate 2L with sodium bicarbonate infusion.

## 2019-12-12 NOTE — ED PROVIDER NOTES
ED Provider Note    ER PROVIDER NOTE      CHIEF COMPLAINT  Chief Complaint   Patient presents with   • Psych Eval     PT reports having a hard time dealing with recent death to a loved one and recent loss of job.  She has begun drinking again and feeling very depressed, requesting resources.  States h/o depression.   • Hypoglycemia     Found to have a BG= 50 when picked up by EMS.   • Vomiting     Pt reports she has been vomiting all day, denies fever or other symptoms.       HPI  Rhonda Gould is a 49 y.o. female who presents to the emergency department complaining of vomiting and alcohol abuse.  Patient reports she has had long-standing issues with alcohol although seem to be getting a handle on it.  However she had a cousin  recently and she began drinking more to take away the pain.  She denies any suicidal thoughts or wanting to die, she just states she wants to get rid of the pain and drinking is the only way she knows how.  She began vomiting earlier this morning and is not held anything down since.  She denies any real abdominal pain, does have some mild headache.  No chest pain or shortness of breath.  No fevers or chills.  No diarrhea.  No urinary symptoms.  States she does not really eat or drink anything in a few days just alcohol.  Denies any other ingestions    REVIEW OF SYSTEMS  Pertinent positives include vomiting, depression, alcohol abuse. Pertinent negatives include no chest pain. See HPI for details. All other systems reviewed and are negative.    PAST MEDICAL HISTORY   has a past medical history of Chronic obstructive pulmonary disease (HCC) and Hypertension.    SURGICAL HISTORY   has a past surgical history that includes hysterectomy laparoscopy (2005).    FAMILY HISTORY  Family History   Problem Relation Age of Onset   • Diabetes Mother    • Hypertension Mother    • Hypertension Father    • Diabetes Father    • Heart Disease Maternal Grandmother    • Cancer Maternal Grandfather         SOCIAL HISTORY  Social History     Socioeconomic History   • Marital status:      Spouse name: Not on file   • Number of children: Not on file   • Years of education: Not on file   • Highest education level: Not on file   Occupational History   • Not on file   Social Needs   • Financial resource strain: Not on file   • Food insecurity:     Worry: Not on file     Inability: Not on file   • Transportation needs:     Medical: Not on file     Non-medical: Not on file   Tobacco Use   • Smoking status: Current Every Day Smoker     Packs/day: 0.25     Years: 30.00     Pack years: 7.50     Types: Cigarettes   • Smokeless tobacco: Never Used   Substance and Sexual Activity   • Alcohol use: Yes     Frequency: 2-3 times a week     Comment: socially couple times per week   • Drug use: Yes     Frequency: 2.0 times per week     Types: Inhaled, Marijuana     Comment: marijuana   • Sexual activity: Not Currently   Lifestyle   • Physical activity:     Days per week: Not on file     Minutes per session: Not on file   • Stress: Not on file   Relationships   • Social connections:     Talks on phone: Not on file     Gets together: Not on file     Attends Latter day service: Not on file     Active member of club or organization: Not on file     Attends meetings of clubs or organizations: Not on file     Relationship status: Not on file   • Intimate partner violence:     Fear of current or ex partner: Not on file     Emotionally abused: Not on file     Physically abused: Not on file     Forced sexual activity: Not on file   Other Topics Concern   • Not on file   Social History Narrative    ** Merged History Encounter **           Social History     Substance and Sexual Activity   Drug Use Yes   • Frequency: 2.0 times per week   • Types: Inhaled, Marijuana    Comment: marijuana       CURRENT MEDICATIONS  Home Medications     Reviewed by Ethel Roy R.N. (Registered Nurse) on 12/11/19 at 1938  Med List Status: Partial  "  Medication Last Dose Status   albuterol 108 (90 BASE) MCG/ACT Aero Soln inhalation aerosol not taking Active   atorvastatin (LIPITOR) 80 MG tablet not taking Active   folic acid (FOLVITE) 1 MG Tab 12/10/2019 Active   lisinopril (PRINIVIL) 10 MG Tab 12/10/2019 Active   meclizine (ANTIVERT) 25 MG Tab not taking Active   methocarbamol (ROBAXIN-750) 750 MG Tab not taking Active   ondansetron (ZOFRAN ODT) 4 MG TABLET DISPERSIBLE not taking Active   thiamine (THIAMINE) 100 MG tablet not taking Active                ALLERGIES  Allergies   Allergen Reactions   • Codeine Hives and Itching     Tolerated morphine previously  Allergy updated from 2015, morphine given in 2016   • Shellfish Allergy Anaphylaxis   • Codeine Hives, Itching and Swelling     Throat swells closed  RXN=>10 years ago   • Codeine    • Fish Allergy Shortness of Breath     Swelling to throat.       PHYSICAL EXAM  VITAL SIGNS: /110   Pulse (!) 104   Temp 36.6 °C (97.9 °F) (Temporal)   Resp 16   Ht 1.727 m (5' 8\")   Wt 47.6 kg (105 lb)   LMP 06/11/2005   SpO2 96%   BMI 15.97 kg/m²   Pulse ox interpretation: I interpret this pulse ox as normal.    Constitutional: Alert thin  HENT: No signs of trauma, Bilateral external ears normal, Nose normal.  Mucous membranes dry  Eyes: Pupils are equal and reactive, Conjunctiva normal, Non-icteric.   Neck: Normal range of motion, No tenderness, Supple, No stridor.   Lymphatic: No lymphadenopathy noted.   Cardiovascular: Tachycardic, regular, no murmurs.   Thorax & Lungs: Normal breath sounds, No respiratory distress, No wheezing, No chest tenderness.   Abdomen: Bowel sounds normal, Soft, No tenderness, No masses, No pulsatile masses. No peritoneal signs.  Skin: Warm, Dry, No erythema, No rash.   Back: No bony tenderness, No CVA tenderness.   Extremities: Intact distal pulses, No edema, No tenderness, No cyanosis,   Musculoskeletal: Good range of motion in all major joints. No tenderness to palpation or " major deformities noted.   Neurologic: Alert , Normal motor function, Normal sensory function, No focal deficits noted.   Psychiatric: Affect normal, Judgment normal, Mood normal.     DIAGNOSTIC STUDIES / PROCEDURES    Results for orders placed or performed during the hospital encounter of 12/11/19   CBC WITH DIFFERENTIAL   Result Value Ref Range    WBC 9.8 4.8 - 10.8 K/uL    RBC 4.50 4.20 - 5.40 M/uL    Hemoglobin 14.0 12.0 - 16.0 g/dL    Hematocrit 43.2 37.0 - 47.0 %    MCV 96.0 81.4 - 97.8 fL    MCH 31.1 27.0 - 33.0 pg    MCHC 32.4 (L) 33.6 - 35.0 g/dL    RDW 54.4 (H) 35.9 - 50.0 fL    Platelet Count 350 164 - 446 K/uL    MPV 9.5 9.0 - 12.9 fL    Neutrophils-Polys 65.20 44.00 - 72.00 %    Lymphocytes 27.30 22.00 - 41.00 %    Monocytes 6.30 0.00 - 13.40 %    Eosinophils 0.40 0.00 - 6.90 %    Basophils 0.50 0.00 - 1.80 %    Immature Granulocytes 0.30 0.00 - 0.90 %    Nucleated RBC 0.00 /100 WBC    Neutrophils (Absolute) 6.36 2.00 - 7.15 K/uL    Lymphs (Absolute) 2.66 1.00 - 4.80 K/uL    Monos (Absolute) 0.61 0.00 - 0.85 K/uL    Eos (Absolute) 0.04 0.00 - 0.51 K/uL    Baso (Absolute) 0.05 0.00 - 0.12 K/uL    Immature Granulocytes (abs) 0.03 0.00 - 0.11 K/uL    NRBC (Absolute) 0.00 K/uL   COMP METABOLIC PANEL   Result Value Ref Range    Sodium 141 135 - 145 mmol/L    Potassium 3.3 (L) 3.6 - 5.5 mmol/L    Chloride 98 96 - 112 mmol/L    Co2 13 (L) 20 - 33 mmol/L    Anion Gap 30.0 (H) 0.0 - 11.9    Glucose 47 (L) 65 - 99 mg/dL    Bun 19 8 - 22 mg/dL    Creatinine 0.92 0.50 - 1.40 mg/dL    Calcium 10.0 8.5 - 10.5 mg/dL    AST(SGOT) 23 12 - 45 U/L    ALT(SGPT) 11 2 - 50 U/L    Alkaline Phosphatase 69 30 - 99 U/L    Total Bilirubin 0.3 0.1 - 1.5 mg/dL    Albumin 5.0 (H) 3.2 - 4.9 g/dL    Total Protein 7.4 6.0 - 8.2 g/dL    Globulin 2.4 1.9 - 3.5 g/dL    A-G Ratio 2.1 g/dL   TSH   Result Value Ref Range    TSH 0.600 0.380 - 5.330 uIU/mL   VENOUS BLOOD GAS   Result Value Ref Range    Venous Bg Ph 7.30 (L) 7.31 - 7.45     Venous Bg Pco2 25.8 (L) 41.0 - 51.0 mmHg    Venous Bg Po2 51.4 (H) 25.0 - 40.0 mmHg    Venous Bg O2 Saturation 80.1 %    Venous Bg Hco3 12 (L) 24 - 28 mmol/L    Venous Bg Base Excess -12 mmol/L    Body Temp see below Centigrade   LIPASE   Result Value Ref Range    Lipase 22 11 - 82 U/L   TROPONIN   Result Value Ref Range    Troponin T <6 6 - 19 ng/L   ESTIMATED GFR   Result Value Ref Range    GFR If African American >60 >60 mL/min/1.73 m 2    GFR If Non African American >60 >60 mL/min/1.73 m 2   URINALYSIS   Result Value Ref Range    Micro Urine Req Microscopic    POC BREATHALIZER   Result Value Ref Range    POC Breathalizer 0.011 (A) 0.00 - 0.01 Percent   ACCU-CHEK GLUCOSE   Result Value Ref Range    Glucose - Accu-Ck 42 (L) 65 - 99 mg/dL   ACCU-CHEK GLUCOSE   Result Value Ref Range    Glucose - Accu-Ck 232 (H) 65 - 99 mg/dL   ACCU-CHEK GLUCOSE   Result Value Ref Range    Glucose - Accu-Ck 200 (H) 65 - 99 mg/dL   EKG (NOW)   Result Value Ref Range    Report       [facility] Emergency Dept.    Test Date:  2019  Pt Name:    JASON SCHMITZ                Department: ER  MRN:        [id number]                      Room:       [facility]  Gender:     Female                       Technician: [id number]  :        1970                   Requested By:AVRIL KNIGHT  Order #:    [id number]                    Reading MD: AVRIL KNIGHT MD    Measurements  Intervals                                Axis  Rate:       126                          P:          82  ID:         130                          QRS:        55  QRSD:       75                           T:          53  QT:         324  QTc:        470    Interpretive Statements  Sinus tachycardia  LAE, consider biatrial enlargement  RSR' in V1 or V2, probably normal variant  Probable left ventricular hypertrophy  Baseline wander in lead(s) V1,V2  Compared to ECG 2019 21:54:44  T-wave abnormality no longer present  Electronically Signed On  12- 21:32:08 PST by AVRIL MANRIQUEZ MD         RADIOLOGY  No orders to display     The radiologist's interpretation of all radiological studies have been reviewed by me.    COURSE & MEDICAL DECISION MAKING  Nursing notes, VS, PMSFHx reviewed in chart.    8:03 PM Patient seen and examined at bedside.  Patient was initially hypoglycemic, 42, was given D10 as well as some apple juice.  She fell down some of the apple juice but it had similar vomiting.   Repeat blood sugar 232   Patient will be treated with Zofran as well as LR. Ordered for CBC, CMP, lipase, troponin, ECG, VBG, TSH to evaluate her symptoms.     9:12 PM  Patient reevaluated, updated on results, her repeat blood sugars 200, she is still feeling somewhat nauseated although with some improvement.  Discussed results, plan for admission      HYDRATION: Based on the patient's presentation of Acute Vomiting, Dehydration and Tachycardia the patient was given IV fluids. IV Hydration was used because oral hydration was not as rapid as required. Upon recheck following hydration, the patient was Improved.     9:40 PM  Discussed case with Dr. Gutierrez for hospitalization    The total critical care time spent on this patient was 40 minutes, resuscitating patient, speaking with admitting physician, and interpreting test results. This 40 minutes is exclusive of separately billable procedures.        Decision Making:  This is a 49 y.o. female presented with nausea vomiting and low blood sugar in the setting of severe alcohol abuse.  I think this is likely alcoholic ketoacidosis given her history, labs, persistent symptoms and hypoglycemia.  She was started on IV dextrose, she was given some food and juice to drink although she has had some difficulty holding this down.  She has no abdominal tenderness and has a benign abdominal exam I do not suspect surgical intra-abdominal pathology at this time.  Has a negative troponin and unremarkable ECG other than sinus  tachycardia.  She does have a significant anion gap, slight acidosis as well consistent with alcoholic ketoacidosis.  No infectious symptoms.  Lipase is normal.  She is additionally mildly hypokalemic and is given 10 mEq IV of potassium as well as magnesium as she is not currently tolerating orals    The patient is hospitalized in guarded condition    FINAL IMPRESSION  1. Nausea and vomiting, intractability of vomiting not specified, unspecified vomiting type    2. Hypoglycemia    3. Alcohol abuse         The note accurately reflects work and decisions made by me.  Thai Joyce  12/11/2019  9:47 PM

## 2019-12-12 NOTE — PROGRESS NOTES
Assumed care at 000,bedside report received from ER RN. Pt. Is NSR on the monitor. Initial assessment completed, orders reviewed, call light within reach, bed alarm  in use, and hourly rounding in place. POC addressed with patient, no additional questions at this time.

## 2019-12-12 NOTE — RESPIRATORY CARE
COPD EDUCATION by COPD CLINICAL EDUCATOR  12/12/2019 at 10:28 AM by Mary Muhammad     Patient interviewed by COPD education team. Patient refused COPD program at this time.

## 2019-12-12 NOTE — ED NOTES
FS =42 after providing pt with apple juice.  She remains alert and able to take po.  Hypoglycemia protocol initiated.

## 2019-12-12 NOTE — CARE PLAN
Problem: Safety  Goal: Will remain free from falls  Outcome: PROGRESSING AS EXPECTED  Bed alarmed, call bell in reach  Problem: Knowledge Deficit  Goal: Knowledge of the prescribed therapeutic regimen will improve  Outcome: PROGRESSING AS EXPECTED  Review of careplan, IVF, medications. Expectations during ETOH withdrawals. Call bell in reach.

## 2019-12-12 NOTE — ED NOTES
Patient up to the bathroom, unsteady on her feet, needs 1 person assist. UA sent to lab. Dr aguayo notified of HA and orders received.

## 2019-12-12 NOTE — ED TRIAGE NOTES
Chief Complaint   Patient presents with   • Psych Eval     PT reports having a hard time dealing with recent death to a loved one and recent loss of job.  She has begun drinking again and feeling very depressed, requesting resources.  States h/o depression.   • Hypoglycemia     Found to have a BG= 50 when picked up by EMS.   • Vomiting     Pt reports she has been vomiting all day, denies fever or other symptoms.     BIBA to room.  Agree with triage assessment.  Changing into gown.  Chart placed for ERP eval.

## 2019-12-12 NOTE — CARE PLAN
Problem: Nutritional:  Goal: Achieve adequate nutritional intake  Description  Patient will consume 50% of meals and supplements   Outcome: NOT MET

## 2019-12-12 NOTE — ASSESSMENT & PLAN NOTE
Concern for the same, especially given recent binge drinking, last drink early on day of admission.  Plan for admission with [facility] protocol.

## 2019-12-12 NOTE — ED NOTES
Patient given PRN pain medication for headache. CIWA score 9 at this time but will reassess after medicated for headache. Pt agreeable to plan.

## 2019-12-13 LAB
ANION GAP SERPL CALC-SCNC: 10 MMOL/L (ref 0–11.9)
BUN SERPL-MCNC: 7 MG/DL (ref 8–22)
CALCIUM SERPL-MCNC: 9.7 MG/DL (ref 8.5–10.5)
CHLORIDE SERPL-SCNC: 95 MMOL/L (ref 96–112)
CO2 SERPL-SCNC: 30 MMOL/L (ref 20–33)
CREAT SERPL-MCNC: 0.92 MG/DL (ref 0.5–1.4)
GLUCOSE SERPL-MCNC: 133 MG/DL (ref 65–99)
POTASSIUM SERPL-SCNC: 4.2 MMOL/L (ref 3.6–5.5)
SODIUM SERPL-SCNC: 135 MMOL/L (ref 135–145)

## 2019-12-13 PROCEDURE — 770020 HCHG ROOM/CARE - TELE (206)

## 2019-12-13 PROCEDURE — 99233 SBSQ HOSP IP/OBS HIGH 50: CPT | Performed by: HOSPITALIST

## 2019-12-13 PROCEDURE — 80048 BASIC METABOLIC PNL TOTAL CA: CPT

## 2019-12-13 PROCEDURE — A9270 NON-COVERED ITEM OR SERVICE: HCPCS | Performed by: HOSPITALIST

## 2019-12-13 PROCEDURE — 700102 HCHG RX REV CODE 250 W/ 637 OVERRIDE(OP): Performed by: HOSPITALIST

## 2019-12-13 RX ADMIN — LORAZEPAM 2 MG: 2 TABLET ORAL at 22:19

## 2019-12-13 RX ADMIN — ACETAMINOPHEN 650 MG: 325 TABLET, FILM COATED ORAL at 22:18

## 2019-12-13 RX ADMIN — LORAZEPAM 1 MG: 1 TABLET ORAL at 16:41

## 2019-12-13 RX ADMIN — LORAZEPAM 2 MG: 2 TABLET ORAL at 20:21

## 2019-12-13 ASSESSMENT — LIFESTYLE VARIABLES
NAUSEA AND VOMITING: NO NAUSEA AND NO VOMITING
TREMOR: TREMOR NOT VISIBLE BUT CAN BE FELT, FINGERTIP TO FINGERTIP
ANXIETY: *
TOTAL SCORE: VERY MILD ITCHING, PINS AND NEEDLES SENSATION, BURNING OR NUMBNESS
VISUAL DISTURBANCES: NOT PRESENT
AGITATION: NORMAL ACTIVITY
ORIENTATION AND CLOUDING OF SENSORIUM: ORIENTED AND CAN DO SERIAL ADDITIONS
AUDITORY DISTURBANCES: NOT PRESENT
NAUSEA AND VOMITING: NO NAUSEA AND NO VOMITING
TOTAL SCORE: 14
PAROXYSMAL SWEATS: BARELY PERCEPTIBLE SWEATING. PALMS MOIST
ORIENTATION AND CLOUDING OF SENSORIUM: ORIENTED AND CAN DO SERIAL ADDITIONS
ORIENTATION AND CLOUDING OF SENSORIUM: ORIENTED AND CAN DO SERIAL ADDITIONS
ANXIETY: MILDLY ANXIOUS
PAROXYSMAL SWEATS: BARELY PERCEPTIBLE SWEATING. PALMS MOIST
AGITATION: NORMAL ACTIVITY
PAROXYSMAL SWEATS: BARELY PERCEPTIBLE SWEATING. PALMS MOIST
VISUAL DISTURBANCES: MILD SENSITIVITY
HEADACHE, FULLNESS IN HEAD: MODERATELY SEVERE
TREMOR: TREMOR NOT VISIBLE BUT CAN BE FELT, FINGERTIP TO FINGERTIP
TREMOR: TREMOR NOT VISIBLE BUT CAN BE FELT, FINGERTIP TO FINGERTIP
TOTAL SCORE: 4
HEADACHE, FULLNESS IN HEAD: MILD
AUDITORY DISTURBANCES: NOT PRESENT
AUDITORY DISTURBANCES: NOT PRESENT
TOTAL SCORE: 12
VISUAL DISTURBANCES: NOT PRESENT
VISUAL DISTURBANCES: MILD SENSITIVITY
ANXIETY: *
NAUSEA AND VOMITING: NO NAUSEA AND NO VOMITING
NAUSEA AND VOMITING: NO NAUSEA AND NO VOMITING
ORIENTATION AND CLOUDING OF SENSORIUM: ORIENTED AND CAN DO SERIAL ADDITIONS
PAROXYSMAL SWEATS: NO SWEAT VISIBLE
SUBSTANCE_ABUSE: 1
AUDITORY DISTURBANCES: NOT PRESENT
ORIENTATION AND CLOUDING OF SENSORIUM: ORIENTED AND CAN DO SERIAL ADDITIONS
ANXIETY: *
AGITATION: NORMAL ACTIVITY
HEADACHE, FULLNESS IN HEAD: MODERATELY SEVERE
PAROXYSMAL SWEATS: BARELY PERCEPTIBLE SWEATING. PALMS MOIST
AGITATION: NORMAL ACTIVITY
TREMOR: *
AUDITORY DISTURBANCES: NOT PRESENT
ANXIETY: *
TREMOR: *
HEADACHE, FULLNESS IN HEAD: SEVERE
TOTAL SCORE: VERY MILD ITCHING, PINS AND NEEDLES SENSATION, BURNING OR NUMBNESS
AGITATION: NORMAL ACTIVITY
HEADACHE, FULLNESS IN HEAD: MODERATELY SEVERE
TOTAL SCORE: 9
TOTAL SCORE: 8
NAUSEA AND VOMITING: MILD NAUSEA WITH NO VOMITING
VISUAL DISTURBANCES: NOT PRESENT

## 2019-12-13 ASSESSMENT — ENCOUNTER SYMPTOMS
PHOTOPHOBIA: 0
FEVER: 0
NERVOUS/ANXIOUS: 1
HEARTBURN: 0
DEPRESSION: 0
SHORTNESS OF BREATH: 0
SENSORY CHANGE: 0
MYALGIAS: 0
TREMORS: 0
BLURRED VISION: 0
DIAPHORESIS: 0
COUGH: 0
DIZZINESS: 0
EYE DISCHARGE: 0
VOMITING: 0
BACK PAIN: 0
ABDOMINAL PAIN: 0
SPUTUM PRODUCTION: 0
TINGLING: 0
CONSTIPATION: 0
ORTHOPNEA: 0

## 2019-12-13 NOTE — PROGRESS NOTES
Assumed care at 0700. Bedside report received from Lazara. Patient's chart and MAR reviewed. Pt sleeping at this time.  White board updated. Call light, phone and personal belongings within reach.

## 2019-12-13 NOTE — PROGRESS NOTES
Spiritual Care Note    Patient Information     Patient's Name: Rhonda Gould   MRN: [id number]    YOB: 1970   Age and Gender: 49 y.o. female   Service Area: [address]   Room (and Bed): [place] [address]   Ethnicity or Nationality:     Primary Language: English   Quaker/Spiritual preference: None   Place of Residence: [place], [place]   Family/Friends/Others Present: no   Clinical Team Present: no   Medical Diagnosis(-es)/Procedure(s): Alcohol withdrawal   Code Status: Full Code    Date of Admission: 12/11/2019   Length of Stay: 1 days        Spiritual Care Provider Information:  Name of Spiritual Care Provider: Lynette Ramey  Title of Spiritual Care Provider: Associate Vidal  Phone Number: [phone]  E-mail: [email or web address]  Total time : 25 minutes    Spiritual Screen Results:    Gen Nursing  Spiritual Screen  Is your spiritual health or inner well-being important to you as you cope with your medical condition?: No  Would you like to receive a visit from our Spiritual Care team or your own Holiness or spiritual leader?: No  Was spiritual care education provided to the patient?: Declined  Sources of Hope/Tanya: Animals     Palliative Care  PC Quaker/Spiritual Screening  Was spiritual care education provided to the patient?: Declined      Encounter/Request Information  Encounter/Request Type     Visited With: Patient  Nature of the Visit: Initial, On shift    Continue Visiting: (UPON REQUEST)    General Visit: Yes    Referral From/ Origin of Request: [place] rounds    Religous Needs/Values       Spiritual Assessment   Spiritual Care Encounters    Observations/Symptoms: Accepting, Thankfulness(Pt alert, sitting up in bed welcoming of visit)    Interacton/Conversation:  introduced self to pt. Pt very pleasant to talk. Pt readily shared how she came to be in the hospital. Pt shared she wants to use the resources offered her by staff. Pt shared she and her family  have suffered through recent deaths of loved ones. Pt was grateful for the visit - wants to remember this encouragement, belong to chelly community with the wife of recently  'uncle.' Pt stated her son is a source of hope for her, and her mother as well. Pt wants to stop drinking and wants to turn her life around. Pt stated she lost her job a couple of weeks ago, but she still has a weekly place to stay. Pt replied she would like some tallo -  retrieved some jello for pt from nutrition unit.    Assessment: Need  Need: Decision Making(Alcohol addiction treatment)    Interventions: Compassionate Presence, Reflective Listening, Encouragement    Outcomes: Value/Dignity/Respect, Moving from Powerlessness to Empowerment    Plan: Visit Upon Request    Notes:

## 2019-12-13 NOTE — PROGRESS NOTES
Hospital Medicine Daily Progress Note    Date of Service  12/13/2019    Chief Complaint  49 y.o. female admitted 12/11/2019 with etoh dependence and acute withdraw    Hospital Course    49 y.o. female with prior history of alcoholism which is been ongoing, essential hypertension for which she takes a medical regimen, and apparent history of chronic obstructive pulmonary disease without current medical regimen, was in her usual state of health until several days prior to admission.  She reports increasing her usual alcohol intake in the setting of losing her best friend cousin.  On the day of admission she had her last drink, which captured several days of drinking without real oral intake.  As she laid in her house, she felt very ill, with palpitations, and anxiety, and decided to call the emergency medical services.  Apparently on their arrival, she was noted to be hypoglycemic, and was brought to the hospital for further evaluation.  She currently denies headache or vision changes she has no chest pain or shortness of breath, she has no abdominal pain, she does report days of vomiting and nausea, she denies diarrhea or constipation, she has no fever or chills.       Interval Problem Update  Patient seen and examined this morning.  Overnight no acute events.  CIWA course have been low.  Patient states she slept through the night without any concerns.  Normal sinus rhythm she is AAO x4 tolerating a regular diet no acute complaints this morning.  co3 was 13 on admission and she was started on bicarb drip, since then co2 has normalized, 24 now , dc drip    Today is day 3 of her withdrawal, monitor  Consultants/Specialty  none    Code Status  full    Disposition  Home 24 hours    Review of Systems  Review of Systems   Constitutional: Negative for diaphoresis, fever and malaise/fatigue.   HENT: Negative for congestion, hearing loss and nosebleeds.    Eyes: Negative for blurred vision, photophobia and discharge.    Respiratory: Negative for cough, sputum production and shortness of breath.    Cardiovascular: Negative for chest pain, orthopnea and leg swelling.   Gastrointestinal: Negative for abdominal pain, constipation, heartburn and vomiting.   Genitourinary: Negative for dysuria and hematuria.   Musculoskeletal: Negative for back pain and myalgias.   Skin: Negative for itching and rash.   Neurological: Negative for dizziness, tingling, tremors and sensory change.   Psychiatric/Behavioral: Positive for substance abuse. Negative for depression and suicidal ideas. The patient is nervous/anxious.         Physical Exam  Temp:  [36.6 °C (97.8 °F)] 36.6 °C (97.8 °F)  Pulse:  [85] 85  Resp:  [17] 17  BP: (113)/(85) 113/85  SpO2:  [98 %] 98 %    Physical Exam  Vitals signs and nursing note reviewed.   Constitutional:       Appearance: Normal appearance. She is normal weight.   HENT:      Head: Normocephalic and atraumatic.      Nose: Nose normal.      Mouth/Throat:      Mouth: Mucous membranes are moist.      Pharynx: Oropharynx is clear.   Eyes:      Extraocular Movements: Extraocular movements intact.      Conjunctiva/sclera: Conjunctivae normal.      Pupils: Pupils are equal, round, and reactive to light.   Neck:      Musculoskeletal: Normal range of motion and neck supple.   Cardiovascular:      Rate and Rhythm: Regular rhythm. Tachycardia present.      Pulses: Normal pulses.      Heart sounds: Normal heart sounds. No murmur. No friction rub. No gallop.    Pulmonary:      Effort: Pulmonary effort is normal. No respiratory distress.      Breath sounds: Normal breath sounds. No wheezing or rales.   Abdominal:      General: Abdomen is flat. Bowel sounds are normal. There is no distension.      Palpations: Abdomen is soft.      Tenderness: There is no tenderness.   Musculoskeletal: Normal range of motion.      Right lower leg: No edema.      Left lower leg: No edema.   Skin:     General: Skin is warm and dry.   Neurological:       General: No focal deficit present.      Mental Status: She is alert and oriented to person, place, and time.      Cranial Nerves: No cranial nerve deficit.      Motor: No weakness.   Psychiatric:         Mood and Affect: Mood normal.         Behavior: Behavior normal.         Fluids  No intake or output data in the 24 hours ending 12/13/19 1427    Laboratory  Recent Labs     12/11/19 1945 12/12/19 0215   WBC 9.8 5.8   RBC 4.50 4.15*   HEMOGLOBIN 14.0 12.8   HEMATOCRIT 43.2 38.9   MCV 96.0 93.7   MCH 31.1 30.8   MCHC 32.4* 32.9*   RDW 54.4* 51.6*   PLATELETCT 350 288   MPV 9.5 9.4     Recent Labs     12/11/19 1945 12/12/19 0215   SODIUM 141 136   POTASSIUM 3.3* 4.1   CHLORIDE 98 95*   CO2 13* 24   GLUCOSE 47* 79   BUN 19 15   CREATININE 0.92 0.81   CALCIUM 10.0 8.7                   Imaging  No orders to display        Assessment/Plan  * Alcohol withdrawal (HCC)  Assessment & Plan  etoh dependence with acute withdrawal  ciwa improving  Last drink was 3 days ago, therefore in peak right now, needs closer monitoring  Continue CIWA and prn protocol  Multivitamins  Thiamine    Metabolic acidosis- (present on admission)  Assessment & Plan  Resolved  Dc drip  Tobacco abuse  Assessment & Plan  Will provide with nicoderm during admission.  Monitor.     Alcoholism (HCC)- (present on admission)  Assessment & Plan  Ongoing with recent increased intake in setting of grief.  Patient reports desire for cessation   Will be given resourced per   Essential hypertension- (present on admission)  Assessment & Plan  Controlled with current medication regimen of lisinopril.           VTE prophylaxis: lovenox    I spent a total of >35 minutes of time during this clinical encounter of which > 50% was devoted to counseling and coordinating care including review of records, pertinent lab data and studies, as well as discussing diagnostic evaluation and work up, planned therapeutic interventions and future disposition of care. Where  indicated, the assessment and plan reflect discussion of patient with consultants, other healthcare providers, family members, and additional research needed to obtain further information in formulating the plan of care of this patient. This time includes no procedures or overlap with other providers.    Start 8:00am- stop 8:37am

## 2019-12-14 ENCOUNTER — PATIENT OUTREACH (OUTPATIENT)
Dept: HEALTH INFORMATION MANAGEMENT | Facility: OTHER | Age: 49
End: 2019-12-14

## 2019-12-14 VITALS
HEIGHT: 68 IN | TEMPERATURE: 97.3 F | DIASTOLIC BLOOD PRESSURE: 86 MMHG | BODY MASS INDEX: 16.07 KG/M2 | WEIGHT: 106.04 LBS | OXYGEN SATURATION: 99 % | RESPIRATION RATE: 16 BRPM | SYSTOLIC BLOOD PRESSURE: 120 MMHG | HEART RATE: 86 BPM

## 2019-12-14 PROBLEM — F10.939 ALCOHOL WITHDRAWAL (HCC): Status: RESOLVED | Noted: 2019-12-11 | Resolved: 2019-12-14

## 2019-12-14 PROBLEM — E87.20 METABOLIC ACIDOSIS: Status: RESOLVED | Noted: 2019-08-23 | Resolved: 2019-12-14

## 2019-12-14 PROCEDURE — 700102 HCHG RX REV CODE 250 W/ 637 OVERRIDE(OP): Performed by: HOSPITALIST

## 2019-12-14 PROCEDURE — 99239 HOSP IP/OBS DSCHRG MGMT >30: CPT | Performed by: HOSPITALIST

## 2019-12-14 PROCEDURE — A9270 NON-COVERED ITEM OR SERVICE: HCPCS | Performed by: HOSPITALIST

## 2019-12-14 RX ORDER — LORAZEPAM 0.5 MG/1
0.5 TABLET ORAL
Qty: 2 TAB | Refills: 0 | Status: SHIPPED | OUTPATIENT
Start: 2019-12-14 | End: 2019-12-14

## 2019-12-14 RX ORDER — FOLIC ACID 1 MG/1
1 TABLET ORAL DAILY
Qty: 30 TAB | Refills: 0 | Status: SHIPPED | OUTPATIENT
Start: 2019-12-15 | End: 2020-03-03

## 2019-12-14 RX ORDER — LORAZEPAM 0.5 MG/1
0.5 TABLET ORAL
Qty: 2 TAB | Refills: 0 | Status: SHIPPED | OUTPATIENT
Start: 2019-12-14 | End: 2019-12-16

## 2019-12-14 RX ORDER — LANOLIN ALCOHOL/MO/W.PET/CERES
100 CREAM (GRAM) TOPICAL DAILY
Qty: 30 TAB | Refills: 0 | Status: SHIPPED | OUTPATIENT
Start: 2019-12-15 | End: 2020-03-03

## 2019-12-14 RX ADMIN — LISINOPRIL 10 MG: 5 TABLET ORAL at 06:00

## 2019-12-14 RX ADMIN — FOLIC ACID 1 MG: 1 TABLET ORAL at 06:00

## 2019-12-14 RX ADMIN — LORAZEPAM 2 MG: 2 TABLET ORAL at 00:06

## 2019-12-14 RX ADMIN — LORAZEPAM 1 MG: 1 TABLET ORAL at 02:16

## 2019-12-14 RX ADMIN — Medication 100 MG: at 06:00

## 2019-12-14 RX ADMIN — LORAZEPAM 1 MG: 1 TABLET ORAL at 06:05

## 2019-12-14 RX ADMIN — LORAZEPAM 1 MG: 1 TABLET ORAL at 10:05

## 2019-12-14 RX ADMIN — THERA TABS 1 TABLET: TAB at 06:00

## 2019-12-14 ASSESSMENT — LIFESTYLE VARIABLES
TREMOR: *
HEADACHE, FULLNESS IN HEAD: MODERATE
NAUSEA AND VOMITING: MILD NAUSEA WITH NO VOMITING
TOTAL SCORE: VERY MILD ITCHING, PINS AND NEEDLES SENSATION, BURNING OR NUMBNESS
ANXIETY: *
AUDITORY DISTURBANCES: NOT PRESENT
TOTAL SCORE: 12
HEADACHE, FULLNESS IN HEAD: MODERATE
VISUAL DISTURBANCES: MILD SENSITIVITY
PAROXYSMAL SWEATS: BARELY PERCEPTIBLE SWEATING. PALMS MOIST
TOTAL SCORE: VERY MILD ITCHING, PINS AND NEEDLES SENSATION, BURNING OR NUMBNESS
PAROXYSMAL SWEATS: BARELY PERCEPTIBLE SWEATING. PALMS MOIST
AGITATION: NORMAL ACTIVITY
ANXIETY: *
ANXIETY: MILDLY ANXIOUS
TOTAL SCORE: 10
AGITATION: SOMEWHAT MORE THAN NORMAL ACTIVITY
AUDITORY DISTURBANCES: NOT PRESENT
TOTAL SCORE: 8
AGITATION: NORMAL ACTIVITY
AUDITORY DISTURBANCES: NOT PRESENT
AGITATION: NORMAL ACTIVITY
NAUSEA AND VOMITING: MILD NAUSEA WITH NO VOMITING
TOTAL SCORE: 10
AUDITORY DISTURBANCES: NOT PRESENT
PAROXYSMAL SWEATS: BARELY PERCEPTIBLE SWEATING. PALMS MOIST
VISUAL DISTURBANCES: MILD SENSITIVITY
VISUAL DISTURBANCES: NOT PRESENT
TREMOR: *
PAROXYSMAL SWEATS: NO SWEAT VISIBLE
ORIENTATION AND CLOUDING OF SENSORIUM: ORIENTED AND CAN DO SERIAL ADDITIONS
NAUSEA AND VOMITING: NO NAUSEA AND NO VOMITING
HEADACHE, FULLNESS IN HEAD: MODERATE
ORIENTATION AND CLOUDING OF SENSORIUM: ORIENTED AND CAN DO SERIAL ADDITIONS
TREMOR: *
ANXIETY: *
VISUAL DISTURBANCES: VERY MILD SENSITIVITY
HEADACHE, FULLNESS IN HEAD: MILD
NAUSEA AND VOMITING: NO NAUSEA AND NO VOMITING
ORIENTATION AND CLOUDING OF SENSORIUM: ORIENTED AND CAN DO SERIAL ADDITIONS
TREMOR: *
ORIENTATION AND CLOUDING OF SENSORIUM: ORIENTED AND CAN DO SERIAL ADDITIONS

## 2019-12-14 NOTE — CARE PLAN
Problem: Communication  Goal: The ability to communicate needs accurately and effectively will improve  Outcome: PROGRESSING AS EXPECTED  Intervention: Educate patient and significant other/support system about the plan of care, procedures, treatments, medications and allow for questions  Note:   Whiteboard updated. Discussed daily POC. Discussed MD rounding. Pt verbalized understanding     Problem: Bowel/Gastric:  Goal: Normal bowel function is maintained or improved  Outcome: PROGRESSING AS EXPECTED  Note:   Pt without complains of bowel dysfunction. No complaints of constipation or abdominal tenderness.

## 2019-12-14 NOTE — DISCHARGE PLANNING
Anticipated Discharge Disposition: Home    Action: Bedside RNJessica, called this DIANA AVALOS requesting substance abuse resources for patient. RN ROBBIE gave patient resources, patient thankful.    Barriers to Discharge: none    [facility]    Care Transition Team Assessment     RN CM met with patient at bedside. Patient lives in a motel and independent with ADL's. Patient takes the bus and gets rides from family.    Patient currently looking for a new job and has a little over $300 left at this time.    Patient has history and current alcohol abuse. Patient would like to quit, given resources.   Patient's daughter in law will be ride home upon discharge.    Information Source  Orientation : Oriented x 4  Information Given By: Patient  Who is responsible for making decisions for patient? : Patient    Elopement Risk  Legal Hold: No  Ambulatory or Self Mobile in Wheelchair: Yes  Disoriented: No  Psychiatric Symptoms: None  History of Wandering: No  Elopement this Admit: No  Vocalizing Wanting to Leave: No  Displays Behaviors, Body Language Wanting to Leave: No-Not at Risk for Elopement  Elopement Risk: Not at Risk for Elopement    Interdisciplinary Discharge Planning  Does Admitting Nurse Feel This Could be a Complex Discharge?: No  Lives with - Patient's Self Care Capacity: Alone and Able to Care For Self  Patient or legal guardian wants to designate a caregiver (see row info): No  Support Systems: Family Member(s), Friends / Neighbors  Housing / Facility: [facility]  Name of Care Facility: [facility]  Do You Take your Prescribed Medications Regularly: Yes  Able to Return to Previous ADL's: Yes  Mobility Issues: No  Prior Services: None  Patient Expects to be Discharged to:: home  Assistance Needed: Unknown at this Time  Durable Medical Equipment: Not Applicable    Discharge Preparedness  What is your plan after discharge?: (home)  What are your discharge supports?: Child  Prior Functional Level:  Ambulatory, Independent with Activities of Daily Living, Independent with Medication Management    Functional Assesment  Prior Functional Level: Ambulatory, Independent with Activities of Daily Living, Independent with Medication Management    Finances  Financial Barriers to Discharge: No  Prescription Coverage: Yes    Vision / Hearing Impairment  Vision Impairment : Yes  Right Eye Vision: Wears Glasses  Left Eye Vision: Wears Glasses  Hearing Impairment : No    Domestic Abuse  Have you ever been the victim of abuse or violence?: No  Physical Abuse or Sexual Abuse: No  Verbal Abuse or Emotional Abuse: No  Possible Abuse Reported to:: Not Applicable    Psychological Assessment  History of Substance Abuse: Alcohol  Date Last Used - Alcohol: 12/11/19  Substance Abuse Comments: wants to quit    Discharge Risks or Barriers  Discharge risks or barriers?: Substance abuse  Patient risk factors: Substance abuse    Anticipated Discharge Information  Anticipated discharge disposition: Home  Discharge Address: [facility] [place] [address] [place] [address]  Discharge Contact Phone Number: [phone]

## 2019-12-14 NOTE — CARE PLAN
Problem: Safety  Goal: Will remain free from falls  Outcome: PROGRESSING AS EXPECTED     Problem: Infection  Goal: Will remain free from infection  Outcome: PROGRESSING AS EXPECTED     Problem: Venous Thromboembolism (VTW)/Deep Vein Thrombosis (DVT) Prevention:  Goal: Patient will participate in Venous Thrombosis (VTE)/Deep Vein Thrombosis (DVT)Prevention Measures  Outcome: PROGRESSING AS EXPECTED     Problem: Knowledge Deficit  Goal: Knowledge of disease process/condition, treatment plan, diagnostic tests, and medications will improve  Outcome: PROGRESSING AS EXPECTED     Problem: Discharge Barriers/Planning  Goal: Patient's continuum of care needs will be met  Outcome: PROGRESSING AS EXPECTED     Problem: Pain Management  Goal: Pain level will decrease to patient's comfort goal  Outcome: PROGRESSING AS EXPECTED

## 2019-12-14 NOTE — PROGRESS NOTES
Spiritual Care Note    Patient Information     Patient's Name: Rhonda Gould   MRN: [id number]    YOB: 1970   Age and Gender: 49 y.o. female   Service Area: [address]   Room (and Bed): [place] [address]   Ethnicity or Nationality:     Primary Language: English   Mandaen/Spiritual preference: Episcopalian   Place of Residence: [place], [place]   Family/Friends/Others Present: no   Clinical Team Present: no   Medical Diagnosis(-es)/Procedure(s): Alcohol withdrawal   Code Status: Full Code    Date of Admission: 12/11/2019   Length of Stay: 2 days        Spiritual Care Provider Information:  Name of Spiritual Care Provider: Lynette Ramey  Title of Spiritual Care Provider: Associate Vidal  Phone Number: [phone]  E-mail: [email or web address]  Total time : 25 minutes    Spiritual Screen Results:    Gen Nursing  Spiritual Screen  Is your spiritual health or inner well-being important to you as you cope with your medical condition?: No  Would you like to receive a visit from our Spiritual Care team or your own Restorationist or spiritual leader?: No  Was spiritual care education provided to the patient?: Declined  Sources of Hope/Tanya: Animals     Palliative Care  PC Mandaen/Spiritual Screening  Was spiritual care education provided to the patient?: Declined      Encounter/Request Information  Encounter/Request Type   Visited With: Patient  Nature of the Visit: Follow-up, On shift  Continue Visiting: (UPON REQUEST)  General Visit: Yes  Referral From/ Origin of Request: Verbal staff, Phone    Religous Needs/Values  Mandaen Needs Visit  Mandaen Needs: Prayer    Spiritual Assessment   Spiritual Care Encounters    Observations/Symptoms: Anxious, Thankfulness(Pt sitting up in bed, eating dinner)    Interacton/Conversation: Pt welcomed  -  received call from nurse that pt had been crying earlier in day due to grieving recent loss of family member. Pt shared with   that SW talked with her about her concern from not having enough money to pay her rent. Pt felt somewhat better - pt stated she had her son and daughter-in-law for local support. Pt said she had talked with her mother by phone today and pt felt better about that.   prayed with pt -  encouraged pt to connect with a local 12 step program - pt grateful for visit.    Assessment: Distress, Need  Need: Decision Making, Seeking Spiritual Assistance and Support  Distress: Anxiety about the Future, Grief/Loss/Bereavement, Overwhelmed    Interventions: Prayer, Encouragement, Compassionate Presence    Outcomes: Spiritual Comfort, Value/Dignity/Respect    Plan: Visit Upon Request    Notes:

## 2019-12-14 NOTE — PROGRESS NOTES
Received report from previous nurse, Zahra, regarding prior 12 hours.  POC reviewed with pt, white board updated, pt verbalized understanding, call light within reach.  Pt encouraged to call before getting up.  Bed in lowest position, treaded slippers on.

## 2019-12-14 NOTE — PROGRESS NOTES
Patient discharged home with family. All personal belongings collected. IV access removed. Monitor removed, monitor room notified. Discharge instructions discussed. Medications reviewed; new medication printed education provided, preferred pharmacy updated. Follow up appointments to be scheduled by patient at earliest convenience. Patient escorted off unit via wheelchair with all personal belongings without incident.

## 2019-12-14 NOTE — DISCHARGE INSTRUCTIONS
Discharge Instructions    Discharged to home by car with relative. Discharged via wheelchair, hospital escort: Yes.  Special equipment needed: Not Applicable    Be sure to schedule a follow-up appointment with your primary care doctor or any specialists as instructed.     Discharge Plan:   Smoking Cessation Offered: Patient Refused  Influenza Vaccine Indication: Not indicated: Previously immunized this influenza season and > 8 years of age    I understand that a diet low in cholesterol, fat, and sodium is recommended for good health. Unless I have been given specific instructions below for another diet, I accept this instruction as my diet prescription.   Other diet: Regular    Special Instructions: None    · Is patient discharged on Warfarin / Coumadin?   No     Alcohol Use Disorder  Alcohol use disorder is when your drinking disrupts your daily life. When you have this condition, you drink too much alcohol and you cannot control your drinking.  Alcohol use disorder can cause serious problems with your physical health. It can affect your brain, heart, liver, pancreas, immune system, stomach, and intestines. Alcohol use disorder can increase your risk for certain cancers and cause problems with your mental health, such as depression, anxiety, psychosis, delirium, and dementia. People with this disorder risk hurting themselves and others.  What are the causes?  This condition is caused by drinking too much alcohol over time. It is not caused by drinking too much alcohol only one or two times. Some people with this condition drink alcohol to cope with or escape from negative life events. Others drink to relieve pain or symptoms of mental illness.  What increases the risk?  You are more likely to develop this condition if:  · You have a family history of alcohol use disorder.  · Your culture encourages drinking to the point of intoxication, or makes alcohol easy to get.  · You had a mood or conduct disorder in  childhood.  · You have been a victim of abuse.  · You are an adolescent and:  ¨ You have poor grades or difficulties in school.  ¨ Your caregivers do not talk to you about saying no to alcohol, or supervise your activities.  ¨ You are impulsive or you have trouble with self-control.  What are the signs or symptoms?  Symptoms of this condition include:  · Drinking more than you want to.  · Drinking for longer than you want to.  · Trying several times to drink less or to control your drinking.  · Spending a lot of time getting alcohol, drinking, or recovering from drinking.  · Craving alcohol.  · Having problems at work, at school, or at home due to drinking.  · Having problems in relationships due to drinking.  · Drinking when it is dangerous to drink, such as before driving a car.  · Continuing to drink even though you know you might have a physical or mental problem related to drinking.  · Needing more and more alcohol to get the same effect you want from the alcohol (building up tolerance).  · Having symptoms of withdrawal when you stop drinking. Symptoms of withdrawal include:  ¨ Fatigue.  ¨ Nightmares.  ¨ Trouble sleeping.  ¨ Depression.  ¨ Anxiety.  ¨ Fever.  ¨ Seizures.  ¨ Severe confusion.  ¨ Feeling or seeing things that are not there (hallucinations).  ¨ Tremors.  ¨ Rapid heart rate.  ¨ Rapid breathing.  ¨ High blood pressure.  · Drinking to avoid symptoms of withdrawal.  How is this diagnosed?  This condition is diagnosed with an assessment. Your health care provider may start the assessment by asking three or four questions about your drinking.  Your health care provider may perform a physical exam or do lab tests to see if you have physical problems resulting from alcohol use. She or he may refer you to a mental health professional for evaluation.  How is this treated?  Some people with alcohol use disorder are able to reduce their alcohol use to low-risk levels. Others need to completely quit drinking  alcohol. When necessary, mental health professionals with specialized training in substance use treatment can help. Your health care provider can help you decide how severe your alcohol use disorder is and what type of treatment you need. The following forms of treatment are available:  · Detoxification. Detoxification involves quitting drinking and using prescription medicines within the first week to help lessen withdrawal symptoms. This treatment is important for people who have had withdrawal symptoms before and for heavy drinkers who are likely to have withdrawal symptoms. Alcohol withdrawal can be dangerous, and in severe cases, it can cause death. Detoxification may be provided in a home, community, or primary care setting, or in a hospital or substance use treatment facility.  · Counseling. This treatment is also called talk therapy. It is provided by substance use treatment counselors. A counselor can address the reasons you use alcohol and suggest ways to keep you from drinking again or to prevent problem drinking. The goals of talk therapy are to:  ¨ Find healthy activities and ways for you to cope with stress.  ¨ Identify and avoid the things that trigger your alcohol use.  ¨ Help you learn how to handle cravings.  · Medicines. Medicines can help treat alcohol use disorder by:  ¨ Decreasing alcohol cravings.  ¨ Decreasing the positive feeling you have when you drink alcohol.  ¨ Causing an uncomfortable physical reaction when you drink alcohol (aversion therapy).  · Support groups. Support groups are led by people who have quit drinking. They provide emotional support, advice, and guidance.  These forms of treatment are often combined. Some people with this condition benefit from a combination of treatments provided by specialized substance use treatment centers.  Follow these instructions at home:  · Take over-the-counter and prescription medicines only as told by your health care provider.  · Check with  your health care provider before starting any new medicines.  · Ask friends and family members not to offer you alcohol.  · Avoid situations where alcohol is served, including gatherings where others are drinking alcohol.  · Create a plan for what to do when you are tempted to use alcohol.  · Find hobbies or activities that you enjoy that do not include alcohol.  · Keep all follow-up visits as told by your health care provider. This is important.  How is this prevented?  · If you drink, limit alcohol intake to no more than 1 drink a day for nonpregnant women and 2 drinks a day for men. One drink equals 12 oz of beer, 5 oz of wine, or 1½ oz of hard liquor.  · If you have a mental health condition, get treatment and support.  · Do not give alcohol to adolescents.  · If you are an adolescent:  ¨ Do not drink alcohol.  ¨ Do not be afraid to say no if someone offers you alcohol. Speak up about why you do not want to drink. You can be a positive role model for your friends and set a good example for those around you by not drinking alcohol.  ¨ If your friends drink, spend time with others who do not drink alcohol. Make new friends who do not use alcohol.  ¨ Find healthy ways to manage stress and emotions, such as meditation or deep breathing, exercise, spending time in nature, listening to music, or talking with a trusted friend or family member.  Contact a health care provider if:  · You are not able to take your medicines as told.  · Your symptoms get worse.  · You return to drinking alcohol (relapse) and your symptoms get worse.  Get help right away if:  · You have thoughts about hurting yourself or others.  If you ever feel like you may hurt yourself or others, or have thoughts about taking your own life, get help right away. You can go to your nearest emergency department or call:  · Your local emergency services (911 in the [place]  · A suicide crisis helpline, such as the National Suicide Prevention Lifeline at  3-192-470-8660. This is open 24 hours a day.  Summary  · Alcohol use disorder is when your drinking disrupts your daily life. When you have this condition, you drink too much alcohol and you cannot control your drinking.  · Treatment may include detoxification, counseling, medicine, and support groups.  · Ask friends and family members not to offer you alcohol. Avoid situations where alcohol is served.  · Get help right away if you have thoughts about hurting yourself or others.  This information is not intended to replace advice given to you by your health care provider. Make sure you discuss any questions you have with your health care provider.  Document Released: 01/25/2006 Document Revised: 09/14/2017 Document Reviewed: 09/14/2017  [employer] Interactive Patient Education © 2017 [employer]    Alcohol Withdrawal  Introduction  When a person who drinks a lot of alcohol stops drinking, he or she may go through alcohol withdrawal. Alcohol withdrawal causes problems. It can make you feel:  · Tired (fatigued).  · Sad (depressed).  · Fearful (anxious).  · Grouchy (irritable).  · Not hungry.  · Sick to your stomach (nauseous).  · Shaky.  It can also make you have:  · Nightmares.  · Trouble sleeping.  · Trouble thinking clearly.  · Mood swings.  · Clammy skin.  · Very bad sweating.  · A very fast heartbeat.  · Shaking that you cannot control (tremor).  · Having a fever.  · A fit of movements that you cannot control (seizure).  · Confusion.  · Throwing up (vomiting).  · Feeling or seeing things that are not there (hallucinations).  Follow these instructions at home:  · Take medicines and vitamins only as told by your doctor.  · Do not drink alcohol.  · Have someone around in case you need help.  · Drink enough fluid to keep your pee (urine) clear or pale yellow.  · Think about joining a group to help you stop drinking.  Contact a doctor if:  · Your problems get worse.  · Your problems do not go away.  · You cannot eat  or drink without throwing up.  · You are having a hard time not drinking alcohol.  · You cannot stop drinking alcohol.  Get help right away if:  · You feel your heart beating differently than usual.  · Your chest hurts.  · You have trouble breathing.  · You have very bad problems, like:  ¨ A fever.  ¨ A fit of movements that you cannot control.  ¨ Being very confused.  ¨ Feeling or seeing things that are not there.  This information is not intended to replace advice given to you by your health care provider. Make sure you discuss any questions you have with your health care provider.  Document Released: 06/05/2009 Document Revised: 05/25/2017 Document Reviewed: 10/06/2015  © 2017 [employer]  Lorazepam tablets  What is this medicine?  LORAZEPAM (stephanie A ze harini) is a benzodiazepine. It is used to treat anxiety.  This medicine may be used for other purposes; ask your health care provider or pharmacist if you have questions.  COMMON BRAND NAME(S): Ativan  What should I tell my health care provider before I take this medicine?  They need to know if you have any of these conditions:  -glaucoma  -history of drug or alcohol abuse problem  -kidney disease  -liver disease  -lung or breathing disease, like asthma  -mental illness  -myasthenia gravis  -Parkinson's disease  -suicidal thoughts, plans, or attempt; a previous suicide attempt by you or a family member  -an unusual or allergic reaction to lorazepam, other medicines, foods, dyes, or preservatives  -pregnant or trying to get pregnant  -breast-feeding  How should I use this medicine?  Take this medicine by mouth with a glass of water. Follow the directions on the prescription label. Take your medicine at regular intervals. Do not take it more often than directed. Do not stop taking except on your doctor's advice.  A special MedGuide will be given to you by the pharmacist with each prescription and refill. Be sure to read this information carefully each time.  Talk to your  pediatrician regarding the use of this medicine in children. While this drug may be used in children as young as 12 years for selected conditions, precautions do apply.  Overdosage: If you think you have taken too much of this medicine contact a poison control center or emergency room at once.  NOTE: This medicine is only for you. Do not share this medicine with others.  What if I miss a dose?  If you miss a dose, take it as soon as you can. If it is almost time for your next dose, take only that dose. Do not take double or extra doses.  What may interact with this medicine?  Do not take this medicine with any of the following medications:  -narcotic medicines for cough  -sodium oxybate  This medicine may also interact with the following medications:  -alcohol  -antihistamines for allergy, cough and cold  -certain medicines for anxiety or sleep  -certain medicines for depression, like amitriptyline, fluoxetine, sertraline  -certain medicines for seizures like carbamazepine, phenobarbital, phenytoin, primidone  -general anesthetics like lidocaine, pramoxine, tetracaine  -MAOIs like Carbex, Eldepryl, Marplan, Nardil, and Parnate  -medicines that relax muscles for surgery  -narcotic medicines for pain  -phenothiazines like chlorpromazine, mesoridazine, prochlorperazine, thioridazine  This list may not describe all possible interactions. Give your health care provider a list of all the medicines, herbs, non-prescription drugs, or dietary supplements you use. Also tell them if you smoke, drink alcohol, or use illegal drugs. Some items may interact with your medicine.  What should I watch for while using this medicine?  Tell your doctor or health care professional if your symptoms do not start to get better or if they get worse.  Do not stop taking except on your doctor's advice. You may develop a severe reaction. Your doctor will tell you how much medicine to take.  You may get drowsy or dizzy. Do not drive, use  machinery, or do anything that needs mental alertness until you know how this medicine affects you. To reduce the risk of dizzy and fainting spells, do not stand or sit up quickly, especially if you are an older patient. Alcohol may increase dizziness and drowsiness. Avoid alcoholic drinks.  If you are taking another medicine that also causes drowsiness, you may have more side effects. Give your health care provider a list of all medicines you use. Your doctor will tell you how much medicine to take. Do not take more medicine than directed. Call emergency for help if you have problems breathing or unusual sleepiness.  What side effects may I notice from receiving this medicine?  Side effects that you should report to your doctor or health care professional as soon as possible:  -allergic reactions like skin rash, itching or hives, swelling of the face, lips, or tongue  -breathing problems  -confusion  -loss of balance or coordination  -signs and symptoms of low blood pressure like dizziness; feeling faint or lightheaded, falls; unusually weak or tired  -suicidal thoughts or other mood changes  Side effects that usually do not require medical attention (report to your doctor or health care professional if they continue or are bothersome):  -dizziness  -headache  -nausea, vomiting  -tiredness  This list may not describe all possible side effects. Call your doctor for medical advice about side effects. You may report side effects to FDA at 1-361-FDA-1233.  Where should I keep my medicine?  Keep out of the reach of children. This medicine can be abused. Keep your medicine in a safe place to protect it from theft. Do not share this medicine with anyone. Selling or giving away this medicine is dangerous and against the law.  This medicine may cause accidental overdose and death if taken by other adults, children, or pets. Mix any unused medicine with a substance like cat litter or coffee grounds. Then throw the medicine  away in a sealed container like a sealed bag or a coffee can with a lid. Do not use the medicine after the expiration date.  Store at room temperature between 20 and 25 degrees C (68 and 77 degrees F). Protect from light. Keep container tightly closed.  NOTE: This sheet is a summary. It may not cover all possible information. If you have questions about this medicine, talk to your doctor, pharmacist, or health care provider.  © 2018 [employer]vier/Gold Standard (2016-09-15 15:54:27)      Depression / Suicide Risk    As you are discharged from this [facility] facility, it is important to learn how to keep safe from harming yourself.    Recognize the warning signs:  · Abrupt changes in personality, positive or negative- including increase in energy   · Giving away possessions  · Change in eating patterns- significant weight changes-  positive or negative  · Change in sleeping patterns- unable to sleep or sleeping all the time   · Unwillingness or inability to communicate  · Depression  · Unusual sadness, discouragement and loneliness  · Talk of wanting to die  · Neglect of personal appearance   · Rebelliousness- reckless behavior  · Withdrawal from people/activities they love  · Confusion- inability to concentrate     If you or a loved one observes any of these behaviors or has concerns about self-harm, here's what you can do:  · Talk about it- your feelings and reasons for harming yourself  · Remove any means that you might use to hurt yourself (examples: pills, rope, extension cords, firearm)  · Get professional help from the community (Mental Health, Substance Abuse, psychological counseling)  · Do not be alone:Call your Safe Contact- someone whom you trust who will be there for you.  · Call your local CRISIS HOTLINE 558-4325 or 722-434-4066  · Call your local Children's Mobile Crisis Response Team [place] (194) 020-7574 or www.[employer]  · Call the toll free National Suicide Prevention Hotlines    · National Suicide Prevention Lifeline 424-130-DAWU (4822)  · National Hope Line Network 800-SUICIDE (593-3295)

## 2019-12-14 NOTE — DISCHARGE SUMMARY
Discharge Summary    CHIEF COMPLAINT ON ADMISSION  Chief Complaint   Patient presents with   • Psych Eval     PT reports having a hard time dealing with recent death to a loved one and recent loss of job.  She has begun drinking again and feeling very depressed, requesting resources.  States h/o depression.   • Hypoglycemia     Found to have a BG= 50 when picked up by EMS.   • Vomiting     Pt reports she has been vomiting all day, denies fever or other symptoms.       Reason for Admission  EMS     Admission Date  12/11/2019    CODE STATUS  Full Code    HPI & HOSPITAL COURSE    49 y.o. female with prior history of alcoholism which is been ongoing, essential hypertension for which she takes a medical regimen, and apparent history of chronic obstructive pulmonary disease without current medical regimen, was in her usual state of health until several days prior to admission.  She reports increasing her usual alcohol intake in the setting of losing her best friend cousin.  On the day of admission she had her last drink, which captured several days of drinking without real oral intake. She was admitted for acute etoh eithdrawl and metabolic acidosis. She was started on ivf, bicarb drip and CIWA protocol as well as vitamins and thiamine.  She was given PRN ativan per protocol, electrolytes were replaced. She has been doing well. Does have social issues for which sw was consulted. At this time she is medically stable to be dc, wants to stop drinking and was given resources. Also her metabolic acidosis resolved.   She understood and agreed with the above plan  Therefore, she is discharged in good and stable condition to home with close outpatient follow-up.    The patient met 2-midnight criteria for an inpatient stay at the time of discharge.    Discharge Date  12/14/19     FOLLOW UP ITEMS POST DISCHARGE  pcp    DISCHARGE DIAGNOSES  Principal Problem (Resolved):    Alcohol withdrawal (HCC) POA: Unknown  Active Problems:     Essential hypertension POA: Yes      Overview: Hypertension since 2010.    Alcoholism (HCC) POA: Yes    Tobacco abuse POA: Unknown  Resolved Problems:    Metabolic acidosis POA: Yes      FOLLOW UP  No future appointments.  No follow-up provider specified.    MEDICATIONS ON DISCHARGE     Medication List      START taking these medications      Instructions   folic acid 1 MG Tabs  Start taking on:  December 15, 2019  Commonly known as:  FOLVITE   Take 1 Tab by mouth every day.  Dose:  1 mg     LORazepam 0.5 MG Tabs  Commonly known as:  ATIVAN   Take 1 Tab by mouth 1 time daily as needed (for CIWA 5-7) for up to 2 days.  Dose:  0.5 mg     multivitamin Tabs  Start taking on:  December 15, 2019   Take 1 Tab by mouth every day.  Dose:  1 Tab     thiamine 100 MG tablet  Start taking on:  December 15, 2019  Commonly known as:  THIAMINE   Take 1 Tab by mouth every day.  Dose:  100 mg        CONTINUE taking these medications      Instructions   albuterol 108 (90 Base) MCG/ACT Aers inhalation aerosol   Inhale 2 Puffs by mouth every 6 hours as needed for Shortness of Breath.  Dose:  2 Puff     lisinopril 10 MG Tabs  Commonly known as:  PRINIVIL   Take 10 mg by mouth every day.  Dose:  10 mg     NIFEdipine SR 30 MG tablet  Commonly known as:  PROCARDIA-XL   Take 30 mg by mouth every day.  Dose:  30 mg     traZODone 50 MG Tabs  Commonly known as:  DESYREL   Take 50 mg by mouth every bedtime.  Dose:  50 mg            Allergies  Allergies   Allergen Reactions   • Codeine Hives and Itching     Tolerated morphine previously  Allergy updated from 2015, morphine given in 2016   • Shellfish Allergy Anaphylaxis   • Codeine Hives, Itching and Swelling     Throat swells closed  RXN=>10 years ago   • Codeine    • Fish Allergy Shortness of Breath     Swelling to throat.       DIET  Orders Placed This Encounter   Procedures   • Diet Order Regular     Standing Status:   Standing     Number of Occurrences:   1     Order Specific Question:    Diet:     Answer:   Regular [1]       ACTIVITY  As tolerated.  Weight bearing as tolerated    CONSULTATIONS  none    PROCEDURES  none    LABORATORY  Lab Results   Component Value Date    SODIUM 135 12/13/2019    POTASSIUM 4.2 12/13/2019    CHLORIDE 95 (L) 12/13/2019    CO2 30 12/13/2019    GLUCOSE 133 (H) 12/13/2019    BUN 7 (L) 12/13/2019    CREATININE 0.92 12/13/2019        Lab Results   Component Value Date    WBC 5.8 12/12/2019    HEMOGLOBIN 12.8 12/12/2019    HEMATOCRIT 38.9 12/12/2019    PLATELETCT 288 12/12/2019        Total time of the discharge process exceeds 45 minutes.

## 2019-12-14 NOTE — PROGRESS NOTES
Assumed care of patient at bedside report from NOC RN. Updated on POC. Patient currently A & O x 4; on room air; up standby assist; with complaints of a headache. Call light within reach. Whiteboard updated. Fall precautions in place. Bed locked and in lowest position. All questions answered. No other needs indicated at this time.

## 2020-01-09 ENCOUNTER — HOSPITAL ENCOUNTER (EMERGENCY)
Facility: MEDICAL CENTER | Age: 50
End: 2020-01-09
Attending: EMERGENCY MEDICINE
Payer: MEDICAID

## 2020-01-09 ENCOUNTER — APPOINTMENT (OUTPATIENT)
Dept: RADIOLOGY | Facility: MEDICAL CENTER | Age: 50
End: 2020-01-09
Attending: EMERGENCY MEDICINE
Payer: MEDICAID

## 2020-01-09 VITALS
HEIGHT: 68 IN | SYSTOLIC BLOOD PRESSURE: 128 MMHG | WEIGHT: 110.23 LBS | RESPIRATION RATE: 15 BRPM | OXYGEN SATURATION: 95 % | DIASTOLIC BLOOD PRESSURE: 98 MMHG | BODY MASS INDEX: 16.71 KG/M2 | TEMPERATURE: 96.6 F | HEART RATE: 77 BPM

## 2020-01-09 DIAGNOSIS — R11.2 NON-INTRACTABLE VOMITING WITH NAUSEA, UNSPECIFIED VOMITING TYPE: ICD-10-CM

## 2020-01-09 DIAGNOSIS — G44.209 TENSION HEADACHE: ICD-10-CM

## 2020-01-09 LAB
ALBUMIN SERPL BCP-MCNC: 4.7 G/DL (ref 3.2–4.9)
ALBUMIN/GLOB SERPL: 1.8 G/DL
ALP SERPL-CCNC: 68 U/L (ref 30–99)
ALT SERPL-CCNC: 11 U/L (ref 2–50)
AMPHET UR QL SCN: NEGATIVE
ANION GAP SERPL CALC-SCNC: 11 MMOL/L (ref 0–11.9)
ANION GAP SERPL CALC-SCNC: 20 MMOL/L (ref 0–11.9)
APPEARANCE UR: CLEAR
AST SERPL-CCNC: 20 U/L (ref 12–45)
BARBITURATES UR QL SCN: NEGATIVE
BASOPHILS # BLD AUTO: 0.2 % (ref 0–1.8)
BASOPHILS # BLD: 0.02 K/UL (ref 0–0.12)
BENZODIAZ UR QL SCN: NEGATIVE
BILIRUB SERPL-MCNC: 0.3 MG/DL (ref 0.1–1.5)
BILIRUB UR QL STRIP.AUTO: NEGATIVE
BUN SERPL-MCNC: 12 MG/DL (ref 8–22)
BUN SERPL-MCNC: 13 MG/DL (ref 8–22)
BZE UR QL SCN: NEGATIVE
CALCIUM SERPL-MCNC: 8.3 MG/DL (ref 8.5–10.5)
CALCIUM SERPL-MCNC: 9.7 MG/DL (ref 8.5–10.5)
CANNABINOIDS UR QL SCN: POSITIVE
CHLORIDE SERPL-SCNC: 106 MMOL/L (ref 96–112)
CHLORIDE SERPL-SCNC: 109 MMOL/L (ref 96–112)
CO2 SERPL-SCNC: 15 MMOL/L (ref 20–33)
CO2 SERPL-SCNC: 22 MMOL/L (ref 20–33)
COLOR UR: YELLOW
CREAT SERPL-MCNC: 0.67 MG/DL (ref 0.5–1.4)
CREAT SERPL-MCNC: 0.86 MG/DL (ref 0.5–1.4)
EKG IMPRESSION: NORMAL
EOSINOPHIL # BLD AUTO: 0 K/UL (ref 0–0.51)
EOSINOPHIL NFR BLD: 0 % (ref 0–6.9)
ERYTHROCYTE [DISTWIDTH] IN BLOOD BY AUTOMATED COUNT: 50.9 FL (ref 35.9–50)
ETHANOL BLD-MCNC: 0.01 G/DL
GLOBULIN SER CALC-MCNC: 2.6 G/DL (ref 1.9–3.5)
GLUCOSE SERPL-MCNC: 129 MG/DL (ref 65–99)
GLUCOSE SERPL-MCNC: 88 MG/DL (ref 65–99)
GLUCOSE UR STRIP.AUTO-MCNC: NEGATIVE MG/DL
HCT VFR BLD AUTO: 41.9 % (ref 37–47)
HGB BLD-MCNC: 14 G/DL (ref 12–16)
IMM GRANULOCYTES # BLD AUTO: 0.03 K/UL (ref 0–0.11)
IMM GRANULOCYTES NFR BLD AUTO: 0.4 % (ref 0–0.9)
KETONES UR STRIP.AUTO-MCNC: 15 MG/DL
LEUKOCYTE ESTERASE UR QL STRIP.AUTO: NEGATIVE
LIPASE SERPL-CCNC: 19 U/L (ref 11–82)
LYMPHOCYTES # BLD AUTO: 1.14 K/UL (ref 1–4.8)
LYMPHOCYTES NFR BLD: 13.8 % (ref 22–41)
MCH RBC QN AUTO: 30.9 PG (ref 27–33)
MCHC RBC AUTO-ENTMCNC: 33.4 G/DL (ref 33.6–35)
MCV RBC AUTO: 92.5 FL (ref 81.4–97.8)
METHADONE UR QL SCN: NEGATIVE
MICRO URNS: ABNORMAL
MONOCYTES # BLD AUTO: 0.38 K/UL (ref 0–0.85)
MONOCYTES NFR BLD AUTO: 4.6 % (ref 0–13.4)
NEUTROPHILS # BLD AUTO: 6.68 K/UL (ref 2–7.15)
NEUTROPHILS NFR BLD: 81 % (ref 44–72)
NITRITE UR QL STRIP.AUTO: NEGATIVE
NRBC # BLD AUTO: 0 K/UL
NRBC BLD-RTO: 0 /100 WBC
OPIATES UR QL SCN: NEGATIVE
OXYCODONE UR QL SCN: NEGATIVE
PCP UR QL SCN: NEGATIVE
PH UR STRIP.AUTO: 7 [PH] (ref 5–8)
PLATELET # BLD AUTO: 282 K/UL (ref 164–446)
PMV BLD AUTO: 9 FL (ref 9–12.9)
POTASSIUM SERPL-SCNC: 4.5 MMOL/L (ref 3.6–5.5)
POTASSIUM SERPL-SCNC: 4.8 MMOL/L (ref 3.6–5.5)
PROPOXYPH UR QL SCN: NEGATIVE
PROT SERPL-MCNC: 7.3 G/DL (ref 6–8.2)
PROT UR QL STRIP: NEGATIVE MG/DL
RBC # BLD AUTO: 4.53 M/UL (ref 4.2–5.4)
RBC UR QL AUTO: NEGATIVE
SODIUM SERPL-SCNC: 141 MMOL/L (ref 135–145)
SODIUM SERPL-SCNC: 142 MMOL/L (ref 135–145)
SP GR UR STRIP.AUTO: 1.01
UROBILINOGEN UR STRIP.AUTO-MCNC: 0.2 MG/DL
WBC # BLD AUTO: 8.3 K/UL (ref 4.8–10.8)

## 2020-01-09 PROCEDURE — 81003 URINALYSIS AUTO W/O SCOPE: CPT

## 2020-01-09 PROCEDURE — 71045 X-RAY EXAM CHEST 1 VIEW: CPT

## 2020-01-09 PROCEDURE — 83690 ASSAY OF LIPASE: CPT

## 2020-01-09 PROCEDURE — 700102 HCHG RX REV CODE 250 W/ 637 OVERRIDE(OP): Performed by: EMERGENCY MEDICINE

## 2020-01-09 PROCEDURE — 93005 ELECTROCARDIOGRAM TRACING: CPT | Performed by: EMERGENCY MEDICINE

## 2020-01-09 PROCEDURE — 96376 TX/PRO/DX INJ SAME DRUG ADON: CPT

## 2020-01-09 PROCEDURE — 85025 COMPLETE CBC W/AUTO DIFF WBC: CPT

## 2020-01-09 PROCEDURE — 700105 HCHG RX REV CODE 258: Performed by: EMERGENCY MEDICINE

## 2020-01-09 PROCEDURE — 80053 COMPREHEN METABOLIC PANEL: CPT

## 2020-01-09 PROCEDURE — 99285 EMERGENCY DEPT VISIT HI MDM: CPT

## 2020-01-09 PROCEDURE — A9270 NON-COVERED ITEM OR SERVICE: HCPCS | Performed by: EMERGENCY MEDICINE

## 2020-01-09 PROCEDURE — 70450 CT HEAD/BRAIN W/O DYE: CPT

## 2020-01-09 PROCEDURE — 94760 N-INVAS EAR/PLS OXIMETRY 1: CPT

## 2020-01-09 PROCEDURE — 36415 COLL VENOUS BLD VENIPUNCTURE: CPT

## 2020-01-09 PROCEDURE — 700111 HCHG RX REV CODE 636 W/ 250 OVERRIDE (IP): Performed by: EMERGENCY MEDICINE

## 2020-01-09 PROCEDURE — 96374 THER/PROPH/DIAG INJ IV PUSH: CPT

## 2020-01-09 PROCEDURE — 80307 DRUG TEST PRSMV CHEM ANLYZR: CPT

## 2020-01-09 PROCEDURE — 80048 BASIC METABOLIC PNL TOTAL CA: CPT

## 2020-01-09 PROCEDURE — 96375 TX/PRO/DX INJ NEW DRUG ADDON: CPT

## 2020-01-09 RX ORDER — SODIUM CHLORIDE 9 MG/ML
1000 INJECTION, SOLUTION INTRAVENOUS ONCE
Status: COMPLETED | OUTPATIENT
Start: 2020-01-09 | End: 2020-01-09

## 2020-01-09 RX ORDER — LORAZEPAM 2 MG/ML
0.5 INJECTION INTRAMUSCULAR ONCE
Status: COMPLETED | OUTPATIENT
Start: 2020-01-09 | End: 2020-01-09

## 2020-01-09 RX ORDER — ONDANSETRON 2 MG/ML
4 INJECTION INTRAMUSCULAR; INTRAVENOUS ONCE
Status: COMPLETED | OUTPATIENT
Start: 2020-01-09 | End: 2020-01-09

## 2020-01-09 RX ORDER — ONDANSETRON 4 MG/1
4 TABLET, ORALLY DISINTEGRATING ORAL EVERY 8 HOURS PRN
Qty: 20 TAB | Refills: 0 | Status: SHIPPED | OUTPATIENT
Start: 2020-01-09 | End: 2020-03-03

## 2020-01-09 RX ORDER — LISINOPRIL 10 MG/1
10 TABLET ORAL ONCE
Status: COMPLETED | OUTPATIENT
Start: 2020-01-09 | End: 2020-01-09

## 2020-01-09 RX ORDER — HYDROMORPHONE HYDROCHLORIDE 1 MG/ML
0.5 INJECTION, SOLUTION INTRAMUSCULAR; INTRAVENOUS; SUBCUTANEOUS
Status: DISCONTINUED | OUTPATIENT
Start: 2020-01-09 | End: 2020-01-09 | Stop reason: HOSPADM

## 2020-01-09 RX ORDER — SODIUM CHLORIDE 9 MG/ML
500 INJECTION, SOLUTION INTRAVENOUS ONCE
Status: COMPLETED | OUTPATIENT
Start: 2020-01-09 | End: 2020-01-09

## 2020-01-09 RX ADMIN — LISINOPRIL 10 MG: 10 TABLET ORAL at 13:42

## 2020-01-09 RX ADMIN — SODIUM CHLORIDE 500 ML: 9 INJECTION, SOLUTION INTRAVENOUS at 13:42

## 2020-01-09 RX ADMIN — HYDROMORPHONE HYDROCHLORIDE 0.5 MG: 1 INJECTION, SOLUTION INTRAMUSCULAR; INTRAVENOUS; SUBCUTANEOUS at 12:29

## 2020-01-09 RX ADMIN — ONDANSETRON 4 MG: 2 INJECTION INTRAMUSCULAR; INTRAVENOUS at 14:46

## 2020-01-09 RX ADMIN — SODIUM CHLORIDE 1000 ML: 9 INJECTION, SOLUTION INTRAVENOUS at 12:28

## 2020-01-09 RX ADMIN — LORAZEPAM 0.5 MG: 2 INJECTION INTRAMUSCULAR; INTRAVENOUS at 12:31

## 2020-01-09 RX ADMIN — ONDANSETRON 4 MG: 2 INJECTION INTRAMUSCULAR; INTRAVENOUS at 12:29

## 2020-01-09 RX ADMIN — HYDROMORPHONE HYDROCHLORIDE 0.5 MG: 1 INJECTION, SOLUTION INTRAMUSCULAR; INTRAVENOUS; SUBCUTANEOUS at 14:46

## 2020-01-09 ASSESSMENT — LIFESTYLE VARIABLES: DO YOU DRINK ALCOHOL: NO

## 2020-01-09 NOTE — ED PROVIDER NOTES
ED Provider Note    Scribed for Ari Chavira M.D. by Michelle Smith. 1/9/2020  12:01 PM    Primary care provider: None noted  Means of arrival: Walk-in  History obtained from: Patient  History limited by: None    CHIEF COMPLAINT  Chief Complaint   Patient presents with   • Diarrhea     x3 days   • N/V     today   • Headache     this morning   • Hypertension     hx of same. takes BP meds. missed this morning.       HPI  Rhonda Gould is a 49 y.o. female, with a history of COPD and Hypertension, who presents to the Emergency Department for an acute, constant, non-radiating frontal headache onset this morning. The patient reports that she was at her baseline about four days ago. However on 1/6/19 the patient developed an acute, constant sore throat and a frequent dry cough. Per nursing note, the patient also developed multiple episodes of non-bloody diarrhea.  Today, the patient woke up at 6 AM complaining of a sudden, moderate frontal headache which she notes that has been gradually worsening in severity since onset. No exacerbating or alleviating factors were reported for her headache. The patient does not report taking any over the counter medications for pain control.She notes that shortly after the onset of her headache, she developed additional symptoms of mild tingling to the bilateral upper extremities, moderate shortness of breath, nausea and multiple episodes of non-bloody vomiting. Her most recent episode of emesis was not reported. The patient denies recent symptoms of fevers, chills, abdominal pain. She further recent falls or trauma to the head. The patient reports that she has a past medical history of hypertension but denies missing her daily dose of anti-hypertensive this morning, however per nursing note the patient reported that she missed her daily dose of Lisinopril today. The patient reports that she has a history of alcoholism but denies recent alcohol use. She is allergic to  Codeine.      REVIEW OF SYSTEMS  Pertinent positives include sore throat, cough, nausea, vomiting, diarrhea, headache, tingling to the bilateral upper extremities, shortness of breath. Pertinent negatives include no  fevers, chills, abdominal pain.  All other systems reviewed and negative.    PAST MEDICAL HISTORY   has a past medical history of Chronic obstructive pulmonary disease (HCC) and Hypertension.    SURGICAL HISTORY   has a past surgical history that includes hysterectomy laparoscopy (6/2005).    SOCIAL HISTORY  Social History     Tobacco Use   • Smoking status: Current Every Day Smoker     Packs/day: 0.25     Years: 30.00     Pack years: 7.50     Types: Cigarettes   • Smokeless tobacco: Never Used   Substance Use Topics   • Alcohol use: Yes     Frequency: 2-3 times a week     Comment: socially couple times per week   • Drug use: Yes     Frequency: 2.0 times per week     Types: Inhaled, Marijuana     Comment: marijuana      Social History     Substance and Sexual Activity   Drug Use Yes   • Frequency: 2.0 times per week   • Types: Inhaled, Marijuana    Comment: marijuana       FAMILY HISTORY  Family History   Problem Relation Age of Onset   • Diabetes Mother    • Hypertension Mother    • Hypertension Father    • Diabetes Father    • Heart Disease Maternal Grandmother    • Cancer Maternal Grandfather        CURRENT MEDICATIONS  Home Medications     Reviewed by Ciaran Fernández R.N. (Registered Nurse) on 01/09/20 at 1144  Med List Status: Partial   Medication Last Dose Status   albuterol 108 (90 Base) MCG/ACT Aero Soln inhalation aerosol  Active   folic acid (FOLVITE) 1 MG Tab  Active   lisinopril (PRINIVIL) 10 MG Tab 1/8/2020 Active   multivitamin (THERAGRAN) Tab  Active   NIFEdipine SR (PROCARDIA-XL) 30 MG tablet not taking Active   thiamine (THIAMINE) 100 MG tablet  Active   traZODone (DESYREL) 50 MG Tab  Active                ALLERGIES  Allergies   Allergen Reactions   • Codeine Hives and Itching      "Tolerated morphine previously  Allergy updated from 2015, morphine given in 2016   • Shellfish Allergy Anaphylaxis   • Codeine Hives, Itching and Swelling     Throat swells closed  RXN=>10 years ago   • Codeine    • Fish Allergy Shortness of Breath     Swelling to throat.       PHYSICAL EXAM  VITAL SIGNS: BP (!) 195/136   Pulse 88   Temp 35.9 °C (96.6 °F) (Temporal)   Resp (!) 26   Ht 1.727 m (5' 8\")   Wt 50 kg (110 lb 3.7 oz)   LMP 06/11/2005   SpO2 99%   BMI 16.76 kg/m²     Constitutional: Well developed, Well nourished, Moderate distress, Non-toxic appearance.   HENT: Normocephalic, Atraumatic, Bilateral external ears normal, Oropharynx dry , No oral exudates.   Eyes: PERRLA, EOMI, Conjunctiva normal, No discharge.   Neck:  Supple, No stridor. Bilateral paraspinal tenderness. No nuchal rigidity  Lymphatic: No lymphadenopathy noted.   Cardiovascular: Normal heart rate, Normal rhythm.   Thorax & Lungs: Tachypneic and hyperventilating. Clear to auscultation bilaterally, No respiratory distress, No wheezing, No crackles.   Abdomen: Soft, No tenderness, No masses, No pulsatile masses.   Skin: Warm, Dry, No erythema, No rash.   Extremities:, No edema No cyanosis.   Musculoskeletal: No tenderness to palpation or major deformities noted.  Intact distal pulses  Neurologic:. Awake, alert. Cranial nerves 2-11 intact, muscle strength 5/5 in bilateral upper and lower extremities  Psychiatric: Affect normal, Judgment normal, Mood normal.     LABS  Results for orders placed or performed during the hospital encounter of 01/09/20   CBC WITH DIFFERENTIAL   Result Value Ref Range    WBC 8.3 4.8 - 10.8 K/uL    RBC 4.53 4.20 - 5.40 M/uL    Hemoglobin 14.0 12.0 - 16.0 g/dL    Hematocrit 41.9 37.0 - 47.0 %    MCV 92.5 81.4 - 97.8 fL    MCH 30.9 27.0 - 33.0 pg    MCHC 33.4 (L) 33.6 - 35.0 g/dL    RDW 50.9 (H) 35.9 - 50.0 fL    Platelet Count 282 164 - 446 K/uL    MPV 9.0 9.0 - 12.9 fL    Neutrophils-Polys 81.00 (H) 44.00 - 72.00 " %    Lymphocytes 13.80 (L) 22.00 - 41.00 %    Monocytes 4.60 0.00 - 13.40 %    Eosinophils 0.00 0.00 - 6.90 %    Basophils 0.20 0.00 - 1.80 %    Immature Granulocytes 0.40 0.00 - 0.90 %    Nucleated RBC 0.00 /100 WBC    Neutrophils (Absolute) 6.68 2.00 - 7.15 K/uL    Lymphs (Absolute) 1.14 1.00 - 4.80 K/uL    Monos (Absolute) 0.38 0.00 - 0.85 K/uL    Eos (Absolute) 0.00 0.00 - 0.51 K/uL    Baso (Absolute) 0.02 0.00 - 0.12 K/uL    Immature Granulocytes (abs) 0.03 0.00 - 0.11 K/uL    NRBC (Absolute) 0.00 K/uL   COMP METABOLIC PANEL   Result Value Ref Range    Sodium 141 135 - 145 mmol/L    Potassium 4.5 3.6 - 5.5 mmol/L    Chloride 106 96 - 112 mmol/L    Co2 15 (L) 20 - 33 mmol/L    Anion Gap 20.0 (H) 0.0 - 11.9    Glucose 129 (H) 65 - 99 mg/dL    Bun 13 8 - 22 mg/dL    Creatinine 0.86 0.50 - 1.40 mg/dL    Calcium 9.7 8.5 - 10.5 mg/dL    AST(SGOT) 20 12 - 45 U/L    ALT(SGPT) 11 2 - 50 U/L    Alkaline Phosphatase 68 30 - 99 U/L    Total Bilirubin 0.3 0.1 - 1.5 mg/dL    Albumin 4.7 3.2 - 4.9 g/dL    Total Protein 7.3 6.0 - 8.2 g/dL    Globulin 2.6 1.9 - 3.5 g/dL    A-G Ratio 1.8 g/dL   LIPASE   Result Value Ref Range    Lipase 19 11 - 82 U/L   URINALYSIS,CULTURE IF INDICATED   Result Value Ref Range    Color Yellow     Character Clear     Specific Gravity 1.010 <1.035    Ph 7.0 5.0 - 8.0    Glucose Negative Negative mg/dL    Ketones 15 (A) Negative mg/dL    Protein Negative Negative mg/dL    Bilirubin Negative Negative    Urobilinogen, Urine 0.2 Negative    Nitrite Negative Negative    Leukocyte Esterase Negative Negative    Occult Blood Negative Negative    Micro Urine Req see below    DIAGNOSTIC ALCOHOL   Result Value Ref Range    Diagnostic Alcohol 0.01 (H) 0.00 g/dL   URINE DRUG SCREEN   Result Value Ref Range    Amphetamines Urine Negative Negative    Barbiturates Negative Negative    Benzodiazepines Negative Negative    Cocaine Metabolite Negative Negative    Methadone Negative Negative    Opiates Negative  Negative    Oxycodone Negative Negative    Phencyclidine -Pcp Negative Negative    Propoxyphene Negative Negative    Cannabinoid Metab Positive (A) Negative   ESTIMATED GFR   Result Value Ref Range    GFR If African American >60 >60 mL/min/1.73 m 2    GFR If Non African American >60 >60 mL/min/1.73 m 2   Basic Metabolic Panel   Result Value Ref Range    Sodium 142 135 - 145 mmol/L    Potassium 4.8 3.6 - 5.5 mmol/L    Chloride 109 96 - 112 mmol/L    Co2 22 20 - 33 mmol/L    Glucose 88 65 - 99 mg/dL    Bun 12 8 - 22 mg/dL    Creatinine 0.67 0.50 - 1.40 mg/dL    Calcium 8.3 (L) 8.5 - 10.5 mg/dL    Anion Gap 11.0 0.0 - 11.9   ESTIMATED GFR   Result Value Ref Range    GFR If African American >60 >60 mL/min/1.73 m 2    GFR If Non African American >60 >60 mL/min/1.73 m 2   EKG   Result Value Ref Range    Report       [facility] Emergency Dept.    Test Date:  2020  Pt Name:    JASON SCHMITZ                Department: ER  MRN:        [id number]                      Room:       [facility]  Gender:     Female                       Technician: [id number]  :        1970                   Requested By:ER TRIAGE PROTOCOL  Order #:    [id number]                    Reading MD: ZEENAT HERNANDEZ MD    Measurements  Intervals                                Axis  Rate:       63                           P:          80  WA:         180                          QRS:        53  QRSD:       82                           T:          55  QT:         468  QTc:        480    Interpretive Statements  SINUS RHYTHM  PROBABLE LEFT ATRIAL ABNORMALITY  RSR' IN V1 OR V2, PROBABLY NORMAL VARIANT  PROBABLE LEFT VENTRICULAR HYPERTROPHY  Compared to ECG 2019 20:15:07  Sinus tachycardia no longer present  Electronically Signed On 2020 15:43:00 PST by ZEENAT HERNANDEZ MD       All labs reviewed by ne    12 Lead EKG Reviewed by me    RADIOLOGY    CT-HEAD W/O   Final Result      No acute intracranial abnormality.       DX-CHEST-PORTABLE (1 VIEW)   Final Result      No acute cardiopulmonary abnormality.          The radiologist's interpretation of all radiological studies have been reviewed by me.      COURSE & MEDICAL DECISION MAKING  Pertinent Labs & Imaging studies reviewed. (See chart for details)    I reviewed the patient's medical records which showed that the patient has a history of alcoholism, hypertension and COPD. She was last seen and admitted at this facility in December of 2019 for nausea and vomiting. The patient was discharged on 12/14/19.     12:01 PM - Patient was seen and evaluated at bedside. Discussed plan of care with patient which includes treating the patient for her symptoms, ordering lab work and imaging for further evaluation of her symptoms. A CT of her head will be performed to rule out subarachnoid hemorrhage or any other cranial abnormalities.  Patient's verbalizes their understanding and agreement to the plan of care. Ordered EKG, CMP, CBC with differential, UA culture, lipase, estimated GFR, urine drug screen, diagnostic alcohol, Dx-chest and CT- head. Patient was medicated with Dilaudid 0.5 mg for pain control, Ativan 0.5 mg for her anxiety, Zofran 4 mg for nausea and IV fluids 1,000 mL for dehydration and headache management.  The differential diagnoses include but are not limited to: subarachnoid hemorrhage, tension headache, anxiety, viral syndrome, hypertensive emergency    HYDRATION: Based on the patient's presentation of headache management and Dehydration secondary to acute vomiting the patient was given IV fluids. IV Hydration was used because oral hydration was not adequate alone. Upon recheck following hydration, the patient was mildly improved.    1:30 PM Recheck. I updated the patient on her lab work results that have been completed so far, which do not show any significant findings. The patient repots that she is feeling improved the administration of medications and states that she  missed her daily dose of Lisinopril today. Discussed plan of care with patient which includes medicating her with a dose of Lisinopril and fluids, then ordering further lab work. Patient verbalizes her understanding and agreement to the plan of care. Patient was medicated with IV fluids 500 mL for continued dehydration, Lisinopril 10 mg for her hypertension. Ordered BMP for further evaluation of her condition.     HYDRATION: Based on the patient's presentation of Dehydration the patient was given IV fluids. IV Hydration was used because oral hydration was not adequate alone. Upon recheck following hydration, the patient was improved.    2:20 PM Ordered estimated GFR for further evaluation of her condition    2:42 PM Per ER nurse, the patient is complaining of continued nausea. Patient was medicated with Zofran 4 mg for her symptoms.     3:57 PM Recheck. The patient reports that she is feeling improved after the administration of medications. I informed the patient that she will be discharged with Zofran for her continued nausea. At this time, the patient is stable for discharge, they were given discharge instructions which include taking her medications as prescribed, washing their hands frequently to avoid the spread of infection, using Tylenol/Ibuprofen for pain control and following up with their primary care provider. The patient will be discharged with a prescription for Zofran 4 mg which is to be taken every eight hours as needed for nausea. The patient was given a referral to her primary care provider and instructed to immediately return to the ED if their symptoms worsen. Patient verbalizes their understanding and agreement to plan of discharge.       Decision Making:  Patient is coming in with acute onset headache, got a CT scan within 6 hours of her headache which was negative ruling the patient out for subarachnoid hemorrhage.  Patient is feeling much improved after pain medicines, believe the patient  likely has nausea vomiting diarrhea possibly gastroenteritis.  Laboratory tests show some metabolic acidosis, the patient was fluid resuscitated, repeat Zofran, repeat shows the patient has normal electrolytes.  The patient is feeling improved, will discharge the patient home, I believe the patient's headache is secondary to a tension headache, she states that she has been having increasing stress and she had tender neck musculature on my examination.  We will get the patient a prescription for Zofran, she will return in the next 12 to 24 hours if not improved, return sooner with increasing pain fevers cannot keep anything down or any other concerns.  Repeat abdominal examination is benign.      The patient will return for new or worsening symptoms and is stable at the time of discharge.    The patient is referred to a primary physician for blood pressure management, diabetic screening, and for all other preventative health concerns.      DISPOSITION:  Patient will be discharged home in stable condition.    FOLLOW UP:  [facility], Emergency Dept  [address] [facility] [id number] [phone]    If symptoms worsen    JONO PatelR.N.  [address] [place] [address] [id number]  [address]  [facility] [address]  [phone]            OUTPATIENT MEDICATIONS:  Discharge Medication List as of 1/9/2020  3:45 PM      START taking these medications    Details   ondansetron (ZOFRAN ODT) 4 MG TABLET DISPERSIBLE Take 1 Tab by mouth every 8 hours as needed., Disp-20 Tab, R-0, Normal               FINAL IMPRESSION  1. Non-intractable vomiting with nausea, unspecified vomiting type    2. Tension headache          Michelle STARR (Olaf), am scribing for, and in the presence of, Ari Chavira M.D..    Electronically signed by: Michelle Smith (Olaf), 1/9/2020    Ari STARR M.D. personally performed the services described in this documentation, as scribed by Michelle Smith in my presence, and it is both accurate and  complete.    C    The note accurately reflects work and decisions made by me.  Ari Chavira  1/9/2020  7:59 PM

## 2020-01-09 NOTE — ED TRIAGE NOTES
"Chief Complaint   Patient presents with   • Diarrhea     x3 days   • N/V     today   • Headache     this morning   • Hypertension     hx of same. takes BP meds. missed this morning.     BIB EMS from home for above. Headache 10/10. Takes lisinopril.   Received 4mg zofran PTA. BS: 132.   Denies vision issues. +dizziness.    Pt cold to touch.    Connected to monitor. Chart up for ERP.     BP (!) 207/145   Pulse 88   Temp 35.9 °C (96.6 °F) (Temporal)   Resp 18   Ht 1.727 m (5' 8\")   Wt 50 kg (110 lb 3.7 oz)   LMP 06/11/2005   SpO2 99%   BMI 16.76 kg/m²     "

## 2020-02-19 ENCOUNTER — APPOINTMENT (OUTPATIENT)
Dept: RADIOLOGY | Facility: MEDICAL CENTER | Age: 50
End: 2020-02-19
Attending: EMERGENCY MEDICINE
Payer: MEDICAID

## 2020-02-19 ENCOUNTER — HOSPITAL ENCOUNTER (EMERGENCY)
Facility: MEDICAL CENTER | Age: 50
End: 2020-02-19
Attending: EMERGENCY MEDICINE
Payer: MEDICAID

## 2020-02-19 VITALS
HEIGHT: 68 IN | RESPIRATION RATE: 6 BRPM | TEMPERATURE: 98.1 F | WEIGHT: 110 LBS | BODY MASS INDEX: 16.67 KG/M2 | HEART RATE: 91 BPM | SYSTOLIC BLOOD PRESSURE: 97 MMHG | OXYGEN SATURATION: 95 % | DIASTOLIC BLOOD PRESSURE: 71 MMHG

## 2020-02-19 DIAGNOSIS — E86.0 DEHYDRATION: ICD-10-CM

## 2020-02-19 DIAGNOSIS — B34.9 VIRAL SYNDROME: ICD-10-CM

## 2020-02-19 DIAGNOSIS — J44.1 ACUTE EXACERBATION OF CHRONIC OBSTRUCTIVE PULMONARY DISEASE (COPD) (HCC): ICD-10-CM

## 2020-02-19 LAB
ANION GAP SERPL CALC-SCNC: 14 MMOL/L (ref 0–11.9)
BASOPHILS # BLD AUTO: 0.5 % (ref 0–1.8)
BASOPHILS # BLD: 0.02 K/UL (ref 0–0.12)
BUN SERPL-MCNC: 15 MG/DL (ref 8–22)
CALCIUM SERPL-MCNC: 8.7 MG/DL (ref 8.5–10.5)
CHLORIDE SERPL-SCNC: 100 MMOL/L (ref 96–112)
CO2 SERPL-SCNC: 16 MMOL/L (ref 20–33)
CREAT SERPL-MCNC: 0.82 MG/DL (ref 0.5–1.4)
EOSINOPHIL # BLD AUTO: 0 K/UL (ref 0–0.51)
EOSINOPHIL NFR BLD: 0 % (ref 0–6.9)
ERYTHROCYTE [DISTWIDTH] IN BLOOD BY AUTOMATED COUNT: 50.4 FL (ref 35.9–50)
FLUAV RNA SPEC QL NAA+PROBE: NEGATIVE
FLUBV RNA SPEC QL NAA+PROBE: NEGATIVE
GLUCOSE SERPL-MCNC: 76 MG/DL (ref 65–99)
HCT VFR BLD AUTO: 35.8 % (ref 37–47)
HGB BLD-MCNC: 12.1 G/DL (ref 12–16)
IMM GRANULOCYTES # BLD AUTO: 0.01 K/UL (ref 0–0.11)
IMM GRANULOCYTES NFR BLD AUTO: 0.2 % (ref 0–0.9)
LYMPHOCYTES # BLD AUTO: 1.04 K/UL (ref 1–4.8)
LYMPHOCYTES NFR BLD: 25.2 % (ref 22–41)
MCH RBC QN AUTO: 31.2 PG (ref 27–33)
MCHC RBC AUTO-ENTMCNC: 33.8 G/DL (ref 33.6–35)
MCV RBC AUTO: 92.3 FL (ref 81.4–97.8)
MONOCYTES # BLD AUTO: 0.69 K/UL (ref 0–0.85)
MONOCYTES NFR BLD AUTO: 16.7 % (ref 0–13.4)
NEUTROPHILS # BLD AUTO: 2.36 K/UL (ref 2–7.15)
NEUTROPHILS NFR BLD: 57.4 % (ref 44–72)
NRBC # BLD AUTO: 0 K/UL
NRBC BLD-RTO: 0 /100 WBC
PLATELET # BLD AUTO: 279 K/UL (ref 164–446)
PMV BLD AUTO: 9.1 FL (ref 9–12.9)
POTASSIUM SERPL-SCNC: 4.2 MMOL/L (ref 3.6–5.5)
RBC # BLD AUTO: 3.88 M/UL (ref 4.2–5.4)
S PYO DNA SPEC NAA+PROBE: NORMAL
SODIUM SERPL-SCNC: 130 MMOL/L (ref 135–145)
WBC # BLD AUTO: 4.1 K/UL (ref 4.8–10.8)

## 2020-02-19 PROCEDURE — 700101 HCHG RX REV CODE 250: Performed by: EMERGENCY MEDICINE

## 2020-02-19 PROCEDURE — 87651 STREP A DNA AMP PROBE: CPT

## 2020-02-19 PROCEDURE — 700111 HCHG RX REV CODE 636 W/ 250 OVERRIDE (IP): Performed by: EMERGENCY MEDICINE

## 2020-02-19 PROCEDURE — 87502 INFLUENZA DNA AMP PROBE: CPT

## 2020-02-19 PROCEDURE — 96374 THER/PROPH/DIAG INJ IV PUSH: CPT

## 2020-02-19 PROCEDURE — 87040 BLOOD CULTURE FOR BACTERIA: CPT | Mod: 91

## 2020-02-19 PROCEDURE — 94640 AIRWAY INHALATION TREATMENT: CPT

## 2020-02-19 PROCEDURE — 80048 BASIC METABOLIC PNL TOTAL CA: CPT

## 2020-02-19 PROCEDURE — 700105 HCHG RX REV CODE 258: Performed by: EMERGENCY MEDICINE

## 2020-02-19 PROCEDURE — 85025 COMPLETE CBC W/AUTO DIFF WBC: CPT

## 2020-02-19 PROCEDURE — 71045 X-RAY EXAM CHEST 1 VIEW: CPT

## 2020-02-19 PROCEDURE — 99284 EMERGENCY DEPT VISIT MOD MDM: CPT

## 2020-02-19 RX ORDER — KETOROLAC TROMETHAMINE 30 MG/ML
15 INJECTION, SOLUTION INTRAMUSCULAR; INTRAVENOUS ONCE
Status: COMPLETED | OUTPATIENT
Start: 2020-02-19 | End: 2020-02-19

## 2020-02-19 RX ORDER — PREDNISONE 20 MG/1
60 TABLET ORAL DAILY
Status: DISCONTINUED | OUTPATIENT
Start: 2020-02-19 | End: 2020-02-19 | Stop reason: HOSPADM

## 2020-02-19 RX ORDER — IPRATROPIUM BROMIDE AND ALBUTEROL SULFATE 2.5; .5 MG/3ML; MG/3ML
3 SOLUTION RESPIRATORY (INHALATION) ONCE
Status: COMPLETED | OUTPATIENT
Start: 2020-02-19 | End: 2020-02-19

## 2020-02-19 RX ORDER — SODIUM CHLORIDE 9 MG/ML
1000 INJECTION, SOLUTION INTRAVENOUS ONCE
Status: COMPLETED | OUTPATIENT
Start: 2020-02-19 | End: 2020-02-19

## 2020-02-19 RX ORDER — PREDNISONE 20 MG/1
20 TABLET ORAL DAILY
Qty: 3 TAB | Refills: 0 | Status: SHIPPED | OUTPATIENT
Start: 2020-02-19 | End: 2020-02-22

## 2020-02-19 RX ORDER — PREDNISONE 20 MG/1
20 TABLET ORAL DAILY
Qty: 3 TAB | Refills: 0 | Status: SHIPPED | OUTPATIENT
Start: 2020-02-19 | End: 2020-02-19 | Stop reason: SDUPTHER

## 2020-02-19 RX ADMIN — KETOROLAC TROMETHAMINE 15 MG: 30 INJECTION, SOLUTION INTRAMUSCULAR; INTRAVENOUS at 17:41

## 2020-02-19 RX ADMIN — IPRATROPIUM BROMIDE AND ALBUTEROL SULFATE 3 ML: .5; 3 SOLUTION RESPIRATORY (INHALATION) at 16:48

## 2020-02-19 RX ADMIN — SODIUM CHLORIDE 1000 ML: 9 INJECTION, SOLUTION INTRAVENOUS at 17:06

## 2020-02-19 RX ADMIN — PREDNISONE 60 MG: 20 TABLET ORAL at 17:00

## 2020-02-19 ASSESSMENT — LIFESTYLE VARIABLES
AVERAGE NUMBER OF DAYS PER WEEK YOU HAVE A DRINK CONTAINING ALCOHOL: 0
TOTAL SCORE: 0
TOTAL SCORE: 0
DO YOU DRINK ALCOHOL: NO
TOTAL SCORE: 0
EVER FELT BAD OR GUILTY ABOUT YOUR DRINKING: NO
HOW MANY TIMES IN THE PAST YEAR HAVE YOU HAD 5 OR MORE DRINKS IN A DAY: 0
CONSUMPTION TOTAL: NEGATIVE
HAVE PEOPLE ANNOYED YOU BY CRITICIZING YOUR DRINKING: NO
ON A TYPICAL DAY WHEN YOU DRINK ALCOHOL HOW MANY DRINKS DO YOU HAVE: 0
EVER HAD A DRINK FIRST THING IN THE MORNING TO STEADY YOUR NERVES TO GET RID OF A HANGOVER: NO
HAVE YOU EVER FELT YOU SHOULD CUT DOWN ON YOUR DRINKING: NO

## 2020-02-20 NOTE — ED PROVIDER NOTES
ED Provider Note    CHIEF COMPLAINT  No chief complaint on file.      HPI  Rhonda Gould is a 49 y.o. female with a history of hypertension, alcoholism and COPD, seen most recently for headache on 9 January, with prior visits involving issues related to psychiatric problems and alcoholism.  Patient here today reporting that she is felt unwell for the last 5 days.  She reports she feels like she is getting the flu.  She reports nasal congestion, sore throat and cough.  She reports cough is minimally productive of white sputum.  Though she does have a history of COPD she does not feel considerably short of breath.  She denies any associated chest pain or abdominal pain.  She did have some nausea and 2 days ago had one episode of nonbloody nonbilious emesis but is not vomited since and is been able to eat without any issue.  She has had no change in her bowel movements, no blood or melena, she continues to pass normal stool and flatus.  Patient does have a history of severe headaches but denies any current headache.  Patient reports an associated subjective fever and chills over the last 5 days.  Patient denies any dysuria urgency or frequency.  Patient reports that she is feeling mildly dizzy upon standing but denies any associated dizziness otherwise, no difficulty ambulating, she has not syncopized, she denies any associated chest pain.    REVIEW OF SYSTEMS  ROS    See HPI for further details. All other systems are negative.     PAST MEDICAL HISTORY   has a past medical history of Chronic obstructive pulmonary disease (HCC) and Hypertension.    SOCIAL HISTORY  Social History     Tobacco Use   • Smoking status: Current Every Day Smoker     Packs/day: 0.25     Years: 30.00     Pack years: 7.50     Types: Cigarettes   • Smokeless tobacco: Never Used   Substance and Sexual Activity   • Alcohol use: Yes     Frequency: 2-3 times a week     Comment: socially couple times per week   • Drug use: Yes     Frequency: 2.0  times per week     Types: Inhaled, Marijuana     Comment: marijuana   • Sexual activity: Not Currently       SURGICAL HISTORY   has a past surgical history that includes hysterectomy laparoscopy (6/2005).    CURRENT MEDICATIONS  Home Medications    **Home medications have not yet been reviewed for this encounter**         ALLERGIES  Allergies   Allergen Reactions   • Codeine Hives and Itching     Tolerated morphine previously  Allergy updated from 2015, morphine given in 2016   • Shellfish Allergy Anaphylaxis   • Codeine Hives, Itching and Swelling     Throat swells closed  RXN=>10 years ago   • Codeine    • Fish Allergy Shortness of Breath     Swelling to throat.       PHYSICAL EXAM  Physical Exam   Constitutional: She is oriented to person, place, and time. She appears well-developed and well-nourished.   HENT:   Head: Normocephalic and atraumatic.   no trismus, no facial swelling, no floor of mouth swelling or submandibular fullness, no stridor. No pharyngeal asymmetry. Nl phonation     Eyes: Conjunctivae are normal.   Neck: Normal range of motion. Neck supple.   Cardiovascular: Normal rate and regular rhythm.   Pulmonary/Chest:   Effort normal, scattered wheezing more pronounced on patient's left than right   Abdominal: Soft. Bowel sounds are normal. She exhibits no distension. There is no abdominal tenderness. There is no rebound.   Neurological: She is alert and oriented to person, place, and time.   Skin: Skin is warm and dry. No rash noted.   Psychiatric: She has a normal mood and affect. Her behavior is normal.         DIAGNOSTIC STUDIES / PROCEDURES    LABS  Results for orders placed or performed during the hospital encounter of 02/19/20   CBC WITH DIFFERENTIAL   Result Value Ref Range    WBC 4.1 (L) 4.8 - 10.8 K/uL    RBC 3.88 (L) 4.20 - 5.40 M/uL    Hemoglobin 12.1 12.0 - 16.0 g/dL    Hematocrit 35.8 (L) 37.0 - 47.0 %    MCV 92.3 81.4 - 97.8 fL    MCH 31.2 27.0 - 33.0 pg    MCHC 33.8 33.6 - 35.0 g/dL     RDW 50.4 (H) 35.9 - 50.0 fL    Platelet Count 279 164 - 446 K/uL    MPV 9.1 9.0 - 12.9 fL    Neutrophils-Polys 57.40 44.00 - 72.00 %    Lymphocytes 25.20 22.00 - 41.00 %    Monocytes 16.70 (H) 0.00 - 13.40 %    Eosinophils 0.00 0.00 - 6.90 %    Basophils 0.50 0.00 - 1.80 %    Immature Granulocytes 0.20 0.00 - 0.90 %    Nucleated RBC 0.00 /100 WBC    Neutrophils (Absolute) 2.36 2.00 - 7.15 K/uL    Lymphs (Absolute) 1.04 1.00 - 4.80 K/uL    Monos (Absolute) 0.69 0.00 - 0.85 K/uL    Eos (Absolute) 0.00 0.00 - 0.51 K/uL    Baso (Absolute) 0.02 0.00 - 0.12 K/uL    Immature Granulocytes (abs) 0.01 0.00 - 0.11 K/uL    NRBC (Absolute) 0.00 K/uL   Influenza A/B By PCR (Adult - Flu Only)   Result Value Ref Range    Influenza virus A RNA Negative Negative    Influenza virus B, PCR Negative Negative   Group A Strep by PCR   Result Value Ref Range    Group A Strep by PCR Invalid Not Detected   Basic Metabolic Panel   Result Value Ref Range    Sodium 130 (L) 135 - 145 mmol/L    Potassium 4.2 3.6 - 5.5 mmol/L    Chloride 100 96 - 112 mmol/L    Co2 16 (L) 20 - 33 mmol/L    Glucose 76 65 - 99 mg/dL    Bun 15 8 - 22 mg/dL    Creatinine 0.82 0.50 - 1.40 mg/dL    Calcium 8.7 8.5 - 10.5 mg/dL    Anion Gap 14.0 (H) 0.0 - 11.9   ESTIMATED GFR   Result Value Ref Range    GFR If African American >60 >60 mL/min/1.73 m 2    GFR If Non African American >60 >60 mL/min/1.73 m 2         RADIOLOGY  DX-CHEST-PORTABLE (1 VIEW)   Final Result      No acute cardiopulmonary process is seen.          COURSE & MEDICAL DECISION MAKING  Pertinent Labs & Imaging studies reviewed. (See chart for details)    Very well-appearing patient here with symptoms most consistent with a viral syndrome.  Given her minimal tachycardia will check basic labs.  Given asymmetric breath sounds will check x-ray.  Will give IV fluids given patient's mild hypotension and tachycardia which is likely sequelae of mild dehydration related to her viral syndrome.  Sepsis highly  unlikely was very well-appearing patient, will reassess patient's vitals following IV fluids.  Checking influenza given patient's symptoms.  Laboratory results are consistent with dehydration.  Patient feeling considerably improved following IV fluids.  I did send blood cultures patient be contacted these returned positive however very low suspicion of bacteremia.  Patient respiratory exam considerably improved following breathing treatment.  Home with a short course of steroids.  Follow-up primary care, return precautions discussed in depth.  The patient will return for worsening symptoms and is stable at the time of discharge. The patient verbalizes understanding and will comply.    FINAL IMPRESSION    1. Dehydration    2. Viral syndrome    3. Acute exacerbation of chronic obstructive pulmonary disease (COPD) ([facility])               Electronically signed by: Thai Martins M.D., 2/19/2020 4:30 PM

## 2020-02-20 NOTE — ED TRIAGE NOTES
Patient was feeling dizzy at home, headache, flu like symptoms and cough.  Symptoms for the past week. Patient has COPD, no SOB at this time. BP was in 80s gave 1L fluid in ambulance.

## 2020-02-24 LAB
BACTERIA BLD CULT: NORMAL
BACTERIA BLD CULT: NORMAL
SIGNIFICANT IND 70042: NORMAL
SIGNIFICANT IND 70042: NORMAL
SITE SITE: NORMAL
SITE SITE: NORMAL
SOURCE SOURCE: NORMAL
SOURCE SOURCE: NORMAL

## 2020-03-03 ENCOUNTER — APPOINTMENT (OUTPATIENT)
Dept: RADIOLOGY | Facility: MEDICAL CENTER | Age: 50
End: 2020-03-03
Attending: EMERGENCY MEDICINE
Payer: MEDICAID

## 2020-03-03 ENCOUNTER — HOSPITAL ENCOUNTER (OUTPATIENT)
Facility: MEDICAL CENTER | Age: 50
End: 2020-03-04
Attending: EMERGENCY MEDICINE | Admitting: FAMILY MEDICINE
Payer: MEDICAID

## 2020-03-03 ENCOUNTER — APPOINTMENT (OUTPATIENT)
Dept: CARDIOLOGY | Facility: MEDICAL CENTER | Age: 50
End: 2020-03-03
Attending: FAMILY MEDICINE
Payer: MEDICAID

## 2020-03-03 ENCOUNTER — APPOINTMENT (OUTPATIENT)
Dept: RADIOLOGY | Facility: MEDICAL CENTER | Age: 50
End: 2020-03-03
Attending: FAMILY MEDICINE
Payer: MEDICAID

## 2020-03-03 DIAGNOSIS — R27.0 ATAXIA: ICD-10-CM

## 2020-03-03 DIAGNOSIS — R51.9 NONINTRACTABLE HEADACHE, UNSPECIFIED CHRONICITY PATTERN, UNSPECIFIED HEADACHE TYPE: ICD-10-CM

## 2020-03-03 DIAGNOSIS — R42 VERTIGO: ICD-10-CM

## 2020-03-03 PROBLEM — R55 NEAR SYNCOPE: Status: ACTIVE | Noted: 2020-03-03

## 2020-03-03 PROBLEM — J44.9 COPD (CHRONIC OBSTRUCTIVE PULMONARY DISEASE) (HCC): Status: ACTIVE | Noted: 2020-03-03

## 2020-03-03 LAB
ABO + RH BLD: NORMAL
ABO GROUP BLD: NORMAL
ALBUMIN SERPL BCP-MCNC: 4.2 G/DL (ref 3.2–4.9)
ALBUMIN/GLOB SERPL: 1.6 G/DL
ALP SERPL-CCNC: 54 U/L (ref 30–99)
ALT SERPL-CCNC: 7 U/L (ref 2–50)
ANION GAP SERPL CALC-SCNC: 17 MMOL/L (ref 0–11.9)
APTT PPP: 31.7 SEC (ref 24.7–36)
AST SERPL-CCNC: 18 U/L (ref 12–45)
BASOPHILS # BLD AUTO: 0.3 % (ref 0–1.8)
BASOPHILS # BLD: 0.02 K/UL (ref 0–0.12)
BILIRUB SERPL-MCNC: 0.4 MG/DL (ref 0.1–1.5)
BLD GP AB SCN SERPL QL: NORMAL
BUN SERPL-MCNC: 16 MG/DL (ref 8–22)
CALCIUM SERPL-MCNC: 9.5 MG/DL (ref 8.5–10.5)
CHLORIDE SERPL-SCNC: 108 MMOL/L (ref 96–112)
CO2 SERPL-SCNC: 18 MMOL/L (ref 20–33)
CREAT SERPL-MCNC: 0.81 MG/DL (ref 0.5–1.4)
EKG IMPRESSION: NORMAL
EOSINOPHIL # BLD AUTO: 0.06 K/UL (ref 0–0.51)
EOSINOPHIL NFR BLD: 1 % (ref 0–6.9)
ERYTHROCYTE [DISTWIDTH] IN BLOOD BY AUTOMATED COUNT: 51.3 FL (ref 35.9–50)
ETHANOL BLD-MCNC: 0.09 G/DL
FLUAV RNA SPEC QL NAA+PROBE: NEGATIVE
FLUBV RNA SPEC QL NAA+PROBE: NEGATIVE
FOLATE SERPL-MCNC: >24 NG/ML
GLOBULIN SER CALC-MCNC: 2.6 G/DL (ref 1.9–3.5)
GLUCOSE SERPL-MCNC: 77 MG/DL (ref 65–99)
HCT VFR BLD AUTO: 34.6 % (ref 37–47)
HGB BLD-MCNC: 11.2 G/DL (ref 12–16)
IMM GRANULOCYTES # BLD AUTO: 0.04 K/UL (ref 0–0.11)
IMM GRANULOCYTES NFR BLD AUTO: 0.7 % (ref 0–0.9)
INR PPP: 0.95 (ref 0.87–1.13)
LV EJECT FRACT  99904: 65
LV EJECT FRACT MOD 2C 99903: 76.52
LV EJECT FRACT MOD 4C 99902: 65.39
LV EJECT FRACT MOD BP 99901: 71.43
LYMPHOCYTES # BLD AUTO: 1.72 K/UL (ref 1–4.8)
LYMPHOCYTES NFR BLD: 29.7 % (ref 22–41)
MCH RBC QN AUTO: 30.9 PG (ref 27–33)
MCHC RBC AUTO-ENTMCNC: 32.4 G/DL (ref 33.6–35)
MCV RBC AUTO: 95.6 FL (ref 81.4–97.8)
MONOCYTES # BLD AUTO: 0.37 K/UL (ref 0–0.85)
MONOCYTES NFR BLD AUTO: 6.4 % (ref 0–13.4)
NEUTROPHILS # BLD AUTO: 3.59 K/UL (ref 2–7.15)
NEUTROPHILS NFR BLD: 61.9 % (ref 44–72)
NRBC # BLD AUTO: 0 K/UL
NRBC BLD-RTO: 0 /100 WBC
PLATELET # BLD AUTO: 463 K/UL (ref 164–446)
PMV BLD AUTO: 8.7 FL (ref 9–12.9)
POTASSIUM SERPL-SCNC: 4.3 MMOL/L (ref 3.6–5.5)
PROT SERPL-MCNC: 6.8 G/DL (ref 6–8.2)
PROTHROMBIN TIME: 12.9 SEC (ref 12–14.6)
RBC # BLD AUTO: 3.62 M/UL (ref 4.2–5.4)
RH BLD: NORMAL
SODIUM SERPL-SCNC: 143 MMOL/L (ref 135–145)
TROPONIN T SERPL-MCNC: 7 NG/L (ref 6–19)
TSH SERPL DL<=0.005 MIU/L-ACNC: 0.71 UIU/ML (ref 0.38–5.33)
VIT B12 SERPL-MCNC: 602 PG/ML (ref 211–911)
WBC # BLD AUTO: 5.8 K/UL (ref 4.8–10.8)

## 2020-03-03 PROCEDURE — 71045 X-RAY EXAM CHEST 1 VIEW: CPT

## 2020-03-03 PROCEDURE — 70551 MRI BRAIN STEM W/O DYE: CPT

## 2020-03-03 PROCEDURE — 99220 PR INITIAL OBSERVATION CARE,LEVL III: CPT | Performed by: FAMILY MEDICINE

## 2020-03-03 PROCEDURE — 84484 ASSAY OF TROPONIN QUANT: CPT

## 2020-03-03 PROCEDURE — A9270 NON-COVERED ITEM OR SERVICE: HCPCS | Performed by: FAMILY MEDICINE

## 2020-03-03 PROCEDURE — 700102 HCHG RX REV CODE 250 W/ 637 OVERRIDE(OP): Performed by: FAMILY MEDICINE

## 2020-03-03 PROCEDURE — 700111 HCHG RX REV CODE 636 W/ 250 OVERRIDE (IP): Performed by: EMERGENCY MEDICINE

## 2020-03-03 PROCEDURE — 700117 HCHG RX CONTRAST REV CODE 255: Performed by: EMERGENCY MEDICINE

## 2020-03-03 PROCEDURE — 97161 PT EVAL LOW COMPLEX 20 MIN: CPT

## 2020-03-03 PROCEDURE — 99285 EMERGENCY DEPT VISIT HI MDM: CPT

## 2020-03-03 PROCEDURE — 96375 TX/PRO/DX INJ NEW DRUG ADDON: CPT | Mod: XU

## 2020-03-03 PROCEDURE — 80307 DRUG TEST PRSMV CHEM ANLYZR: CPT

## 2020-03-03 PROCEDURE — 0042T CT-CEREBRAL PERFUSION ANALYSIS: CPT

## 2020-03-03 PROCEDURE — 70498 CT ANGIOGRAPHY NECK: CPT

## 2020-03-03 PROCEDURE — 82746 ASSAY OF FOLIC ACID SERUM: CPT

## 2020-03-03 PROCEDURE — 85025 COMPLETE CBC W/AUTO DIFF WBC: CPT

## 2020-03-03 PROCEDURE — 93306 TTE W/DOPPLER COMPLETE: CPT | Mod: 26 | Performed by: INTERNAL MEDICINE

## 2020-03-03 PROCEDURE — 70496 CT ANGIOGRAPHY HEAD: CPT

## 2020-03-03 PROCEDURE — 94760 N-INVAS EAR/PLS OXIMETRY 1: CPT

## 2020-03-03 PROCEDURE — 93005 ELECTROCARDIOGRAM TRACING: CPT | Performed by: EMERGENCY MEDICINE

## 2020-03-03 PROCEDURE — 85730 THROMBOPLASTIN TIME PARTIAL: CPT

## 2020-03-03 PROCEDURE — 700105 HCHG RX REV CODE 258: Performed by: EMERGENCY MEDICINE

## 2020-03-03 PROCEDURE — 86901 BLOOD TYPING SEROLOGIC RH(D): CPT

## 2020-03-03 PROCEDURE — 84443 ASSAY THYROID STIM HORMONE: CPT

## 2020-03-03 PROCEDURE — 93306 TTE W/DOPPLER COMPLETE: CPT

## 2020-03-03 PROCEDURE — 70450 CT HEAD/BRAIN W/O DYE: CPT

## 2020-03-03 PROCEDURE — 80053 COMPREHEN METABOLIC PANEL: CPT

## 2020-03-03 PROCEDURE — 86850 RBC ANTIBODY SCREEN: CPT

## 2020-03-03 PROCEDURE — 82607 VITAMIN B-12: CPT

## 2020-03-03 PROCEDURE — 86900 BLOOD TYPING SEROLOGIC ABO: CPT

## 2020-03-03 PROCEDURE — G0378 HOSPITAL OBSERVATION PER HR: HCPCS

## 2020-03-03 PROCEDURE — 85610 PROTHROMBIN TIME: CPT

## 2020-03-03 PROCEDURE — 87502 INFLUENZA DNA AMP PROBE: CPT

## 2020-03-03 PROCEDURE — 96374 THER/PROPH/DIAG INJ IV PUSH: CPT | Mod: XU

## 2020-03-03 RX ORDER — LABETALOL HYDROCHLORIDE 5 MG/ML
10 INJECTION, SOLUTION INTRAVENOUS EVERY 4 HOURS PRN
Status: DISCONTINUED | OUTPATIENT
Start: 2020-03-03 | End: 2020-03-04 | Stop reason: HOSPADM

## 2020-03-03 RX ORDER — ONDANSETRON 4 MG/1
4 TABLET, ORALLY DISINTEGRATING ORAL EVERY 4 HOURS PRN
Status: DISCONTINUED | OUTPATIENT
Start: 2020-03-03 | End: 2020-03-04 | Stop reason: HOSPADM

## 2020-03-03 RX ORDER — ONDANSETRON 2 MG/ML
4 INJECTION INTRAMUSCULAR; INTRAVENOUS EVERY 4 HOURS PRN
Status: DISCONTINUED | OUTPATIENT
Start: 2020-03-03 | End: 2020-03-04 | Stop reason: HOSPADM

## 2020-03-03 RX ORDER — BISACODYL 10 MG
10 SUPPOSITORY, RECTAL RECTAL
Status: DISCONTINUED | OUTPATIENT
Start: 2020-03-03 | End: 2020-03-04 | Stop reason: HOSPADM

## 2020-03-03 RX ORDER — ASPIRIN 81 MG/1
324 TABLET, CHEWABLE ORAL DAILY
Status: DISCONTINUED | OUTPATIENT
Start: 2020-03-03 | End: 2020-03-04 | Stop reason: HOSPADM

## 2020-03-03 RX ORDER — ASPIRIN 325 MG
325 TABLET ORAL DAILY
Status: DISCONTINUED | OUTPATIENT
Start: 2020-03-03 | End: 2020-03-04 | Stop reason: HOSPADM

## 2020-03-03 RX ORDER — AMOXICILLIN 250 MG
2 CAPSULE ORAL 2 TIMES DAILY
Status: DISCONTINUED | OUTPATIENT
Start: 2020-03-03 | End: 2020-03-04 | Stop reason: HOSPADM

## 2020-03-03 RX ORDER — BUTALBITAL, ACETAMINOPHEN AND CAFFEINE 50; 325; 40 MG/1; MG/1; MG/1
1 TABLET ORAL EVERY 6 HOURS PRN
Status: DISCONTINUED | OUTPATIENT
Start: 2020-03-03 | End: 2020-03-03

## 2020-03-03 RX ORDER — NICOTINE 21 MG/24HR
14 PATCH, TRANSDERMAL 24 HOURS TRANSDERMAL
Status: DISCONTINUED | OUTPATIENT
Start: 2020-03-03 | End: 2020-03-04 | Stop reason: HOSPADM

## 2020-03-03 RX ORDER — FOLIC ACID 1 MG/1
1 TABLET ORAL DAILY
COMMUNITY
End: 2021-06-22

## 2020-03-03 RX ORDER — SODIUM CHLORIDE 9 MG/ML
1000 INJECTION, SOLUTION INTRAVENOUS ONCE
Status: COMPLETED | OUTPATIENT
Start: 2020-03-03 | End: 2020-03-03

## 2020-03-03 RX ORDER — SODIUM CHLORIDE, SODIUM LACTATE, POTASSIUM CHLORIDE, AND CALCIUM CHLORIDE .6; .31; .03; .02 G/100ML; G/100ML; G/100ML; G/100ML
30 INJECTION, SOLUTION INTRAVENOUS
Status: DISCONTINUED | OUTPATIENT
Start: 2020-03-03 | End: 2020-03-03

## 2020-03-03 RX ORDER — PROCHLORPERAZINE EDISYLATE 5 MG/ML
5-10 INJECTION INTRAMUSCULAR; INTRAVENOUS EVERY 4 HOURS PRN
Status: DISCONTINUED | OUTPATIENT
Start: 2020-03-03 | End: 2020-03-04 | Stop reason: HOSPADM

## 2020-03-03 RX ORDER — ALBUTEROL SULFATE 90 UG/1
2 AEROSOL, METERED RESPIRATORY (INHALATION) EVERY 6 HOURS PRN
Status: DISCONTINUED | OUTPATIENT
Start: 2020-03-03 | End: 2020-03-04 | Stop reason: HOSPADM

## 2020-03-03 RX ORDER — ATORVASTATIN CALCIUM 80 MG/1
80 TABLET, FILM COATED ORAL EVERY EVENING
Status: DISCONTINUED | OUTPATIENT
Start: 2020-03-03 | End: 2020-03-04 | Stop reason: HOSPADM

## 2020-03-03 RX ORDER — FOLIC ACID 1 MG/1
1 TABLET ORAL DAILY
Status: DISCONTINUED | OUTPATIENT
Start: 2020-03-03 | End: 2020-03-04 | Stop reason: HOSPADM

## 2020-03-03 RX ORDER — TRAZODONE HYDROCHLORIDE 50 MG/1
50 TABLET ORAL
Status: DISCONTINUED | OUTPATIENT
Start: 2020-03-03 | End: 2020-03-04 | Stop reason: HOSPADM

## 2020-03-03 RX ORDER — ONDANSETRON 2 MG/ML
4 INJECTION INTRAMUSCULAR; INTRAVENOUS ONCE
Status: COMPLETED | OUTPATIENT
Start: 2020-03-03 | End: 2020-03-03

## 2020-03-03 RX ORDER — SODIUM CHLORIDE 9 MG/ML
INJECTION, SOLUTION INTRAVENOUS CONTINUOUS
Status: DISCONTINUED | OUTPATIENT
Start: 2020-03-03 | End: 2020-03-04 | Stop reason: HOSPADM

## 2020-03-03 RX ORDER — POLYETHYLENE GLYCOL 3350 17 G/17G
1 POWDER, FOR SOLUTION ORAL
Status: DISCONTINUED | OUTPATIENT
Start: 2020-03-03 | End: 2020-03-04 | Stop reason: HOSPADM

## 2020-03-03 RX ORDER — HYDRALAZINE HYDROCHLORIDE 20 MG/ML
10 INJECTION INTRAMUSCULAR; INTRAVENOUS
Status: DISCONTINUED | OUTPATIENT
Start: 2020-03-03 | End: 2020-03-04 | Stop reason: HOSPADM

## 2020-03-03 RX ORDER — ASPIRIN 300 MG/1
300 SUPPOSITORY RECTAL DAILY
Status: DISCONTINUED | OUTPATIENT
Start: 2020-03-03 | End: 2020-03-04 | Stop reason: HOSPADM

## 2020-03-03 RX ORDER — ACETAMINOPHEN 325 MG/1
650 TABLET ORAL EVERY 6 HOURS PRN
Status: DISCONTINUED | OUTPATIENT
Start: 2020-03-03 | End: 2020-03-04 | Stop reason: HOSPADM

## 2020-03-03 RX ORDER — PROMETHAZINE HYDROCHLORIDE 25 MG/1
12.5-25 SUPPOSITORY RECTAL EVERY 4 HOURS PRN
Status: DISCONTINUED | OUTPATIENT
Start: 2020-03-03 | End: 2020-03-04 | Stop reason: HOSPADM

## 2020-03-03 RX ORDER — BUTALBITAL, ACETAMINOPHEN AND CAFFEINE 50; 325; 40 MG/1; MG/1; MG/1
1 TABLET ORAL EVERY 6 HOURS PRN
Status: DISCONTINUED | OUTPATIENT
Start: 2020-03-03 | End: 2020-03-04 | Stop reason: HOSPADM

## 2020-03-03 RX ORDER — PROMETHAZINE HYDROCHLORIDE 25 MG/1
12.5-25 TABLET ORAL EVERY 4 HOURS PRN
Status: DISCONTINUED | OUTPATIENT
Start: 2020-03-03 | End: 2020-03-04 | Stop reason: HOSPADM

## 2020-03-03 RX ADMIN — IOHEXOL 40 ML: 350 INJECTION, SOLUTION INTRAVENOUS at 08:00

## 2020-03-03 RX ADMIN — TRAZODONE HYDROCHLORIDE 50 MG: 50 TABLET ORAL at 19:59

## 2020-03-03 RX ADMIN — ACETAMINOPHEN 650 MG: 325 TABLET, FILM COATED ORAL at 10:57

## 2020-03-03 RX ADMIN — SODIUM CHLORIDE 1000 ML: 9 INJECTION, SOLUTION INTRAVENOUS at 07:46

## 2020-03-03 RX ADMIN — BUTALBITAL, ACETAMINOPHEN, AND CAFFEINE 1 TABLET: 50; 325; 40 TABLET ORAL at 13:07

## 2020-03-03 RX ADMIN — BUTALBITAL, ACETAMINOPHEN, AND CAFFEINE 1 TABLET: 50; 325; 40 TABLET ORAL at 19:59

## 2020-03-03 RX ADMIN — ACETAMINOPHEN 650 MG: 325 TABLET, FILM COATED ORAL at 17:38

## 2020-03-03 RX ADMIN — ATORVASTATIN CALCIUM 80 MG: 80 TABLET, FILM COATED ORAL at 17:36

## 2020-03-03 RX ADMIN — ASPIRIN 325 MG: 325 TABLET, FILM COATED ORAL at 10:58

## 2020-03-03 RX ADMIN — ONDANSETRON 4 MG: 2 INJECTION INTRAMUSCULAR; INTRAVENOUS at 07:45

## 2020-03-03 RX ADMIN — FENTANYL CITRATE 50 MCG: 50 INJECTION INTRAMUSCULAR; INTRAVENOUS at 07:45

## 2020-03-03 RX ADMIN — IOHEXOL 100 ML: 350 INJECTION, SOLUTION INTRAVENOUS at 07:33

## 2020-03-03 RX ADMIN — FOLIC ACID 1 MG: 1 TABLET ORAL at 10:58

## 2020-03-03 ASSESSMENT — ENCOUNTER SYMPTOMS
ABDOMINAL PAIN: 0
VOMITING: 1
BACK PAIN: 0
FEVER: 0
NAUSEA: 1
HEADACHES: 0
SPEECH CHANGE: 0
DIZZINESS: 1
NECK PAIN: 0
BLURRED VISION: 0
HEARTBURN: 0
SENSORY CHANGE: 0
WHEEZING: 0
PALPITATIONS: 0
DIAPHORESIS: 0
FOCAL WEAKNESS: 0
NERVOUS/ANXIOUS: 0
COUGH: 0
FLANK PAIN: 0
WEAKNESS: 1
TREMORS: 0
DIARRHEA: 0
SHORTNESS OF BREATH: 0
SORE THROAT: 0
CHILLS: 0

## 2020-03-03 ASSESSMENT — COGNITIVE AND FUNCTIONAL STATUS - GENERAL
MOBILITY SCORE: 24
SUGGESTED CMS G CODE MODIFIER MOBILITY: CH

## 2020-03-03 ASSESSMENT — COPD QUESTIONNAIRES
HAVE YOU SMOKED AT LEAST 100 CIGARETTES IN YOUR ENTIRE LIFE: YES
DURING THE PAST 4 WEEKS HOW MUCH DID YOU FEEL SHORT OF BREATH: NONE/LITTLE OF THE TIME
COPD SCREENING SCORE: 3
DO YOU EVER COUGH UP ANY MUCUS OR PHLEGM?: NO/ONLY WITH OCCASIONAL COLDS OR INFECTIONS
IN THE PAST 12 MONTHS DO YOU DO LESS THAN YOU USED TO BECAUSE OF YOUR BREATHING PROBLEMS: DISAGREE/UNSURE

## 2020-03-03 ASSESSMENT — LIFESTYLE VARIABLES
EVER FELT BAD OR GUILTY ABOUT YOUR DRINKING: NO
EVER FELT BAD OR GUILTY ABOUT YOUR DRINKING: NO
EVER HAD A DRINK FIRST THING IN THE MORNING TO STEADY YOUR NERVES TO GET RID OF A HANGOVER: NO
EVER_SMOKED: YES
EVER HAD A DRINK FIRST THING IN THE MORNING TO STEADY YOUR NERVES TO GET RID OF A HANGOVER: NO
TOTAL SCORE: 0
ALCOHOL_USE: YES
DO YOU DRINK ALCOHOL: YES
HAVE YOU EVER FELT YOU SHOULD CUT DOWN ON YOUR DRINKING: NO
ON A TYPICAL DAY WHEN YOU DRINK ALCOHOL HOW MANY DRINKS DO YOU HAVE: 0
HOW MANY TIMES IN THE PAST YEAR HAVE YOU HAD 5 OR MORE DRINKS IN A DAY: 0
TOTAL SCORE: 0
HAVE PEOPLE ANNOYED YOU BY CRITICIZING YOUR DRINKING: NO
CONSUMPTION TOTAL: NEGATIVE
TOTAL SCORE: 0
CONSUMPTION TOTAL: INCOMPLETE
DOES PATIENT WANT TO STOP DRINKING: NO
AVERAGE NUMBER OF DAYS PER WEEK YOU HAVE A DRINK CONTAINING ALCOHOL: 0
HAVE PEOPLE ANNOYED YOU BY CRITICIZING YOUR DRINKING: NO
HAVE YOU EVER FELT YOU SHOULD CUT DOWN ON YOUR DRINKING: NO
TOTAL SCORE: 0

## 2020-03-03 ASSESSMENT — FIBROSIS 4 INDEX: FIB4 SCORE: 1.1

## 2020-03-03 ASSESSMENT — GAIT ASSESSMENTS
DISTANCE (FEET): 100
GAIT LEVEL OF ASSIST: SUPERVISED

## 2020-03-03 ASSESSMENT — PATIENT HEALTH QUESTIONNAIRE - PHQ9
SUM OF ALL RESPONSES TO PHQ9 QUESTIONS 1 AND 2: 0
2. FEELING DOWN, DEPRESSED, IRRITABLE, OR HOPELESS: NOT AT ALL
1. LITTLE INTEREST OR PLEASURE IN DOING THINGS: NOT AT ALL

## 2020-03-03 NOTE — ED PROVIDER NOTES
ED Provider Note    CHIEF COMPLAINT  Chief Complaint   Patient presents with   • Syncope     Pt bib ems from work. Pt was found by coworkers slumped over at work. Pt had 1 syncopal episode and was brought here. Pt denies chest pain, but endorses dizziness and abd pain. FSBG 89 PTA, 4mg Zofran given PTA.  STEMI activation.   • Head Pain   • Dizziness   • Abdominal Pain       HPI  Rhonda Gould is a 49 y.o. female who presents to the ER with EMS.  Patient was not feeling great last night with a couple episodes of vomiting.  She has had some congestion runny nose and recently had a cold about a week ago that she is getting over she went to work this morning.  She was sitting at a computer.  Started feeling lightheaded and things spinning around her with a headache.  She slumped over at her computer.  She was found by coworkers slumped forward at her desk.  An EKG was obtained in route which showed questionable elevation anteriorly.  A second EKG was obtained just prior to arrival and code STEMI was activated.  I was called emergently to the bedside.  The patient denies having any chest pain or shortness of breath.  No diaphoresis.  Positive nausea.  Denies weakness or numbness.  She complains of throbbing posterior headache.  She feels that things are spinning around her.    REVIEW OF SYSTEMS  As per HPI, otherwise a 10 point review of systems is negative    PAST MEDICAL HISTORY  Past Medical History:   Diagnosis Date   • Chronic obstructive pulmonary disease (HCC)    • Hypertension        SOCIAL HISTORY  Social History     Tobacco Use   • Smoking status: Current Every Day Smoker     Packs/day: 0.25     Years: 30.00     Pack years: 7.50     Types: Cigarettes   • Smokeless tobacco: Never Used   Substance Use Topics   • Alcohol use: Yes     Frequency: 2-3 times a week     Comment: socially couple times per week   • Drug use: Yes     Frequency: 2.0 times per week     Types: Inhaled, Marijuana     Comment: marijuana  "      SURGICAL HISTORY  Past Surgical History:   Procedure Laterality Date   • HYSTERECTOMY LAPAROSCOPY  6/2005       CURRENT MEDICATIONS  Home Medications     Reviewed by Joshua Maloney (Pharmacy Tech) on 03/03/20 at 0802  Med List Status: Complete   Medication Last Dose Status   albuterol 108 (90 Base) MCG/ACT Aero Soln inhalation aerosol > 2 days Active   folic acid (FOLVITE) 1 MG Tab 3/3/2020 Active   lisinopril (PRINIVIL) 20 MG Tab 3/3/2020 Active   NIFEdipine SR (PROCARDIA-XL) 30 MG tablet 3/3/2020 Active   traZODone (DESYREL) 50 MG Tab > 2 days Active                ALLERGIES  Allergies   Allergen Reactions   • Codeine Hives and Itching     Tolerated morphine previously  Allergy updated from 2015, morphine given in 2016   • Shellfish Allergy Anaphylaxis   • Codeine Hives, Itching and Swelling     Throat swells closed  RXN=>10 years ago   • Codeine    • Fish Allergy Shortness of Breath     Swelling to throat.       PHYSICAL EXAM  VITAL SIGNS: BP (!) 95/71   Pulse 87   Temp 36.7 °C (98.1 °F)   Resp 17   Ht 1.727 m (5' 8\")   Wt 49.9 kg (110 lb 0.2 oz)   LMP 06/11/2005   SpO2 99%   BMI 16.73 kg/m²    Constitutional: Awake and alert.  Tearful and anxious  HENT:  Atraumatic, Normocephalic.Oropharynx dry mucus membranes, Nose normal inspection.   Eyes: Questionable leftward gaze horizontal nystagmus.  No overt nystagmus is appreciated.  Neck: Supple  Cardiovascular: Normal heart rate, Normal rhythm.  Symmetric peripheral pulses.   Thorax & Lungs: No respiratory distress, No wheezing, No rales, No rhonchi, No chest tenderness.   Abdomen: Bowel sounds normal, soft, non-distended, nontender, no mass  Skin: Warm, Dry, No rash.   Back: No tenderness, No CVA tenderness.   Extremities: No clubbing, cyanosis, edema, no Homans or cords   Neurologic: Awake alert oriented anxious and tearful.  There is poor coordination with respect to finger-nose testing bilaterally.  Psychiatric: Anxious " appearing    RADIOLOGY/PROCEDURES  CT-CTA NECK WITH & W/O-POST PROCESSING   Final Result      CT angiogram of the neck within normal limits.      CT-CTA HEAD WITH & W/O-POST PROCESS   Final Result      CT angiogram of the [place] within normal limits.      CT-CEREBRAL PERFUSION ANALYSIS   Final Result      1.  Cerebral blood flow less than 30% likely representing completed infarct = 0 mL.      2.  T Max more than 6 seconds likely representing combination of completed infarct and ischemia = 0 mL.      3.  Mismatched volume likely representing ischemic brain/penumbra = None      4.  Please note that the cerebral perfusion was performed on the limited brain tissue around the basal ganglia region. Infarct/ischemia outside the CT perfusion sections can be missed in this study.      CT-HEAD W/O   Final Result      Normal CT scan of the head without contrast.               INTERPRETING LOCATION:  [address] [place] [address], [id number]      DX-CHEST-PORTABLE (1 VIEW)   Final Result      No acute cardiopulmonary abnormality.           Imaging is interpreted by radiologist    Labs:  Results for orders placed or performed during the hospital encounter of 03/03/20   CBC WITH DIFFERENTIAL   Result Value Ref Range    WBC 5.8 4.8 - 10.8 K/uL    RBC 3.62 (L) 4.20 - 5.40 M/uL    Hemoglobin 11.2 (L) 12.0 - 16.0 g/dL    Hematocrit 34.6 (L) 37.0 - 47.0 %    MCV 95.6 81.4 - 97.8 fL    MCH 30.9 27.0 - 33.0 pg    MCHC 32.4 (L) 33.6 - 35.0 g/dL    RDW 51.3 (H) 35.9 - 50.0 fL    Platelet Count 463 (H) 164 - 446 K/uL    MPV 8.7 (L) 9.0 - 12.9 fL    Neutrophils-Polys 61.90 44.00 - 72.00 %    Lymphocytes 29.70 22.00 - 41.00 %    Monocytes 6.40 0.00 - 13.40 %    Eosinophils 1.00 0.00 - 6.90 %    Basophils 0.30 0.00 - 1.80 %    Immature Granulocytes 0.70 0.00 - 0.90 %    Nucleated RBC 0.00 /100 WBC    Neutrophils (Absolute) 3.59 2.00 - 7.15 K/uL    Lymphs (Absolute) 1.72 1.00 - 4.80 K/uL    Monos (Absolute) 0.37 0.00 - 0.85 K/uL    Eos (Absolute)  0.06 0.00 - 0.51 K/uL    Baso (Absolute) 0.02 0.00 - 0.12 K/uL    Immature Granulocytes (abs) 0.04 0.00 - 0.11 K/uL    NRBC (Absolute) 0.00 K/uL   COMP METABOLIC PANEL   Result Value Ref Range    Sodium 143 135 - 145 mmol/L    Potassium 4.3 3.6 - 5.5 mmol/L    Chloride 108 96 - 112 mmol/L    Co2 18 (L) 20 - 33 mmol/L    Anion Gap 17.0 (H) 0.0 - 11.9    Glucose 77 65 - 99 mg/dL    Bun 16 8 - 22 mg/dL    Creatinine 0.81 0.50 - 1.40 mg/dL    Calcium 9.5 8.5 - 10.5 mg/dL    AST(SGOT) 18 12 - 45 U/L    ALT(SGPT) 7 2 - 50 U/L    Alkaline Phosphatase 54 30 - 99 U/L    Total Bilirubin 0.4 0.1 - 1.5 mg/dL    Albumin 4.2 3.2 - 4.9 g/dL    Total Protein 6.8 6.0 - 8.2 g/dL    Globulin 2.6 1.9 - 3.5 g/dL    A-G Ratio 1.6 g/dL   PROTHROMBIN TIME   Result Value Ref Range    PT 12.9 12.0 - 14.6 sec    INR 0.95 0.87 - 1.13   APTT   Result Value Ref Range    APTT 31.7 24.7 - 36.0 sec   COD (ADULT)   Result Value Ref Range    ABO Grouping Only O     Rh Grouping Only POS     Antibody Screen-Cod NEG    TROPONIN   Result Value Ref Range    Troponin T 7 6 - 19 ng/L   ABO Rh Confirm   Result Value Ref Range    ABO Rh Confirm O POS    Influenza A/B By PCR (Adult - Flu Only)   Result Value Ref Range    Influenza virus A RNA Negative Negative    Influenza virus B, PCR Negative Negative   ESTIMATED GFR   Result Value Ref Range    GFR If African American >60 >60 mL/min/1.73 m 2    GFR If Non African American >60 >60 mL/min/1.73 m 2   EKG   Result Value Ref Range    Report       [facility] Emergency Dept.    Test Date:  2020  Pt Name:    JASON SCHMITZ                Department: ER  MRN:        [id number]                      Room:        08  Gender:     Female                       Technician: [id number]  :        1970                   Requested By:ER TRIAGE PROTOCOL  Order #:    [id number]                    Reading MD: TYRONE DUQUE MD    Measurements  Intervals                                Axis  Rate:        91                           P:          81  KY:         179                          QRS:        78  QRSD:       89                           T:          71  QT:         345  QTc:        425    Interpretive Statements  Sinus rhythm  Biatrial enlargement  ST elev, probable normal early repol pattern  Compared to ECG 01/09/2020 12:37:42  ST (T wave) deviation now present  Electronically Signed On 3-3-2020 7:54:09 PST by TYRONE DUQUE MD     Discussed EKG with Dr. Gamez who agrees not acute ST elevation MI    Medications   iohexol (OMNIPAQUE) 350 mg/mL (40 mL Intravenous Given 3/3/20 0800)   iohexol (OMNIPAQUE) 350 mg/mL (100 mL Intravenous Given 3/3/20 0733)   fentaNYL (SUBLIMAZE) injection 50 mcg (50 mcg Intravenous Given 3/3/20 0745)   ondansetron (ZOFRAN) syringe/vial injection 4 mg (4 mg Intravenous Given 3/3/20 0745)   NS infusion 1,000 mL (1,000 mL Intravenous New Bag 3/3/20 0746)       HYDRATION: Based on the patient's presentation of Hypotension the patient was given IV fluids. IV Hydration was used because oral hydration was not as rapid as required. Upon recheck following hydration, the patient was Improved.    COURSE & MEDICAL DECISION MAKING  Patient presents with headache and ataxia.  She had a syncopal episode at work.  She had an abnormal EKG that was nondiagnostic for STEMI.  There was some borderline elevation with [place] depression.  Work-up was undertaken.  CT scans of the head were obtained to rule out posterior vascular occlusion and to evaluate possibility of CVA.  Patient was treated with Zofran and IV fluids as mentioned above.  Given fentanyl for her headache.    Data has thus far returned reassuring.  Her vital signs are stable.  I consulted Dr. Muller for further evaluation.  He reviewed the chart and noted multiple admissions for alcohol abuse.  This test was added.      FINAL IMPRESSION  1.  Syncope  2.  Ataxia  3.  Headache      This dictation was created using voice recognition  software. The accuracy of the dictation is limited to the abilities of the software.  The nursing notes were reviewed and certain aspects of this information were incorporated into this note.      Electronically signed by: Dallin Cai M.D., 3/3/2020 8:41 AM

## 2020-03-03 NOTE — PROGRESS NOTES
Pt arrived from MRI   Assumed care of pt. Call light in reach. Bed in lowest position. Care of plan discussed with pt with pt agreeing to care of plan. Communication board updated. All questions answered. Assessment completed.

## 2020-03-03 NOTE — ED TRIAGE NOTES
Chief Complaint   Patient presents with   • Syncope     Pt bib ems from work. Pt was found by coworkers slumped over at work. Pt had 1 syncopal episode and was brought here. Pt denies chest pain, but endorses dizziness and abd pain. FSBG 89 PTA, 4mg Zofran given PTA.  STEMI activation.   • Head Pain   • Dizziness   • Abdominal Pain

## 2020-03-03 NOTE — ASSESSMENT & PLAN NOTE
IVF hydration  Telemetry monitoring, neurochecks  Aspirin  Check lipid profile  Check MRI of brain, Echocardiogram

## 2020-03-03 NOTE — DISCHARGE PLANNING
Medical Social Work    Referral: STEMI    Intervention: Pt is a 49 year old female brought in by [facility] from work.  Pt is Rhonda Gould (: 1970).  Per [employer] pt states that her emergency contact is her son, Gavin ([phone]); however, she requested that he not be contacted at this time.    Plan: SW will follow as needed.

## 2020-03-03 NOTE — ED NOTES
Pt up to restroom ambulates well with standby assist for safety. Pt voided large unknown amount and returned to bed.

## 2020-03-03 NOTE — H&P
Hospital Medicine History & Physical Note    Date of Service  3/3/2020    Primary Care Physician  RED Patel    Consultants  None    Code Status  Full    Chief Complaint  Dizziness    History of Presenting Illness  49 y.o. female who presented 3/3/2020 with a near syncopal episode.  Patient states when she woke up this morning she was dizzy and lightheaded.  She also had an episode of nausea and vomiting.  She does history of hypertension took her blood pressure medications.  When she got to work when she was sitting on her desk her dizziness got worse and she nearly passed out.  She denies any numbness or weakness, chest pain, palpitations, shortness of breath or diaphoresis.  Her blood pressure here is noted to be the systolic .  Denies any fever chills, she did have some cough and congestion about 2 weeks ago for which she has recovered from.  She has known history of alcohol use, states that she does not drink as much now, only drinks 2 beers on the weekends.  It is noted her blood alcohol level is 0.09.    Review of Systems  Review of Systems   Constitutional: Negative for chills, diaphoresis, fever and malaise/fatigue.   HENT: Negative for hearing loss and sore throat.    Eyes: Negative for blurred vision.   Respiratory: Negative for cough, shortness of breath and wheezing.    Cardiovascular: Negative for chest pain, palpitations and leg swelling.   Gastrointestinal: Positive for nausea and vomiting. Negative for abdominal pain, diarrhea and heartburn.   Genitourinary: Negative for dysuria, flank pain and hematuria.   Musculoskeletal: Negative for back pain and neck pain.   Skin: Negative for rash.   Neurological: Positive for dizziness and weakness. Negative for tremors, sensory change, speech change, focal weakness and headaches.   Psychiatric/Behavioral: The patient is not nervous/anxious.        Past Medical History   has a past medical history of Chronic obstructive pulmonary  disease (HCC) and Hypertension.    Surgical History   has a past surgical history that includes hysterectomy laparoscopy (6/2005).     Family History  family history includes Cancer in her maternal grandfather; Diabetes in her father and mother; Heart Disease in her maternal grandmother; Hypertension in her father and mother.     Social History   reports that she has been smoking cigarettes. She has a 7.50 pack-year smoking history. She has never used smokeless tobacco. She reports current alcohol use. She reports current drug use. Frequency: 2.00 times per week. Drugs: Inhaled and Marijuana.    Allergies  Allergies   Allergen Reactions   • Codeine Hives and Itching     Tolerated morphine previously  Allergy updated from 2015, morphine given in 2016   • Shellfish Allergy Anaphylaxis   • Codeine Hives, Itching and Swelling     Throat swells closed  RXN=>10 years ago   • Codeine    • Fish Allergy Shortness of Breath     Swelling to throat.       Medications  Prior to Admission Medications   Prescriptions Last Dose Informant Patient Reported? Taking?   NIFEdipine SR (PROCARDIA-XL) 30 MG tablet 3/3/2020 at AM Patient Yes No   Sig: Take 30 mg by mouth every day.   albuterol 108 (90 Base) MCG/ACT Aero Soln inhalation aerosol > 2 days Patient Yes No   Sig: Inhale 2 Puffs by mouth every 6 hours as needed for Shortness of Breath.   folic acid (FOLVITE) 1 MG Tab 3/3/2020 at AM Patient Yes Yes   Sig: Take 1 mg by mouth every day.   lisinopril (PRINIVIL) 20 MG Tab 3/3/2020 at AM Patient Yes No   Sig: Take 20 mg by mouth every day.   traZODone (DESYREL) 50 MG Tab > 2 days Patient Yes No   Sig: Take 50 mg by mouth at bedtime as needed.      Facility-Administered Medications: None       Physical Exam  Temp:  [36.3 °C (97.4 °F)-36.7 °C (98.1 °F)] 36.3 °C (97.4 °F)  Pulse:  [] 74  Resp:  [13-26] 16  BP: ()/(69-90) 104/72  SpO2:  [96 %-100 %] 99 %    Physical Exam  HENT:      Head: Normocephalic and atraumatic.      Nose:  No congestion.      Mouth/Throat:      Mouth: Mucous membranes are dry.   Eyes:      Conjunctiva/sclera: Conjunctivae normal.      Pupils: Pupils are equal, round, and reactive to light.   Neck:      Musculoskeletal: No muscular tenderness.   Cardiovascular:      Rate and Rhythm: Normal rate and regular rhythm.   Pulmonary:      Effort: Pulmonary effort is normal.      Breath sounds: Normal breath sounds.   Abdominal:      General: Bowel sounds are normal. There is no distension.      Palpations: Abdomen is soft.      Tenderness: There is no abdominal tenderness. There is no guarding or rebound.   Musculoskeletal:      Right lower leg: No edema.      Left lower leg: No edema.   Lymphadenopathy:      Cervical: No cervical adenopathy.   Skin:     General: Skin is warm and dry.   Neurological:      General: No focal deficit present.      Mental Status: She is alert and oriented to person, place, and time.      Cranial Nerves: No cranial nerve deficit.         Laboratory:  Recent Labs     03/03/20  0645   WBC 5.8   RBC 3.62*   HEMOGLOBIN 11.2*   HEMATOCRIT 34.6*   MCV 95.6   MCH 30.9   MCHC 32.4*   RDW 51.3*   PLATELETCT 463*   MPV 8.7*     Recent Labs     03/03/20  0645   SODIUM 143   POTASSIUM 4.3   CHLORIDE 108   CO2 18*   GLUCOSE 77   BUN 16   CREATININE 0.81   CALCIUM 9.5     Recent Labs     03/03/20  0645   ALTSGPT 7   ASTSGOT 18   ALKPHOSPHAT 54   TBILIRUBIN 0.4   GLUCOSE 77     Recent Labs     03/03/20  0645   APTT 31.7   INR 0.95     No results for input(s): NTPROBNP in the last 72 hours.      Recent Labs     03/03/20  0645   TROPONINT 7       Urinalysis:    No results found     Imaging:  CT-CTA NECK WITH & W/O-POST PROCESSING   Final Result      CT angiogram of the neck within normal limits.      CT-CTA HEAD WITH & W/O-POST PROCESS   Final Result      CT angiogram of the [place] within normal limits.      CT-CEREBRAL PERFUSION ANALYSIS   Final Result      1.  Cerebral blood flow less than 30% likely  representing completed infarct = 0 mL.      2.  T Max more than 6 seconds likely representing combination of completed infarct and ischemia = 0 mL.      3.  Mismatched volume likely representing ischemic brain/penumbra = None      4.  Please note that the cerebral perfusion was performed on the limited brain tissue around the basal ganglia region. Infarct/ischemia outside the CT perfusion sections can be missed in this study.      CT-HEAD W/O   Final Result      Normal CT scan of the head without contrast.               INTERPRETING LOCATION:  [address], [place], [address]      DX-CHEST-PORTABLE (1 VIEW)   Final Result      No acute cardiopulmonary abnormality.      MR-BRAIN-W/O    (Results Pending)   EC-ECHOCARDIOGRAM COMPLETE W/O CONT    (Results Pending)         Assessment/Plan:  I anticipate this patient is appropriate for observation status at this time.    Near syncope- (present on admission)  Assessment & Plan  IVF hydration  Telemetry monitoring, neurochecks  Aspirin  Check lipid profile  Check MRI of brain, Echocardiogram    COPD (chronic obstructive pulmonary disease) (HCC)- (present on admission)  Assessment & Plan  RT protocol    Alcoholism (HCC)- (present on admission)  Assessment & Plan  Check B12, folate, TSH  Monitor for withdrawal symptoms    Essential hypertension- (present on admission)  Assessment & Plan  Hold lisinopril and nifedipine    Tobacco abuse- (present on admission)  Assessment & Plan  Nicotine replacement protocol        VTE prophylaxis: Lovenox

## 2020-03-03 NOTE — THERAPY
"Physical Therapy Evaluation completed.   Bed Mobility:  Supine to Sit: Supervised  Transfers: Sit to Stand: Supervised  Gait: Level Of Assist: Supervised with No Equipment Needed       Plan of Care: Patient with no further skilled PT needs in the acute care setting at this time  Discharge Recommendations: Equipment: No Equipment Needed. Post-acute therapy Currently anticipate no further skilled therapy needs once patient is discharged from the inpatient setting.    Pt is a 49 year old female admitted to the hospital for syncopal episode at work. Pt reports feeling generally unwell this am and was dizzy just prior to syncopal episode. Prior to mobilizing pt, complete orthostatics. BP in supine 96/66, sitting 110/65 and standing 106/71. Pt did report being light headed with positional changes. Pt ambulated to restroom and back to room without use of AD, SPV. Pt with steady gait and no overt LOB or gait deviations. Pt did report increased light headedness with prolonged duration OOB. Closer guarding provided on the way back to room given symptoms. Pt's LE strength and sensation intact. At this time, pt does not demonstrate the need for ongoing PT intervention while in the acute care setting. Pt educated on being aware of symptoms of dizziness/lightheadedness with mobility to prevent fall. Anticipate that the patient will have no further physical therapy needs after discharge from the hospital.      See \"Rehab Therapy-Acute\" Patient Summary Report for complete documentation.     "

## 2020-03-04 ENCOUNTER — PATIENT OUTREACH (OUTPATIENT)
Dept: HEALTH INFORMATION MANAGEMENT | Facility: OTHER | Age: 50
End: 2020-03-04

## 2020-03-04 VITALS
BODY MASS INDEX: 16.67 KG/M2 | TEMPERATURE: 97.7 F | OXYGEN SATURATION: 98 % | WEIGHT: 110.01 LBS | DIASTOLIC BLOOD PRESSURE: 86 MMHG | RESPIRATION RATE: 16 BRPM | SYSTOLIC BLOOD PRESSURE: 124 MMHG | HEART RATE: 74 BPM | HEIGHT: 68 IN

## 2020-03-04 PROBLEM — R55 NEAR SYNCOPE: Status: RESOLVED | Noted: 2020-03-03 | Resolved: 2020-03-04

## 2020-03-04 LAB
ALBUMIN SERPL BCP-MCNC: 3.5 G/DL (ref 3.2–4.9)
ALBUMIN/GLOB SERPL: 1.6 G/DL
ALP SERPL-CCNC: 44 U/L (ref 30–99)
ALT SERPL-CCNC: 5 U/L (ref 2–50)
ANION GAP SERPL CALC-SCNC: 10 MMOL/L (ref 0–11.9)
AST SERPL-CCNC: 15 U/L (ref 12–45)
BASOPHILS # BLD AUTO: 0.3 % (ref 0–1.8)
BASOPHILS # BLD: 0.01 K/UL (ref 0–0.12)
BILIRUB SERPL-MCNC: 0.6 MG/DL (ref 0.1–1.5)
BUN SERPL-MCNC: 11 MG/DL (ref 8–22)
CALCIUM SERPL-MCNC: 8 MG/DL (ref 8.5–10.5)
CHLORIDE SERPL-SCNC: 110 MMOL/L (ref 96–112)
CHOLEST SERPL-MCNC: 131 MG/DL (ref 100–199)
CO2 SERPL-SCNC: 20 MMOL/L (ref 20–33)
CREAT SERPL-MCNC: 0.79 MG/DL (ref 0.5–1.4)
EOSINOPHIL # BLD AUTO: 0.07 K/UL (ref 0–0.51)
EOSINOPHIL NFR BLD: 2.3 % (ref 0–6.9)
ERYTHROCYTE [DISTWIDTH] IN BLOOD BY AUTOMATED COUNT: 53.9 FL (ref 35.9–50)
GLOBULIN SER CALC-MCNC: 2.2 G/DL (ref 1.9–3.5)
GLUCOSE SERPL-MCNC: 81 MG/DL (ref 65–99)
HCT VFR BLD AUTO: 32.3 % (ref 37–47)
HDLC SERPL-MCNC: 70 MG/DL
HGB BLD-MCNC: 10.6 G/DL (ref 12–16)
IMM GRANULOCYTES # BLD AUTO: 0.01 K/UL (ref 0–0.11)
IMM GRANULOCYTES NFR BLD AUTO: 0.3 % (ref 0–0.9)
LDLC SERPL CALC-MCNC: 38 MG/DL
LYMPHOCYTES # BLD AUTO: 1.62 K/UL (ref 1–4.8)
LYMPHOCYTES NFR BLD: 52.6 % (ref 22–41)
MCH RBC QN AUTO: 31.8 PG (ref 27–33)
MCHC RBC AUTO-ENTMCNC: 32.8 G/DL (ref 33.6–35)
MCV RBC AUTO: 97 FL (ref 81.4–97.8)
MONOCYTES # BLD AUTO: 0.27 K/UL (ref 0–0.85)
MONOCYTES NFR BLD AUTO: 8.8 % (ref 0–13.4)
NEUTROPHILS # BLD AUTO: 1.1 K/UL (ref 2–7.15)
NEUTROPHILS NFR BLD: 35.7 % (ref 44–72)
NRBC # BLD AUTO: 0 K/UL
NRBC BLD-RTO: 0 /100 WBC
PLATELET # BLD AUTO: 363 K/UL (ref 164–446)
PMV BLD AUTO: 8.6 FL (ref 9–12.9)
POTASSIUM SERPL-SCNC: 3.9 MMOL/L (ref 3.6–5.5)
PROT SERPL-MCNC: 5.7 G/DL (ref 6–8.2)
RBC # BLD AUTO: 3.33 M/UL (ref 4.2–5.4)
SODIUM SERPL-SCNC: 140 MMOL/L (ref 135–145)
TRIGL SERPL-MCNC: 113 MG/DL (ref 0–149)
WBC # BLD AUTO: 3.1 K/UL (ref 4.8–10.8)

## 2020-03-04 PROCEDURE — 85025 COMPLETE CBC W/AUTO DIFF WBC: CPT

## 2020-03-04 PROCEDURE — 80061 LIPID PANEL: CPT

## 2020-03-04 PROCEDURE — 700102 HCHG RX REV CODE 250 W/ 637 OVERRIDE(OP): Performed by: FAMILY MEDICINE

## 2020-03-04 PROCEDURE — A9270 NON-COVERED ITEM OR SERVICE: HCPCS | Performed by: FAMILY MEDICINE

## 2020-03-04 PROCEDURE — 99217 PR OBSERVATION CARE DISCHARGE: CPT | Performed by: FAMILY MEDICINE

## 2020-03-04 PROCEDURE — 80053 COMPREHEN METABOLIC PANEL: CPT

## 2020-03-04 PROCEDURE — G0378 HOSPITAL OBSERVATION PER HR: HCPCS

## 2020-03-04 PROCEDURE — 36415 COLL VENOUS BLD VENIPUNCTURE: CPT

## 2020-03-04 RX ADMIN — ASPIRIN 325 MG: 325 TABLET, FILM COATED ORAL at 05:00

## 2020-03-04 RX ADMIN — FOLIC ACID 1 MG: 1 TABLET ORAL at 05:00

## 2020-03-04 NOTE — DISCHARGE PLANNING
Patient is eligible for Medicaid Meds to Beds at discharge if they have coverage with [place] Medicaid, Medicaid FFS, Medicaid HMO ([facility]), or [place]. This service is provided through the [facility] if orders are received prior to 4 p.m. Monday through Friday, excluding holidays. Please call [phone] prior to discharge.

## 2020-03-04 NOTE — DISCHARGE INSTRUCTIONS
FOLLOW UP ITEMS POST DISCHARGE  Please call [phone] to schedule PCP appointment for patient.    Required specialty appointments include:         Discharge Instructions per RED Morrow  -Follow up with PCP  -RESUME most of in home medication EXCEPT for Procardia-XL      DIET: cardiac     ACTIVITY: as tolerated     DIAGNOSIS: syncope                 Return to ER if symptoms worsen, chest pain, palpitations, shortness of breath, numbness, tingling and weaknes    Discharge Instructions    Discharged to home by car with self. Discharged via wheelchair, hospital escort: Yes.  Special equipment needed: Not Applicable    Be sure to schedule a follow-up appointment with your primary care doctor or any specialists as instructed.     Discharge Plan:   Diet Plan: Discussed  Activity Level: Discussed  Smoking Cessation Offered: Patient Counseled  Confirmed Follow up Appointment: Appointment Scheduled  Confirmed Symptoms Management: Discussed  Medication Reconciliation Updated: Yes  Influenza Vaccine Indication: Not indicated: Previously immunized this influenza season and > 8 years of age    I understand that a diet low in cholesterol, fat, and sodium is recommended for good health. Unless I have been given specific instructions below for another diet, I accept this instruction as my diet prescription.   Other diet: heart healthy diet    Special Instructions: None    · Is patient discharged on Warfarin / Coumadin?   No     Depression / Suicide Risk    As you are discharged from this [facility] facility, it is important to learn how to keep safe from harming yourself.    Recognize the warning signs:  · Abrupt changes in personality, positive or negative- including increase in energy   · Giving away possessions  · Change in eating patterns- significant weight changes-  positive or negative  · Change in sleeping patterns- unable to sleep or sleeping all the time   · Unwillingness or inability to  communicate  · Depression  · Unusual sadness, discouragement and loneliness  · Talk of wanting to die  · Neglect of personal appearance   · Rebelliousness- reckless behavior  · Withdrawal from people/activities they love  · Confusion- inability to concentrate     If you or a loved one observes any of these behaviors or has concerns about self-harm, here's what you can do:  · Talk about it- your feelings and reasons for harming yourself  · Remove any means that you might use to hurt yourself (examples: pills, rope, extension cords, firearm)  · Get professional help from the community (Mental Health, Substance Abuse, psychological counseling)  · Do not be alone:Call your Safe Contact- someone whom you trust who will be there for you.  · Call your local CRISIS HOTLINE 796-7159 or 883-449-0322  · Call your local Children's Mobile Crisis Response Team [place] (115) 508-6990 or www.[employer]  · Call the toll free National Suicide Prevention Hotlines   · National Suicide Prevention Lifeline 093-180-CJPI (3956)  · National Hope Line Network 800-SUICIDE (737-7625)

## 2020-03-04 NOTE — PROGRESS NOTES
IV removed. Discharge instructions provided and patient verbalizes understanding. Patient states that all question have been answered. Copy of discharge provided to patient and signed. Patient states that all personal items are in possess. Patient awaiting transport.

## 2020-03-04 NOTE — PROGRESS NOTES
Pt a&o x4. On RA. Respirations equal and unlabored. Complaints of headache earlier today. Fioricet given with good result.  Ambulatory with steady gait. Repositions self in bed.  Good po intake without any s/sx of aspiration noted. LO. NIH score 0. Plan of care reviewed including: OT eval in am, labs, vitals frequency and fall precautions. Verbalized understanding and agrees. White board updated. Instructed to use call light prior to getting oob to prevent falls. Able to make needs known.  Call light within reach.

## 2020-03-04 NOTE — DISCHARGE SUMMARY
"Discharge Summary    CHIEF COMPLAINT ON ADMISSION  Chief Complaint   Patient presents with   • Syncope     Pt bib ems from work. Pt was found by coworkers slumped over at work. Pt had 1 syncopal episode and was brought here. Pt denies chest pain, but endorses dizziness and abd pain. FSBG 89 PTA, 4mg Zofran given PTA.  STEMI activation.   • Head Pain   • Dizziness   • Abdominal Pain       Reason for Admission  EMS     Admission Date  3/3/2020    CODE STATUS  Full Code    HPI & HOSPITAL COURSE  Ms. Gould is a 49 y.o. female who presented 3/3/2020 with a near syncopal episode.  Patient states when she woke up this morning she was dizzy and lightheaded.  She also had an episode of nausea and vomiting.  She does history of hypertension took her blood pressure medications.  When she got to work when she was sitting on her desk her dizziness got worse and she nearly passed out.  She denies any numbness or weakness, chest pain, palpitations, shortness of breath or diaphoresis.  Her blood pressure here is noted to be the systolic .  Denies any fever chills, she did have some cough and congestion about 2 weeks ago for which she has recovered from.  She has known history of alcohol use, states that she does not drink as much now, only drinks 2 beers on the weekends.  It is noted her blood alcohol level is 0.09. Patient placed in the observation unit for further evaluation and treatment.    While in the unit, patient given fluids. Orthostatic test were performed by PT/OT and was NEGATIVE. An echocardiogram was also obtained with report of left ventricular ejection fraction visually estimated at 65%. An MRI of the brain also obtained with impression of no acute intracranial abnormality or change from prior study.         Patient seen and examined prior to being d/c.  Patient reports \"feeling better\" and had no overnight episodes. Patient given education in regards to alcohol use, along with use of anti hypertensive " medication. Patient instructed to HOLD and STOP taking Procardia. Patient also encouraged to follow up with PCP for BP management. All questions and concerns answered prior to being d/c. Patient d/c home.         Therefore, she is discharged in good and stable condition to home with close outpatient follow-up.    The patient recovered much more quickly than anticipated on admission.    Discharge Date  03/04/20    FOLLOW UP ITEMS POST DISCHARGE  Please call [phone] to schedule PCP appointment for patient.    Required specialty appointments include:       Discharge Instructions per JONO MorrowREFRAÍN  -Follow up with PCP  -RESUME most of in home medication EXCEPT for Procardia-XL     DIET: cardiac    ACTIVITY: as tolerated    DIAGNOSIS: syncope     Return to ER if symptoms worsen, chest pain, palpitations, shortness of breath, numbness, tingling and weakness      DISCHARGE DIAGNOSES  Active Problems:    Essential hypertension POA: Yes      Overview: Hypertension since 2010.    Alcoholism (HCC) POA: Yes    COPD (chronic obstructive pulmonary disease) (HCC) POA: Yes    Tobacco abuse POA: Yes  Resolved Problems:    Near syncope POA: Yes      FOLLOW UP  No future appointments.  JONO PatelREFRAÍN  [address] [place] [address] [id number]  [address]  [place] [address]  [phone]    Schedule an appointment as soon as possible for a visit  Please call to schedule a hospital follow up with your provider. Thank you!       MEDICATIONS ON DISCHARGE     Medication List      CONTINUE taking these medications      Instructions   albuterol 108 (90 Base) MCG/ACT Aers inhalation aerosol   Inhale 2 Puffs by mouth every 6 hours as needed for Shortness of Breath.  Dose:  2 Puff     folic acid 1 MG Tabs  Commonly known as:  FOLVITE   Take 1 mg by mouth every day.  Dose:  1 mg     lisinopril 20 MG Tabs  Commonly known as:  PRINIVIL   Take 20 mg by mouth every day.  Dose:  20 mg     traZODone 50 MG Tabs  Commonly known as:  DESYREL   Take  50 mg by mouth at bedtime as needed.  Dose:  50 mg        STOP taking these medications    NIFEdipine SR 30 MG tablet  Commonly known as:  PROCARDIA-XL            Allergies  Allergies   Allergen Reactions   • Codeine Hives and Itching     Tolerated morphine previously  Allergy updated from 2015, morphine given in 2016   • Shellfish Allergy Anaphylaxis   • Codeine Hives, Itching and Swelling     Throat swells closed  RXN=>10 years ago   • Codeine    • Fish Allergy Shortness of Breath     Swelling to throat.       DIET  Orders Placed This Encounter   Procedures   • Diet Order Regular     Standing Status:   Standing     Number of Occurrences:   1     Order Specific Question:   Diet:     Answer:   Regular [1]       ACTIVITY  As tolerated.  Weight bearing as tolerated    CONSULTATIONS  NONE    PROCEDURES  NONE    IMAGING  EC-ECHOCARDIOGRAM COMPLETE W/O CONT   Final Result      MR-BRAIN-W/O   Final Result      No acute intracranial abnormality or change from prior study.      CT-CTA NECK WITH & W/O-POST PROCESSING   Final Result      CT angiogram of the neck within normal limits.      CT-CTA HEAD WITH & W/O-POST PROCESS   Final Result      CT angiogram of the [place] within normal limits.      CT-CEREBRAL PERFUSION ANALYSIS   Final Result      1.  Cerebral blood flow less than 30% likely representing completed infarct = 0 mL.      2.  T Max more than 6 seconds likely representing combination of completed infarct and ischemia = 0 mL.      3.  Mismatched volume likely representing ischemic brain/penumbra = None      4.  Please note that the cerebral perfusion was performed on the limited brain tissue around the basal ganglia region. Infarct/ischemia outside the CT perfusion sections can be missed in this study.      CT-HEAD W/O   Final Result      Normal CT scan of the head without contrast.               INTERPRETING LOCATION:  [address] [place] [address], [id number]      DX-CHEST-PORTABLE (1 VIEW)   Final Result      No  acute cardiopulmonary abnormality.            LABORATORY  Lab Results   Component Value Date    SODIUM 140 03/04/2020    POTASSIUM 3.9 03/04/2020    CHLORIDE 110 03/04/2020    CO2 20 03/04/2020    GLUCOSE 81 03/04/2020    BUN 11 03/04/2020    CREATININE 0.79 03/04/2020        Lab Results   Component Value Date    WBC 3.1 (L) 03/04/2020    HEMOGLOBIN 10.6 (L) 03/04/2020    HEMATOCRIT 32.3 (L) 03/04/2020    PLATELETCT 363 03/04/2020        Total time of the discharge process exceeds 36 minutes.  ------------------------------------------------------------------------------------------------------------------------------------------------------------------------------------------------------------------------------------------  Please note that this dictation was created using voice recognition software. I have made every reasonable attempt to correct obvious errors, but there may be errors of grammar and possibly content that I did not discover before finalizing the note.    Electronically signed by:  MCKAYLA Schaeffer, MSN, APRN, FNP-C  Hospitalist Services  [facility]  [phone]  [email or web address]  03/04/20    [id number]

## 2020-06-07 ENCOUNTER — HOSPITAL ENCOUNTER (EMERGENCY)
Facility: MEDICAL CENTER | Age: 50
End: 2020-06-07
Attending: EMERGENCY MEDICINE
Payer: MEDICAID

## 2020-06-07 VITALS
BODY MASS INDEX: 16.37 KG/M2 | HEART RATE: 90 BPM | TEMPERATURE: 97.9 F | DIASTOLIC BLOOD PRESSURE: 63 MMHG | OXYGEN SATURATION: 96 % | SYSTOLIC BLOOD PRESSURE: 92 MMHG | RESPIRATION RATE: 20 BRPM | WEIGHT: 108 LBS | HEIGHT: 68 IN

## 2020-06-07 DIAGNOSIS — G43.809 OTHER MIGRAINE WITHOUT STATUS MIGRAINOSUS, NOT INTRACTABLE: ICD-10-CM

## 2020-06-07 LAB — BLOOD CULTURE HOLD CXBCH: NORMAL

## 2020-06-07 PROCEDURE — 96375 TX/PRO/DX INJ NEW DRUG ADDON: CPT

## 2020-06-07 PROCEDURE — 99284 EMERGENCY DEPT VISIT MOD MDM: CPT

## 2020-06-07 PROCEDURE — 700111 HCHG RX REV CODE 636 W/ 250 OVERRIDE (IP): Performed by: EMERGENCY MEDICINE

## 2020-06-07 PROCEDURE — 96374 THER/PROPH/DIAG INJ IV PUSH: CPT

## 2020-06-07 PROCEDURE — 700105 HCHG RX REV CODE 258: Performed by: EMERGENCY MEDICINE

## 2020-06-07 RX ORDER — DIPHENHYDRAMINE HYDROCHLORIDE 50 MG/ML
25 INJECTION INTRAMUSCULAR; INTRAVENOUS ONCE
Status: COMPLETED | OUTPATIENT
Start: 2020-06-07 | End: 2020-06-07

## 2020-06-07 RX ORDER — SUMATRIPTAN 50 MG/1
50 TABLET, FILM COATED ORAL
Qty: 10 TAB | Refills: 3 | Status: SHIPPED | OUTPATIENT
Start: 2020-06-07 | End: 2022-05-23

## 2020-06-07 RX ORDER — METOCLOPRAMIDE HYDROCHLORIDE 5 MG/ML
10 INJECTION INTRAMUSCULAR; INTRAVENOUS ONCE
Status: COMPLETED | OUTPATIENT
Start: 2020-06-07 | End: 2020-06-07

## 2020-06-07 RX ORDER — LORAZEPAM 2 MG/ML
1 INJECTION INTRAMUSCULAR ONCE
Status: COMPLETED | OUTPATIENT
Start: 2020-06-07 | End: 2020-06-07

## 2020-06-07 RX ORDER — KETOROLAC TROMETHAMINE 30 MG/ML
15 INJECTION, SOLUTION INTRAMUSCULAR; INTRAVENOUS ONCE
Status: COMPLETED | OUTPATIENT
Start: 2020-06-07 | End: 2020-06-07

## 2020-06-07 RX ORDER — SODIUM CHLORIDE 9 MG/ML
1000 INJECTION, SOLUTION INTRAVENOUS ONCE
Status: COMPLETED | OUTPATIENT
Start: 2020-06-07 | End: 2020-06-07

## 2020-06-07 RX ADMIN — METOCLOPRAMIDE 10 MG: 5 INJECTION, SOLUTION INTRAMUSCULAR; INTRAVENOUS at 18:21

## 2020-06-07 RX ADMIN — KETOROLAC TROMETHAMINE 15 MG: 30 INJECTION, SOLUTION INTRAMUSCULAR at 18:22

## 2020-06-07 RX ADMIN — LORAZEPAM 1 MG: 2 INJECTION INTRAMUSCULAR; INTRAVENOUS at 18:21

## 2020-06-07 RX ADMIN — SODIUM CHLORIDE 1000 ML: 9 INJECTION, SOLUTION INTRAVENOUS at 18:27

## 2020-06-07 RX ADMIN — DIPHENHYDRAMINE HYDROCHLORIDE 25 MG: 50 INJECTION INTRAMUSCULAR; INTRAVENOUS at 18:21

## 2020-06-07 ASSESSMENT — FIBROSIS 4 INDEX: FIB4 SCORE: 0.94

## 2020-06-08 NOTE — ED TRIAGE NOTES
Pt bib ems for headache that began last night. Today pt had chills, nausea, and vomititng. Headache 10/10 pain, denies sob, chest pain, or abdominal pain. Bgl 187 for ems. Cough started today.

## 2020-06-08 NOTE — ED PROVIDER NOTES
"ED Provider Note    CHIEF COMPLAINT  Chief Complaint   Patient presents with   • Headache       HPI  Rhonda Gould is a 49 y.o. female who presents with cc of headache.  Patient reports a longstanding history of headaches.  She has been seen in the ER for similar issues.  She reports this headache began last night.  She reports that it began gradually and progressively worsened throughout the night.  She reports it is bitemporal and pulsatile.  She denies any associated neck pain or neck stiffness.  She denies any associated focal weakness or numbness but does report some diffuse body weakness.  She reports that she felt very cold last night but did not have a discrete fever, she denies any associated sore throat, abdominal pain, dysuria urgency or frequency, or night sweats.  Patient denies any associated rash.  She does report associated nausea and vomiting.  She reports that the emesis is clear, she denies any bilious or bloody emesis.  She denies any associated changes in her bowel movements.  She reports that is normal for her to have associated nausea with her headaches.  I asked her if this headache is different at all from prior severe headaches that have brought her to the emergency department and she reports that \"no it is the same.\"  Of note patient has been seen multiple times for headache in the past.  She had a CTA of her head and neck on March of this year, patient CTA failed to reveal any evidence of aneurysm or vascular abnormality.  Patient reports a longstanding history of alcohol abuse but has not drank in the last 3 days.    REVIEW OF SYSTEMS  ROS    See HPI for further details. All other systems are negative.     PAST MEDICAL HISTORY   has a past medical history of Chronic obstructive pulmonary disease (HCC) and Hypertension.    SOCIAL HISTORY  Social History     Tobacco Use   • Smoking status: Current Every Day Smoker     Packs/day: 0.25     Years: 30.00     Pack years: 7.50     Types: " Cigarettes   • Smokeless tobacco: Never Used   Substance and Sexual Activity   • Alcohol use: Yes     Frequency: 2-3 times a week     Comment: socially couple times per week   • Drug use: Yes     Frequency: 2.0 times per week     Types: Inhaled, Marijuana     Comment: marijuana   • Sexual activity: Not Currently       SURGICAL HISTORY   has a past surgical history that includes hysterectomy laparoscopy (6/2005).    CURRENT MEDICATIONS  Home Medications     Reviewed by Zev Walker R.N. (Registered Nurse) on 06/07/20 at 1742  Med List Status: <None>   Medication Last Dose Status   albuterol 108 (90 Base) MCG/ACT Aero Soln inhalation aerosol  Active   folic acid (FOLVITE) 1 MG Tab  Active   lisinopril (PRINIVIL) 20 MG Tab  Active   traZODone (DESYREL) 50 MG Tab  Active                ALLERGIES  Allergies   Allergen Reactions   • Codeine Hives and Itching     Tolerated morphine previously  Allergy updated from 2015, morphine given in 2016   • Shellfish Allergy Anaphylaxis   • Codeine Hives, Itching and Swelling     Throat swells closed  RXN=>10 years ago   • Codeine    • Fish Allergy Shortness of Breath     Swelling to throat.       PHYSICAL EXAM  Physical Exam   Constitutional: She is oriented to person, place, and time. She appears well-developed and well-nourished.   HENT:   Head: Normocephalic and atraumatic.   Eyes: Conjunctivae are normal.   Neck: Normal range of motion. Neck supple.   Cardiovascular: Normal rate and regular rhythm.   Pulmonary/Chest: Effort normal and breath sounds normal.   Abdominal: Soft. Bowel sounds are normal. She exhibits no distension. There is no abdominal tenderness. There is no rebound.   Neurological: She is alert and oriented to person, place, and time.   Distal and proximal strength 5/5 in upper and lower extremities. Normal gait. No dysmetria. No sensation deficits. No visual field deficits. Cranial nerves intact.   Mild diffuse high-frequency low amplitude tremor   Skin: Skin  is warm and dry. No rash noted.   Psychiatric: She has a normal mood and affect. Her behavior is normal.         DIAGNOSTIC STUDIES / PROCEDURES        COURSE & MEDICAL DECISION MAKING  Pertinent Labs & Imaging studies reviewed. (See chart for details)    Very pleasant patient here with symptoms consistent with migraine headache.  Patient is very well appearing.  She has very reassuring neurologic exam.  She does have a mild tremor and some mild tachycardia which would appear to be most consistent with mild alcohol withdrawal given patient's longstanding history of alcohol abuse.  This may be the cause of etiology of patient's migraine as I believe she is likely dehydrated.  She will receive IV fluids for this.  We will give Toradol and Reglan as well as Benadryl and give Ativan for patient's mild withdrawal.  Patient with recent imaging which failed to reveal any evidence of aneurysm, this in conjunction with no neck pain or neck stiffness, gradual onset of headache, no fever here in the emergency department,  believe that subarachnoid hemorrhage is highly unlikely as well as meningitis.  Patient with identical headache to prior, I believe that cavernous venous thrombosis is unlikely.  Following migraine cocktail patient symptoms have resolved.  Patient reports that she will continue drinking alcohol and she is not interested in quitting at this point, therefore will defer Librium.  Patient appears very well.  Repeat neurologic exam is unremarkable.  Patient observed in the emergency department to ensure no recurrence  Patient remains pain-free  Return precautions discussed in depth.  The patient will return for worsening symptoms and is stable at the time of discharge. The patient verbalizes understanding and will comply.    FINAL IMPRESSION    1. Other migraine without status migrainosus, not intractable            Electronically signed by: Thai Martins M.D., 6/7/2020 6:01 PM

## 2021-06-22 ENCOUNTER — APPOINTMENT (OUTPATIENT)
Dept: RADIOLOGY | Facility: MEDICAL CENTER | Age: 51
DRG: 189 | End: 2021-06-22
Attending: EMERGENCY MEDICINE
Payer: MEDICAID

## 2021-06-22 ENCOUNTER — HOSPITAL ENCOUNTER (INPATIENT)
Facility: MEDICAL CENTER | Age: 51
LOS: 2 days | DRG: 189 | End: 2021-06-24
Attending: EMERGENCY MEDICINE | Admitting: HOSPITALIST
Payer: MEDICAID

## 2021-06-22 DIAGNOSIS — J45.901 REACTIVE AIRWAY DISEASE WITH ACUTE EXACERBATION, UNSPECIFIED ASTHMA SEVERITY, UNSPECIFIED WHETHER PERSISTENT: ICD-10-CM

## 2021-06-22 DIAGNOSIS — J44.9 CHRONIC OBSTRUCTIVE PULMONARY DISEASE, UNSPECIFIED COPD TYPE (HCC): ICD-10-CM

## 2021-06-22 DIAGNOSIS — J44.1 ACUTE EXACERBATION OF CHRONIC OBSTRUCTIVE PULMONARY DISEASE (COPD) (HCC): ICD-10-CM

## 2021-06-22 DIAGNOSIS — J96.01 ACUTE RESPIRATORY FAILURE WITH HYPOXIA (HCC): ICD-10-CM

## 2021-06-22 PROBLEM — J45.909 REACTIVE AIRWAY DISEASE: Status: ACTIVE | Noted: 2020-03-03

## 2021-06-22 PROBLEM — F17.200 SMOKING: Status: ACTIVE | Noted: 2019-12-11

## 2021-06-22 PROBLEM — J96.90 RESPIRATORY FAILURE (HCC): Status: ACTIVE | Noted: 2021-06-22

## 2021-06-22 LAB
ALBUMIN SERPL BCP-MCNC: 4.3 G/DL (ref 3.2–4.9)
ALBUMIN/GLOB SERPL: 1.4 G/DL
ALP SERPL-CCNC: 76 U/L (ref 30–99)
ALT SERPL-CCNC: 6 U/L (ref 2–50)
ANION GAP SERPL CALC-SCNC: 13 MMOL/L (ref 7–16)
ANION GAP SERPL CALC-SCNC: 18 MMOL/L (ref 7–16)
APPEARANCE UR: CLEAR
AST SERPL-CCNC: 11 U/L (ref 12–45)
BASOPHILS # BLD AUTO: 0.2 % (ref 0–1.8)
BASOPHILS # BLD: 0.01 K/UL (ref 0–0.12)
BILIRUB SERPL-MCNC: <0.2 MG/DL (ref 0.1–1.5)
BILIRUB UR QL STRIP.AUTO: NEGATIVE
BUN SERPL-MCNC: 11 MG/DL (ref 8–22)
BUN SERPL-MCNC: 12 MG/DL (ref 8–22)
CALCIUM SERPL-MCNC: 9.6 MG/DL (ref 8.5–10.5)
CALCIUM SERPL-MCNC: 9.9 MG/DL (ref 8.5–10.5)
CHLORIDE SERPL-SCNC: 100 MMOL/L (ref 96–112)
CHLORIDE SERPL-SCNC: 104 MMOL/L (ref 96–112)
CO2 SERPL-SCNC: 18 MMOL/L (ref 20–33)
CO2 SERPL-SCNC: 19 MMOL/L (ref 20–33)
COLOR UR: YELLOW
CREAT SERPL-MCNC: 0.83 MG/DL (ref 0.5–1.4)
CREAT SERPL-MCNC: 0.87 MG/DL (ref 0.5–1.4)
EKG IMPRESSION: NORMAL
EKG IMPRESSION: NORMAL
EOSINOPHIL # BLD AUTO: 0.22 K/UL (ref 0–0.51)
EOSINOPHIL NFR BLD: 5.3 % (ref 0–6.9)
ERYTHROCYTE [DISTWIDTH] IN BLOOD BY AUTOMATED COUNT: 50.6 FL (ref 35.9–50)
FERRITIN SERPL-MCNC: 101 NG/ML (ref 10–291)
FLUAV RNA SPEC QL NAA+PROBE: NEGATIVE
FLUBV RNA SPEC QL NAA+PROBE: NEGATIVE
GLOBULIN SER CALC-MCNC: 3 G/DL (ref 1.9–3.5)
GLUCOSE SERPL-MCNC: 215 MG/DL (ref 65–99)
GLUCOSE SERPL-MCNC: 78 MG/DL (ref 65–99)
GLUCOSE UR STRIP.AUTO-MCNC: NEGATIVE MG/DL
HCT VFR BLD AUTO: 37.3 % (ref 37–47)
HGB BLD-MCNC: 11.8 G/DL (ref 12–16)
HGB RETIC QN AUTO: 29.7 PG/CELL (ref 29–35)
IMM GRANULOCYTES # BLD AUTO: 0.01 K/UL (ref 0–0.11)
IMM GRANULOCYTES NFR BLD AUTO: 0.2 % (ref 0–0.9)
IMM RETICS NFR: 18.3 % (ref 9.3–17.4)
IRON SATN MFR SERPL: 16 % (ref 15–55)
IRON SERPL-MCNC: 47 UG/DL (ref 40–170)
KETONES UR STRIP.AUTO-MCNC: NEGATIVE MG/DL
LACTATE BLD-SCNC: 1 MMOL/L (ref 0.5–2)
LACTATE BLD-SCNC: 1.8 MMOL/L (ref 0.5–2)
LEUKOCYTE ESTERASE UR QL STRIP.AUTO: NEGATIVE
LYMPHOCYTES # BLD AUTO: 1.45 K/UL (ref 1–4.8)
LYMPHOCYTES NFR BLD: 35.2 % (ref 22–41)
MCH RBC QN AUTO: 28.7 PG (ref 27–33)
MCHC RBC AUTO-ENTMCNC: 31.6 G/DL (ref 33.6–35)
MCV RBC AUTO: 90.8 FL (ref 81.4–97.8)
MICRO URNS: NORMAL
MONOCYTES # BLD AUTO: 0.36 K/UL (ref 0–0.85)
MONOCYTES NFR BLD AUTO: 8.7 % (ref 0–13.4)
NEUTROPHILS # BLD AUTO: 2.07 K/UL (ref 2–7.15)
NEUTROPHILS NFR BLD: 50.4 % (ref 44–72)
NITRITE UR QL STRIP.AUTO: NEGATIVE
NRBC # BLD AUTO: 0 K/UL
NRBC BLD-RTO: 0 /100 WBC
NT-PROBNP SERPL IA-MCNC: 24 PG/ML (ref 0–125)
PH UR STRIP.AUTO: 5.5 [PH] (ref 5–8)
PLATELET # BLD AUTO: 330 K/UL (ref 164–446)
PMV BLD AUTO: 9.1 FL (ref 9–12.9)
POTASSIUM SERPL-SCNC: 4.3 MMOL/L (ref 3.6–5.5)
POTASSIUM SERPL-SCNC: 5.2 MMOL/L (ref 3.6–5.5)
PROCALCITONIN SERPL-MCNC: <0.05 NG/ML
PROT SERPL-MCNC: 7.3 G/DL (ref 6–8.2)
PROT UR QL STRIP: NEGATIVE MG/DL
RBC # BLD AUTO: 4.11 M/UL (ref 4.2–5.4)
RBC UR QL AUTO: NEGATIVE
RETICS # AUTO: 0.08 M/UL (ref 0.04–0.06)
RETICS/RBC NFR: 1.9 % (ref 0.8–2.1)
RSV RNA SPEC QL NAA+PROBE: NEGATIVE
SARS-COV-2 RNA RESP QL NAA+PROBE: NOTDETECTED
SODIUM SERPL-SCNC: 136 MMOL/L (ref 135–145)
SODIUM SERPL-SCNC: 136 MMOL/L (ref 135–145)
SP GR UR STRIP.AUTO: 1.01
SPECIMEN SOURCE: NORMAL
TIBC SERPL-MCNC: 296 UG/DL (ref 250–450)
TROPONIN T SERPL-MCNC: <6 NG/L (ref 6–19)
TROPONIN T SERPL-MCNC: <6 NG/L (ref 6–19)
TSH SERPL DL<=0.005 MIU/L-ACNC: 0.96 UIU/ML (ref 0.38–5.33)
UIBC SERPL-MCNC: 249 UG/DL (ref 110–370)
UROBILINOGEN UR STRIP.AUTO-MCNC: 0.2 MG/DL
VIT B12 SERPL-MCNC: 702 PG/ML (ref 211–911)
WBC # BLD AUTO: 4.1 K/UL (ref 4.8–10.8)

## 2021-06-22 PROCEDURE — 700102 HCHG RX REV CODE 250 W/ 637 OVERRIDE(OP): Performed by: STUDENT IN AN ORGANIZED HEALTH CARE EDUCATION/TRAINING PROGRAM

## 2021-06-22 PROCEDURE — A9270 NON-COVERED ITEM OR SERVICE: HCPCS | Performed by: STUDENT IN AN ORGANIZED HEALTH CARE EDUCATION/TRAINING PROGRAM

## 2021-06-22 PROCEDURE — 93005 ELECTROCARDIOGRAM TRACING: CPT | Performed by: EMERGENCY MEDICINE

## 2021-06-22 PROCEDURE — 83605 ASSAY OF LACTIC ACID: CPT

## 2021-06-22 PROCEDURE — 0241U HCHG SARS-COV-2 COVID-19 NFCT DS RESP RNA 4 TRGT MIC: CPT

## 2021-06-22 PROCEDURE — 770020 HCHG ROOM/CARE - TELE (206)

## 2021-06-22 PROCEDURE — 700111 HCHG RX REV CODE 636 W/ 250 OVERRIDE (IP): Performed by: EMERGENCY MEDICINE

## 2021-06-22 PROCEDURE — 85025 COMPLETE CBC W/AUTO DIFF WBC: CPT

## 2021-06-22 PROCEDURE — 82607 VITAMIN B-12: CPT

## 2021-06-22 PROCEDURE — 94760 N-INVAS EAR/PLS OXIMETRY 1: CPT

## 2021-06-22 PROCEDURE — 700101 HCHG RX REV CODE 250: Performed by: STUDENT IN AN ORGANIZED HEALTH CARE EDUCATION/TRAINING PROGRAM

## 2021-06-22 PROCEDURE — 96374 THER/PROPH/DIAG INJ IV PUSH: CPT

## 2021-06-22 PROCEDURE — 80053 COMPREHEN METABOLIC PANEL: CPT

## 2021-06-22 PROCEDURE — 700111 HCHG RX REV CODE 636 W/ 250 OVERRIDE (IP): Performed by: STUDENT IN AN ORGANIZED HEALTH CARE EDUCATION/TRAINING PROGRAM

## 2021-06-22 PROCEDURE — 83880 ASSAY OF NATRIURETIC PEPTIDE: CPT

## 2021-06-22 PROCEDURE — 36415 COLL VENOUS BLD VENIPUNCTURE: CPT | Mod: XU

## 2021-06-22 PROCEDURE — 87086 URINE CULTURE/COLONY COUNT: CPT

## 2021-06-22 PROCEDURE — 85046 RETICYTE/HGB CONCENTRATE: CPT

## 2021-06-22 PROCEDURE — 87040 BLOOD CULTURE FOR BACTERIA: CPT

## 2021-06-22 PROCEDURE — 94640 AIRWAY INHALATION TREATMENT: CPT

## 2021-06-22 PROCEDURE — 80048 BASIC METABOLIC PNL TOTAL CA: CPT

## 2021-06-22 PROCEDURE — 99285 EMERGENCY DEPT VISIT HI MDM: CPT

## 2021-06-22 PROCEDURE — 83540 ASSAY OF IRON: CPT

## 2021-06-22 PROCEDURE — 82728 ASSAY OF FERRITIN: CPT

## 2021-06-22 PROCEDURE — 94669 MECHANICAL CHEST WALL OSCILL: CPT

## 2021-06-22 PROCEDURE — 93005 ELECTROCARDIOGRAM TRACING: CPT | Performed by: STUDENT IN AN ORGANIZED HEALTH CARE EDUCATION/TRAINING PROGRAM

## 2021-06-22 PROCEDURE — 81003 URINALYSIS AUTO W/O SCOPE: CPT

## 2021-06-22 PROCEDURE — 36415 COLL VENOUS BLD VENIPUNCTURE: CPT

## 2021-06-22 PROCEDURE — 84484 ASSAY OF TROPONIN QUANT: CPT

## 2021-06-22 PROCEDURE — C9803 HOPD COVID-19 SPEC COLLECT: HCPCS | Performed by: EMERGENCY MEDICINE

## 2021-06-22 PROCEDURE — 84443 ASSAY THYROID STIM HORMONE: CPT

## 2021-06-22 PROCEDURE — 71045 X-RAY EXAM CHEST 1 VIEW: CPT

## 2021-06-22 PROCEDURE — 700101 HCHG RX REV CODE 250: Performed by: EMERGENCY MEDICINE

## 2021-06-22 PROCEDURE — 84145 PROCALCITONIN (PCT): CPT

## 2021-06-22 PROCEDURE — 99223 1ST HOSP IP/OBS HIGH 75: CPT | Mod: GC | Performed by: HOSPITALIST

## 2021-06-22 PROCEDURE — 93010 ELECTROCARDIOGRAM REPORT: CPT | Performed by: INTERNAL MEDICINE

## 2021-06-22 PROCEDURE — 83550 IRON BINDING TEST: CPT

## 2021-06-22 RX ORDER — AZITHROMYCIN 250 MG/1
500 TABLET, FILM COATED ORAL DAILY
Status: COMPLETED | OUTPATIENT
Start: 2021-06-22 | End: 2021-06-24

## 2021-06-22 RX ORDER — BISACODYL 10 MG
10 SUPPOSITORY, RECTAL RECTAL
Status: DISCONTINUED | OUTPATIENT
Start: 2021-06-22 | End: 2021-06-24 | Stop reason: HOSPADM

## 2021-06-22 RX ORDER — NICOTINE 21 MG/24HR
14 PATCH, TRANSDERMAL 24 HOURS TRANSDERMAL
Status: DISCONTINUED | OUTPATIENT
Start: 2021-06-23 | End: 2021-06-24 | Stop reason: HOSPADM

## 2021-06-22 RX ORDER — SERTRALINE HYDROCHLORIDE 100 MG/1
100 TABLET, FILM COATED ORAL
COMMUNITY
End: 2023-05-30

## 2021-06-22 RX ORDER — PREDNISONE 20 MG/1
40 TABLET ORAL DAILY
Status: DISCONTINUED | OUTPATIENT
Start: 2021-06-22 | End: 2021-06-24 | Stop reason: HOSPADM

## 2021-06-22 RX ORDER — BUDESONIDE AND FORMOTEROL FUMARATE DIHYDRATE 160; 4.5 UG/1; UG/1
2 AEROSOL RESPIRATORY (INHALATION)
Status: DISCONTINUED | OUTPATIENT
Start: 2021-06-22 | End: 2021-06-24 | Stop reason: HOSPADM

## 2021-06-22 RX ORDER — TRAZODONE HYDROCHLORIDE 50 MG/1
50 TABLET ORAL
Status: DISCONTINUED | OUTPATIENT
Start: 2021-06-22 | End: 2021-06-24

## 2021-06-22 RX ORDER — SERTRALINE HYDROCHLORIDE 100 MG/1
100 TABLET, FILM COATED ORAL
Status: DISCONTINUED | OUTPATIENT
Start: 2021-06-22 | End: 2021-06-24 | Stop reason: HOSPADM

## 2021-06-22 RX ORDER — AMOXICILLIN 250 MG
2 CAPSULE ORAL 2 TIMES DAILY
Status: DISCONTINUED | OUTPATIENT
Start: 2021-06-22 | End: 2021-06-24 | Stop reason: HOSPADM

## 2021-06-22 RX ORDER — GUAIFENESIN/DEXTROMETHORPHAN 100-10MG/5
5 SYRUP ORAL EVERY 6 HOURS PRN
Status: DISCONTINUED | OUTPATIENT
Start: 2021-06-22 | End: 2021-06-24 | Stop reason: HOSPADM

## 2021-06-22 RX ORDER — LISINOPRIL 10 MG/1
10 TABLET ORAL EVERY MORNING
Status: ON HOLD | COMMUNITY
End: 2021-06-23

## 2021-06-22 RX ORDER — POLYETHYLENE GLYCOL 3350 17 G/17G
1 POWDER, FOR SOLUTION ORAL
Status: DISCONTINUED | OUTPATIENT
Start: 2021-06-22 | End: 2021-06-24 | Stop reason: HOSPADM

## 2021-06-22 RX ORDER — IPRATROPIUM BROMIDE AND ALBUTEROL SULFATE 2.5; .5 MG/3ML; MG/3ML
3 SOLUTION RESPIRATORY (INHALATION)
Status: DISCONTINUED | OUTPATIENT
Start: 2021-06-22 | End: 2021-06-24 | Stop reason: HOSPADM

## 2021-06-22 RX ORDER — METHYLPREDNISOLONE SODIUM SUCCINATE 125 MG/2ML
125 INJECTION, POWDER, LYOPHILIZED, FOR SOLUTION INTRAMUSCULAR; INTRAVENOUS ONCE
Status: COMPLETED | OUTPATIENT
Start: 2021-06-22 | End: 2021-06-22

## 2021-06-22 RX ORDER — CETIRIZINE HYDROCHLORIDE 10 MG/1
10 TABLET ORAL
COMMUNITY
End: 2022-05-23

## 2021-06-22 RX ORDER — IBUPROFEN 800 MG/1
400 TABLET ORAL EVERY 6 HOURS PRN
Status: DISCONTINUED | OUTPATIENT
Start: 2021-06-22 | End: 2021-06-24 | Stop reason: HOSPADM

## 2021-06-22 RX ORDER — ACETAMINOPHEN 325 MG/1
650 TABLET ORAL EVERY 6 HOURS PRN
Status: DISCONTINUED | OUTPATIENT
Start: 2021-06-22 | End: 2021-06-24 | Stop reason: HOSPADM

## 2021-06-22 RX ADMIN — PREDNISONE 40 MG: 20 TABLET ORAL at 16:47

## 2021-06-22 RX ADMIN — GUAIFENESIN AND DEXTROMETHORPHAN 5 ML: 100; 10 SYRUP ORAL at 23:41

## 2021-06-22 RX ADMIN — ALBUTEROL SULFATE 2.5 MG: 2.5 SOLUTION RESPIRATORY (INHALATION) at 14:37

## 2021-06-22 RX ADMIN — IPRATROPIUM BROMIDE AND ALBUTEROL SULFATE 3 ML: .5; 2.5 SOLUTION RESPIRATORY (INHALATION) at 23:49

## 2021-06-22 RX ADMIN — AZITHROMYCIN MONOHYDRATE 500 MG: 250 TABLET ORAL at 16:47

## 2021-06-22 RX ADMIN — METHYLPREDNISOLONE SODIUM SUCCINATE 125 MG: 125 INJECTION, POWDER, FOR SOLUTION INTRAMUSCULAR; INTRAVENOUS at 12:40

## 2021-06-22 RX ADMIN — ACETAMINOPHEN 650 MG: 325 TABLET, FILM COATED ORAL at 16:47

## 2021-06-22 RX ADMIN — TRAZODONE HYDROCHLORIDE 50 MG: 50 TABLET ORAL at 21:00

## 2021-06-22 RX ADMIN — SERTRALINE HYDROCHLORIDE 100 MG: 100 TABLET ORAL at 21:00

## 2021-06-22 RX ADMIN — ALBUTEROL SULFATE 2.5 MG: 2.5 SOLUTION RESPIRATORY (INHALATION) at 12:42

## 2021-06-22 ASSESSMENT — ENCOUNTER SYMPTOMS
FEVER: 0
ORTHOPNEA: 1
VOMITING: 0
SORE THROAT: 0
NECK PAIN: 0
HEMOPTYSIS: 0
FOCAL WEAKNESS: 0
ABDOMINAL PAIN: 0
SHORTNESS OF BREATH: 1
PALPITATIONS: 0
WHEEZING: 1
BACK PAIN: 0
NAUSEA: 0
CHILLS: 0
COUGH: 1
WEAKNESS: 0
SPUTUM PRODUCTION: 1

## 2021-06-22 ASSESSMENT — FIBROSIS 4 INDEX
FIB4 SCORE: 0.71
FIB4 SCORE: 0.96

## 2021-06-22 ASSESSMENT — LIFESTYLE VARIABLES
ON A TYPICAL DAY WHEN YOU DRINK ALCOHOL HOW MANY DRINKS DO YOU HAVE: 0
TOTAL SCORE: 0
HOW MANY TIMES IN THE PAST YEAR HAVE YOU HAD 5 OR MORE DRINKS IN A DAY: 0
HAVE PEOPLE ANNOYED YOU BY CRITICIZING YOUR DRINKING: NO
TOTAL SCORE: 0
HAVE YOU EVER FELT YOU SHOULD CUT DOWN ON YOUR DRINKING: NO
TOTAL SCORE: 0
ALCOHOL_USE: NO
EVER HAD A DRINK FIRST THING IN THE MORNING TO STEADY YOUR NERVES TO GET RID OF A HANGOVER: NO
EVER FELT BAD OR GUILTY ABOUT YOUR DRINKING: NO
CONSUMPTION TOTAL: NEGATIVE
AVERAGE NUMBER OF DAYS PER WEEK YOU HAVE A DRINK CONTAINING ALCOHOL: 0

## 2021-06-22 ASSESSMENT — COGNITIVE AND FUNCTIONAL STATUS - GENERAL
SUGGESTED CMS G CODE MODIFIER DAILY ACTIVITY: CH
DAILY ACTIVITIY SCORE: 24
SUGGESTED CMS G CODE MODIFIER MOBILITY: CH
MOBILITY SCORE: 24

## 2021-06-22 ASSESSMENT — PATIENT HEALTH QUESTIONNAIRE - PHQ9
8. MOVING OR SPEAKING SO SLOWLY THAT OTHER PEOPLE COULD HAVE NOTICED. OR THE OPPOSITE, BEING SO FIGETY OR RESTLESS THAT YOU HAVE BEEN MOVING AROUND A LOT MORE THAN USUAL: NOT AT ALL
3. TROUBLE FALLING OR STAYING ASLEEP OR SLEEPING TOO MUCH: SEVERAL DAYS
6. FEELING BAD ABOUT YOURSELF - OR THAT YOU ARE A FAILURE OR HAVE LET YOURSELF OR YOUR FAMILY DOWN: NOT AL ALL
SUM OF ALL RESPONSES TO PHQ9 QUESTIONS 1 AND 2: 1
SUM OF ALL RESPONSES TO PHQ QUESTIONS 1-9: 2
1. LITTLE INTEREST OR PLEASURE IN DOING THINGS: NOT AT ALL
9. THOUGHTS THAT YOU WOULD BE BETTER OFF DEAD, OR OF HURTING YOURSELF: NOT AT ALL
2. FEELING DOWN, DEPRESSED, IRRITABLE, OR HOPELESS: SEVERAL DAYS
4. FEELING TIRED OR HAVING LITTLE ENERGY: NOT AT ALL
7. TROUBLE CONCENTRATING ON THINGS, SUCH AS READING THE NEWSPAPER OR WATCHING TELEVISION: NOT AT ALL
5. POOR APPETITE OR OVEREATING: NOT AT ALL

## 2021-06-22 ASSESSMENT — PAIN DESCRIPTION - PAIN TYPE
TYPE: ACUTE PAIN
TYPE: ACUTE PAIN

## 2021-06-22 ASSESSMENT — COPD QUESTIONNAIRES
DO YOU EVER COUGH UP ANY MUCUS OR PHLEGM?: NO/ONLY WITH OCCASIONAL COLDS OR INFECTIONS
HAVE YOU SMOKED AT LEAST 100 CIGARETTES IN YOUR ENTIRE LIFE: YES
DURING THE PAST 4 WEEKS HOW MUCH DID YOU FEEL SHORT OF BREATH: SOME OF THE TIME
COPD SCREENING SCORE: 4

## 2021-06-22 NOTE — PROGRESS NOTES
Assumed patient care. VSS, assessment complete, monitor room notified. No signs of acute distress noted at this time. Bed locked/low position, call light within reach, hourly rounding initiated.

## 2021-06-22 NOTE — ED NOTES
Bedside report given to Tele RN. Patient transported via gurney on cardiac monitor to Tele [id number] on 3lpm O2

## 2021-06-22 NOTE — ED NOTES
Patient rounded on and is breathing at a more regular rate the patient is still having expiratory wheezes, but it does sound like she is moving more air than prior to the breathing treatment. The patient was provided blankets and is sitting upright in full fowlers. No needs at this time.

## 2021-06-22 NOTE — ED TRIAGE NOTES
Pt BIB [facility] with c/c of SOB and cough for approx 2 weeks but worsening over the past couple of days. Pt found to be 91% on RA. Pt with productive cough noted.

## 2021-06-22 NOTE — ED PROVIDER NOTES
ED Provider Note    CHIEF COMPLAINT  Chief Complaint   Patient presents with   • Shortness of Breath   • Cough       HPI  Rhonda Gould is a 50 y.o. female who presents to the emergency department for evaluation of shortness of breath and cough.  Patient is a cough about the last 2 weeks with mild shortness of breath however this is worsened over the last couple of days.  She now is in moderate distress.  She comes in the emergency department for difficulty breathing.  She had a cough, this is dry nonproductive.  Denies any chest pain.  Denies any fevers or chills.  Denies any history of PE or DVT.  States she feels like this is a COPD exacerbation but much more severe.  She denies any other aggravating or alleviating factors.    REVIEW OF SYSTEMS  See HPI for further details. All other systems are negative.    PAST MEDICAL HISTORY  Past Medical History:   Diagnosis Date   • Chronic obstructive pulmonary disease (HCC)    • Hypertension        FAMILY HISTORY  Family History   Problem Relation Age of Onset   • Diabetes Mother    • Hypertension Mother    • Hypertension Father    • Diabetes Father    • Heart Disease Maternal Grandmother    • Cancer Maternal Grandfather        SOCIAL HISTORY  Social History     Socioeconomic History   • Marital status:      Spouse name: Not on file   • Number of children: Not on file   • Years of education: Not on file   • Highest education level: Not on file   Occupational History   • Not on file   Tobacco Use   • Smoking status: Current Every Day Smoker     Packs/day: 0.25     Years: 30.00     Pack years: 7.50     Types: Cigarettes   • Smokeless tobacco: Never Used   Substance and Sexual Activity   • Alcohol use: Not Currently     Comment: Quit 6/15   • Drug use: Yes     Frequency: 2.0 times per week     Types: Inhaled, Marijuana     Comment: marijuana   • Sexual activity: Not Currently   Other Topics Concern   • Not on file   Social History Narrative    ** Merged History  It was very nice to meet you both  "Encounter **          Social Determinants of Health     Financial Resource Strain:    • Difficulty of Paying Living Expenses:    Food Insecurity:    • Worried About Running Out of Food in the Last Year:    • Ran Out of Food in the Last Year:    Transportation Needs:    • Lack of Transportation (Medical):    • Lack of Transportation (Non-Medical):    Physical Activity:    • Days of Exercise per Week:    • Minutes of Exercise per Session:    Stress:    • Feeling of Stress :    Social Connections:    • Frequency of Communication with Friends and Family:    • Frequency of Social Gatherings with Friends and Family:    • Attends Sikhism Services:    • Active Member of Clubs or Organizations:    • Attends Club or Organization Meetings:    • Marital Status:    Intimate Partner Violence:    • Fear of Current or Ex-Partner:    • Emotionally Abused:    • Physically Abused:    • Sexually Abused:        SURGICAL HISTORY  Past Surgical History:   Procedure Laterality Date   • HYSTERECTOMY LAPAROSCOPY  6/2005       CURRENT MEDICATIONS  Home Medications    **Home medications have not yet been reviewed for this encounter**         ALLERGIES  Allergies   Allergen Reactions   • Codeine Hives and Itching     Tolerated morphine previously  Allergy updated from 2015, morphine given in 2016   • Shellfish Allergy Anaphylaxis   • Codeine Hives, Itching and Swelling     Throat swells closed  RXN=>10 years ago   • Codeine    • Fish Allergy Shortness of Breath     Swelling to throat.       PHYSICAL EXAM  VITAL SIGNS: /59   Pulse 89   Temp 36.3 °C (97.3 °F) (Temporal)   Resp (!) 24   Ht 1.753 m (5' 9\")   Wt 61.7 kg (136 lb)   LMP 06/11/2005   SpO2 100%   BMI 20.08 kg/m²    Constitutional: Awake alert anxious moderate to severe distress  HENT: Normocephalic, Atraumatic, Bilateral external ears tita  Eyes: PERRL, EOMI, Conjunctiva normal, No discharge.   Neck: Normal range of motion  Cardiovascular: Normal heart rate, Normal " rhythm, No murmurs, No rubs, No gallops.   Thorax & Lungs: Diminished breath sounds bilaterally.  Poor air movement and scattered wheezes.  Abdomen: Bowel sounds normal, Soft, No tenderness  Skin: Warm, Dry, No erythema, No rash.   Back: No tenderness, No CVA tenderness.   Musculoskeletal: Good range of motion in all major joints no asymmetric edema good pulses  Neurologic: Alert,  No focal deficits noted.   Psychiatric: Affect normal,    Results for orders placed or performed during the hospital encounter of 06/22/21   CBC with Differential   Result Value Ref Range    WBC 4.1 (L) 4.8 - 10.8 K/uL    RBC 4.11 (L) 4.20 - 5.40 M/uL    Hemoglobin 11.8 (L) 12.0 - 16.0 g/dL    Hematocrit 37.3 37.0 - 47.0 %    MCV 90.8 81.4 - 97.8 fL    MCH 28.7 27.0 - 33.0 pg    MCHC 31.6 (L) 33.6 - 35.0 g/dL    RDW 50.6 (H) 35.9 - 50.0 fL    Platelet Count 330 164 - 446 K/uL    MPV 9.1 9.0 - 12.9 fL    Neutrophils-Polys 50.40 44.00 - 72.00 %    Lymphocytes 35.20 22.00 - 41.00 %    Monocytes 8.70 0.00 - 13.40 %    Eosinophils 5.30 0.00 - 6.90 %    Basophils 0.20 0.00 - 1.80 %    Immature Granulocytes 0.20 0.00 - 0.90 %    Nucleated RBC 0.00 /100 WBC    Neutrophils (Absolute) 2.07 2.00 - 7.15 K/uL    Lymphs (Absolute) 1.45 1.00 - 4.80 K/uL    Monos (Absolute) 0.36 0.00 - 0.85 K/uL    Eos (Absolute) 0.22 0.00 - 0.51 K/uL    Baso (Absolute) 0.01 0.00 - 0.12 K/uL    Immature Granulocytes (abs) 0.01 0.00 - 0.11 K/uL    NRBC (Absolute) 0.00 K/uL   Comp Metabolic Panel   Result Value Ref Range    Sodium 136 135 - 145 mmol/L    Potassium 4.3 3.6 - 5.5 mmol/L    Chloride 104 96 - 112 mmol/L    Co2 19 (L) 20 - 33 mmol/L    Anion Gap 13.0 7.0 - 16.0    Glucose 78 65 - 99 mg/dL    Bun 11 8 - 22 mg/dL    Creatinine 0.83 0.50 - 1.40 mg/dL    Calcium 9.6 8.5 - 10.5 mg/dL    AST(SGOT) 11 (L) 12 - 45 U/L    ALT(SGPT) 6 2 - 50 U/L    Alkaline Phosphatase 76 30 - 99 U/L    Total Bilirubin <0.2 0.1 - 1.5 mg/dL    Albumin 4.3 3.2 - 4.9 g/dL    Total Protein  7.3 6.0 - 8.2 g/dL    Globulin 3.0 1.9 - 3.5 g/dL    A-G Ratio 1.4 g/dL   LACTIC ACID   Result Value Ref Range    Lactic Acid 1.8 0.5 - 2.0 mmol/L   LACTIC ACID   Result Value Ref Range    Lactic Acid 1.0 0.5 - 2.0 mmol/L   COV-2, FLU A/B, AND RSV BY PCR (2-4 HOURS CEPHEID): Collect NP swab in VTM    Specimen: Respirate   Result Value Ref Range    Influenza virus A RNA Negative Negative    Influenza virus B, PCR Negative Negative    RSV, PCR Negative Negative    SARS-CoV-2 by PCR NotDetected     SARS-CoV-2 Source NP Swab    ESTIMATED GFR   Result Value Ref Range    GFR If African American >60 >60 mL/min/1.73 m 2    GFR If Non African American >60 >60 mL/min/1.73 m 2        RADIOLOGY/PROCEDURES  DX-CHEST-PORTABLE (1 VIEW)   Final Result      1.  No acute cardiopulmonary process is seen.   2.  Atherosclerotic plaque.            COURSE & MEDICAL DECISION MAKING  Pertinent Labs & Imaging studies reviewed. (See chart for details)  The patient presents emerged part with acute respiratory distress.  She has a history of COPD and significant bronchospasm on evaluation.    Differential diagnosis includes but is not limited to COPD exacerbation, COVID-19 infection, pneumonia, heart failure, COPD plus infection.    The patient has been in the past and previous work-up and labs have been reviewed for comparison.    Covid testing sent this is pending.  She is wheezing this is not consistent with ACS.  Chest x-ray does not show pneumonia.  He does have a slightly low white count however this is chronic.  She does not have a fever, purulent sputum, infiltrate or leukocytosis.  I do not think this is likely to be pneumonia.    Patient is reassessed after her breathing treatments require hospitalization for ongoing work-up and therapy for COPD exacerbation.  Discussed the case with the hospitalist and care transfer at this time.  Patient is reassessed she still wheezing I ordered a second breathing treatment.  Patient is aware the  plan.  Questions are answered.        FINAL IMPRESSION  1. Acute exacerbation of chronic obstructive pulmonary disease (COPD) (HCC)     2. Acute respiratory failure with hypoxia (HCC)         2.   3.         Electronically signed by: Guille Prieto M.D., 6/22/2021 12:51 PM

## 2021-06-22 NOTE — H&P
History & Physical Note    Date of Admission: 6/22/2021  Admission Status: Inpatient  [facility]: [facility]   Attending: Nico Romano M.D.   Senior Resident: Dr. Washington  Contact Number: [phone]    Chief Complaint: Shortness of breath    History of Present Illness (HPI):  Rhonda is a 50 y.o. female with past medical history of smoking, hypertension, COPD, migraine, alcohol abuse who presented 6/22/2021 with increasing shortness of breath that was worsened over 2 weeks, but became severe today. She continues to smoke about 5 cigarettes per day. States that she was diagnosed with COPD in [place] a few years ago. She has been wheezing. She has occasional cough with some sputum that was yellow last week but now is clear. No fevers or chills. No recent travel. No sore throat or congestion. She has been using albuterol as often as twice per hour in the past few week. She denies leg swelling. Patient endorses also to having chest pain. That is tightness in nature and worse with exertion and relieved by rest. It is worst in a morning and resolved with use of albuterol. Her shortness of breath is worse with lying flat. Patient has been living in [place] and moved to [place] 2019. Patient in ER received albuterol x2 and one dose of methylprednisolone. She still required 3 L of oxygen to maintain saturation.    Review of Systems:   Review of Systems   Constitutional: Negative for chills and fever.   HENT: Negative for congestion and sore throat.    Respiratory: Positive for cough (productive), sputum production, shortness of breath and wheezing. Negative for hemoptysis.    Cardiovascular: Positive for chest pain and orthopnea. Negative for palpitations and leg swelling.   Gastrointestinal: Negative for abdominal pain, nausea and vomiting.   Genitourinary: Negative for dysuria, frequency and hematuria.   Musculoskeletal: Negative for back pain and neck pain.   Skin: Negative for itching and rash.    Neurological: Negative for focal weakness and weakness.     Past Medical History:   Past Medical History was reviewed with patient.   has a past medical history of Chronic obstructive pulmonary disease (HCC) and Hypertension.    Past Surgical History: Past Surgical History was reviewed with patient.   has a past surgical history that includes hysterectomy laparoscopy (6/2005).    Medications: Medications have been reviewed with patient.  Prior to Admission Medications   Prescriptions Last Dose Informant Patient Reported? Taking?   SUMAtriptan (IMITREX) 50 MG Tab Not Taking at Unknown time  No No   Sig: Take 1 Tab by mouth Once PRN for Migraine for up to 1 dose.   Patient not taking: Reported on 6/22/2021   albuterol 108 (90 Base) MCG/ACT Aero Soln inhalation aerosol 6/22/2021 at Unknown time Patient Yes No   Sig: Inhale 2 Puffs by mouth every 6 hours as needed for Shortness of Breath.   cetirizine (ZYRTEC) 10 MG Tab 6/21/2021 at Unknown time  Yes Yes   Sig: Take 10 mg by mouth at bedtime.   lisinopril (PRINIVIL) 10 MG Tab 6/22/2021 at am  Yes Yes   Sig: Take 10 mg by mouth every morning.   sertraline (ZOLOFT) 100 MG Tab 6/21/2021 at Unknown time  Yes Yes   Sig: Take 100 mg by mouth at bedtime.   traZODone (DESYREL) 50 MG Tab 6/21/2021 at Unknown time Patient Yes No   Sig: Take 50 mg by mouth at bedtime.      Facility-Administered Medications: None        Allergies: Allergies have been reviewed with patient.  Allergies   Allergen Reactions   • Codeine Hives and Itching     Tolerated morphine previously  Allergy updated from 2015, morphine given in 2016   • Fish Allergy Anaphylaxis and Shortness of Breath     Swelling to throat.   • Shellfish Allergy Anaphylaxis       Family History: Denies kidney disease in family, no cancers in family memebrs  family history includes Cancer in her maternal grandfather; Diabetes in her father and mother; Heart Disease in her maternal grandmother; Hypertension in her father and  mother.     Social History:   Tobacco: Does not smoke   Alcohol: Denies   Recreational drugs (illegal and prescription):  Denies   Living situation:  With wife  Primary Care Provider: dorota Middleton M.D.  Physical Exam:   Vitals:  Temp:  [36.3 °C (97.3 °F)-36.7 °C (98 °F)] 36.3 °C (97.4 °F)  Pulse:  [] 87  Resp:  [15-30] 18  BP: (104-121)/(59-92) 121/92  SpO2:  [90 %-100 %] 94 %    Physical Exam  Constitutional:       Appearance: She is ill-appearing.      Comments: Disheveled   HENT:      Head: Normocephalic and atraumatic.      Ears:      Comments: Left neck large purple mass tender to touch     Nose: Nose normal.      Mouth/Throat:      Comments: Thrush noted  Eyes:      Extraocular Movements: Extraocular movements intact.      Pupils: Pupils are equal, round, and reactive to light.   Cardiovascular:      Rate and Rhythm: Normal rate.      Pulses: Normal pulses.   Pulmonary:      Effort: Pulmonary effort is normal.      Breath sounds: Normal breath sounds.   Abdominal:      General: Abdomen is flat. There is no distension.      Palpations: There is no mass.      Tenderness: There is abdominal tenderness.   Musculoskeletal:         General: Swelling (Mild bilateral LE edema) present.   Neurological:      Mental Status: She is alert.       Labs:   Recent Labs     06/22/21  1130   WBC 4.1*   RBC 4.11*   HEMOGLOBIN 11.8*   HEMATOCRIT 37.3   MCV 90.8   MCH 28.7   RDW 50.6*   PLATELETCT 330   MPV 9.1   NEUTSPOLYS 50.40   LYMPHOCYTES 35.20   MONOCYTES 8.70   EOSINOPHILS 5.30   BASOPHILS 0.20     Recent Labs     06/22/21  1130   SODIUM 136   POTASSIUM 4.3   CHLORIDE 104   CO2 19*   GLUCOSE 78   BUN 11       Recent Labs     06/22/21  1130   ALBUMIN 4.3   TBILIRUBIN <0.2   ALKPHOSPHAT 76   TOTPROTEIN 7.3   ALTSGPT 6   ASTSGOT 11*   CREATININE 0.83     EKG: Per my read, pedning     Imaging:   DX-CHEST-PORTABLE (1 VIEW)   Final Result      1.  No acute cardiopulmonary process is seen.   2.  Atherosclerotic  plaque.          Previous Data Review: reviewed    * Respiratory failure (HCC)  Assessment & Plan  Hypoxic  Likely secondary to COPD/asthma exacerbation as patient continues to smoke vs. Poorly controlled + anemia component  R/o chest pain  BMP normal  Troponin/EKG  Flu/covid negative  CXR unremarkable  Recent echo 3/2021 shows 65% and RSVP 30  Procalcitonin normal  See reactive airway disease exacerbation    Reactive airway disease  Assessment & Plan  Patient with underlying COPD vs. recurring asthma (patient had asthma in childhood)  CXR unremarkable  Prednisone  Duonebs  Azithromycin  Budesonide/Formoterol  O2 goal 88-92  Measure peek expiratory flow    Chest pain  Assessment & Plan  Likely relating to exacerbation to airway disease  Troponin x2 negative and EKG negative    Smoking  Assessment & Plan  Current smoker  Patient would like to cease smoking  Nicotine replacement order set    Anemia- (present on admission)  Assessment & Plan  Appears chronic  Will obtain TSH, ferritin, occult, iron panel, folate, and b12    Alcoholism (HCC)- (present on admission)  Assessment & Plan  Currently 10 days sober

## 2021-06-23 ENCOUNTER — DOCUMENTATION (OUTPATIENT)
Dept: RESPIRATORY THERAPY | Facility: MEDICAL CENTER | Age: 51
End: 2021-06-23

## 2021-06-23 LAB
ALBUMIN SERPL BCP-MCNC: 4.3 G/DL (ref 3.2–4.9)
ALBUMIN/GLOB SERPL: 1.5 G/DL
ALP SERPL-CCNC: 74 U/L (ref 30–99)
ALT SERPL-CCNC: 6 U/L (ref 2–50)
AMPHET UR QL SCN: NEGATIVE
ANION GAP SERPL CALC-SCNC: 10 MMOL/L (ref 7–16)
AST SERPL-CCNC: 7 U/L (ref 12–45)
BARBITURATES UR QL SCN: NEGATIVE
BENZODIAZ UR QL SCN: NEGATIVE
BILIRUB SERPL-MCNC: <0.2 MG/DL (ref 0.1–1.5)
BUN SERPL-MCNC: 14 MG/DL (ref 8–22)
BZE UR QL SCN: NEGATIVE
CALCIUM SERPL-MCNC: 9.6 MG/DL (ref 8.5–10.5)
CANNABINOIDS UR QL SCN: NEGATIVE
CHLORIDE SERPL-SCNC: 105 MMOL/L (ref 96–112)
CHOLEST SERPL-MCNC: 161 MG/DL (ref 100–199)
CO2 SERPL-SCNC: 22 MMOL/L (ref 20–33)
CREAT SERPL-MCNC: 0.71 MG/DL (ref 0.5–1.4)
ERYTHROCYTE [DISTWIDTH] IN BLOOD BY AUTOMATED COUNT: 50 FL (ref 35.9–50)
GLOBULIN SER CALC-MCNC: 2.9 G/DL (ref 1.9–3.5)
GLUCOSE SERPL-MCNC: 206 MG/DL (ref 65–99)
HCT VFR BLD AUTO: 36.5 % (ref 37–47)
HDLC SERPL-MCNC: 75 MG/DL
HGB BLD-MCNC: 11.7 G/DL (ref 12–16)
LDLC SERPL CALC-MCNC: 78 MG/DL
MCH RBC QN AUTO: 29 PG (ref 27–33)
MCHC RBC AUTO-ENTMCNC: 32.1 G/DL (ref 33.6–35)
MCV RBC AUTO: 90.3 FL (ref 81.4–97.8)
METHADONE UR QL SCN: NEGATIVE
OPIATES UR QL SCN: NEGATIVE
OXYCODONE UR QL SCN: NEGATIVE
PCP UR QL SCN: NEGATIVE
PLATELET # BLD AUTO: 326 K/UL (ref 164–446)
PMV BLD AUTO: 9.3 FL (ref 9–12.9)
POTASSIUM SERPL-SCNC: 4.7 MMOL/L (ref 3.6–5.5)
PROPOXYPH UR QL SCN: NEGATIVE
PROT SERPL-MCNC: 7.2 G/DL (ref 6–8.2)
RBC # BLD AUTO: 4.04 M/UL (ref 4.2–5.4)
SODIUM SERPL-SCNC: 137 MMOL/L (ref 135–145)
TRIGL SERPL-MCNC: 42 MG/DL (ref 0–149)
WBC # BLD AUTO: 4.9 K/UL (ref 4.8–10.8)

## 2021-06-23 PROCEDURE — 700111 HCHG RX REV CODE 636 W/ 250 OVERRIDE (IP): Performed by: STUDENT IN AN ORGANIZED HEALTH CARE EDUCATION/TRAINING PROGRAM

## 2021-06-23 PROCEDURE — 770020 HCHG ROOM/CARE - TELE (206)

## 2021-06-23 PROCEDURE — 80307 DRUG TEST PRSMV CHEM ANLYZR: CPT

## 2021-06-23 PROCEDURE — A9270 NON-COVERED ITEM OR SERVICE: HCPCS | Performed by: STUDENT IN AN ORGANIZED HEALTH CARE EDUCATION/TRAINING PROGRAM

## 2021-06-23 PROCEDURE — RXMED WILLOW AMBULATORY MEDICATION CHARGE: Performed by: STUDENT IN AN ORGANIZED HEALTH CARE EDUCATION/TRAINING PROGRAM

## 2021-06-23 PROCEDURE — 85027 COMPLETE CBC AUTOMATED: CPT

## 2021-06-23 PROCEDURE — 80053 COMPREHEN METABOLIC PANEL: CPT

## 2021-06-23 PROCEDURE — 94664 DEMO&/EVAL PT USE INHALER: CPT | Mod: XU

## 2021-06-23 PROCEDURE — 99406 BEHAV CHNG SMOKING 3-10 MIN: CPT

## 2021-06-23 PROCEDURE — 94640 AIRWAY INHALATION TREATMENT: CPT

## 2021-06-23 PROCEDURE — 99238 HOSP IP/OBS DSCHRG MGMT 30/<: CPT | Mod: GC | Performed by: HOSPITALIST

## 2021-06-23 PROCEDURE — 700101 HCHG RX REV CODE 250: Performed by: STUDENT IN AN ORGANIZED HEALTH CARE EDUCATION/TRAINING PROGRAM

## 2021-06-23 PROCEDURE — 80061 LIPID PANEL: CPT

## 2021-06-23 PROCEDURE — 94760 N-INVAS EAR/PLS OXIMETRY 1: CPT

## 2021-06-23 PROCEDURE — 700102 HCHG RX REV CODE 250 W/ 637 OVERRIDE(OP): Performed by: STUDENT IN AN ORGANIZED HEALTH CARE EDUCATION/TRAINING PROGRAM

## 2021-06-23 PROCEDURE — 94669 MECHANICAL CHEST WALL OSCILL: CPT

## 2021-06-23 RX ORDER — AZITHROMYCIN 250 MG/1
TABLET, FILM COATED ORAL
Qty: 2 TABLET | Refills: 0 | Status: SHIPPED | OUTPATIENT
Start: 2021-06-24 | End: 2022-01-14 | Stop reason: CLARIF

## 2021-06-23 RX ORDER — LORATADINE 10 MG/1
10 TABLET ORAL DAILY
Status: ON HOLD | COMMUNITY
End: 2021-06-23

## 2021-06-23 RX ORDER — PREDNISONE 20 MG/1
TABLET ORAL
Qty: 6 TABLET | Refills: 0 | Status: SHIPPED | OUTPATIENT
Start: 2021-06-24 | End: 2023-01-29

## 2021-06-23 RX ORDER — FEXOFENADINE HCL 60 MG/1
60 TABLET, FILM COATED ORAL DAILY
Status: ON HOLD | COMMUNITY
End: 2021-06-23

## 2021-06-23 RX ORDER — BUDESONIDE AND FORMOTEROL FUMARATE DIHYDRATE 160; 4.5 UG/1; UG/1
2 AEROSOL RESPIRATORY (INHALATION) 2 TIMES DAILY
Qty: 10.2 G | Refills: 3 | Status: SHIPPED | OUTPATIENT
Start: 2021-06-23 | End: 2022-01-14 | Stop reason: CLARIF

## 2021-06-23 RX ORDER — DIPHENHYDRAMINE HCL 25 MG
25 TABLET ORAL EVERY 6 HOURS PRN
Status: ON HOLD | COMMUNITY
End: 2021-06-23

## 2021-06-23 RX ADMIN — BUDESONIDE AND FORMOTEROL FUMARATE DIHYDRATE 2 PUFF: 160; 4.5 AEROSOL RESPIRATORY (INHALATION) at 08:42

## 2021-06-23 RX ADMIN — NICOTINE 14 MG: 14 PATCH TRANSDERMAL at 05:26

## 2021-06-23 RX ADMIN — TRAZODONE HYDROCHLORIDE 50 MG: 50 TABLET ORAL at 20:41

## 2021-06-23 RX ADMIN — GUAIFENESIN AND DEXTROMETHORPHAN 5 ML: 100; 10 SYRUP ORAL at 05:26

## 2021-06-23 RX ADMIN — ACETAMINOPHEN 650 MG: 325 TABLET, FILM COATED ORAL at 08:45

## 2021-06-23 RX ADMIN — PREDNISONE 40 MG: 20 TABLET ORAL at 05:27

## 2021-06-23 RX ADMIN — BUDESONIDE AND FORMOTEROL FUMARATE DIHYDRATE 2 PUFF: 160; 4.5 AEROSOL RESPIRATORY (INHALATION) at 20:32

## 2021-06-23 RX ADMIN — AZITHROMYCIN MONOHYDRATE 500 MG: 250 TABLET ORAL at 05:27

## 2021-06-23 RX ADMIN — IPRATROPIUM BROMIDE AND ALBUTEROL SULFATE 3 ML: .5; 2.5 SOLUTION RESPIRATORY (INHALATION) at 09:01

## 2021-06-23 RX ADMIN — SERTRALINE HYDROCHLORIDE 100 MG: 100 TABLET ORAL at 20:41

## 2021-06-23 RX ADMIN — ENOXAPARIN SODIUM 40 MG: 40 INJECTION SUBCUTANEOUS at 05:27

## 2021-06-23 ASSESSMENT — ENCOUNTER SYMPTOMS
HEMOPTYSIS: 0
PALPITATIONS: 0
FOCAL WEAKNESS: 0
WEAKNESS: 0
VOMITING: 0
ABDOMINAL PAIN: 0
SORE THROAT: 0
FEVER: 0
CHILLS: 0
COUGH: 1
NECK PAIN: 0
BACK PAIN: 0
SHORTNESS OF BREATH: 1
NAUSEA: 0
WHEEZING: 1

## 2021-06-23 ASSESSMENT — FIBROSIS 4 INDEX
FIB4 SCORE: 0.46
FIB4 SCORE: 0.46

## 2021-06-23 ASSESSMENT — PAIN DESCRIPTION - PAIN TYPE
TYPE: ACUTE PAIN
TYPE: ACUTE PAIN

## 2021-06-23 NOTE — CARE PLAN
The patient is Stable - Low risk of patient condition declining or worsening    Shift Goals  Clinical Goals: Headache below pain scale 5/10  Patient Goals: Quit smoking and feel better  Family Goals: n/a    Progress made toward(s) clinical / shift goals:      Problem: Knowledge Deficit - COPD  Goal: Patient/significant other demonstrates understanding of disease process, utilization of the Action Plan, medications and discharge instruction  Outcome: Progressing     Problem: Risk for Infection - COPD  Goal: Patient will remain free from signs and symptoms of infection  Outcome: Progressing     Problem: Nutrition - Advanced  Goal: Patient will display progressive weight gain toward goal have adequate food and fluid intake  Outcome: Progressing       Patient is not progressing towards the following goals:      Problem: Pain - Standard  Goal: Alleviation of pain or a reduction in pain to the patient’s comfort goal  Outcome: Not Progressing

## 2021-06-23 NOTE — DISCHARGE SUMMARY
Discharge Summary    Date of Admission: 6/22/2021  Date of Discharge: 6/23/2021  Discharging Attending: Nico Romano M.D.   Discharging Senior Resident: Dr. Washington    CHIEF COMPLAINT ON ADMISSION  Chief Complaint   Patient presents with   • Shortness of Breath   • Cough     Reason for Admission  Acute hypoxic respiratory failure    Admission Date  6/22/2021    CODE STATUS  Full Code    HPI & HOSPITAL COURSE  This is a 50 y.o. female with past medical history of smoking, hypertension, history of asthma, migraine, alcohol abuse who presented 6/22/2021 with increasing shortness of breath over 2 weeks and was found to be saturating 86% on room air. Patient reported pressure like substernal chest pain which was relieved with inhalers. Troponins were negative x2, BNP was normal, and EKG was unremarkable.  CXR did not show any acute abnormality. Patient significantly improved with duonebs treatment. She then was started on prednisone, azithromycin, and Symbicort. She greatly improved over the course of her hospital stay. Patient has been smoking 5 cigarettes, and she is ready to quit. We provided nicotine patches and gum for patient. Also, patient was educated regarding inhaler use with and without spacer.      Therefore, she is discharged in good and stable condition to home with close outpatient follow-up.    The patient recovered much more quickly than anticipated on admission.    Discharge Date  6/23/2021    FOLLOW UP ITEMS POST DISCHARGE  Obtain pulmonary function tests which were ordered  Continue smoking cessation, use patch for 6 weeks then discontinue afterwards, and use gum for cravings  Use inhalers as instructed by respiratory therapy, and if you are unsure, then use spacer to administer both Symbicort and albuterol  Follow up with primary  Seek medical attention if you have to use albuterol more frequently than 4 hours, have persistent shortness of breath, your breathing is constantly heavy and  fast    DISCHARGE DIAGNOSES  Principal Problem:    Respiratory failure (HCC) POA: Unknown  Active Problems:    Reactive airway disease POA: Unknown    Chest pain POA: Unknown    Alcoholism (HCC) POA: Yes    Anemia POA: Yes    Smoking POA: Unknown  Resolved Problems:    * No resolved hospital problems. *      FOLLOW UP  No future appointments.  No follow-up provider specified.    MEDICATIONS ON DISCHARGE     Medication List      START taking these medications      Instructions   azithromycin 250 MG Tabs  Start taking on: June 24, 2021  Commonly known as: ZITHROMAX   Take 2 tabs morning of 6/24     budesonide-formoterol 160-4.5 MCG/ACT Aero  Commonly known as: SYMBICORT   Inhale 2 Puffs by mouth 2 times a day.  Dose: 2 Puff     nicotine 7 MG/24HR Pt24  Commonly known as: NICODERM   Place 1 Patch on the skin every 24 hours.  Dose: 1 Patch     nicotine polacrilex 2 MG Gum  Commonly known as: Nicorette   Chew and cheek 1 piece as needed for Smoking Cessation.  Dose: 2 mg     predniSONE 20 MG Tabs  Start taking on: June 24, 2021  Commonly known as: DELTASONE   Take 2 pills by mouth (20 mg) once a day for 3 days        CONTINUE taking these medications      Instructions   albuterol 108 (90 Base) MCG/ACT Aers inhalation aerosol   Inhale 2 Puffs by mouth every 6 hours as needed for Shortness of Breath.  Dose: 2 Puff     cetirizine 10 MG Tabs  Commonly known as: ZYRTEC   Take 10 mg by mouth at bedtime.  Dose: 10 mg     sertraline 100 MG Tabs  Commonly known as: Zoloft   Take 100 mg by mouth at bedtime.  Dose: 100 mg     SUMAtriptan 50 MG Tabs  Commonly known as: IMITREX   Take 1 Tab by mouth Once PRN for Migraine for up to 1 dose.  Dose: 50 mg        STOP taking these medications    diphenhydrAMINE 25 MG Tabs  Commonly known as: BENADRYL     fexofenadine 60 MG Tabs  Commonly known as: ALLEGRA     lisinopril 10 MG Tabs  Commonly known as: PRINIVIL     loratadine 10 MG Tabs  Commonly known as: CLARITIN     traZODone 50 MG  Tabs  Commonly known as: DESYREL          Allergies  Allergies   Allergen Reactions   • Codeine Hives and Itching     Tolerated morphine previously  Allergy updated from 2015, morphine given in 2016   • Fish Allergy Anaphylaxis and Shortness of Breath     Swelling to throat.   • Shellfish Allergy Anaphylaxis     DIET  Orders Placed This Encounter   Procedures   • Diet Order Diet: Regular     Standing Status:   Standing     Number of Occurrences:   1     Order Specific Question:   Diet:     Answer:   Regular [1]       ACTIVITY  As tolerated.  Weight bearing as tolerated    CONSULTATIONS  None    PROCEDURES  None

## 2021-06-23 NOTE — PROGRESS NOTES
Daily Progress Note:     Date of Service: 6/23/2021  Primary Team: [facility]    Attending: Nico Romano M.D.   Senior Resident: Dr. Washington  Contact:  [phone]    Chief Complaint:   Shortness of breath    Subjective:  SOB significantly improved  Mild wheezing  Improved cough  No fevers or chills    Consultants/Specialty:  None    Review of Systems:   Review of Systems   Constitutional: Negative for chills and fever.   HENT: Negative for congestion and sore throat.    Respiratory: Positive for cough (improved), shortness of breath (improved) and wheezing (improved). Negative for hemoptysis.    Cardiovascular: Positive for chest pain (improved). Negative for palpitations and leg swelling.   Gastrointestinal: Negative for abdominal pain, nausea and vomiting.   Genitourinary: Negative for dysuria, frequency and hematuria.   Musculoskeletal: Negative for back pain and neck pain.   Skin: Negative for itching and rash.   Neurological: Negative for focal weakness and weakness.     Objective Data:   Physical Exam:   Vitals:   Temp:  [35.8 °C (96.5 °F)-37.1 °C (98.7 °F)] 37.1 °C (98.7 °F)  Pulse:  [70-93] 84  Resp:  [15-20] 18  BP: (101-121)/(75-92) 115/84  SpO2:  [90 %-98 %] 98 %    Physical Exam  Constitutional:       Appearance: She is ill-appearing.      Comments: Disheveled   HENT:      Head: Normocephalic and atraumatic.      Ears:      Comments: Left neck large purple mass tender to touch     Nose: Nose normal.      Mouth/Throat:      Comments: Thrush noted  Eyes:      Extraocular Movements: Extraocular movements intact.      Pupils: Pupils are equal, round, and reactive to light.   Cardiovascular:      Rate and Rhythm: Normal rate.      Pulses: Normal pulses.   Pulmonary:      Effort: Pulmonary effort is normal.      Breath sounds: Normal breath sounds.   Abdominal:      General: Abdomen is flat. There is no distension.      Palpations: There is no mass.      Tenderness: There is abdominal tenderness.    Musculoskeletal:         General: Swelling (Mild bilateral LE edema) present.   Neurological:      Mental Status: She is alert.       Labs:   Recent Labs     06/22/21  1130 06/23/21  0252   WBC 4.1* 4.9   RBC 4.11* 4.04*   HEMOGLOBIN 11.8* 11.7*   HEMATOCRIT 37.3 36.5*   MCV 90.8 90.3   MCH 28.7 29.0   RDW 50.6* 50.0   PLATELETCT 330 326   MPV 9.1 9.3   NEUTSPOLYS 50.40  --    LYMPHOCYTES 35.20  --    MONOCYTES 8.70  --    EOSINOPHILS 5.30  --    BASOPHILS 0.20  --      Recent Labs     06/22/21  1130 06/22/21  1737 06/23/21  0252   SODIUM 136 136 137   POTASSIUM 4.3 5.2 4.7   CHLORIDE 104 100 105   CO2 19* 18* 22   GLUCOSE 78 215* 206*   BUN 11 12 14     Recent Labs     06/22/21  1130 06/22/21 1737 06/23/21  0252   ALBUMIN 4.3  --  4.3   TBILIRUBIN <0.2  --  <0.2   ALKPHOSPHAT 76  --  74   TOTPROTEIN 7.3  --  7.2   ALTSGPT 6  --  6   ASTSGOT 11*  --  7*   CREATININE 0.83 0.87 0.71       Imaging:   DX-CHEST-PORTABLE (1 VIEW)   Final Result      1.  No acute cardiopulmonary process is seen.   2.  Atherosclerotic plaque.          Problem Representation: 50 year old female smoker with prior history of asthma presents with acute hypoxic respiratory failure. Patient is started on Duonebs, prednisone, Symbicort, and azithromycin.    * Respiratory failure (HCC)  Assessment & Plan  Hypoxic  Likely secondary to COPD/asthma exacerbation as patient continues to smoke vs. Poorly controlled + anemia component  R/o chest pain  BMP normal  Troponin/EKG  Flu/covid negative  CXR unremarkable  Recent echo 3/2021 shows 65% and RSVP 30  Procalcitonin normal  See reactive airway disease exacerbation    Reactive airway disease  Assessment & Plan  Improving  Patient with underlying COPD vs. recurring asthma (patient had asthma in childhood)  CXR unremarkable  Prednisone  Duonebs  Azithromycin  Budesonide/Formoterol  O2 goal 88-92  Measure peek expiratory flow    Chest pain  Assessment & Plan  Likely relating to exacerbation to airway  disease  Troponin x2 negative and EKG negative    Smoking  Assessment & Plan  Current smoker  Patient would like to cease smoking  Nicotine replacement order set    Anemia- (present on admission)  Assessment & Plan  Appears chronic  Workup unremarkable  Patient probably will benefit from screening colonoscopy outpatinet    Alcoholism (HCC)- (present on admission)  Assessment & Plan  Currently 10 days sober

## 2021-06-23 NOTE — PROGRESS NOTES
Received report from Day shift RN. Assumed care of patient at 1900. Patient A/Ox4 at this this time. On 2L O2, no signs of respiratory distress. Mild complaints of headache. Call light and belongings within reach. Bed in lowest locked position. Upper side rails raised. Hourly rounding in place. Will continue to monitor.

## 2021-06-23 NOTE — PROGRESS NOTES
"Paged [facility] on call. Pt having c/o of coughing and SOB. Pt stated \"This cough started two weeks ago.\" Lung sounds are rhonchi bilaterally and increased expiratory wheezing present compared to assessment at change of shift. HOB increased to >45deg. Cough is strong, congested but non-productive. Requesting guaifenesin. Dose given. Orders received. Respiratory Therapist at bedside to administer Duoneb. WCTM.     "

## 2021-06-23 NOTE — DISCHARGE PLANNING
Anticipated Discharge Disposition: home     Action: Received message from [facility] that pt's Symbicort script requires a PA. Submitted PA via [employer]     PA #: [id number]    Notified MD Washington via voalte of PA and that there is not an approval on script yet.     Update @ 3:36 pm: Made PC to Nydia with [employer] regarding PA. She stated that none of medication information was in PA and will assist with updated PA. She is able to have an answer by 5 pm for pt to potentially still dc today. Notified RN Marlene and MD.     PA submitted by pt's insurance. She notified this LSW that pt's insurance will only approve a month of symbicort discharging from the hospital and will need to follow up with her PCP Karo Middleton to receive her medications on a long term basis. Notified pt of this and she is aware.   Barriers to Discharge: symbicort script     Plan: LSW to assist as needed and monitor for barriers to discharge.

## 2021-06-23 NOTE — ASSESSMENT & PLAN NOTE
Improving  Patient with underlying COPD vs. recurring asthma (patient had asthma in childhood)  CXR unremarkable  Prednisone  Duonebs  Azithromycin  Budesonide/Formoterol  O2 goal 88-92  Measure peek expiratory flow

## 2021-06-23 NOTE — CARE PLAN
The patient is Watcher - Medium risk of patient condition declining or worsening    Shift Goals  Clinical Goals: Monitor respiratory status  Patient Goals: Quit smoking, Get well  Family Goals: n/a    Progress made toward(s) clinical / shift goals:    Problem: Risk for Aspiration  Goal: Patient's risk for aspiration will be absent or decrease  Outcome: Progressing  Flowsheets (Taken 6/22/2021 2329)  Aspiration Prevention:  • Assessed for signs and symptoms of aspiration  • Implemented aspiration precautions  Head of Bed Elevated: Greater or equal to 30 degrees     Problem: Knowledge Deficit - Standard  Goal: Patient and family/care givers will demonstrate understanding of plan of care, disease process/condition, diagnostic tests and medications  Outcome: Progressing  Note: Knowledge assessed and 10 minute sit completed to allow pt to verbalize questions about POC and risk factors. Pt able to teach back learning regarding smoking cessation and also triggers for disease process and allergy. Note that touching or consuming fish and fish products trigger anaphylactic reaction per patient.     Problem: Pain - Standard  Goal: Alleviation of pain or a reduction in pain to the patient’s comfort goal  Outcome: Progressing  Flowsheets  Taken 6/22/2021 2329  OB Pain Intervention: Cold pack  Taken 6/22/2021 2000  Pain Rating Scale (NPRS): 4  Note: Ice pack applied. Patient educated to pain scale system. Patient encouraged to verbalize discomfort. Patient taught about non-pharmacological pain management. Patient is comfortable at this time without statements of discomfort or pain.         Patient is not progressing towards the following goals:      Problem: Ineffective Airway Clearance  Goal: Patient will maintain patent airway with clear/clearing breath sounds  Outcome: Not Met  Flowsheets (Taken 6/22/2021 2325)  Ineffective Airway Clearance:  • Assessed breath sounds  • Assisted with measures to improve effectiveness of cough  effort  • Ambulated patient  Note: Cough medication added to MAR as pt stated she has had a cough for 2 weeks. Suction setup at bedside to assist with airway clearance. Pt educated on safe use and indication. Pt agreeable and encouraged on participating in POC.

## 2021-06-23 NOTE — ASSESSMENT & PLAN NOTE
Hypoxic  Likely secondary to COPD/asthma exacerbation as patient continues to smoke vs. Poorly controlled + anemia component  R/o chest pain  BMP normal  Troponin/EKG  Flu/covid negative  CXR unremarkable  Recent echo 3/2021 shows 65% and RSVP 30  Procalcitonin normal  See reactive airway disease exacerbation

## 2021-06-23 NOTE — RESPIRATORY CARE
"  COPD EDUCATION by COPD CLINICAL EDUCATOR  (Phone: [phone])  6/23/2021 at 8:49 AM by Lelia Manjarrez RRT     Patient seen by Respiratory Education team to complete Block 1 of a 2 part series. Reference material about the program was given to patient along with our contact information.  Part #1 includes: What is COPD (how the lungs work), common treatments for COPD, the difference between \"rescue\" medications and \"daily\" medications, bronchial hygiene, and explanation of the Action Plan. We discussed appropriate medication technique along with a demonstration, and the correct way to care for and clean equipment.  Advance directives and smoking cessation were discussed as appropriate to this patient. Question and answer session followed.    COPD EDUCATION by COPD CLINICAL EDUCATOR  6/23/2021 at 8:49 AM by Lelia Manjarrez RRT     Smoking Cessation Intervention and education completed, 4 minutes spent on smoking cessation education with patient.    Provided smoking cessation packet with \"Tips to Quit\" and brochure for \"Free Smoking Cessation Classes\".       COPD Screen  COPD Risk Screening  Do you have a history of COPD?: Yes  Do you have a Pulmonologist?: No  COPD Population Screener  During the past 4 weeks, how much did you feel short of breath?: Some of the time  Do you ever cough up any mucus or phlegm?: No/only with occasional colds or infections  In the past 12 months, you do less than you used to because of your breathing problems: Disagree/unsure  Have you smoked at least 100 cigarettes in your entire life?: Yes  How old are you[age] 50-59  COPD Screening Score: 4  COPD Coordinator Not Recommended: Yes    COPD Assessment  COPD Clinical Specialists ONLY  COPD Education Initiated: Yes--Full Intervention  COPD Education Session 1: Yes  Palliative Care:  (Pt wants to discuss with family first)  Referrals Initiated: Yes  Pulmonary Rehab: Yes  Smoking Cessation: Yes  $ Smoking Cessation 3-10 Minutes: Symptomatic  Hospice: " "Declined  Home Health Care: Declined  [place] [facility] Outreach: Yes  Geriatric Specialty Group: N/A  [facility]: Yes  Private In-Home Care Agency: Declined  Is this a COPD exacerbation patient?: Yes  $ Demo/Eval of SVN's, MDI's and Aerosols: Yes  (OP) Pulmonary Function Testing:  (Encouraged Pt to get PFT)    Meds to Beds  Would the patient like to opt in for Bedside Medication Delivery at Discharge?: Yes, interested     MY COPD ACTION PLAN     It is recommended that patients and physicians /healthcare providers complete this action plan together. This plan should be discussed at each physician visit and updated as needed.    The green, yellow and red zones show groups of symptoms of COPD. This list of symptoms is not comprehensive, and you may experience other symptoms. In the \"Actions\" column, your healthcare provider has recommended actions for you to take based on your symptoms.    Patient Name: Rhonda Gould   YOB: 1970   Last Updated on:     Green Zone:  I am doing well today Actions   •  Usual activitiy and exercise level •  Take daily medications   •  Usual amounts of cough and phlegm/mucus •  Use oxygen as prescribed   •  Sleep well at night •  Continue regular exercise/diet plan   •  Appetite is good •  At all times avoid cigarette smoke, inhaled irritants     Daily Medications (these medications are taken every day):                Yellow Zone:  I am having a bad day or a COPD flare Actions   •  More breathless than usual •  Continue daily medications   •  I have less energy for my daily activities •  Use quick relief inhaler as ordered   •  Increased or thicker phlegm/mucus •  Use oxygen as prescribed   •  Using quick relief inhaler/nebulizer more often •  Get plenty of rest   •  Swelling of ankles more than usual •  Use pursed lip breathing   •  More coughing than usual •  At all times avoid cigarette smoke, inhaled irritants   •  I feel like I have a \"chest cold\"   •  Poor " sleep and my symptoms woke me up   •  My appetite is not good   •  My medicine is not helping    •  Call provider immediately if symptoms don’t improve     Continue daily medications, add rescue medications:   Albuterol 2 Puffs Every 6 hours PRN       Medications to be used during a flare up, (as Discussed with Provider):           Additional Information:  Once you have the nebulizer, use before the inhaler if available.  If not available, use spacer with inhaler.    Red Zone:  I need urgent medical care Actions   •  Severe shortness of breath even at rest •  Call 911 or seek medical care immediately   •  Not able to do any activity because of breathing    •  Fever or shaking chills    •  Feeling confused or very drowsy     •  Chest pains    •  Coughing up blood

## 2021-06-23 NOTE — ASSESSMENT & PLAN NOTE
Appears chronic  Workup unremarkable  Patient probably will benefit from screening colonoscopy outpatinet

## 2021-06-23 NOTE — PROGRESS NOTES
Monitor summary:      Rhythm: SR  Rate: 70-94  Ectopy: none  Measurements: .18/.04/.39      12hr chart check

## 2021-06-23 NOTE — PROGRESS NOTES
4 Eyes Skin Assessment Completed by DIANA Donovan and DIANA [place].    Head WDL  Ears WDL  Nose WDL  Mouth WDL  Neck WDL  Breast/Chest WDL  Shoulder Blades WDL  Spine WDL  (R) Arm/Elbow/Hand WDL  (L) Arm/Elbow/Hand WDL  Abdomen WDL  Groin WDL  Scrotum/Coccyx/Buttocks WDL  (R) Leg WDL  (L) Leg WDL  (R) Heel/Foot/Toe WDL  (L) Heel/Foot/Toe WDL          Devices In Places Tele Box      Interventions In Place NC W/Ear Foams, Pillows and Pressure Redistribution Mattress    Possible Skin Injury No    Pictures Uploaded Into Epic N/A  Wound Consult Placed N/A  RN Wound Prevention Protocol Ordered No

## 2021-06-24 ENCOUNTER — PHARMACY VISIT (OUTPATIENT)
Dept: PHARMACY | Facility: MEDICAL CENTER | Age: 51
End: 2021-06-24
Payer: COMMERCIAL

## 2021-06-24 VITALS
DIASTOLIC BLOOD PRESSURE: 82 MMHG | WEIGHT: 141.09 LBS | BODY MASS INDEX: 20.9 KG/M2 | SYSTOLIC BLOOD PRESSURE: 108 MMHG | HEART RATE: 69 BPM | RESPIRATION RATE: 18 BRPM | OXYGEN SATURATION: 96 % | TEMPERATURE: 98.1 F | HEIGHT: 69 IN

## 2021-06-24 PROCEDURE — 700111 HCHG RX REV CODE 636 W/ 250 OVERRIDE (IP): Performed by: STUDENT IN AN ORGANIZED HEALTH CARE EDUCATION/TRAINING PROGRAM

## 2021-06-24 PROCEDURE — 700102 HCHG RX REV CODE 250 W/ 637 OVERRIDE(OP): Performed by: STUDENT IN AN ORGANIZED HEALTH CARE EDUCATION/TRAINING PROGRAM

## 2021-06-24 PROCEDURE — RXMED WILLOW AMBULATORY MEDICATION CHARGE: Performed by: STUDENT IN AN ORGANIZED HEALTH CARE EDUCATION/TRAINING PROGRAM

## 2021-06-24 PROCEDURE — 94664 DEMO&/EVAL PT USE INHALER: CPT

## 2021-06-24 PROCEDURE — 99406 BEHAV CHNG SMOKING 3-10 MIN: CPT

## 2021-06-24 PROCEDURE — A9270 NON-COVERED ITEM OR SERVICE: HCPCS | Performed by: STUDENT IN AN ORGANIZED HEALTH CARE EDUCATION/TRAINING PROGRAM

## 2021-06-24 RX ADMIN — AZITHROMYCIN MONOHYDRATE 500 MG: 250 TABLET ORAL at 05:06

## 2021-06-24 RX ADMIN — PREDNISONE 40 MG: 20 TABLET ORAL at 05:06

## 2021-06-24 NOTE — PROGRESS NOTES
Received bedside report from RN Marlene, pt care assumed, VSS, pt assessment complete. Pt AAOx4, c/o no pain at this time. No signs of acute distress noted at this time. POC discussed with pt and verbalizes no questions. Pt denies any additional needs at this time. Bed in lowest position. Pt educated on fall risk and verbalized understanding, call light within reach, hourly rounding initiated.

## 2021-06-24 NOTE — PROGRESS NOTES
This RN attempted to call [facility] to see if patient could  medications. Pharmacy is closed. This RN was waiting for prior authorization for Symbicort and this RN just got notified it was authorized. Patient updated and will be ready to go in the morning.

## 2021-06-24 NOTE — DISCHARGE INSTRUCTIONS
Obtain pulmonary function tests which were ordered  Continue smoking cessation, use patch for 6 weeks then discontinue afterwards, and use gum for cravings  Use inhalers as instructed by respiratory therapy, and if you are unsure, then use spacer to administer both Symbicort and albuterol  Follow up with primary       Discharge Instructions    Discharged to home by taxi with escort. Discharged via wheelchair, hospital escort: Yes.  Special equipment needed: Not Applicable    Be sure to schedule a follow-up appointment with your primary care doctor or any specialists as instructed.     Discharge Plan:   Diet Plan: Discussed  Activity Level: Discussed  Confirmed Follow up Appointment: Patient to Call and Schedule Appointment  Confirmed Symptoms Management: Discussed  Medication Reconciliation Updated: Yes    I understand that a diet low in cholesterol, fat, and sodium is recommended for good health. Unless I have been given specific instructions below for another diet, I accept this instruction as my diet prescription.   Other diet: Regular    Special Instructions: Chronic Obstructive Pulmonary Disease (COPD) is a long-term, progressive lung disease that makes it harder to breathe. It includes chronic bronchitis, emphysema, and refractory (non-reversible) asthma. With COPD, the airways in your lungs become inflamed and thicken, and the tissue where oxygen is exchanged is destroyed. The flow of air in and out of your lungs decreases. When that happens, less oxygen gets into your body tissues, and it becomes harder to get rid of the waste gas carbon dioxide. As the disease gets worse, shortness of breath makes it harder to remain active. There is no cure for COPD, but it is preventable and treatable.    COPD Patient Discharge Instructions    • Diet  o Follow a low fat, low cholesterol, low salt diet unless instructed otherwise by your Doctor. Read the labels on the back of food products and track your intake of fat,  cholesterol and salt.  • No smoking  o Discontinuing smoking will have the biggest impact on preventing progression of disease.  o To participate in [employer] Quit Tobacco Program, call 057-5802 or visit [email or web address]/[email or web address]  • Oxygen  o If your doctor has order that you wear oxygen at home, it is important to wear it as ordered.  o Do not smoke, vape, or use e-cigarettes with oxygen on.  o Store in an appropriate location: upright in its deleon or laid flat down, away from open flames and stoves.   o Do not use oil-based creams and moisturizers (ie: petroleum products, oil-based lip moisturizers) or aerosol sprays (ie: hair sprays or deodorants) when using your oxygen equipment.  o Be careful with tubing placement to prevent yourself and others from tripping.  • Medications  o Refer to your personalized Action Plan to manage your symptoms.  • Warning signs of an exacerbation  o Breathing fast and shallow, worsening shortness of breath (like you just finished exercising)  o Chest tightness  o Increases in sputum production  o Changes in sputum color  o Lower oxygen levels than baseline  • When to call your doctor  o If the warning signs of an exacerbation do not improve  o Refer to your personalized Action Plan   • Pulmonary Rehab  o Your doctor has ordered you a referral to Pulmonary Rehab. Call [phone] to schedule an appointment  • Attend your follow-up appointment with your PCP and/or Pulmonologist  • Remote Monitoring: At the direction of the remote monitoring on-call provider, you may increase your oxygen by 2 liters above your baseline.     See the educational handout provided by your COPD Navigator for more information. This also explains more about COPD, symptoms of an exacerbation, and some of the tests that you have undergone.    · Is patient discharged on Warfarin / Coumadin?   No     Pulmonary Function Tests  A pulmonary function test measures how well you move air in and out of your lungs.  "There are a number of tests that can be done. The most often the measurement used is the peak flow test. This test can also be used at home. Examples of these include the Chidi Zone, Astech, Mini Mack, and Spir-O-Flow meters.   Measuring how well air moves out of your lung can be used as a reliable guide to treatment, even if you have no symptoms. Normal values of peak flow rates depend on your age, height, and sex. If your peak flow measurement is lower than normal, treatment with inhaled medicines can be started to prevent a more serious episode of asthma or emphysema. You should measure your peak flows daily in the morning, and more often throughout the day if you have any symptoms such as shortness of breath or cough. Keep a record of your peak flow results to review with your caregiver. The mouth piece of your peak flow meter should be washed with soap and water once a week. Otherwise it does not need special care.  To measure your peak flow, proper technique is very important. Follow the instructions that accompany your meter. Usually you must set the indicator to 0, take a deep breath, and blow as quickly and forcefully as possible into the meter. Reset the meter and repeat the measurement twice; record your best result. If your test results put you in the \"green\" zone, this means your ability to move air is % of normal. No special treatment may be needed. If your test puts you in the \"yellow\" zone, you are between 50 and 80% of normal. Treatment should be started. Results in the \"red\" zone mean you are below 50% of normal and you should initiate treatment and call your caregiver right away.  Document Released: 01/25/2006 Document Revised: 03/11/2013 Document Reviewed: 12/18/2006  ExitCare® Patient Information ©2013 24/7 Card.    Depression / Suicide Risk    As [facility] are [facility], it is important to learn how to keep safe from harming yourself.    Recognize the " warning signs:  · Abrupt changes in personality, positive or negative- including increase in energy   · Giving away possessions  · Change in eating patterns- significant weight changes-  positive or negative  · Change in sleeping patterns- unable to sleep or sleeping all the time   · Unwillingness or inability to communicate  · Depression  · Unusual sadness, discouragement and loneliness  · Talk of wanting to die  · Neglect of personal appearance   · Rebelliousness- reckless behavior  · Withdrawal from people/activities they love  · Confusion- inability to concentrate     If you or a loved one observes any of these behaviors or has concerns about self-harm, here's what you can do:  · Talk about it- your feelings and reasons for harming yourself  · Remove any means that you might use to hurt yourself (examples: pills, rope, extension cords, firearm)  · Get professional help from the community (Mental Health, Substance Abuse, psychological counseling)  · Do not be alone:Call your Safe Contact- someone whom you trust who will be there for you.  · Call your local CRISIS HOTLINE 504-4611 or 218-636-1589  · Call your local Children's Mobile Crisis Response Team [place] (600) 863-1701 or www.[employer]  · Call the toll free National Suicide Prevention Hotlines   · National Suicide Prevention Lifeline 004-547-CIWC (0486)  · National Hope Line Network 800-SUICIDE (038-6885)

## 2021-06-24 NOTE — DISCHARGE PLANNING
Received Transport Form @ 1138  Spoke to Sheila @ [facility]    Transport is scheduled for 6/24 @1100 [facility].    SW for T7 and RN for DL notified of scheduled transport via Voalte @0733.

## 2021-06-24 NOTE — RESPIRATORY CARE
"  COPD EDUCATION by COPD CLINICAL EDUCATOR  (Phone: [phone])  6/24/2021 at 9:07 AM by Mary Muhammad RRT    Patient seen by Respiratory Education team to complete the final block of education.  This session discussed signs and symptoms of an exacerbation (flare-up), triggers that can create flare-ups, reiteration of the \"Action Plan\" to refer to daily which will help categorize their symptoms in order to utilize the appropriate therapy, breathing techniques used to treat acute symptoms, and oxygen safety. Smoking Cessation was discussed as appropriate to this patient. Question and answer session followed.     COPD EDUCATION by COPD CLINICAL EDUCATOR  6/24/2021 at 9:07 AM by Mary Muhammad RRT     Smoking Cessation Intervention and education completed, 5 minutes spent on smoking cessation education with patient.    Provided smoking cessation packet with \"Tips to Quit\" and brochure for \"Free Smoking Cessation Classes\".       COPD Screen  COPD Risk Screening  Do you have a history of COPD?: Yes  Do you have a Pulmonologist?: No  COPD Population Screener  During the past 4 weeks, how much did you feel short of breath?: Some of the time  Do you ever cough up any mucus or phlegm?: No/only with occasional colds or infections  In the past 12 months, you do less than you used to because of your breathing problems: Disagree/unsure  Have you smoked at least 100 cigarettes in your entire life?: Yes  How old are you?: 50-59  COPD Screening Score: 4  COPD Coordinator Not Recommended: Yes    COPD Assessment  COPD Clinical Specialists ONLY  COPD Education Initiated: Yes--Full Intervention, Req neb from MD 6/23 not sure if pt will receive. She is going home on symbicort which is new for her.  COPD Education Session 1: Yes  COPD Education Session 2: Yes  DME Company:  (n/a)  Physician Follow Up Appointment:  (Pt declined, called her PCP herself and is making an appt.)  Pulmonary Follow Up Appointment:  (Does not " "qualify)  Palliative Care:  (Pt wants to discuss with family first)  Referrals Initiated: Yes  Pulmonary Rehab: Yes  Smoking Cessation: Yes  $ Smoking Cessation 3-10 Minutes: Symptomatic  Hospice: No  Home Health Care: No  [facility] Community Outreach: Yes  Geriatric Specialty Group: N/A  [facility]: No  Private In-Home Care Agency: No  Is this a COPD exacerbation patient?: Yes  $ Demo/Eval of SVN's, MDI's and Aerosols: Yes  (OP) Pulmonary Function Testing:  (Encouraged Pt to get PFT)    Meds to Beds  Would the patient like to opt in for Bedside Medication Delivery at Discharge?: Yes, interested     MY COPD ACTION PLAN     It is recommended that patients and physicians /healthcare providers complete this action plan together. This plan should be discussed at each physician visit and updated as needed.    The green, yellow and red zones show groups of symptoms of COPD. This list of symptoms is not comprehensive, and you may experience other symptoms. In the \"Actions\" column, your healthcare provider has recommended actions for you to take based on your symptoms.    Patient Name: Rhonda Gould   YOB: 1970   Last Updated on:     Green Zone:  I am doing well today Actions   •  Usual activitiy and exercise level •  Take daily medications   •  Usual amounts of cough and phlegm/mucus •  Use oxygen as prescribed   •  Sleep well at night •  Continue regular exercise/diet plan   •  Appetite is good •  At all times avoid cigarette smoke, inhaled irritants     Daily Medications (these medications are taken every day):   Budesonide-Formoterol Fumarate (Symbicort) 2 Puffs Twice daily     Additional Information:  Use your spacer and rinse mouth after.    Yellow Zone:  I am having a bad day or a COPD flare Actions   •  More breathless than usual •  Continue daily medications   •  I have less energy for my daily activities •  Use quick relief inhaler as ordered   •  Increased or thicker phlegm/mucus •  Use " "oxygen as prescribed   •  Using quick relief inhaler/nebulizer more often •  Get plenty of rest   •  Swelling of ankles more than usual •  Use pursed lip breathing   •  More coughing than usual •  At all times avoid cigarette smoke, inhaled irritants   •  I feel like I have a \"chest cold\"   •  Poor sleep and my symptoms woke me up   •  My appetite is not good   •  My medicine is not helping    •  Call provider immediately if symptoms don’t improve     Continue daily medications, add rescue medications:   Albuterol 2 Puffs Every 6 hours PRN       Medications to be used during a flare up, (as Discussed with Provider):           Additional Information:  Once you have the nebulizer, use before the inhaler if available.  If not available, use spacer with inhaler.    Red Zone:  I need urgent medical care Actions   •  Severe shortness of breath even at rest •  Call 911 or seek medical care immediately   •  Not able to do any activity because of breathing    •  Fever or shaking chills    •  Feeling confused or very drowsy     •  Chest pains    •  Coughing up blood              "

## 2021-06-24 NOTE — DISCHARGE PLANNING
Anticipated Discharge Disposition: [facility] transitional housing. [address]     Action: Pt ready for discharge.     Pt needs assistance with transportation from the hospital. She has MTM. Submitted transport request to [facility] and Notified [facility] via Voalte.     Barriers to Discharge: transport set up     Plan: LSW to assist as needed and monitor for barriers to discharge.

## 2021-06-24 NOTE — CARE PLAN
The patient is Stable - Low risk of patient condition declining or worsening    Shift Goals  Clinical Goals: Headache below pain scale 5/10  Patient Goals: Quit smoking and feel better  Family Goals: n/a    Progress made toward(s) clinical / shift goals:      Problem: Knowledge Deficit - COPD  Goal: Patient/significant other demonstrates understanding of disease process, utilization of the Action Plan, medications and discharge instruction  Outcome: Progressing     Problem: Pain - Standard  Goal: Alleviation of pain or a reduction in pain to the patient’s comfort goal  Outcome: Progressing

## 2021-06-24 NOTE — DISCHARGE PLANNING
Meds-to-Beds: Discharge prescription orders listed below delivered to patient at discharge neema. DIANA Reyes notified. Patient counseled.       Rhonda Gould   Home Medication Instructions VALDEZ:[id number]    Printed on:06/24/21 1023   Medication Information                      azithromycin (ZITHROMAX) 250 MG Tab  Take 2 tabs morning of 6/24             budesonide-formoterol (SYMBICORT) 160-4.5 MCG/ACT Aerosol  Inhale 2 Puffs by mouth 2 times a day.             nicotine (NICODERM) 7 MG/24HR PATCH 24 HR  Place 1 Patch on the skin every 24 hours.             nicotine polacrilex (NICORETTE) 2 MG Gum  Chew and cheek 1 piece as needed for Smoking Cessation.             predniSONE (DELTASONE) 20 MG Tab  Take 2 pills by mouth (20 mg) once a day for 3 days                 Tawnya Matias, PharmD

## 2021-06-24 NOTE — PROGRESS NOTES
D/C'd.  Discharge instructions provided to pt.  Pt states understanding.  Pt states all questions have been answered.  Copy of discharge provided to pt.  Signed copy in chart.  Prescriptions provided to pt via meds to beds. Pt states that all personal belongings are in possession. Awaitng MTM ride.

## 2021-06-25 LAB
BACTERIA UR CULT: NORMAL
SIGNIFICANT IND 70042: NORMAL
SITE SITE: NORMAL
SOURCE SOURCE: NORMAL

## 2021-07-05 ENCOUNTER — TELEPHONE (OUTPATIENT)
Dept: RESPIRATORY THERAPY | Facility: MEDICAL CENTER | Age: 51
End: 2021-07-05

## 2021-07-07 ENCOUNTER — TELEPHONE (OUTPATIENT)
Dept: RESPIRATORY THERAPY | Facility: MEDICAL CENTER | Age: 51
End: 2021-07-07

## 2021-07-13 ENCOUNTER — TELEPHONE (OUTPATIENT)
Dept: RESPIRATORY THERAPY | Facility: MEDICAL CENTER | Age: 51
End: 2021-07-13

## 2021-07-13 NOTE — TELEPHONE ENCOUNTER
Did you stop smoking because of the program? Still working on it, getting nicotine patches from MD  Not smoking at time of follow up? no  Did you refill your medications? yes  Did you take them everyday as prescribed? yes  Have you seen [facility] since discharging from the hospital? yes  Have you seen Home Health since discharging from the hospital? n/a  Have you seen [facility] since discharging from the hospital? n/a  Have you seen [facility] since discharging from the hospital? n/a

## 2021-10-07 ENCOUNTER — APPOINTMENT (OUTPATIENT)
Dept: RADIOLOGY | Facility: MEDICAL CENTER | Age: 51
End: 2021-10-07
Attending: EMERGENCY MEDICINE
Payer: MEDICAID

## 2021-10-07 ENCOUNTER — HOSPITAL ENCOUNTER (EMERGENCY)
Facility: MEDICAL CENTER | Age: 51
End: 2021-10-07
Attending: EMERGENCY MEDICINE
Payer: MEDICAID

## 2021-10-07 VITALS
OXYGEN SATURATION: 93 % | WEIGHT: 146 LBS | HEART RATE: 56 BPM | BODY MASS INDEX: 21.62 KG/M2 | TEMPERATURE: 98 F | RESPIRATION RATE: 16 BRPM | DIASTOLIC BLOOD PRESSURE: 71 MMHG | HEIGHT: 69 IN | SYSTOLIC BLOOD PRESSURE: 101 MMHG

## 2021-10-07 DIAGNOSIS — M54.42 MIDLINE LOW BACK PAIN WITH LEFT-SIDED SCIATICA, UNSPECIFIED CHRONICITY: ICD-10-CM

## 2021-10-07 LAB
ANION GAP SERPL CALC-SCNC: 12 MMOL/L (ref 7–16)
APPEARANCE UR: CLEAR
BASOPHILS # BLD AUTO: 0.7 % (ref 0–1.8)
BASOPHILS # BLD: 0.03 K/UL (ref 0–0.12)
BILIRUB UR QL STRIP.AUTO: NEGATIVE
BUN SERPL-MCNC: 15 MG/DL (ref 8–22)
CALCIUM SERPL-MCNC: 9.4 MG/DL (ref 8.5–10.5)
CHLORIDE SERPL-SCNC: 107 MMOL/L (ref 96–112)
CO2 SERPL-SCNC: 22 MMOL/L (ref 20–33)
COLOR UR: YELLOW
CREAT SERPL-MCNC: 0.89 MG/DL (ref 0.5–1.4)
CRP SERPL HS-MCNC: <0.3 MG/DL (ref 0–0.75)
EOSINOPHIL # BLD AUTO: 0.15 K/UL (ref 0–0.51)
EOSINOPHIL NFR BLD: 3.5 % (ref 0–6.9)
ERYTHROCYTE [DISTWIDTH] IN BLOOD BY AUTOMATED COUNT: 46.4 FL (ref 35.9–50)
ERYTHROCYTE [SEDIMENTATION RATE] IN BLOOD BY WESTERGREN METHOD: 10 MM/HOUR (ref 0–25)
GLUCOSE SERPL-MCNC: 93 MG/DL (ref 65–99)
GLUCOSE UR STRIP.AUTO-MCNC: NEGATIVE MG/DL
HCT VFR BLD AUTO: 40.2 % (ref 37–47)
HGB BLD-MCNC: 12.7 G/DL (ref 12–16)
IMM GRANULOCYTES # BLD AUTO: 0.01 K/UL (ref 0–0.11)
IMM GRANULOCYTES NFR BLD AUTO: 0.2 % (ref 0–0.9)
KETONES UR STRIP.AUTO-MCNC: ABNORMAL MG/DL
LEUKOCYTE ESTERASE UR QL STRIP.AUTO: NEGATIVE
LYMPHOCYTES # BLD AUTO: 1.98 K/UL (ref 1–4.8)
LYMPHOCYTES NFR BLD: 45.7 % (ref 22–41)
MCH RBC QN AUTO: 27.8 PG (ref 27–33)
MCHC RBC AUTO-ENTMCNC: 31.6 G/DL (ref 33.6–35)
MCV RBC AUTO: 88 FL (ref 81.4–97.8)
MICRO URNS: ABNORMAL
MONOCYTES # BLD AUTO: 0.28 K/UL (ref 0–0.85)
MONOCYTES NFR BLD AUTO: 6.5 % (ref 0–13.4)
NEUTROPHILS # BLD AUTO: 1.88 K/UL (ref 2–7.15)
NEUTROPHILS NFR BLD: 43.4 % (ref 44–72)
NITRITE UR QL STRIP.AUTO: NEGATIVE
NRBC # BLD AUTO: 0 K/UL
NRBC BLD-RTO: 0 /100 WBC
PH UR STRIP.AUTO: 5 [PH] (ref 5–8)
PLATELET # BLD AUTO: 328 K/UL (ref 164–446)
PMV BLD AUTO: 8.7 FL (ref 9–12.9)
POTASSIUM SERPL-SCNC: 4.5 MMOL/L (ref 3.6–5.5)
PROT UR QL STRIP: NEGATIVE MG/DL
RBC # BLD AUTO: 4.57 M/UL (ref 4.2–5.4)
RBC UR QL AUTO: NEGATIVE
SODIUM SERPL-SCNC: 141 MMOL/L (ref 135–145)
SP GR UR STRIP.AUTO: 1.03
UROBILINOGEN UR STRIP.AUTO-MCNC: 0.2 MG/DL
WBC # BLD AUTO: 4.3 K/UL (ref 4.8–10.8)

## 2021-10-07 PROCEDURE — 700102 HCHG RX REV CODE 250 W/ 637 OVERRIDE(OP): Performed by: EMERGENCY MEDICINE

## 2021-10-07 PROCEDURE — 86140 C-REACTIVE PROTEIN: CPT

## 2021-10-07 PROCEDURE — 81003 URINALYSIS AUTO W/O SCOPE: CPT

## 2021-10-07 PROCEDURE — 700105 HCHG RX REV CODE 258: Performed by: EMERGENCY MEDICINE

## 2021-10-07 PROCEDURE — 96374 THER/PROPH/DIAG INJ IV PUSH: CPT

## 2021-10-07 PROCEDURE — 72131 CT LUMBAR SPINE W/O DYE: CPT

## 2021-10-07 PROCEDURE — 99285 EMERGENCY DEPT VISIT HI MDM: CPT

## 2021-10-07 PROCEDURE — 85025 COMPLETE CBC W/AUTO DIFF WBC: CPT

## 2021-10-07 PROCEDURE — 85652 RBC SED RATE AUTOMATED: CPT

## 2021-10-07 PROCEDURE — 80048 BASIC METABOLIC PNL TOTAL CA: CPT

## 2021-10-07 PROCEDURE — 700101 HCHG RX REV CODE 250: Performed by: EMERGENCY MEDICINE

## 2021-10-07 PROCEDURE — A9270 NON-COVERED ITEM OR SERVICE: HCPCS | Performed by: EMERGENCY MEDICINE

## 2021-10-07 PROCEDURE — 74018 RADEX ABDOMEN 1 VIEW: CPT

## 2021-10-07 RX ORDER — NAPROXEN 500 MG/1
500 TABLET ORAL 2 TIMES DAILY WITH MEALS
Qty: 10 TABLET | Refills: 0 | Status: SHIPPED | OUTPATIENT
Start: 2021-10-07 | End: 2021-10-12

## 2021-10-07 RX ORDER — CYCLOBENZAPRINE HCL 5 MG
5 TABLET ORAL 3 TIMES DAILY PRN
Qty: 15 TABLET | Refills: 0 | Status: SHIPPED | OUTPATIENT
Start: 2021-10-07 | End: 2021-10-12

## 2021-10-07 RX ORDER — LIDOCAINE 50 MG/G
1 PATCH TOPICAL EVERY 24 HOURS
Qty: 5 PATCH | Refills: 0 | Status: SHIPPED | OUTPATIENT
Start: 2021-10-07 | End: 2021-10-12

## 2021-10-07 RX ORDER — DIAZEPAM 5 MG/1
5 TABLET ORAL ONCE
Status: COMPLETED | OUTPATIENT
Start: 2021-10-07 | End: 2021-10-07

## 2021-10-07 RX ORDER — SODIUM CHLORIDE 9 MG/ML
1000 INJECTION, SOLUTION INTRAVENOUS ONCE
Status: COMPLETED | OUTPATIENT
Start: 2021-10-07 | End: 2021-10-07

## 2021-10-07 RX ORDER — LIDOCAINE 50 MG/G
1 PATCH TOPICAL EVERY 24 HOURS
Status: DISCONTINUED | OUTPATIENT
Start: 2021-10-07 | End: 2021-10-07 | Stop reason: HOSPADM

## 2021-10-07 RX ADMIN — KETAMINE HYDROCHLORIDE 25 MG: 10 INJECTION, SOLUTION INTRAMUSCULAR; INTRAVENOUS at 17:12

## 2021-10-07 RX ADMIN — SODIUM CHLORIDE 1000 ML: 9 INJECTION, SOLUTION INTRAVENOUS at 17:12

## 2021-10-07 RX ADMIN — LIDOCAINE 1 PATCH: 50 PATCH TOPICAL at 19:29

## 2021-10-07 RX ADMIN — DIAZEPAM 5 MG: 5 TABLET ORAL at 19:29

## 2021-10-07 ASSESSMENT — PAIN DESCRIPTION - PAIN TYPE
TYPE: ACUTE PAIN;CHRONIC PAIN
TYPE: ACUTE PAIN;CHRONIC PAIN

## 2021-10-07 ASSESSMENT — LIFESTYLE VARIABLES: DO YOU DRINK ALCOHOL: NO

## 2021-10-07 ASSESSMENT — FIBROSIS 4 INDEX: FIB4 SCORE: 0.46

## 2021-10-07 NOTE — ED TRIAGE NOTES
"Chief Complaint   Patient presents with   • Back Pain     hx of back pain with previous fx. reports doing a lot of walking this week making the pain worse. denies new injury. pain radiates up and down LT side of back and down LT leg. denies loss of bowel or bladder.      [facility] from [facility] for above.   Received a total of 200 mcg fentanyl PTA.  Continues to have pain.     /66   Pulse 77   Temp 36.7 °C (98.1 °F)   Resp 18   Ht 1.753 m (5' 9\")   Wt 66.2 kg (146 lb)   LMP 06/11/2005   SpO2 96%   BMI 21.56 kg/m²     "

## 2021-10-07 NOTE — ED PROVIDER NOTES
ED Provider Note    CHIEF COMPLAINT  Chief Complaint   Patient presents with   • Back Pain     hx of back pain with previous fx. reports doing a lot of walking this week making the pain worse. denies new injury. pain radiates up and down LT side of back and down LT leg. denies loss of bowel or bladder.        HPI  Rhonda Gould is a 50 y.o. female who presents with a chief complaint of low back pain.  Patient has a longstanding history of low back pain but did some extensive walking this past Monday and Tuesday after which time she felt her back stiffened up.  The pain is in the coccyx region and radiates down the left leg.  There is tingling down the back of the left leg but no erica numbness or weakness.  She has been unable to ambulate because of the pain.  She has now saddle anesthesia or bowel/bladder incontinence.  She denies any IV drug use, fevers, recent trauma, or recent back injections.  She has been taking Tylenol and Motrin without improvement.  She was given 200 mcg of fentanyl in route without any relief of symptoms.  She denies any chest pain or shortness of breath, abdominal pain, nausea, vomiting, dysuria.    REVIEW OF SYSTEMS  See HPI for further details.  Low back pain.  Left leg pain.  Left leg tingling.  All other systems are negative.     PAST MEDICAL HISTORY   has a past medical history of Chronic obstructive pulmonary disease (HCC) and Hypertension.    SOCIAL HISTORY  Social History     Tobacco Use   • Smoking status: Current Every Day Smoker     Packs/day: 0.25     Years: 30.00     Pack years: 7.50     Types: Cigarettes   • Smokeless tobacco: Never Used   Substance and Sexual Activity   • Alcohol use: Not Currently     Comment: Quit 6/15   • Drug use: Yes     Frequency: 2.0 times per week     Types: Inhaled, Marijuana     Comment: marijuana   • Sexual activity: Not Currently       SURGICAL HISTORY   has a past surgical history that includes hysterectomy laparoscopy (6/2005).    CURRENT  "MEDICATIONS  Home Medications     Reviewed by Ciaran Fernández R.N. (Registered Nurse) on 10/07/21 at 1555  Med List Status: Partial   Medication Last Dose Status   albuterol 108 (90 Base) MCG/ACT Aero Soln inhalation aerosol  Active   azithromycin (ZITHROMAX) 250 MG Tab  Active   budesonide-formoterol (SYMBICORT) 160-4.5 MCG/ACT Aerosol  Active   cetirizine (ZYRTEC) 10 MG Tab  Active   nicotine (NICODERM) 7 MG/24HR PATCH 24 HR  Active   nicotine polacrilex (NICORETTE) 2 MG Gum  Active   predniSONE (DELTASONE) 20 MG Tab  Active   sertraline (ZOLOFT) 100 MG Tab  Active   SUMAtriptan (IMITREX) 50 MG Tab  Active                ALLERGIES  Allergies   Allergen Reactions   • Codeine Hives and Itching     Tolerated morphine previously  Allergy updated from 2015, morphine given in 2016   • Fish Allergy Anaphylaxis and Shortness of Breath     Swelling to throat.   • Shellfish Allergy Anaphylaxis       PHYSICAL EXAM  VITAL SIGNS: /66   Pulse 77   Temp 36.7 °C (98.1 °F)   Resp 18   Ht 1.753 m (5' 9\")   Wt 66.2 kg (146 lb)   LMP 06/11/2005   SpO2 96%   BMI 21.56 kg/m²    Pulse ox interpretation: I interpret this pulse ox as normal.  Constitutional: Alert, tearful.  HENT: No signs of trauma, Bilateral external ears normal, Nose normal.  Tacky mucous membranes.  Eyes: Pupils are equal and reactive, Conjunctiva normal, Non-icteric.   Neck: Normal range of motion, No tenderness, Supple, No stridor.   Lymphatic: No lymphadenopathy noted.   Cardiovascular: Regular rate and rhythm, no murmurs. Pulses symmetrical.  Thorax & Lungs: Normal breath sounds, No respiratory distress, No wheezing, No chest tenderness.   Abdomen: Bowel sounds normal, Soft, No tenderness, No masses, No pulsatile masses. No peritoneal signs.  Skin: Warm, Dry, No erythema, No rash.   Back: Tender over the low back/lumbar spine without overlying erythema or obvious external trauma.  Extremities: Intact distal pulses, No edema, No tenderness, No " cyanosis.  Musculoskeletal: Good range of motion in all major joints. No major deformities noted.   Neurologic: Alert, Normal strength and sensation in bilateral lower extremities. No focal deficits noted.  Antalgic gait.  Psychiatric: Affect normal, Judgment normal, Mood normal.     DIAGNOSTIC STUDIES / PROCEDURES    LABS  Results for orders placed or performed during the hospital encounter of 10/07/21   CBC WITH DIFFERENTIAL   Result Value Ref Range    WBC 4.3 (L) 4.8 - 10.8 K/uL    RBC 4.57 4.20 - 5.40 M/uL    Hemoglobin 12.7 12.0 - 16.0 g/dL    Hematocrit 40.2 37.0 - 47.0 %    MCV 88.0 81.4 - 97.8 fL    MCH 27.8 27.0 - 33.0 pg    MCHC 31.6 (L) 33.6 - 35.0 g/dL    RDW 46.4 35.9 - 50.0 fL    Platelet Count 328 164 - 446 K/uL    MPV 8.7 (L) 9.0 - 12.9 fL    Neutrophils-Polys 43.40 (L) 44.00 - 72.00 %    Lymphocytes 45.70 (H) 22.00 - 41.00 %    Monocytes 6.50 0.00 - 13.40 %    Eosinophils 3.50 0.00 - 6.90 %    Basophils 0.70 0.00 - 1.80 %    Immature Granulocytes 0.20 0.00 - 0.90 %    Nucleated RBC 0.00 /100 WBC    Neutrophils (Absolute) 1.88 (L) 2.00 - 7.15 K/uL    Lymphs (Absolute) 1.98 1.00 - 4.80 K/uL    Monos (Absolute) 0.28 0.00 - 0.85 K/uL    Eos (Absolute) 0.15 0.00 - 0.51 K/uL    Baso (Absolute) 0.03 0.00 - 0.12 K/uL    Immature Granulocytes (abs) 0.01 0.00 - 0.11 K/uL    NRBC (Absolute) 0.00 K/uL   Basic Metabolic Panel   Result Value Ref Range    Sodium 141 135 - 145 mmol/L    Potassium 4.5 3.6 - 5.5 mmol/L    Chloride 107 96 - 112 mmol/L    Co2 22 20 - 33 mmol/L    Glucose 93 65 - 99 mg/dL    Bun 15 8 - 22 mg/dL    Creatinine 0.89 0.50 - 1.40 mg/dL    Calcium 9.4 8.5 - 10.5 mg/dL    Anion Gap 12.0 7.0 - 16.0   URINALYSIS    Specimen: Urine, Clean Catch   Result Value Ref Range    Color Yellow     Character Clear     Specific Gravity 1.028 <1.035    Ph 5.0 5.0 - 8.0    Glucose Negative Negative mg/dL    Ketones Trace (A) Negative mg/dL    Protein Negative Negative mg/dL    Bilirubin Negative Negative     Urobilinogen, Urine 0.2 Negative    Nitrite Negative Negative    Leukocyte Esterase Negative Negative    Occult Blood Negative Negative    Micro Urine Req see below    CRP QUANTITIVE (NON-CARDIAC)   Result Value Ref Range    Stat C-Reactive Protein <0.30 0.00 - 0.75 mg/dL   Sed Rate   Result Value Ref Range    Sed Rate Westergren 10 0 - 25 mm/hour   ESTIMATED GFR   Result Value Ref Range    GFR If African American >60 >60 mL/min/1.73 m 2    GFR If Non African American >60 >60 mL/min/1.73 m 2     RADIOLOGY  [id number] VIEW   Final Result      1.  No evidence of bowel obstruction   2.  Mildly increased colonic stool.      CT-LSPINE W/O PLUS RECONS   Final Result      1.  There is no acute fracture or malalignment of the lumbar spine.   2.  There is minimal degenerative endplate spurring with mild disc bulges as noted above.          COURSE & MEDICAL DECISION MAKING  Pertinent Labs & Imaging studies reviewed. (See chart for details)  This is a 50 year old female here with acute on chronic low back pain. No red flags to suggest cauda equina or epidural abscess. No fevers, lower extremity weakness, saddle anesthesia, incontinence. She is not an IV drug user and has not had any recent back injections. She is tender over the low L-spine region. There is no overlying erythema. Patient reports some tingling in the left lower extremity but has full strength and sensation on exam. She has a very antalgic gait but is able to bear weight on the left leg.    Given a dose of ketamine due to BP in the low 100s. She received fentanyl enroute without improvement. The ketamine helped modestly but patient remained in significant discomfort.    Labs were ordered for further risk stratification of infectious process. CT performed to rule out pathologic fracture. AXR performed to rule out constipation as the etiology although patient has no abdominal pain/tenderness.    CBC demonstrates mild leukopenia with WBC of 4.3. BMP is grossly  normal. Both ESR and CRP are normal. CT L-spine without acute process. AXR with mildly increased stool burden.    Low suspicion for discitis, osteomyelitis, septic facets. Remains afebrile with normal heart rate.    Lidoderm patch was placed and patient was given a dose of valium as she expressed some desire to try a muscle relaxer. Thankfully, upon reevaluation, patient reported significant improvement in her symptoms. Still has a mildly antalgic gait but this is significantly improved.    She is safe for d/c with close outpatient management. Given prescriptions for naproxen, lidoderm patches, and flexeril. She was given contact information for spine surgery for additional workup and management.    Discharged in good and stable condition with strict return precautions.    The patient will not drink alcohol nor drive with prescribed medications. The patient will return for worsening symptoms and is stable at the time of discharge. The patient verbalizes understanding and will comply.    HYDRATION  HYDRATION: Based on the patient's presentation of Dehydration the patient was given IV fluids. IV Hydration was used because oral hydration was not adequate alone. Upon recheck following hydration, the patient was improved.    FINAL IMPRESSION  1. Midline low back pain with left-sided sciatica, unspecified chronicity  cyclobenzaprine (FLEXERIL) 5 mg tablet    naproxen (NAPROSYN) 500 MG Tab    lidocaine (LIDODERM) 5 % Patch         Electronically signed by: Wily Cowart M.D., 10/7/2021 4:02 PM

## 2021-10-08 NOTE — ED NOTES
Discharge teaching and paperwork provided regarding sciatica and all questions/concerns answered. VSS, assessment stable and PIV removed. Given information regarding home care and reasons to follow up with ED or primary MD. Patient provided with flexeril, naprosyn & lidocaine Rx. Patient discharged to the care of son and assisted by w/c out of the ED.

## 2021-10-08 NOTE — ED NOTES
Dr. Cowart reevaluating pt. ++ improvement, pain decreased to 6/10 from 9/10 & pt now able to ambulate slowly.

## 2021-10-08 NOTE — ED NOTES
Patient attempted to ambulate. Standing at bedside patient was unable to put full weight on left leg due to pain which is now 9/10. Dr. Cowart notified.

## 2021-10-08 NOTE — DISCHARGE INSTRUCTIONS
You were seen in the ER for back pain going into the leg.  Thankfully, your labs and imaging did not reveal an acute abnormality and you are safe to go home.  I did give you a dose of Valium here today which is a muscle relaxer.  Please do not drive or drink alcohol after taking this medication.  You are doing a great job not drinking alcohol and I am proud of you!  I have given you a prescription for Lidoderm patches, Flexeril, and naproxen.  Please use these as directed.  I gave you the contact information for [place] [facility] where you can follow-up for additional work-up and management.  Please contact your primary care doctor tomorrow to make a follow-up appointment.  Return to the ER immediately with new or worsening symptoms.  Good luck, I hope you feel better soon!

## 2021-12-01 ENCOUNTER — TELEPHONE (OUTPATIENT)
Dept: RESPIRATORY THERAPY | Facility: MEDICAL CENTER | Age: 51
End: 2021-12-01

## 2021-12-01 NOTE — TELEPHONE ENCOUNTER
COPD program follow up phone call:    Did you stop smoking because of the program? No, working on quitting now with patches.  Not smoking at time of follow up? No  Did you refill your medications? Yes  Did you take them everyday as prescribed? Yes  Have you seen [facility] since discharging from the hospital? N/A  Have you seen Home Health since discharging from the hospital? N/A  Have you seen [facility] since discharging from the hospital? N/A  [facility] since [facility] from the hospital? N/A

## 2022-01-14 ENCOUNTER — HOSPITAL ENCOUNTER (OUTPATIENT)
Facility: MEDICAL CENTER | Age: 52
End: 2022-01-14
Attending: FAMILY MEDICINE
Payer: MEDICAID

## 2022-01-14 ENCOUNTER — OFFICE VISIT (OUTPATIENT)
Dept: URGENT CARE | Facility: CLINIC | Age: 52
End: 2022-01-14
Payer: MEDICAID

## 2022-01-14 ENCOUNTER — APPOINTMENT (OUTPATIENT)
Dept: RADIOLOGY | Facility: IMAGING CENTER | Age: 52
End: 2022-01-14
Attending: FAMILY MEDICINE
Payer: MEDICAID

## 2022-01-14 VITALS
DIASTOLIC BLOOD PRESSURE: 84 MMHG | SYSTOLIC BLOOD PRESSURE: 138 MMHG | RESPIRATION RATE: 20 BRPM | TEMPERATURE: 98.9 F | WEIGHT: 158 LBS | HEIGHT: 69 IN | OXYGEN SATURATION: 99 % | HEART RATE: 94 BPM | BODY MASS INDEX: 23.4 KG/M2

## 2022-01-14 DIAGNOSIS — J44.1 COPD EXACERBATION (HCC): ICD-10-CM

## 2022-01-14 DIAGNOSIS — U07.1 COVID-19: ICD-10-CM

## 2022-01-14 DIAGNOSIS — R05.9 COUGH: ICD-10-CM

## 2022-01-14 PROBLEM — F12.10 CANNABIS ABUSE: Status: ACTIVE | Noted: 2019-09-11

## 2022-01-14 PROBLEM — K21.9 GERD (GASTROESOPHAGEAL REFLUX DISEASE): Status: ACTIVE | Noted: 2022-01-14

## 2022-01-14 PROBLEM — Z83.3 FH: DIABETES MELLITUS: Status: ACTIVE | Noted: 2017-08-24

## 2022-01-14 PROBLEM — F17.200 TOBACCO DEPENDENCE: Status: ACTIVE | Noted: 2017-04-20

## 2022-01-14 PROBLEM — E78.5 DYSLIPIDEMIA: Status: ACTIVE | Noted: 2019-09-11

## 2022-01-14 PROBLEM — F10.11 ALCOHOL ABUSE, IN REMISSION: Status: ACTIVE | Noted: 2019-09-11

## 2022-01-14 PROBLEM — L65.9 ALOPECIA: Status: ACTIVE | Noted: 2019-01-15

## 2022-01-14 PROBLEM — J30.1 ALLERGIC RHINITIS DUE TO POLLEN: Status: ACTIVE | Noted: 2017-08-24

## 2022-01-14 PROBLEM — Z98.890 S/P CARDIAC CATHETERIZATION: Status: ACTIVE | Noted: 2022-01-14

## 2022-01-14 PROBLEM — R63.6 BELOW IDEAL BODY WEIGHT RANGE: Status: ACTIVE | Noted: 2020-01-29

## 2022-01-14 PROBLEM — J45.20 MILD INTERMITTENT ASTHMA WITHOUT COMPLICATION: Status: ACTIVE | Noted: 2017-08-24

## 2022-01-14 PROBLEM — Z82.49 FH: HTN (HYPERTENSION): Status: ACTIVE | Noted: 2017-08-24

## 2022-01-14 PROBLEM — M10.9 GOUT: Status: ACTIVE | Noted: 2017-04-20

## 2022-01-14 PROBLEM — Z90.710 S/P HYSTERECTOMY: Status: ACTIVE | Noted: 2022-01-14

## 2022-01-14 PROCEDURE — 99204 OFFICE O/P NEW MOD 45 MIN: CPT | Mod: CS | Performed by: FAMILY MEDICINE

## 2022-01-14 PROCEDURE — 71046 X-RAY EXAM CHEST 2 VIEWS: CPT | Mod: TC | Performed by: FAMILY MEDICINE

## 2022-01-14 PROCEDURE — U0003 INFECTIOUS AGENT DETECTION BY NUCLEIC ACID (DNA OR RNA); SEVERE ACUTE RESPIRATORY SYNDROME CORONAVIRUS 2 (SARS-COV-2) (CORONAVIRUS DISEASE [COVID-19]), AMPLIFIED PROBE TECHNIQUE, MAKING USE OF HIGH THROUGHPUT TECHNOLOGIES AS DESCRIBED BY CMS-2020-01-R: HCPCS

## 2022-01-14 PROCEDURE — U0005 INFEC AGEN DETEC AMPLI PROBE: HCPCS

## 2022-01-14 RX ORDER — METHYLPREDNISOLONE 4 MG/1
TABLET ORAL
Qty: 21 TABLET | Refills: 0 | Status: SHIPPED | OUTPATIENT
Start: 2022-01-14 | End: 2022-05-23

## 2022-01-14 RX ORDER — AZITHROMYCIN 250 MG/1
TABLET, FILM COATED ORAL
Qty: 6 TABLET | Refills: 0 | Status: SHIPPED | OUTPATIENT
Start: 2022-01-14 | End: 2022-05-23

## 2022-01-14 ASSESSMENT — FIBROSIS 4 INDEX: FIB4 SCORE: 0.46

## 2022-01-14 NOTE — PROGRESS NOTES
Chief Complaint   Patient presents with   • Cough     chest cough, exposure x 2        CC:  cough        Cough  This is a new problem. The current episode started 2 days ago. The problem has been unchanged. The problem occurs constantly. The cough is dry. Associated symptoms include : subj fever, chest congestion. Pertinent negatives include no   headaches, nausea, vomiting, diarrhea, sweats, weight loss or wheezing. Nothing aggravates the symptoms.       + COVID exposure.  + COVID Ag test yesterday.       Pt has hx COPD and has been using albuterol nebulizer at home without relief.   Denies wheezing. .       Past Medical History:   Diagnosis Date   • Chronic obstructive pulmonary disease (HCC)    • Hypertension          Social History     Tobacco Use   • Smoking status: Current Every Day Smoker     Packs/day: 0.25     Years: 30.00     Pack years: 7.50     Types: Cigarettes   • Smokeless tobacco: Never Used   Substance Use Topics   • Alcohol use: Not Currently     Comment: Quit 6/15   • Drug use: Yes     Frequency: 2.0 times per week     Types: Inhaled, Marijuana     Comment: marijuana         Current Outpatient Medications on File Prior to Visit   Medication Sig Dispense Refill   • budesonide-formoterol (SYMBICORT) 160-4.5 MCG/ACT Aerosol Inhale 2 Puffs by mouth 2 times a day. 10.2 g 3   • nicotine (NICODERM) 7 MG/24HR PATCH 24 HR Place 1 Patch on the skin every 24 hours. 42 Patch 0   • nicotine polacrilex (NICORETTE) 2 MG Gum Chew and cheek 1 piece as needed for Smoking Cessation. 100 Each 0   • predniSONE (DELTASONE) 20 MG Tab Take 2 pills by mouth (20 mg) once a day for 3 days 6 tablet 0   • cetirizine (ZYRTEC) 10 MG Tab Take 10 mg by mouth at bedtime.     • sertraline (ZOLOFT) 100 MG Tab Take 100 mg by mouth at bedtime.     • SUMAtriptan (IMITREX) 50 MG Tab Take 1 Tab by mouth Once PRN for Migraine for up to 1 dose. 10 Tab 3   • albuterol 108 (90 Base) MCG/ACT Aero Soln inhalation aerosol Inhale 2 Puffs by  "mouth every 6 hours as needed for Shortness of Breath.     • azithromycin (ZITHROMAX) 250 MG Tab Take 2 tabs morning of 6/24 (Patient not taking: Reported on 1/14/2022) 2 tablet 0     No current facility-administered medications on file prior to visit.                    Review of Systems      HENT: negative for otalgia  Cardiovascular - denies chest pain   Respiratory: Positive for cough.  .  Negative for wheezing.    Neurological: Negative for headaches.   GI - denies nausea, vomiting or diarrhea  Neuro - denies numbness or tingling.            Objective:   /84 (BP Location: Left arm, Patient Position: Sitting, BP Cuff Size: Adult long)   Pulse 94   Temp 37.2 °C (98.9 °F) (Temporal)   Resp 20   Ht 1.753 m (5' 9\")   Wt 71.7 kg (158 lb)   SpO2 99%     Physical Exam   Constitutional: patient is oriented to person, place, and time. Patient appears well-developed and well-nourished. No distress.   HENT:   Head: Normocephalic and atraumatic.   Right Ear: External ear normal.   Left Ear: External ear normal.   Nose: Mucosal edema  present. Right sinus exhibits no maxillary sinus tenderness. Left sinus exhibits no maxillary sinus tenderness.   Mouth/Throat: Mucous membranes are normal. No oral lesions.  No posterior pharyngeal erythema.  No oropharyngeal exudate or posterior oropharyngeal edema.   Eyes: Conjunctivae and EOM are normal. Pupils are equal, round, and reactive to light. Right eye exhibits no discharge. Left eye exhibits no discharge. No scleral icterus.   Neck: Normal range of motion. Neck supple. No tracheal deviation present.   Cardiovascular: Normal rate, regular rhythm and normal heart sounds.  Exam reveals no friction rub.    Pulmonary/Chest: Effort normal. No respiratory distress. Patient has no wheezes or rhonchi. Patient has no rales.    Musculoskeletal:  exhibits no edema.   Lymphadenopathy:     Patient has no cervical adenopathy.      Neurological: patient is alert and oriented to " person, place, and time.   Skin: Skin is warm and dry. No rash noted. No erythema.   Psychiatric: patient  has a normal mood and affect.  behavior is normal.   Nursing note and vitals reviewed.              Assessment/Plan:       COVID-19  COPD exacerbation    Rapid Ag test positive yesterday  PCR to confirm    She is high risk and is candidate for Paxlovid - Rx written.     Rx azithromycin, medrol dosepak       Follow up in one week if no improvement, sooner if sx worsen        My total time spent caring for the patient on the day of the encounter was at least 45 minutes.   This does not include time spent on separately billable procedures/tests.

## 2022-01-15 DIAGNOSIS — R05.9 COUGH: ICD-10-CM

## 2022-01-15 LAB — COVID ORDER STATUS COVID19: NORMAL

## 2022-01-16 LAB
SARS-COV-2 RNA RESP QL NAA+PROBE: DETECTED
SPECIMEN SOURCE: ABNORMAL

## 2022-04-18 ENCOUNTER — HOSPITAL ENCOUNTER (OUTPATIENT)
Facility: MEDICAL CENTER | Age: 52
End: 2022-04-18
Attending: NURSE PRACTITIONER
Payer: COMMERCIAL

## 2022-04-18 ENCOUNTER — EH NON-PROVIDER (OUTPATIENT)
Dept: OCCUPATIONAL MEDICINE | Facility: CLINIC | Age: 52
End: 2022-04-18
Payer: MEDICAID

## 2022-04-18 ENCOUNTER — EMPLOYEE HEALTH (OUTPATIENT)
Dept: OCCUPATIONAL MEDICINE | Facility: CLINIC | Age: 52
End: 2022-04-18
Payer: MEDICAID

## 2022-04-18 DIAGNOSIS — Z02.89 ENCOUNTER FOR OCCUPATIONAL HEALTH ASSESSMENT: ICD-10-CM

## 2022-04-18 DIAGNOSIS — Z02.1 PRE-EMPLOYMENT DRUG SCREENING: ICD-10-CM

## 2022-04-18 DIAGNOSIS — Z02.1 PRE-EMPLOYMENT HEALTH SCREENING EXAMINATION: ICD-10-CM

## 2022-04-18 LAB
AMP AMPHETAMINE: NORMAL
BAR BARBITURATES: NORMAL
BZO BENZODIAZEPINES: NORMAL
COC COCAINE: NORMAL
INT CON NEG: NORMAL
INT CON POS: NORMAL
MDMA ECSTASY: NORMAL
MET METHAMPHETAMINES: NORMAL
MTD METHADONE: NORMAL
OPI OPIATES: NORMAL
OXY OXYCODONE: NORMAL
PCP PHENCYCLIDINE: NORMAL
POC URINE DRUG SCREEN OCDRS: NEGATIVE
THC: NORMAL

## 2022-04-18 PROCEDURE — 80305 DRUG TEST PRSMV DIR OPT OBS: CPT | Performed by: NURSE PRACTITIONER

## 2022-04-18 PROCEDURE — 94010 BREATHING CAPACITY TEST: CPT | Performed by: NURSE PRACTITIONER

## 2022-04-18 PROCEDURE — 8915 PR COMPREHENSIVE PHYSICAL: Performed by: NURSE PRACTITIONER

## 2022-04-18 PROCEDURE — 94375 RESPIRATORY FLOW VOLUME LOOP: CPT | Performed by: NURSE PRACTITIONER

## 2022-04-18 PROCEDURE — 86480 TB TEST CELL IMMUN MEASURE: CPT | Performed by: NURSE PRACTITIONER

## 2022-04-19 LAB
GAMMA INTERFERON BACKGROUND BLD IA-ACNC: 0.04 IU/ML
M TB IFN-G BLD-IMP: NEGATIVE
M TB IFN-G CD4+ BCKGRND COR BLD-ACNC: -0.01 IU/ML
MITOGEN IGNF BCKGRD COR BLD-ACNC: >10 IU/ML
QFT TB2 - NIL TBQ2: 0 IU/ML

## 2022-04-20 ENCOUNTER — TELEPHONE (OUTPATIENT)
Dept: RESPIRATORY THERAPY | Facility: MEDICAL CENTER | Age: 52
End: 2022-04-20
Payer: MEDICAID

## 2022-04-20 NOTE — TELEPHONE ENCOUNTER
COPD program follow up phone call:    Did you stop smoking because of the program? No  Not smoking at time of follow up? No, has cut down.  Did you refill your medications? Yes  Did you take them everyday as prescribed? Yes  Have you seen [facility] since discharging from the hospital? n/a  Have you seen Home Health since discharging from the hospital? n/a  Have you seen [facility] since discharging from the hospital? n/a  Have you seen [facility] since discharging from the hospital? n/a

## 2022-05-23 ENCOUNTER — OFFICE VISIT (OUTPATIENT)
Dept: URGENT CARE | Facility: CLINIC | Age: 52
End: 2022-05-23
Payer: MEDICAID

## 2022-05-23 ENCOUNTER — PHARMACY VISIT (OUTPATIENT)
Dept: PHARMACY | Facility: MEDICAL CENTER | Age: 52
End: 2022-05-23
Payer: COMMERCIAL

## 2022-05-23 VITALS
BODY MASS INDEX: 22.82 KG/M2 | DIASTOLIC BLOOD PRESSURE: 74 MMHG | SYSTOLIC BLOOD PRESSURE: 142 MMHG | HEIGHT: 68 IN | OXYGEN SATURATION: 95 % | RESPIRATION RATE: 20 BRPM | HEART RATE: 113 BPM | WEIGHT: 150.6 LBS | TEMPERATURE: 96.7 F

## 2022-05-23 DIAGNOSIS — J44.1 COPD WITH ACUTE EXACERBATION (HCC): ICD-10-CM

## 2022-05-23 DIAGNOSIS — J01.01 ACUTE RECURRENT MAXILLARY SINUSITIS: ICD-10-CM

## 2022-05-23 LAB
EXTERNAL QUALITY CONTROL: NORMAL
SARS-COV+SARS-COV-2 AG RESP QL IA.RAPID: NEGATIVE

## 2022-05-23 PROCEDURE — 99214 OFFICE O/P EST MOD 30 MIN: CPT | Performed by: PHYSICIAN ASSISTANT

## 2022-05-23 PROCEDURE — RXMED WILLOW AMBULATORY MEDICATION CHARGE: Performed by: PHYSICIAN ASSISTANT

## 2022-05-23 PROCEDURE — 87426 SARSCOV CORONAVIRUS AG IA: CPT | Performed by: PHYSICIAN ASSISTANT

## 2022-05-23 RX ORDER — DOXYCYCLINE HYCLATE 100 MG
100 TABLET ORAL 2 TIMES DAILY
Qty: 20 TABLET | Refills: 0 | Status: SHIPPED | OUTPATIENT
Start: 2022-05-23 | End: 2022-06-02

## 2022-05-23 RX ORDER — LORATADINE 10 MG/1
10 TABLET ORAL DAILY
COMMUNITY
End: 2023-01-29

## 2022-05-23 RX ORDER — TRAZODONE HYDROCHLORIDE 50 MG/1
50 TABLET ORAL NIGHTLY
COMMUNITY
End: 2023-01-29

## 2022-05-23 RX ORDER — METHYLPREDNISOLONE 4 MG/1
TABLET ORAL
Qty: 21 TABLET | Refills: 0 | Status: SHIPPED | OUTPATIENT
Start: 2022-05-23 | End: 2023-01-29

## 2022-05-23 ASSESSMENT — FIBROSIS 4 INDEX: FIB4 SCORE: 0.46

## 2022-05-23 ASSESSMENT — ENCOUNTER SYMPTOMS: SHORTNESS OF BREATH: 1

## 2022-05-23 NOTE — PROGRESS NOTES
Subjective     Rhonda Gould is a 51 y.o. female who presents with Shortness of Breath (Congestion, yellow phlegm, chills, hard to breath, did breathing treatment it helps a little x 1 week)    Medications:    • albuterol Aers  • doxycycline Tabs  • loratadine Tabs  • methylPREDNISolone Tbpk  • nicotine Pt24  • predniSONE Tabs  • sertraline Tabs  • traZODone Tabs    Allergies: Codeine, Fish allergy, Other drug, and Shellfish allergy    Problem List: Rhonda Gould does not have any pertinent problems on file.    Surgical History:  Past Surgical History:   Procedure Laterality Date   • HYSTERECTOMY LAPAROSCOPY  6/2005       Past Social Hx: Rhonda Gould  reports that she has been smoking cigarettes. She has a 7.50 pack-year smoking history. She has never used smokeless tobacco. She reports previous alcohol use. She reports previous drug use. Frequency: 2.00 times per week. Drugs: Inhaled and Marijuana.     Past Family Hx:  Rhonda Gould family history includes Cancer in her maternal grandfather; Diabetes in her father and mother; Heart Disease in her maternal grandmother; Hypertension in her father and mother.     Problem list, medications, and allergies reviewed by myself today in Epic.          Patient presents with:  Cough, mild shortness of breath, nasal congestion, yellow phlegm, chills.  Patient has history of COPD, is taking her medications, and has also done some breathing treatments,  it helps a little x 1 week.  Pt denies fever, body aches, chest pain, sore throat.  Patient is a CNA in healthcare.  Patient immunizations are up-to-date.        Influenza  This is a new problem. The current episode started in the past 7 days. The problem occurs constantly. The problem has been gradually worsening. Associated symptoms include chills, congestion, coughing and headaches. Pertinent negatives include no anorexia, change in bowel habit, chest pain, fever, nausea, sore throat, urinary symptoms or vomiting. The  "symptoms are aggravated by coughing and exertion. She has tried position changes, rest, NSAIDs and acetaminophen (neb treatment) for the symptoms. The treatment provided mild relief.       Review of Systems   Constitutional: Positive for chills. Negative for fever.   HENT: Positive for congestion and sinus pain. Negative for sore throat.    Respiratory: Positive for cough, shortness of breath and wheezing.    Cardiovascular: Negative for chest pain and leg swelling.   Gastrointestinal: Negative for anorexia, change in bowel habit, nausea and vomiting.   Neurological: Positive for headaches.   All other systems reviewed and are negative.             Objective     BP (!) 142/74 (BP Location: Left arm, Patient Position: Sitting, BP Cuff Size: Adult)   Pulse (!) 113   Temp 35.9 °C (96.7 °F) (Temporal)   Resp 20   Ht 1.727 m (5' 8\")   Wt 68.3 kg (150 lb 9.6 oz)   LMP 06/11/2005   SpO2 95%   BMI 22.90 kg/m²      Physical Exam  Vitals and nursing note reviewed.   Constitutional:       General: She is not in acute distress.     Appearance: Normal appearance. She is well-developed and normal weight. She is not ill-appearing or toxic-appearing.   HENT:      Head: Normocephalic and atraumatic.      Right Ear: Tympanic membrane and external ear normal.      Left Ear: Tympanic membrane and external ear normal.      Nose: Congestion and rhinorrhea present. Rhinorrhea is purulent.      Right Sinus: Maxillary sinus tenderness present.      Left Sinus: Maxillary sinus tenderness present.      Mouth/Throat:      Lips: Pink.      Mouth: Mucous membranes are moist.      Pharynx: Oropharynx is clear. Uvula midline. No posterior oropharyngeal erythema.   Eyes:      Extraocular Movements: Extraocular movements intact.      Conjunctiva/sclera: Conjunctivae normal.      Pupils: Pupils are equal, round, and reactive to light.   Cardiovascular:      Rate and Rhythm: Normal rate and regular rhythm.      Pulses: Normal pulses.      " Heart sounds: Normal heart sounds.   Pulmonary:      Effort: Pulmonary effort is normal. No respiratory distress.      Breath sounds: No stridor. Wheezing present. No rales.   Chest:      Chest wall: No tenderness.   Abdominal:      General: Bowel sounds are normal.      Palpations: Abdomen is soft.   Musculoskeletal:         General: Normal range of motion.      Cervical back: Normal range of motion and neck supple.   Lymphadenopathy:      Cervical: No cervical adenopathy.   Skin:     General: Skin is warm and dry.      Capillary Refill: Capillary refill takes less than 2 seconds.   Neurological:      General: No focal deficit present.      Mental Status: She is alert and oriented to person, place, and time.      Cranial Nerves: No cranial nerve deficit.      Motor: Motor function is intact.      Coordination: Coordination is intact.      Gait: Gait normal.   Psychiatric:         Mood and Affect: Mood normal.                             Assessment & Plan                1. COPD with acute exacerbation (HCC)  methylPREDNISolone (MEDROL DOSEPAK) 4 MG Tablet Therapy Pack    doxycycline (VIBRAMYCIN) 100 MG Tab    POCT SARS-COV Antigen NIKA (Symptomatic only)   2. Acute recurrent maxillary sinusitis  methylPREDNISolone (MEDROL DOSEPAK) 4 MG Tablet Therapy Pack    doxycycline (VIBRAMYCIN) 100 MG Tab    POCT SARS-COV Antigen NIKA (Symptomatic only)     Patient was evaluated in clinic today while wearing appropriate personal protective equipment.      PT to begin prescription medications today as discussed for copd exacerbation and sinusitis.     PT can begin or continue OTC medications, increase fluids and rest until symptoms improve.     PT should follow up with PCP in 1-2 days for re-evaluation if symptoms have not improved.      Discussed red flags and reasons to return to UC or ED.      Pt and/or family verbalized understanding of diagnosis and follow up instructions and was offered informational handout on diagnosis.   PT discharged.

## 2022-05-23 NOTE — LETTER
May 23, 2022         Patient: Rhonda Gould   YOB: 1970   Date of Visit: 5/23/2022           To Whom it May Concern:    Rhonda Gould was seen in my clinic on 5/23/2022. She may return to work on 5/26/2022.    If you have any questions or concerns, please don't hesitate to call.        Sincerely,           Eli Benítez P.A.-C.  Electronically Signed

## 2022-05-29 ASSESSMENT — ENCOUNTER SYMPTOMS
COUGH: 1
ANOREXIA: 0
NAUSEA: 0
VOMITING: 0
CHANGE IN BOWEL HABIT: 0
CHILLS: 1
SORE THROAT: 0
HEADACHES: 1
FEVER: 0
SINUS PAIN: 1
WHEEZING: 1

## 2022-06-08 ENCOUNTER — TELEPHONE (OUTPATIENT)
Dept: RESPIRATORY THERAPY | Facility: MEDICAL CENTER | Age: 52
End: 2022-06-08
Payer: COMMERCIAL

## 2022-06-08 NOTE — TELEPHONE ENCOUNTER
COPD program follow up phone call:     Did you stop smoking because of the program? No  Not smoking at time of follow up? no, but is wearing the patch and is trying.  Did you refill your medications? yes  Did you take them everyday as prescribed? yes  Have you seen your PCP since discharging from the hospital? n/a  Have you seen Home Health since discharging from the hospital? n/a  Have you seen [facility] since discharging from the hospital? n/a  [facility] you [facility] since [facility] from the hospital? n/a

## 2022-07-01 ENCOUNTER — EH NON-PROVIDER (OUTPATIENT)
Dept: OCCUPATIONAL MEDICINE | Facility: CLINIC | Age: 52
End: 2022-07-01
Payer: COMMERCIAL

## 2022-07-12 ENCOUNTER — HOSPITAL ENCOUNTER (OUTPATIENT)
Facility: MEDICAL CENTER | Age: 52
End: 2022-07-12
Attending: PREVENTIVE MEDICINE
Payer: MEDICAID

## 2022-07-12 LAB
COVID ORDER STATUS COVID19: NORMAL
SARS-COV-2 RNA RESP QL NAA+PROBE: DETECTED
SPECIMEN SOURCE: ABNORMAL

## 2022-07-12 PROCEDURE — U0005 INFEC AGEN DETEC AMPLI PROBE: HCPCS

## 2022-07-12 PROCEDURE — U0003 INFECTIOUS AGENT DETECTION BY NUCLEIC ACID (DNA OR RNA); SEVERE ACUTE RESPIRATORY SYNDROME CORONAVIRUS 2 (SARS-COV-2) (CORONAVIRUS DISEASE [COVID-19]), AMPLIFIED PROBE TECHNIQUE, MAKING USE OF HIGH THROUGHPUT TECHNOLOGIES AS DESCRIBED BY CMS-2020-01-R: HCPCS

## 2022-08-30 ENCOUNTER — HOSPITAL ENCOUNTER (EMERGENCY)
Facility: MEDICAL CENTER | Age: 52
End: 2022-08-30
Attending: EMERGENCY MEDICINE
Payer: MEDICAID

## 2022-08-30 ENCOUNTER — APPOINTMENT (OUTPATIENT)
Dept: RADIOLOGY | Facility: MEDICAL CENTER | Age: 52
End: 2022-08-30
Attending: EMERGENCY MEDICINE
Payer: MEDICAID

## 2022-08-30 VITALS
TEMPERATURE: 98 F | SYSTOLIC BLOOD PRESSURE: 113 MMHG | RESPIRATION RATE: 16 BRPM | WEIGHT: 143.3 LBS | DIASTOLIC BLOOD PRESSURE: 72 MMHG | OXYGEN SATURATION: 92 % | HEART RATE: 70 BPM | HEIGHT: 68 IN | BODY MASS INDEX: 21.72 KG/M2

## 2022-08-30 DIAGNOSIS — G43.009 MIGRAINE WITHOUT AURA AND WITHOUT STATUS MIGRAINOSUS, NOT INTRACTABLE: Primary | ICD-10-CM

## 2022-08-30 LAB
ALBUMIN SERPL BCP-MCNC: 4.6 G/DL (ref 3.2–4.9)
ALBUMIN/GLOB SERPL: 2.1 G/DL
ALP SERPL-CCNC: 73 U/L (ref 30–99)
ALT SERPL-CCNC: 9 U/L (ref 2–50)
ANION GAP SERPL CALC-SCNC: 15 MMOL/L (ref 7–16)
AST SERPL-CCNC: 15 U/L (ref 12–45)
BASOPHILS # BLD AUTO: 0.4 % (ref 0–1.8)
BASOPHILS # BLD: 0.02 K/UL (ref 0–0.12)
BILIRUB SERPL-MCNC: 0.3 MG/DL (ref 0.1–1.5)
BUN SERPL-MCNC: 18 MG/DL (ref 8–22)
CALCIUM SERPL-MCNC: 9.3 MG/DL (ref 8.5–10.5)
CHLORIDE SERPL-SCNC: 104 MMOL/L (ref 96–112)
CO2 SERPL-SCNC: 20 MMOL/L (ref 20–33)
CREAT SERPL-MCNC: 0.8 MG/DL (ref 0.5–1.4)
EOSINOPHIL # BLD AUTO: 0.14 K/UL (ref 0–0.51)
EOSINOPHIL NFR BLD: 2.9 % (ref 0–6.9)
ERYTHROCYTE [DISTWIDTH] IN BLOOD BY AUTOMATED COUNT: 53.7 FL (ref 35.9–50)
GFR SERPLBLD CREATININE-BSD FMLA CKD-EPI: 88 ML/MIN/1.73 M 2
GLOBULIN SER CALC-MCNC: 2.2 G/DL (ref 1.9–3.5)
GLUCOSE SERPL-MCNC: 106 MG/DL (ref 65–99)
HCT VFR BLD AUTO: 36.8 % (ref 37–47)
HGB BLD-MCNC: 11.9 G/DL (ref 12–16)
IMM GRANULOCYTES # BLD AUTO: 0.01 K/UL (ref 0–0.11)
IMM GRANULOCYTES NFR BLD AUTO: 0.2 % (ref 0–0.9)
LYMPHOCYTES # BLD AUTO: 1.8 K/UL (ref 1–4.8)
LYMPHOCYTES NFR BLD: 37.5 % (ref 22–41)
MCH RBC QN AUTO: 29 PG (ref 27–33)
MCHC RBC AUTO-ENTMCNC: 32.3 G/DL (ref 33.6–35)
MCV RBC AUTO: 89.8 FL (ref 81.4–97.8)
MONOCYTES # BLD AUTO: 0.4 K/UL (ref 0–0.85)
MONOCYTES NFR BLD AUTO: 8.3 % (ref 0–13.4)
NEUTROPHILS # BLD AUTO: 2.43 K/UL (ref 2–7.15)
NEUTROPHILS NFR BLD: 50.7 % (ref 44–72)
NRBC # BLD AUTO: 0 K/UL
NRBC BLD-RTO: 0 /100 WBC
PLATELET # BLD AUTO: 281 K/UL (ref 164–446)
PMV BLD AUTO: 9.3 FL (ref 9–12.9)
POTASSIUM SERPL-SCNC: 3.7 MMOL/L (ref 3.6–5.5)
PROT SERPL-MCNC: 6.8 G/DL (ref 6–8.2)
RBC # BLD AUTO: 4.1 M/UL (ref 4.2–5.4)
SODIUM SERPL-SCNC: 139 MMOL/L (ref 135–145)
WBC # BLD AUTO: 4.8 K/UL (ref 4.8–10.8)

## 2022-08-30 PROCEDURE — 36415 COLL VENOUS BLD VENIPUNCTURE: CPT

## 2022-08-30 PROCEDURE — 94760 N-INVAS EAR/PLS OXIMETRY 1: CPT

## 2022-08-30 PROCEDURE — 96374 THER/PROPH/DIAG INJ IV PUSH: CPT

## 2022-08-30 PROCEDURE — 80053 COMPREHEN METABOLIC PANEL: CPT

## 2022-08-30 PROCEDURE — 700105 HCHG RX REV CODE 258: Performed by: EMERGENCY MEDICINE

## 2022-08-30 PROCEDURE — 70450 CT HEAD/BRAIN W/O DYE: CPT

## 2022-08-30 PROCEDURE — 700111 HCHG RX REV CODE 636 W/ 250 OVERRIDE (IP): Performed by: EMERGENCY MEDICINE

## 2022-08-30 PROCEDURE — 96375 TX/PRO/DX INJ NEW DRUG ADDON: CPT

## 2022-08-30 PROCEDURE — 99284 EMERGENCY DEPT VISIT MOD MDM: CPT

## 2022-08-30 PROCEDURE — 700102 HCHG RX REV CODE 250 W/ 637 OVERRIDE(OP): Performed by: EMERGENCY MEDICINE

## 2022-08-30 PROCEDURE — A9270 NON-COVERED ITEM OR SERVICE: HCPCS | Performed by: EMERGENCY MEDICINE

## 2022-08-30 PROCEDURE — 85025 COMPLETE CBC W/AUTO DIFF WBC: CPT

## 2022-08-30 RX ORDER — SODIUM CHLORIDE 9 MG/ML
INJECTION, SOLUTION INTRAVENOUS CONTINUOUS
Status: DISCONTINUED | OUTPATIENT
Start: 2022-08-30 | End: 2022-08-30 | Stop reason: HOSPADM

## 2022-08-30 RX ORDER — METOCLOPRAMIDE HYDROCHLORIDE 5 MG/ML
10 INJECTION INTRAMUSCULAR; INTRAVENOUS ONCE
Status: COMPLETED | OUTPATIENT
Start: 2022-08-30 | End: 2022-08-30

## 2022-08-30 RX ORDER — ACETAMINOPHEN 500 MG
1000 TABLET ORAL ONCE
Status: COMPLETED | OUTPATIENT
Start: 2022-08-30 | End: 2022-08-30

## 2022-08-30 RX ORDER — DIPHENHYDRAMINE HYDROCHLORIDE 50 MG/ML
25 INJECTION INTRAMUSCULAR; INTRAVENOUS ONCE
Status: COMPLETED | OUTPATIENT
Start: 2022-08-30 | End: 2022-08-30

## 2022-08-30 RX ORDER — PROMETHAZINE HYDROCHLORIDE 25 MG/1
25 TABLET ORAL EVERY 6 HOURS PRN
Qty: 30 TABLET | Refills: 0 | Status: SHIPPED | OUTPATIENT
Start: 2022-08-30 | End: 2023-01-29

## 2022-08-30 RX ORDER — KETOROLAC TROMETHAMINE 30 MG/ML
15 INJECTION, SOLUTION INTRAMUSCULAR; INTRAVENOUS ONCE
Status: COMPLETED | OUTPATIENT
Start: 2022-08-30 | End: 2022-08-30

## 2022-08-30 RX ADMIN — SODIUM CHLORIDE: 9 INJECTION, SOLUTION INTRAVENOUS at 04:34

## 2022-08-30 RX ADMIN — DIPHENHYDRAMINE HYDROCHLORIDE 25 MG: 50 INJECTION INTRAMUSCULAR; INTRAVENOUS at 04:33

## 2022-08-30 RX ADMIN — ACETAMINOPHEN 1000 MG: 500 TABLET ORAL at 04:34

## 2022-08-30 RX ADMIN — KETOROLAC TROMETHAMINE 15 MG: 30 INJECTION, SOLUTION INTRAMUSCULAR; INTRAVENOUS at 05:52

## 2022-08-30 RX ADMIN — METOCLOPRAMIDE 10 MG: 5 INJECTION, SOLUTION INTRAMUSCULAR; INTRAVENOUS at 04:33

## 2022-08-30 ASSESSMENT — ENCOUNTER SYMPTOMS
TINGLING: 0
CHILLS: 0
NECK PAIN: 0
FALLS: 1
FEVER: 0
WEAKNESS: 0
NAUSEA: 1
HEADACHES: 1
PHOTOPHOBIA: 1

## 2022-08-30 ASSESSMENT — FIBROSIS 4 INDEX: FIB4 SCORE: 0.46

## 2022-08-30 NOTE — ED TRIAGE NOTES
"Chief Complaint   Patient presents with    Migraine    N/V     Pt arrives with the /o migraine that started at 1930 last night. Hx of but hasn't had one in a while.  Associated dizziness.  EMS administered 50mcg Fentanyl and 4mg Zofran.  20g left hand.    /80   Pulse 83   Temp 36.4 °C (97.6 °F) (Temporal)   Resp 18   Ht 1.727 m (5' 8\")   Wt 65 kg (143 lb 4.8 oz)   LMP 06/11/2005   SpO2 94%   BMI 21.79 kg/m²     "

## 2022-08-30 NOTE — ED PROVIDER NOTES
ED Provider Note    Scribed for YORDY Harris II* by Kiana Juarez. 8/30/2022  3:53 AM    Means of Arrival: EMS  History obtained by: patient  Limitations: none    CHIEF COMPLAINT  Chief Complaint   Patient presents with    Migraine    N/V       HPI  Rhonda Gould is a 51 y.o. female who has a history of COPD presents to the Emergency Department for evaluation of a severe headache onset yesterday. Yesterday Rhonda was getting off of work when she tripped on a parking barrier. She denies hitting her head but states she fell really hard on her left elbow. Around 7:30 PM she developed a moderate to severe left sided headache. She has associated symptoms of photophobia and nausea. She states she called the nonemergency number to receive a ride to the ED. EMS treated the patient with 50 mcg of Fentanyl and 4 mg of Zofran with little to no alleviation of her symptoms. She denies neck pain, neck stiffness, fever, chills, weakness, or tingling. No alleviating or exacerbating factors were reported. She has a history of migraines. She takes trazodone and zoloft daily.     REVIEW OF SYSTEMS  Review of Systems   Constitutional:  Negative for chills and fever.   Eyes:  Positive for photophobia.   Gastrointestinal:  Positive for nausea.   Musculoskeletal:  Positive for falls. Negative for neck pain.   Neurological:  Positive for headaches. Negative for tingling and weakness.   All other systems reviewed and are negative.  See HPI for further details.    PAST MEDICAL HISTORY   has a past medical history of Chronic obstructive pulmonary disease (HCC) and Hypertension.    SOCIAL HISTORY  Social History     Tobacco Use    Smoking status: Every Day     Packs/day: 0.25     Years: 30.00     Pack years: 7.50     Types: Cigarettes    Smokeless tobacco: Never   Vaping Use    Vaping Use: Never used   Substance and Sexual Activity    Alcohol use: Not Currently     Comment: Quit 6/15    Drug use: Not Currently     Frequency: 2.0  "times per week     Types: Inhaled, Marijuana     Comment: marijuana    Sexual activity: Not Currently       SURGICAL HISTORY   has a past surgical history that includes hysterectomy laparoscopy (6/2005).    CURRENT MEDICATIONS  Home Medications       Reviewed by Mary Simental R.N. (Registered Nurse) on 08/30/22 at 0051  Med List Status: Not Addressed     Medication Last Dose Status   albuterol 108 (90 Base) MCG/ACT Aero Soln inhalation aerosol  Active   loratadine (CLARITIN) 10 MG Tab  Active   methylPREDNISolone (MEDROL DOSEPAK) 4 MG Tablet Therapy Pack  Active   nicotine (NICODERM) 7 MG/24HR PATCH 24 HR  Active   predniSONE (DELTASONE) 20 MG Tab  Active   sertraline (ZOLOFT) 100 MG Tab  Active   traZODone (DESYREL) 50 MG Tab  Active                    ALLERGIES  Allergies   Allergen Reactions    Codeine Hives and Itching     Tolerated morphine previously  Allergy updated from 2015, morphine given in 2016    Fish Allergy Anaphylaxis and Shortness of Breath     Swelling to throat.    Other Drug Hives     shellfish  shellfish  shellfish      Shellfish Allergy Anaphylaxis     Other reaction(s): Anaphylaxis  Strict No Shellfish (avoids most fish, cod ok)  Strict No Shellfish (avoids most fish, cod ok)  Strict No Shellfish (avoids most fish, cod ok)         PHYSICAL EXAM  VITAL SIGNS: /78   Pulse 85   Temp 36.4 °C (97.6 °F) (Temporal)   Resp 18   Ht 1.727 m (5' 8\")   Wt 65 kg (143 lb 4.8 oz)   LMP 06/11/2005   SpO2 92%   BMI 21.79 kg/m²     Constitutional: Alert pleasant 51 y.o. woman laying down in ED bed. Room lights are off.   HENT: No signs of trauma, Bilateral external ears normal, Nose normal.   Eyes: Pupils are equal, Conjunctiva normal, Non-icteric. Eyes are not tearing, no corneal clouding, normal eye movements, pupils are equal and reactive  Neck: Normal range of motion, No tenderness, Supple, No stridor.   Cardiovascular: Regular rate and rhythm, no murmurs. Symmetric distal pulses. No " cyanosis of extremities. No peripheral edema of extremities.  Thorax & Lungs: Normal breath sounds, No respiratory distress, No wheezing, No chest tenderness.   Abdomen: Soft, No tenderness, No masses, No pulsatile masses. No peritoneal signs.  Skin: Warm, Dry, No erythema, No rash.   Back: No midline bony tenderness, No CVA tenderness.   Musculoskeletal: Good range of motion in all major joints. No tenderness to palpation or major deformities noted.   Neurologic: Alert and oriented to person, place, time and situation, Clear speech. No facial droop, moving all four extremities.    Psychiatric: Affect normal, Judgment normal, Mood normal.       DIAGNOSTIC STUDIES / PROCEDURES  LABS  Pertinent Labs & Imaging studies reviewed. (See chart for details)  Labs Reviewed   CBC WITH DIFFERENTIAL - Abnormal; Notable for the following components:       Result Value    RBC 4.10 (*)     Hemoglobin 11.9 (*)     Hematocrit 36.8 (*)     MCHC 32.3 (*)     RDW 53.7 (*)     All other components within normal limits   COMP METABOLIC PANEL - Abnormal; Notable for the following components:    Glucose 106 (*)     All other components within normal limits   ESTIMATED GFR       RADIOLOGY  CT-HEAD W/O   Final Result         1.  No acute intracranial abnormality.           Pertinent Labs & Imaging studies reviewed. (See chart for details)    COURSE & MEDICAL DECISION MAKING  Pertinent Labs & Imaging studies reviewed. (See chart for details)    3:53 AM This is a 51 y.o. female who presents with a headache, photophobia,and nausea and the differential diagnosis includes but is not limited to Migraine, intracranial injury. Ordered for CT-Head, CMP, and CBC with diff to evaluate. Patient will be treated with Tylenol 1,000 mg tablet, Benadryl 25 mg injection, Reglan 10 mg injection for her headache. Maintenance IV fluids will be started.    5:01 AM Patient's headache has improved. Pending CT results.     5:20 AM CT-Head shows not acute  abnormalities. She still has a slight headache. Patient will be treated with Toradol 15 mg injection. Dicussed plans for discharge at this time. Patient verbalizes understanding and agreement to this plan of care.      The patient will return for worsening symptoms and is stable at the time of discharge. The patient verbalizes understanding and will comply. Guidance provided on appropriate use of medications.    DISPOSITION:  Patient will be discharged home in stable condition.    FOLLOW UP:  Karo Middleton M.D.  [address] [facility] [id number] [phone]    Schedule an appointment as soon as possible for a visit   for re-evaluation if headaches are recurrent    [facility], Emergency Dept  [address] [facility] [place] [address] [phone]    fever, severe worsening headache, unable to stop vomiting or other serious concerns      OUTPATIENT MEDICATIONS:  Discharge Medication List as of 8/30/2022  5:56 AM        START taking these medications    Details   promethazine (PHENERGAN) 25 MG Tab Take 1 Tablet by mouth every 6 hours as needed for Nausea/Vomiting (migraine headache)., Disp-30 Tablet, R-0, Normal               FINAL IMPRESSION  1. Migraine without aura and without status migrainosus, not intractable Active         Kiana STARR (Scribe), am scribing for, and in the presence of, JOEL Harris II.    Electronically signed by: Kiana Juarez (Scribe), 8/30/2022    Jonny STARR II, M* personally performed the services described in this documentation, as scribed by Kiana Juarez in my presence, and it is both accurate and complete.    The note accurately reflects work and decisions made by me.  Jonny Alcantara II, M.D.  8/30/2022  6:59 AM

## 2022-10-11 ENCOUNTER — IMMUNIZATION (OUTPATIENT)
Dept: OCCUPATIONAL MEDICINE | Facility: CLINIC | Age: 52
End: 2022-10-11

## 2022-10-11 DIAGNOSIS — Z23 NEED FOR VACCINATION: Primary | ICD-10-CM

## 2022-10-11 PROCEDURE — 90686 IIV4 VACC NO PRSV 0.5 ML IM: CPT | Performed by: NURSE PRACTITIONER

## 2022-11-05 ENCOUNTER — APPOINTMENT (OUTPATIENT)
Dept: RADIOLOGY | Facility: MEDICAL CENTER | Age: 52
End: 2022-11-05
Attending: EMERGENCY MEDICINE
Payer: MEDICAID

## 2022-11-05 ENCOUNTER — HOSPITAL ENCOUNTER (EMERGENCY)
Facility: MEDICAL CENTER | Age: 52
End: 2022-11-05
Attending: EMERGENCY MEDICINE
Payer: MEDICAID

## 2022-11-05 VITALS
DIASTOLIC BLOOD PRESSURE: 87 MMHG | HEIGHT: 69 IN | RESPIRATION RATE: 17 BRPM | TEMPERATURE: 97.5 F | SYSTOLIC BLOOD PRESSURE: 127 MMHG | OXYGEN SATURATION: 91 % | WEIGHT: 136.24 LBS | HEART RATE: 80 BPM | BODY MASS INDEX: 20.18 KG/M2

## 2022-11-05 DIAGNOSIS — R42 VERTIGO: ICD-10-CM

## 2022-11-05 LAB
ALBUMIN SERPL BCP-MCNC: 4.7 G/DL (ref 3.2–4.9)
ALBUMIN/GLOB SERPL: 1.5 G/DL
ALP SERPL-CCNC: 92 U/L (ref 30–99)
ALT SERPL-CCNC: 10 U/L (ref 2–50)
ANION GAP SERPL CALC-SCNC: 20 MMOL/L (ref 7–16)
AST SERPL-CCNC: 18 U/L (ref 12–45)
BASOPHILS # BLD AUTO: 0.2 % (ref 0–1.8)
BASOPHILS # BLD: 0.01 K/UL (ref 0–0.12)
BILIRUB SERPL-MCNC: 0.6 MG/DL (ref 0.1–1.5)
BUN SERPL-MCNC: 14 MG/DL (ref 8–22)
CALCIUM SERPL-MCNC: 9.6 MG/DL (ref 8.5–10.5)
CHLORIDE SERPL-SCNC: 99 MMOL/L (ref 96–112)
CO2 SERPL-SCNC: 18 MMOL/L (ref 20–33)
CREAT SERPL-MCNC: 0.89 MG/DL (ref 0.5–1.4)
EKG IMPRESSION: NORMAL
EOSINOPHIL # BLD AUTO: 0.06 K/UL (ref 0–0.51)
EOSINOPHIL NFR BLD: 1 % (ref 0–6.9)
ERYTHROCYTE [DISTWIDTH] IN BLOOD BY AUTOMATED COUNT: 50.2 FL (ref 35.9–50)
GFR SERPLBLD CREATININE-BSD FMLA CKD-EPI: 77 ML/MIN/1.73 M 2
GLOBULIN SER CALC-MCNC: 3.1 G/DL (ref 1.9–3.5)
GLUCOSE BLD STRIP.AUTO-MCNC: 145 MG/DL (ref 65–99)
GLUCOSE SERPL-MCNC: 96 MG/DL (ref 65–99)
HCT VFR BLD AUTO: 43.2 % (ref 37–47)
HGB BLD-MCNC: 14.5 G/DL (ref 12–16)
IMM GRANULOCYTES # BLD AUTO: 0.03 K/UL (ref 0–0.11)
IMM GRANULOCYTES NFR BLD AUTO: 0.5 % (ref 0–0.9)
LYMPHOCYTES # BLD AUTO: 0.94 K/UL (ref 1–4.8)
LYMPHOCYTES NFR BLD: 16.3 % (ref 22–41)
MCH RBC QN AUTO: 30.1 PG (ref 27–33)
MCHC RBC AUTO-ENTMCNC: 33.6 G/DL (ref 33.6–35)
MCV RBC AUTO: 89.6 FL (ref 81.4–97.8)
MONOCYTES # BLD AUTO: 0.32 K/UL (ref 0–0.85)
MONOCYTES NFR BLD AUTO: 5.5 % (ref 0–13.4)
NEUTROPHILS # BLD AUTO: 4.41 K/UL (ref 2–7.15)
NEUTROPHILS NFR BLD: 76.5 % (ref 44–72)
NRBC # BLD AUTO: 0 K/UL
NRBC BLD-RTO: 0 /100 WBC
PLATELET # BLD AUTO: 279 K/UL (ref 164–446)
PMV BLD AUTO: 9.6 FL (ref 9–12.9)
POTASSIUM SERPL-SCNC: 3.7 MMOL/L (ref 3.6–5.5)
PROT SERPL-MCNC: 7.8 G/DL (ref 6–8.2)
RBC # BLD AUTO: 4.82 M/UL (ref 4.2–5.4)
SODIUM SERPL-SCNC: 137 MMOL/L (ref 135–145)
TROPONIN T SERPL-MCNC: 8 NG/L (ref 6–19)
WBC # BLD AUTO: 5.8 K/UL (ref 4.8–10.8)

## 2022-11-05 PROCEDURE — 93005 ELECTROCARDIOGRAM TRACING: CPT

## 2022-11-05 PROCEDURE — 96374 THER/PROPH/DIAG INJ IV PUSH: CPT

## 2022-11-05 PROCEDURE — 36415 COLL VENOUS BLD VENIPUNCTURE: CPT

## 2022-11-05 PROCEDURE — 700111 HCHG RX REV CODE 636 W/ 250 OVERRIDE (IP): Performed by: EMERGENCY MEDICINE

## 2022-11-05 PROCEDURE — 93005 ELECTROCARDIOGRAM TRACING: CPT | Performed by: EMERGENCY MEDICINE

## 2022-11-05 PROCEDURE — 80053 COMPREHEN METABOLIC PANEL: CPT

## 2022-11-05 PROCEDURE — 96375 TX/PRO/DX INJ NEW DRUG ADDON: CPT

## 2022-11-05 PROCEDURE — 85025 COMPLETE CBC W/AUTO DIFF WBC: CPT

## 2022-11-05 PROCEDURE — 70450 CT HEAD/BRAIN W/O DYE: CPT

## 2022-11-05 PROCEDURE — 99284 EMERGENCY DEPT VISIT MOD MDM: CPT

## 2022-11-05 PROCEDURE — 700105 HCHG RX REV CODE 258: Performed by: EMERGENCY MEDICINE

## 2022-11-05 PROCEDURE — 84484 ASSAY OF TROPONIN QUANT: CPT

## 2022-11-05 PROCEDURE — 82962 GLUCOSE BLOOD TEST: CPT

## 2022-11-05 RX ORDER — ONDANSETRON 2 MG/ML
4 INJECTION INTRAMUSCULAR; INTRAVENOUS ONCE
Status: COMPLETED | OUTPATIENT
Start: 2022-11-05 | End: 2022-11-05

## 2022-11-05 RX ORDER — SODIUM CHLORIDE 9 MG/ML
1000 INJECTION, SOLUTION INTRAVENOUS ONCE
Status: COMPLETED | OUTPATIENT
Start: 2022-11-05 | End: 2022-11-05

## 2022-11-05 RX ORDER — MECLIZINE HYDROCHLORIDE 25 MG/1
25 TABLET ORAL 3 TIMES DAILY PRN
Qty: 20 TABLET | Refills: 0 | Status: SHIPPED | OUTPATIENT
Start: 2022-11-05 | End: 2023-01-29

## 2022-11-05 RX ORDER — LORAZEPAM 2 MG/ML
1 INJECTION INTRAMUSCULAR ONCE
Status: COMPLETED | OUTPATIENT
Start: 2022-11-05 | End: 2022-11-05

## 2022-11-05 RX ORDER — ONDANSETRON 4 MG/1
4 TABLET, ORALLY DISINTEGRATING ORAL EVERY 6 HOURS PRN
Qty: 12 TABLET | Refills: 0 | Status: SHIPPED | OUTPATIENT
Start: 2022-11-05 | End: 2022-11-10

## 2022-11-05 RX ADMIN — SODIUM CHLORIDE 1000 ML: 9 INJECTION, SOLUTION INTRAVENOUS at 10:00

## 2022-11-05 RX ADMIN — LORAZEPAM 1 MG: 2 INJECTION INTRAMUSCULAR; INTRAVENOUS at 10:01

## 2022-11-05 RX ADMIN — ONDANSETRON 4 MG: 2 INJECTION INTRAMUSCULAR; INTRAVENOUS at 10:01

## 2022-11-05 ASSESSMENT — FIBROSIS 4 INDEX: FIB4 SCORE: 0.94

## 2022-11-05 NOTE — ED NOTES
"Pt discharged to home w/ steady gait. Pt A&Ox4 on RA. IV discontinued and gauze placed, pt in possession of belongings. Pt provided discharge education and information pertaining to medications and follow up appointments. Pt received copy of discharge instructions and verbalized understanding. /87   Pulse 80   Temp 36.4 °C (97.5 °F) (Temporal)   Resp 17   Ht 1.753 m (5' 9\")   Wt 61.8 kg (136 lb 3.9 oz)   LMP 06/11/2005   SpO2 91%   BMI 20.12 kg/m²   "

## 2022-11-05 NOTE — ED TRIAGE NOTES
"Chief Complaint   Patient presents with    Dizziness     Started around 0730 today while working. Pt became dizzy, sweaty, and nauseous. Reports 1 episode of vomiting. \"I feel more dizzy when I stand up\" per pt.     /87   Pulse (!) 115   Temp 36.6 °C (97.8 °F) (Oral)   Resp 20   Ht 1.753 m (5' 9\")   Wt 61.8 kg (136 lb 3.9 oz)   LMP 06/11/2005   SpO2 99%   BMI 20.12 kg/m²     "

## 2022-11-05 NOTE — ED NOTES
Pt AOx4, complains of dizziness, vomiting x 1 episode and nausea while working this morning, pt also complains of a cough and tightness in her chest

## 2022-11-05 NOTE — ED PROVIDER NOTES
"ED Provider Note    CHIEF COMPLAINT  Chief Complaint   Patient presents with    Dizziness     Started around 0730 today while working. Pt became dizzy, sweaty, and nauseous. Reports 1 episode of vomiting. \"I feel more dizzy when I stand up\" per pt.       HPI  Rhonda Gould is a 51 y.o. female who presents for evaluation of dizziness.  Patient states she was feeling fine last night when she went to bed.  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  She came out of her room and was feeling very lightheaded and dizzy.  She describes more vertiginous types of symptomatology and then states that she vomited.  She has had a cough.  She denies: Fever, chills, sore throat, hemoptysis, hematemesis, melena hematochezia.  No other acute symptomatology or complaints.    REVIEW OF SYSTEMS  See HPI for further details.  No history of diabetes, seizures, heart disease, cancer, stroke.  All other systems negative.    PAST MEDICAL HISTORY  Past Medical History:   Diagnosis Date    Chronic obstructive pulmonary disease (HCC)     Hypertension        FAMILY HISTORY  Family History   Problem Relation Age of Onset    Diabetes Mother     Hypertension Mother     Hypertension Father     Diabetes Father     Heart Disease Maternal Grandmother     Cancer Maternal Grandfather        SOCIAL HISTORY  Positive tobacco use;    SURGICAL HISTORY  Past Surgical History:   Procedure Laterality Date    HYSTERECTOMY LAPAROSCOPY  6/2005       CURRENT MEDICATIONS  Home Medications       Reviewed by Thai Sanchez R.N. (Registered Nurse) on 11/05/22 at 0900  Med List Status: Partial     Medication Last Dose Status   albuterol 108 (90 Base) MCG/ACT Aero Soln inhalation aerosol  Active   loratadine (CLARITIN) 10 MG Tab  Active   methylPREDNISolone (MEDROL DOSEPAK) 4 MG Tablet Therapy Pack  Active   nicotine (NICODERM) 7 MG/24HR PATCH 24 HR  Active   predniSONE (DELTASONE) 20 MG Tab  Active " "  promethazine (PHENERGAN) 25 MG Tab  Active   sertraline (ZOLOFT) 100 MG Tab  Active   traZODone (DESYREL) 50 MG Tab  Active                    ALLERGIES  Allergies   Allergen Reactions    Codeine Hives and Itching     Tolerated morphine previously  Allergy updated from 2015, morphine given in 2016    Fish Allergy Anaphylaxis and Shortness of Breath     Swelling to throat.    Other Drug Hives     shellfish  shellfish  shellfish      Shellfish Allergy Anaphylaxis     Other reaction(s): Anaphylaxis  Strict No Shellfish (avoids most fish, cod ok)  Strict No Shellfish (avoids most fish, cod ok)  Strict No Shellfish (avoids most fish, cod ok)         PHYSICAL EXAM  VITAL SIGNS: BP (!) 135/103   Pulse 91   Temp 36.6 °C (97.8 °F) (Oral)   Resp 17   Ht 1.753 m (5' 9\")   Wt 61.8 kg (136 lb 3.9 oz)   LMP 06/11/2005   SpO2 97%   BMI 20.12 kg/m²    Constitutional: 51-year-old female, well developed, Well nourished, appears weak but oriented x 3  HENT: ,Atraumatic, Bilateral external ears normal, tympanic membranes clear, Oropharynx mildly dry, No oral exudates, Nose normal.   Eyes: PERRL, EOMI, Conjunctiva normal, No discharge.   Neck: Normal range of motion, No tenderness, Supple, No stridor.   Lymphatic: No lymphadenopathy noted.   Cardiovascular: Normal heart rate, Normal rhythm, No murmurs, No rubs, No gallops.   Thorax & Lungs: Normal Equal breath sounds, No respiratory distress, No wheezing, no stridor, no rales. No chest tenderness.   Abdomen: Soft, nontender, nondistended, no organomegaly, positive bowel sounds normal in quality. No guarding or rebound.  Skin: Good skin turgor, pink, warm, dry. No rashes, petechiae, purpura. Normal capillary refill.   Back: No tenderness, No CVA tenderness.   Extremities: Intact distal pulses, No edema, No tenderness, No cyanosis, No clubbing. Vascular: Pulses are 2+, symmetric in the upper and lower extremities.  Musculoskeletal: Good range of motion in all major joints. No " tenderness to palpation or major deformities noted.   Neurologic: Alert & oriented x 3, Normal motor function, Normal sensory function, No gross focal deficits noted.  Finger-nose is normal;  Psychiatric: Affect normal, Judgment normal, Mood normal.         EKG  I have interpreted: Rate 75, rhythm sinus, axis normal, MT QRS QT intervals normal, possible LVH, no acute STEMI, no change compared to previous tracing;    RADIOLOGY/PROCEDURES  CT-HEAD W/O   Final Result         1. No evidence of acute cerebral infarction, hemorrhage or mass lesion.      2. Paranasal sinus disease.      3. Fluid in the right mastoid air cells could indicate history of middle ear infection or mastoiditis.               COURSE & MEDICAL DECISION MAKING  Pertinent Labs & Imaging studies reviewed. (See chart for details)  1.  IV normal saline  2.  Zofran, titrated  3.  Ativan 1 mg IV    Laboratory studies: CBC shows white count of 5.8, 76% neutrophils, 16% lymphocytes, 5% monocytes, hemoglobin 14.5 hematocrit 43.2; CMP shows CO2 of 18, anion gap 20, otherwise within normal; troponin 8;    Discussion: At this time, the patient presents for evaluation of dizziness.  The patient describes vertiginous symptomatology.  Patient has a normal neurological exam.  Cardiac work-up has been negative.  Imaging studies were also unremarkable.  Patient had no evidence of otitis media or mastoiditis on clinical exam.  Patient's dizziness appears to be vertiginous may represent labyrinthitis or benign positional vertigo.  No evidence of stroke.  I discussed the findings and treatment plan with the patient.  She indicates that she is comfortable with this explanation disposition.  I discussed the findings treatment plan with the patient.  She indicates she is comfortable with this explanation disposition.    FINAL IMPRESSION  1. Vertigo           PLAN  1.  Appropriate discharge instructions given  2.  Zofran 4 mg #12  3.  Antivert 25 mg #20  4.  Follow-up with  primary care  5.  Recheck if change or worsening of symptoms or any new or concerning symptoms she is not experiencing at this time    Electronically signed by: Guy G Gansert, M.D., 11/5/2022 9:42 AM

## 2022-12-16 ENCOUNTER — HOSPITAL ENCOUNTER (EMERGENCY)
Facility: MEDICAL CENTER | Age: 52
End: 2022-12-16
Attending: EMERGENCY MEDICINE
Payer: COMMERCIAL

## 2022-12-16 VITALS
OXYGEN SATURATION: 97 % | WEIGHT: 136 LBS | DIASTOLIC BLOOD PRESSURE: 98 MMHG | HEART RATE: 75 BPM | BODY MASS INDEX: 20.08 KG/M2 | SYSTOLIC BLOOD PRESSURE: 134 MMHG | RESPIRATION RATE: 12 BRPM | TEMPERATURE: 97.1 F

## 2022-12-16 DIAGNOSIS — E86.0 DEHYDRATION: ICD-10-CM

## 2022-12-16 DIAGNOSIS — E87.6 HYPOKALEMIA: ICD-10-CM

## 2022-12-16 DIAGNOSIS — N30.00 ACUTE CYSTITIS WITHOUT HEMATURIA: ICD-10-CM

## 2022-12-16 LAB
ALBUMIN SERPL BCP-MCNC: 4.9 G/DL (ref 3.2–4.9)
ALBUMIN/GLOB SERPL: 1.7 G/DL
ALP SERPL-CCNC: 95 U/L (ref 30–99)
ALT SERPL-CCNC: 11 U/L (ref 2–50)
AMORPH CRY #/AREA URNS HPF: PRESENT /HPF
ANION GAP SERPL CALC-SCNC: 20 MMOL/L (ref 7–16)
APPEARANCE UR: ABNORMAL
AST SERPL-CCNC: 19 U/L (ref 12–45)
BACTERIA #/AREA URNS HPF: ABNORMAL /HPF
BASOPHILS # BLD AUTO: 0.3 % (ref 0–1.8)
BASOPHILS # BLD: 0.02 K/UL (ref 0–0.12)
BILIRUB SERPL-MCNC: 0.8 MG/DL (ref 0.1–1.5)
BILIRUB UR QL STRIP.AUTO: NEGATIVE
BUN SERPL-MCNC: 10 MG/DL (ref 8–22)
CALCIUM ALBUM COR SERPL-MCNC: 9 MG/DL (ref 8.5–10.5)
CALCIUM SERPL-MCNC: 9.7 MG/DL (ref 8.5–10.5)
CHLORIDE SERPL-SCNC: 98 MMOL/L (ref 96–112)
CO2 SERPL-SCNC: 18 MMOL/L (ref 20–33)
COLOR UR: YELLOW
CREAT SERPL-MCNC: 1.01 MG/DL (ref 0.5–1.4)
EKG IMPRESSION: NORMAL
EOSINOPHIL # BLD AUTO: 0.03 K/UL (ref 0–0.51)
EOSINOPHIL NFR BLD: 0.5 % (ref 0–6.9)
EPI CELLS #/AREA URNS HPF: ABNORMAL /HPF
ERYTHROCYTE [DISTWIDTH] IN BLOOD BY AUTOMATED COUNT: 48.8 FL (ref 35.9–50)
FLUAV RNA SPEC QL NAA+PROBE: NEGATIVE
FLUBV RNA SPEC QL NAA+PROBE: NEGATIVE
GFR SERPLBLD CREATININE-BSD FMLA CKD-EPI: 66 ML/MIN/1.73 M 2
GLOBULIN SER CALC-MCNC: 2.9 G/DL (ref 1.9–3.5)
GLUCOSE SERPL-MCNC: 192 MG/DL (ref 65–99)
GLUCOSE UR STRIP.AUTO-MCNC: NEGATIVE MG/DL
HCT VFR BLD AUTO: 44.5 % (ref 37–47)
HGB BLD-MCNC: 15.2 G/DL (ref 12–16)
HYALINE CASTS #/AREA URNS LPF: ABNORMAL /LPF
IMM GRANULOCYTES # BLD AUTO: 0.02 K/UL (ref 0–0.11)
IMM GRANULOCYTES NFR BLD AUTO: 0.3 % (ref 0–0.9)
KETONES UR STRIP.AUTO-MCNC: ABNORMAL MG/DL
LEUKOCYTE ESTERASE UR QL STRIP.AUTO: ABNORMAL
LIPASE SERPL-CCNC: 17 U/L (ref 11–82)
LYMPHOCYTES # BLD AUTO: 0.96 K/UL (ref 1–4.8)
LYMPHOCYTES NFR BLD: 16 % (ref 22–41)
MCH RBC QN AUTO: 30.6 PG (ref 27–33)
MCHC RBC AUTO-ENTMCNC: 34.2 G/DL (ref 33.6–35)
MCV RBC AUTO: 89.7 FL (ref 81.4–97.8)
MICRO URNS: ABNORMAL
MONOCYTES # BLD AUTO: 0.33 K/UL (ref 0–0.85)
MONOCYTES NFR BLD AUTO: 5.5 % (ref 0–13.4)
NEUTROPHILS # BLD AUTO: 4.64 K/UL (ref 2–7.15)
NEUTROPHILS NFR BLD: 77.4 % (ref 44–72)
NITRITE UR QL STRIP.AUTO: NEGATIVE
NRBC # BLD AUTO: 0 K/UL
NRBC BLD-RTO: 0 /100 WBC
PH UR STRIP.AUTO: 5.5 [PH] (ref 5–8)
PLATELET # BLD AUTO: 331 K/UL (ref 164–446)
PMV BLD AUTO: 9.5 FL (ref 9–12.9)
POTASSIUM SERPL-SCNC: 3.2 MMOL/L (ref 3.6–5.5)
PROT SERPL-MCNC: 7.8 G/DL (ref 6–8.2)
PROT UR QL STRIP: 100 MG/DL
RBC # BLD AUTO: 4.96 M/UL (ref 4.2–5.4)
RBC # URNS HPF: ABNORMAL /HPF
RBC UR QL AUTO: ABNORMAL
RSV RNA SPEC QL NAA+PROBE: NEGATIVE
SARS-COV-2 RNA RESP QL NAA+PROBE: NOTDETECTED
SODIUM SERPL-SCNC: 136 MMOL/L (ref 135–145)
SP GR UR STRIP.AUTO: 1.01
SPECIMEN SOURCE: NORMAL
UROBILINOGEN UR STRIP.AUTO-MCNC: 0.2 MG/DL
WBC # BLD AUTO: 6 K/UL (ref 4.8–10.8)
WBC #/AREA URNS HPF: ABNORMAL /HPF

## 2022-12-16 PROCEDURE — 700111 HCHG RX REV CODE 636 W/ 250 OVERRIDE (IP): Performed by: EMERGENCY MEDICINE

## 2022-12-16 PROCEDURE — 96376 TX/PRO/DX INJ SAME DRUG ADON: CPT

## 2022-12-16 PROCEDURE — C9803 HOPD COVID-19 SPEC COLLECT: HCPCS | Performed by: EMERGENCY MEDICINE

## 2022-12-16 PROCEDURE — 85025 COMPLETE CBC W/AUTO DIFF WBC: CPT

## 2022-12-16 PROCEDURE — 87086 URINE CULTURE/COLONY COUNT: CPT

## 2022-12-16 PROCEDURE — 96365 THER/PROPH/DIAG IV INF INIT: CPT

## 2022-12-16 PROCEDURE — 700102 HCHG RX REV CODE 250 W/ 637 OVERRIDE(OP): Performed by: EMERGENCY MEDICINE

## 2022-12-16 PROCEDURE — 93005 ELECTROCARDIOGRAM TRACING: CPT

## 2022-12-16 PROCEDURE — 83690 ASSAY OF LIPASE: CPT

## 2022-12-16 PROCEDURE — 80053 COMPREHEN METABOLIC PANEL: CPT

## 2022-12-16 PROCEDURE — 0241U HCHG SARS-COV-2 COVID-19 NFCT DS RESP RNA 4 TRGT MIC: CPT

## 2022-12-16 PROCEDURE — 99284 EMERGENCY DEPT VISIT MOD MDM: CPT

## 2022-12-16 PROCEDURE — 36415 COLL VENOUS BLD VENIPUNCTURE: CPT

## 2022-12-16 PROCEDURE — A9270 NON-COVERED ITEM OR SERVICE: HCPCS | Performed by: EMERGENCY MEDICINE

## 2022-12-16 PROCEDURE — 81001 URINALYSIS AUTO W/SCOPE: CPT

## 2022-12-16 PROCEDURE — 93005 ELECTROCARDIOGRAM TRACING: CPT | Performed by: EMERGENCY MEDICINE

## 2022-12-16 PROCEDURE — 700105 HCHG RX REV CODE 258: Performed by: EMERGENCY MEDICINE

## 2022-12-16 PROCEDURE — 96375 TX/PRO/DX INJ NEW DRUG ADDON: CPT

## 2022-12-16 RX ORDER — SODIUM CHLORIDE, SODIUM LACTATE, POTASSIUM CHLORIDE, CALCIUM CHLORIDE 600; 310; 30; 20 MG/100ML; MG/100ML; MG/100ML; MG/100ML
1000 INJECTION, SOLUTION INTRAVENOUS ONCE
Status: COMPLETED | OUTPATIENT
Start: 2022-12-16 | End: 2022-12-16

## 2022-12-16 RX ORDER — SULFAMETHOXAZOLE AND TRIMETHOPRIM 800; 160 MG/1; MG/1
1 TABLET ORAL 2 TIMES DAILY
Qty: 14 TABLET | Refills: 0 | Status: SHIPPED | OUTPATIENT
Start: 2022-12-16 | End: 2022-12-23

## 2022-12-16 RX ORDER — POTASSIUM CHLORIDE 7.45 MG/ML
10 INJECTION INTRAVENOUS ONCE
Status: COMPLETED | OUTPATIENT
Start: 2022-12-16 | End: 2022-12-16

## 2022-12-16 RX ORDER — ONDANSETRON 2 MG/ML
4 INJECTION INTRAMUSCULAR; INTRAVENOUS ONCE
Status: COMPLETED | OUTPATIENT
Start: 2022-12-16 | End: 2022-12-16

## 2022-12-16 RX ORDER — CEFTRIAXONE 1 G/1
1000 INJECTION, POWDER, FOR SOLUTION INTRAMUSCULAR; INTRAVENOUS ONCE
Status: COMPLETED | OUTPATIENT
Start: 2022-12-16 | End: 2022-12-16

## 2022-12-16 RX ORDER — POTASSIUM CHLORIDE 20 MEQ/1
40 TABLET, EXTENDED RELEASE ORAL ONCE
Status: COMPLETED | OUTPATIENT
Start: 2022-12-16 | End: 2022-12-16

## 2022-12-16 RX ORDER — KETOROLAC TROMETHAMINE 30 MG/ML
15 INJECTION, SOLUTION INTRAMUSCULAR; INTRAVENOUS ONCE
Status: COMPLETED | OUTPATIENT
Start: 2022-12-16 | End: 2022-12-16

## 2022-12-16 RX ADMIN — POTASSIUM CHLORIDE 40 MEQ: 1500 TABLET, EXTENDED RELEASE ORAL at 11:41

## 2022-12-16 RX ADMIN — POTASSIUM CHLORIDE 10 MEQ: 7.46 INJECTION, SOLUTION INTRAVENOUS at 09:56

## 2022-12-16 RX ADMIN — CEFTRIAXONE SODIUM 1000 MG: 1 INJECTION, POWDER, FOR SOLUTION INTRAMUSCULAR; INTRAVENOUS at 11:41

## 2022-12-16 RX ADMIN — SODIUM CHLORIDE, POTASSIUM CHLORIDE, SODIUM LACTATE AND CALCIUM CHLORIDE 1000 ML: 600; 310; 30; 20 INJECTION, SOLUTION INTRAVENOUS at 09:52

## 2022-12-16 RX ADMIN — KETOROLAC TROMETHAMINE 15 MG: 30 INJECTION, SOLUTION INTRAMUSCULAR; INTRAVENOUS at 09:52

## 2022-12-16 RX ADMIN — ONDANSETRON 4 MG: 2 INJECTION INTRAMUSCULAR; INTRAVENOUS at 09:52

## 2022-12-16 RX ADMIN — ONDANSETRON 4 MG: 2 INJECTION INTRAMUSCULAR; INTRAVENOUS at 11:22

## 2022-12-16 ASSESSMENT — FIBROSIS 4 INDEX: FIB4 SCORE: 1.1

## 2022-12-16 NOTE — ED NOTES
Patient ambulated to bathroom with unsteady gait due to dizziness and standby assist. Urine sample sent to lab.    ERP at bedside.

## 2022-12-16 NOTE — ED NOTES
Rounded on patient. Patient resting in bed. No signs of distress noted. Equal chest rise and fall. No further needs at this time.

## 2022-12-16 NOTE — ED NOTES
Patient to room from lobby in wheelchair. Connected to monitor. Agree with triage note. Charted for ERP. Warm blankets provided. Paramedic student at bedside to obtain PIV access.

## 2022-12-16 NOTE — ED PROVIDER NOTES
ED Provider Note    Scribed for Mignon Zuleta M.D. by Thai Srinivasan. 12/16/2022, 9:35 AM.    Primary care provider: Karo Middleton M.D.  Means of arrival: Walk-in  History obtained from: Patient  History limited by: None    CHIEF COMPLAINT  Chief Complaint   Patient presents with    Dizziness    Nausea/Vomiting/Diarrhea     HPI  Rhonda Gould is a 52 y.o. female who presents to the Emergency Department for evaluation of feeling unwell over the past 2 days. She reports staying home from work the last couple of days due to symptoms of diarrhea, nausea, vomiting, lightheadedness, dizziness, elevated blood pressure, chills, mild abdominal pain, and throbbing pain in the back of her head. She adds that she's had diarrhea for the past two days, and has been experiencing 4 episodes daily. She also adds that she vomited this morning. Before arrival, she measured her blood pressure which she reports was 139/109, prompting her to be evaluated in the ED. She denies fever, cough, or increased urinary frequency. She reports no other medical problems, and denies any alleviating or exacerbating factors.    REVIEW OF SYSTEMS  Pertinent positives include diarrhea, nausea, vomiting, lightheadedness, dizziness, elevated blood pressure, chills, mild abdominal pain, and throbbing pain in the back of her head. Pertinent negatives include no fever, cough, or increased urinary frequency.  All other systems reviewed and negative.    PAST MEDICAL HISTORY   has a past medical history of Chronic obstructive pulmonary disease (HCC) and Hypertension.    SURGICAL HISTORY   has a past surgical history that includes hysterectomy laparoscopy (6/2005).    SOCIAL HISTORY  Social History     Tobacco Use    Smoking status: Every Day     Packs/day: 0.25     Years: 30.00     Pack years: 7.50     Types: Cigarettes    Smokeless tobacco: Never   Vaping Use    Vaping Use: Never used   Substance Use Topics    Alcohol use: Not Currently     Comment: Quit  6/15    Drug use: Not Currently     Frequency: 2.0 times per week     Types: Inhaled, Marijuana     Comment: marijuana      Social History     Substance and Sexual Activity   Drug Use Not Currently    Frequency: 2.0 times per week    Types: Inhaled, Marijuana    Comment: marijuana       FAMILY HISTORY  Family History   Problem Relation Age of Onset    Diabetes Mother     Hypertension Mother     Hypertension Father     Diabetes Father     Heart Disease Maternal Grandmother     Cancer Maternal Grandfather        CURRENT MEDICATIONS  Home Medications       Reviewed by Kaley Hendricks R.N. (Registered Nurse) on 12/16/22 at 0822  Med List Status: <None>     Medication Last Dose Status   albuterol 108 (90 Base) MCG/ACT Aero Soln inhalation aerosol  Active   loratadine (CLARITIN) 10 MG Tab  Active   meclizine (ANTIVERT) 25 MG Tab  Active   methylPREDNISolone (MEDROL DOSEPAK) 4 MG Tablet Therapy Pack  Active   nicotine (NICODERM) 7 MG/24HR PATCH 24 HR  Active   predniSONE (DELTASONE) 20 MG Tab  Active   promethazine (PHENERGAN) 25 MG Tab  Active   sertraline (ZOLOFT) 100 MG Tab  Active   traZODone (DESYREL) 50 MG Tab  Active                    ALLERGIES  Allergies   Allergen Reactions    Codeine Hives and Itching     Tolerated morphine previously  Allergy updated from 2015, morphine given in 2016    Fish Allergy Anaphylaxis and Shortness of Breath     Swelling to throat.    Other Drug Hives     shellfish  shellfish  shellfish      Shellfish Allergy Anaphylaxis     Other reaction(s): Anaphylaxis  Strict No Shellfish (avoids most fish, cod ok)  Strict No Shellfish (avoids most fish, cod ok)  Strict No Shellfish (avoids most fish, cod ok)         PHYSICAL EXAM  VITAL SIGNS: /79   Pulse 94   Temp 36.2 °C (97.1 °F) (Temporal)   Resp 14   Wt 61.7 kg (136 lb)   LMP 06/11/2005   SpO2 97%   BMI 20.08 kg/m²   Constitutional: Alert in no apparent distress.  HENT: No signs of trauma, Bilateral external ears normal, Nose  normal.   Eyes: Pupils are equal and reactive, Conjunctiva normal, Non-icteric.   Neck: No stridor.   Cardiovascular: Regular rate and rhythm, no murmurs.   Thorax & Lungs: Normal breath sounds, No respiratory distress, No wheezing, No chest tenderness.   Abdomen: Bowel sounds normal, Soft, No tenderness, No masses, No peritoneal signs.  Skin: Warm, Dry, No erythema, No rash.   Musculoskeletal:  No major deformities noted.   Neurologic: Alert, moving all extremities without difficulty, no focal deficits.    LABS  Labs Reviewed   CBC WITH DIFFERENTIAL - Abnormal; Notable for the following components:       Result Value    Neutrophils-Polys 77.40 (*)     Lymphocytes 16.00 (*)     Lymphs (Absolute) 0.96 (*)     All other components within normal limits   COMP METABOLIC PANEL - Abnormal; Notable for the following components:    Potassium 3.2 (*)     Co2 18 (*)     Anion Gap 20.0 (*)     Glucose 192 (*)     All other components within normal limits   URINALYSIS - Abnormal; Notable for the following components:    Character Cloudy (*)     Ketones Trace (*)     Protein 100 (*)     Leukocyte Esterase Small (*)     Occult Blood Trace (*)     All other components within normal limits   URINE MICROSCOPIC (W/UA) - Abnormal; Notable for the following components:    WBC 10-20 (*)     RBC 2-5 (*)     Bacteria Few (*)     Hyaline Cast 6-10 (*)     All other components within normal limits   LIPASE   ESTIMATED GFR   CORRECTED CALCIUM   COV-2, FLU A/B, AND RSV BY PCR (CEPHEID)   URINE CULTURE(NEW)    Narrative:     Indication for culture:->Patient WITHOUT an indwelling Byrd  catheter in place with new onset of Dysuria, Frequency,  Urgency, and/or Suprapubic pain     All labs reviewed by me.    EKG  Results for orders placed or performed during the hospital encounter of 12/16/22   EKG   Result Value Ref Range    Report       [facility] Emergency Dept.    Test Date:  2022-12-16  Pt Name:    JASON SCHMITZ                 Department: ER  MRN:        [id number]                      Room:  Gender:     Female                       Technician: [id number]  :        1970                   Requested By:ER TRIAGE PROTOCOL  Order #:    [id number]                    Reading MD: WELLINGTON EPSTEIN    Measurements  Intervals                                Axis  Rate:       87                           P:          80  AZ:         172                          QRS:        70  QRSD:       76                           T:          71  QT:         433  QTc:        521    Interpretive Statements  Sinus rhythm  Probable left atrial enlargement  Probable left ventricular hypertrophy  Prolonged QT interval  Compared to ECG 2022 09:04:29  Prolonged QT interval now present  Impression: Sinus rhythm with LVH no evidence of acute ischemia  Electronically Signed On 2022 12:10:17 PST by WELLINGTON EPSTEIN       RADIOLOGY  No orders to display     The radiologist's interpretation of all radiological studies have been reviewed by me.    COURSE & MEDICAL DECISION MAKING  Pertinent Labs & Imaging studies reviewed. (See chart for details)    Differential diagnoses include but are not limited to: influenza, dehydration    9:35 AM - Patient seen and examined at bedside. Patient will be treated with Toradol 15mg injection, Zofran 4 mg injection, Potassium Chloride 10 mEq IVPB, and LR infusion. Ordered EKG, Urinalysis, Lipase, CMP, CBC w/ Diff, Corrected Calcium, Estimated GFR, COVID/RSV PCR swab to evaluate her symptoms.     11:15 AM - Patient was reevaluated at bedside. Discussed lab and radiology results with the patient. Patient to be treated with ceftriaxone 1g injection, potassium chloride 40 mEq tablet, and ondansetron 4 mg injection. My plan is to discharge the patient after current interventions are complete. I am writing a prescription for Bactrim DS for outpatient use. Patient verbalizes understanding and agreement to this plan of care.     HYDRATION:  Based on the patient's presentation of Dehydration the patient was given IV fluids. IV Hydration was used because oral hydration was not adequate alone. Upon recheck following hydration, the patient was improved.    Decision Making:  This is a 52 y.o. year old female who presents with diarrhea weakness for the last few days.  She was slightly dry appearing on exam.  She also was dizzy with ambulation initially I suspect likely secondary to orthostasis.  Labs were obtained she has a low potassium.  She was given additional potassium and IV fluids with improvement of her symptoms.  Urinalysis does appear infected.  She has not had any recent cough or upper respiratory infection.  Viral testing was negative.  Therefore I do suspect her symptoms are secondary to her UTI she was given antibiotics for this.  Patient is agreeable to this plan she feels better she will be discharged at this time.    The patient will return for new or worsening symptoms and is stable at the time of discharge. Patient was given return precautions. Patient and/or family member verbalizes understanding and will comply.    DISPOSITION:  Patient will be discharged home in stable condition.    FOLLOW UP:  Karo Middleton M.D.  [address] [place] [id number] [phone]    Schedule an appointment as soon as possible for a visit in 1 week  As needed    [facility], Emergency Dept  [address] [facility] [address] [phone]    Return to the emergency department for worsening vomiting, concerns for dehydration fevers or other concerns.    OUTPATIENT MEDICATIONS:  Discharge Medication List as of 12/16/2022 12:31 PM        START taking these medications    Details   sulfamethoxazole-trimethoprim (BACTRIM DS) 800-160 MG tablet Take 1 Tablet by mouth 2 times a day for 7 days., Disp-20 Tablet, R-0, Normal           FINAL IMPRESSION  1. Dehydration    2. Acute cystitis without hematuria    3. Hypokalemia         This  dictation has been created using voice recognition software and/or scribes. The accuracy of the dictation is limited by the abilities of the software and the expertise of the scribes. I expect there may be some errors of grammar and possibly content. I made every attempt to manually correct the errors within my dictation. However, errors related to voice recognition software and/or scribes may still exist and should be interpreted within the appropriate context.     I, Thai Srinivasan (Scribe), am scribing for, and in the presence of, Mignon Zuleta M.D..    Electronically signed by: Thai Srinivasan (Scribe), 12/16/2022    I, Mignon Zuleta M.D. personally performed the services described in this documentation, as scribed by Thai Srinivasan in my presence, and it is both accurate and complete.    The note accurately reflects work and decisions made by me.  Mignon Zuleta M.D.  12/16/2022  4:10 PM

## 2022-12-16 NOTE — ED TRIAGE NOTES
Patient was at work this am and noted increased dizziness, patient has had n/v/d for the last couple of days, no noted fevers, no complaints of cp, has not taken anything for her symptoms, no pain noted with urination.

## 2022-12-18 LAB
BACTERIA UR CULT: NORMAL
SIGNIFICANT IND 70042: NORMAL
SITE SITE: NORMAL
SOURCE SOURCE: NORMAL

## 2022-12-23 ENCOUNTER — APPOINTMENT (OUTPATIENT)
Dept: RADIOLOGY | Facility: MEDICAL CENTER | Age: 52
End: 2022-12-23
Attending: EMERGENCY MEDICINE
Payer: COMMERCIAL

## 2022-12-23 ENCOUNTER — HOSPITAL ENCOUNTER (EMERGENCY)
Facility: MEDICAL CENTER | Age: 52
End: 2022-12-24
Attending: EMERGENCY MEDICINE
Payer: COMMERCIAL

## 2022-12-23 DIAGNOSIS — R06.02 SHORTNESS OF BREATH: ICD-10-CM

## 2022-12-23 DIAGNOSIS — R45.851 SUICIDAL IDEATION: ICD-10-CM

## 2022-12-23 DIAGNOSIS — F10.929 ALCOHOLIC INTOXICATION WITH COMPLICATION (HCC): ICD-10-CM

## 2022-12-23 LAB
ALBUMIN SERPL BCP-MCNC: 4.5 G/DL (ref 3.2–4.9)
ALBUMIN/GLOB SERPL: 1.9 G/DL
ALP SERPL-CCNC: 75 U/L (ref 30–99)
ALT SERPL-CCNC: 17 U/L (ref 2–50)
AMPHET UR QL SCN: NEGATIVE
ANION GAP SERPL CALC-SCNC: 20 MMOL/L (ref 7–16)
AST SERPL-CCNC: 26 U/L (ref 12–45)
BARBITURATES UR QL SCN: NEGATIVE
BASOPHILS # BLD AUTO: 0.2 % (ref 0–1.8)
BASOPHILS # BLD: 0.01 K/UL (ref 0–0.12)
BENZODIAZ UR QL SCN: NEGATIVE
BILIRUB SERPL-MCNC: 0.6 MG/DL (ref 0.1–1.5)
BUN SERPL-MCNC: 15 MG/DL (ref 8–22)
BZE UR QL SCN: NEGATIVE
CALCIUM ALBUM COR SERPL-MCNC: 8.4 MG/DL (ref 8.5–10.5)
CALCIUM SERPL-MCNC: 8.8 MG/DL (ref 8.5–10.5)
CANNABINOIDS UR QL SCN: POSITIVE
CHLORIDE SERPL-SCNC: 104 MMOL/L (ref 96–112)
CO2 SERPL-SCNC: 19 MMOL/L (ref 20–33)
CREAT SERPL-MCNC: 0.66 MG/DL (ref 0.5–1.4)
EKG IMPRESSION: NORMAL
EOSINOPHIL # BLD AUTO: 0.03 K/UL (ref 0–0.51)
EOSINOPHIL NFR BLD: 0.6 % (ref 0–6.9)
ERYTHROCYTE [DISTWIDTH] IN BLOOD BY AUTOMATED COUNT: 50 FL (ref 35.9–50)
GFR SERPLBLD CREATININE-BSD FMLA CKD-EPI: 104 ML/MIN/1.73 M 2
GLOBULIN SER CALC-MCNC: 2.4 G/DL (ref 1.9–3.5)
GLUCOSE SERPL-MCNC: 80 MG/DL (ref 65–99)
HCT VFR BLD AUTO: 38.6 % (ref 37–47)
HGB BLD-MCNC: 13.1 G/DL (ref 12–16)
IMM GRANULOCYTES # BLD AUTO: 0.02 K/UL (ref 0–0.11)
IMM GRANULOCYTES NFR BLD AUTO: 0.4 % (ref 0–0.9)
LIPASE SERPL-CCNC: 26 U/L (ref 11–82)
LYMPHOCYTES # BLD AUTO: 1.65 K/UL (ref 1–4.8)
LYMPHOCYTES NFR BLD: 31.1 % (ref 22–41)
MCH RBC QN AUTO: 31 PG (ref 27–33)
MCHC RBC AUTO-ENTMCNC: 33.9 G/DL (ref 33.6–35)
MCV RBC AUTO: 91.3 FL (ref 81.4–97.8)
METHADONE UR QL SCN: NEGATIVE
MONOCYTES # BLD AUTO: 0.39 K/UL (ref 0–0.85)
MONOCYTES NFR BLD AUTO: 7.4 % (ref 0–13.4)
NEUTROPHILS # BLD AUTO: 3.2 K/UL (ref 2–7.15)
NEUTROPHILS NFR BLD: 60.3 % (ref 44–72)
NRBC # BLD AUTO: 0 K/UL
NRBC BLD-RTO: 0 /100 WBC
OPIATES UR QL SCN: NEGATIVE
OXYCODONE UR QL SCN: NEGATIVE
PCP UR QL SCN: NEGATIVE
PLATELET # BLD AUTO: 278 K/UL (ref 164–446)
PMV BLD AUTO: 9.3 FL (ref 9–12.9)
POC BREATHALIZER: 0.2 PERCENT (ref 0–0.01)
POTASSIUM SERPL-SCNC: 4 MMOL/L (ref 3.6–5.5)
PROPOXYPH UR QL SCN: NEGATIVE
PROT SERPL-MCNC: 6.9 G/DL (ref 6–8.2)
RBC # BLD AUTO: 4.23 M/UL (ref 4.2–5.4)
SODIUM SERPL-SCNC: 143 MMOL/L (ref 135–145)
TROPONIN T SERPL-MCNC: 8 NG/L (ref 6–19)
WBC # BLD AUTO: 5.3 K/UL (ref 4.8–10.8)

## 2022-12-23 PROCEDURE — 302970 POC BREATHALIZER: Performed by: EMERGENCY MEDICINE

## 2022-12-23 PROCEDURE — 85025 COMPLETE CBC W/AUTO DIFF WBC: CPT

## 2022-12-23 PROCEDURE — 71045 X-RAY EXAM CHEST 1 VIEW: CPT

## 2022-12-23 PROCEDURE — 99285 EMERGENCY DEPT VISIT HI MDM: CPT

## 2022-12-23 PROCEDURE — 36415 COLL VENOUS BLD VENIPUNCTURE: CPT

## 2022-12-23 PROCEDURE — 80053 COMPREHEN METABOLIC PANEL: CPT

## 2022-12-23 PROCEDURE — 83690 ASSAY OF LIPASE: CPT

## 2022-12-23 PROCEDURE — A9270 NON-COVERED ITEM OR SERVICE: HCPCS | Performed by: EMERGENCY MEDICINE

## 2022-12-23 PROCEDURE — 700102 HCHG RX REV CODE 250 W/ 637 OVERRIDE(OP): Performed by: EMERGENCY MEDICINE

## 2022-12-23 PROCEDURE — 93005 ELECTROCARDIOGRAM TRACING: CPT | Performed by: EMERGENCY MEDICINE

## 2022-12-23 PROCEDURE — 80307 DRUG TEST PRSMV CHEM ANLYZR: CPT

## 2022-12-23 PROCEDURE — 700111 HCHG RX REV CODE 636 W/ 250 OVERRIDE (IP): Performed by: EMERGENCY MEDICINE

## 2022-12-23 PROCEDURE — 84484 ASSAY OF TROPONIN QUANT: CPT

## 2022-12-23 RX ORDER — ALUMINA, MAGNESIA, AND SIMETHICONE 2400; 2400; 240 MG/30ML; MG/30ML; MG/30ML
20 SUSPENSION ORAL ONCE
Status: COMPLETED | OUTPATIENT
Start: 2022-12-23 | End: 2022-12-23

## 2022-12-23 RX ORDER — ONDANSETRON 4 MG/1
4 TABLET, ORALLY DISINTEGRATING ORAL ONCE
Status: COMPLETED | OUTPATIENT
Start: 2022-12-23 | End: 2022-12-23

## 2022-12-23 RX ORDER — ONDANSETRON 2 MG/ML
4 INJECTION INTRAMUSCULAR; INTRAVENOUS ONCE
Status: DISCONTINUED | OUTPATIENT
Start: 2022-12-23 | End: 2022-12-23

## 2022-12-23 RX ADMIN — ONDANSETRON 4 MG: 4 TABLET, ORALLY DISINTEGRATING ORAL at 22:47

## 2022-12-23 RX ADMIN — ALUMINUM HYDROXIDE, MAGNESIUM HYDROXIDE, AND DIMETHICONE 20 ML: 400; 400; 40 SUSPENSION ORAL at 20:26

## 2022-12-23 ASSESSMENT — LIFESTYLE VARIABLES
EVER HAD A DRINK FIRST THING IN THE MORNING TO STEADY YOUR NERVES TO GET RID OF A HANGOVER: YES
TOTAL SCORE: 3
HAVE YOU EVER FELT YOU SHOULD CUT DOWN ON YOUR DRINKING: NO
HAVE PEOPLE ANNOYED YOU BY CRITICIZING YOUR DRINKING: YES
EVER FELT BAD OR GUILTY ABOUT YOUR DRINKING: YES
DO YOU DRINK ALCOHOL: YES
TOTAL SCORE: 3
TOTAL SCORE: 3
CONSUMPTION TOTAL: INCOMPLETE

## 2022-12-23 ASSESSMENT — FIBROSIS 4 INDEX: FIB4 SCORE: 0.93

## 2022-12-24 VITALS
HEIGHT: 69 IN | HEART RATE: 88 BPM | SYSTOLIC BLOOD PRESSURE: 134 MMHG | TEMPERATURE: 97.8 F | WEIGHT: 136.69 LBS | OXYGEN SATURATION: 98 % | RESPIRATION RATE: 16 BRPM | BODY MASS INDEX: 20.24 KG/M2 | DIASTOLIC BLOOD PRESSURE: 88 MMHG

## 2022-12-24 LAB — POC BREATHALIZER: 0.15 PERCENT (ref 0–0.01)

## 2022-12-24 PROCEDURE — 700102 HCHG RX REV CODE 250 W/ 637 OVERRIDE(OP): Performed by: EMERGENCY MEDICINE

## 2022-12-24 PROCEDURE — A9270 NON-COVERED ITEM OR SERVICE: HCPCS | Performed by: EMERGENCY MEDICINE

## 2022-12-24 RX ORDER — ACETAMINOPHEN 325 MG/1
650 TABLET ORAL ONCE
Status: COMPLETED | OUTPATIENT
Start: 2022-12-24 | End: 2022-12-24

## 2022-12-24 RX ADMIN — ACETAMINOPHEN 650 MG: 325 TABLET, FILM COATED ORAL at 01:59

## 2022-12-24 NOTE — ED NOTES
Pt medicated per MAR. Pt given socks and ambulatory to the bathroom to urinate. Provided her with urine cup for UA sample

## 2022-12-24 NOTE — CONSULTS
Alert Team:    Legal hold certified by ERP. Referrals sent to inpatient psychiatric facility and psychiatry consult has been ordered.
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
## 2022-12-24 NOTE — ED NOTES
Breathalyzer completed, ERP notified. Pt requesting tylenol for 7/10 headache, ERP notified of pt's request

## 2022-12-24 NOTE — ED NOTES
Report received, assumed care of patient, patient resting in bed, no apparent distress, 1:1 sitter in place.

## 2022-12-24 NOTE — DISCHARGE INSTRUCTIONS
You were evaluated today for suicidal ideation. Your physical exam and labs were reassuring. You were medically cleared in the emergency department, had a psychiatric evaluation performed and you are being discharged from the emergency department. Please follow all the recommendations set by your psychiatrist.    If you have thoughts of suicide, please seek help. This may be a family member, friend, mental health professional, suicide hotline, or any emergency department.     [place] Crisis hotline: 988  Available 24/7    Your evaluation for chest pain or shortness of breath is reassuring.  You have reassuring vital signs, chest x-ray, EKG, and labs.  There is no signs of a heart attack or other dangerous condition.    Please return to the ED or seek medical attention if you develop:  Fever, severe headache, vomiting, thoughts of suicide or other concerning findings

## 2022-12-24 NOTE — ED NOTES
Legal hold placed on pt for SI by ERP, Legal hold paperwork in chart. Pt changed into hospital gown and all belongings held at nurses station. 1:1 sitter monitoring initiated per protocol, Charge RN notified.
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
## 2022-12-24 NOTE — ED PROVIDER NOTES
"ED Observation Progress Note    Date of Service: 12/24/22    Interval History  Patient signed out to me by Dr. Delong. She has been medically cleared.  She is pending sober reevaluation for suicidal ideation.  Patient sober, evaluated by mental health felt to be a high risk from a suicidal standpoint and was placed on a legal hold. No further events.  She has been accepted by [facility] by Dr. Macias.    Physical Exam  /85   Pulse 85   Temp 36.6 °C (97.9 °F) (Tympanic)   Resp 17   Ht 1.75 m (5' 8.9\")   Wt 62 kg (136 lb 11 oz)   LMP 06/11/2005   SpO2 98%   BMI 20.24 kg/m² .    Constitutional: Awake and alert. Nontoxic  HENT:  Grossly normal  Eyes: Grossly normal  Neck: Normal range of motion  Cardiovascular: Normal heart rate   Thorax & Lungs: No respiratory distress  Abdomen: Nontender  Skin:  No pathologic rash.   Extremities: Well perfused  Psychiatric: +ve SI    Labs  Results for orders placed or performed during the hospital encounter of 12/23/22   CBC WITH DIFFERENTIAL   Result Value Ref Range    WBC 5.3 4.8 - 10.8 K/uL    RBC 4.23 4.20 - 5.40 M/uL    Hemoglobin 13.1 12.0 - 16.0 g/dL    Hematocrit 38.6 37.0 - 47.0 %    MCV 91.3 81.4 - 97.8 fL    MCH 31.0 27.0 - 33.0 pg    MCHC 33.9 33.6 - 35.0 g/dL    RDW 50.0 35.9 - 50.0 fL    Platelet Count 278 164 - 446 K/uL    MPV 9.3 9.0 - 12.9 fL    Neutrophils-Polys 60.30 44.00 - 72.00 %    Lymphocytes 31.10 22.00 - 41.00 %    Monocytes 7.40 0.00 - 13.40 %    Eosinophils 0.60 0.00 - 6.90 %    Basophils 0.20 0.00 - 1.80 %    Immature Granulocytes 0.40 0.00 - 0.90 %    Nucleated RBC 0.00 /100 WBC    Neutrophils (Absolute) 3.20 2.00 - 7.15 K/uL    Lymphs (Absolute) 1.65 1.00 - 4.80 K/uL    Monos (Absolute) 0.39 0.00 - 0.85 K/uL    Eos (Absolute) 0.03 0.00 - 0.51 K/uL    Baso (Absolute) 0.01 0.00 - 0.12 K/uL    Immature Granulocytes (abs) 0.02 0.00 - 0.11 K/uL    NRBC (Absolute) 0.00 K/uL   COMP METABOLIC PANEL   Result Value Ref Range    Sodium 143 135 - 145 mmol/L    " Potassium 4.0 3.6 - 5.5 mmol/L    Chloride 104 96 - 112 mmol/L    Co2 19 (L) 20 - 33 mmol/L    Anion Gap 20.0 (H) 7.0 - 16.0    Glucose 80 65 - 99 mg/dL    Bun 15 8 - 22 mg/dL    Creatinine 0.66 0.50 - 1.40 mg/dL    Calcium 8.8 8.5 - 10.5 mg/dL    AST(SGOT) 26 12 - 45 U/L    ALT(SGPT) 17 2 - 50 U/L    Alkaline Phosphatase 75 30 - 99 U/L    Total Bilirubin 0.6 0.1 - 1.5 mg/dL    Albumin 4.5 3.2 - 4.9 g/dL    Total Protein 6.9 6.0 - 8.2 g/dL    Globulin 2.4 1.9 - 3.5 g/dL    A-G Ratio 1.9 g/dL   LIPASE   Result Value Ref Range    Lipase 26 11 - 82 U/L   TROPONIN   Result Value Ref Range    Troponin T 8 6 - 19 ng/L   URINE DRUG SCREEN (TRIAGE)   Result Value Ref Range    Amphetamines Urine Negative Negative    Barbiturates Negative Negative    Benzodiazepines Negative Negative    Cocaine Metabolite Negative Negative    Methadone Negative Negative    Opiates Negative Negative    Oxycodone Negative Negative    Phencyclidine -Pcp Negative Negative    Propoxyphene Negative Negative    Cannabinoid Metab Positive (A) Negative   CORRECTED CALCIUM   Result Value Ref Range    Correct Calcium 8.4 (L) 8.5 - 10.5 mg/dL   ESTIMATED GFR   Result Value Ref Range    GFR (CKD-EPI) 104 >60 mL/min/1.73 m 2   POC BREATHALIZER   Result Value Ref Range    POC Breathalizer 0.198 (A) 0.00 - 0.01 Percent   POC BREATHALIZER   Result Value Ref Range    POC Breathalizer 0.151 (A) 0.00 - 0.01 Percent   EKG (NOW)   Result Value Ref Range    Report       [facility] Emergency Dept.    Test Date:  2022  Pt Name:    JASON SCHMITZ                Department: ER  MRN:        [id number]                      Room:       [facility]  Gender:     Female                       Technician: [id number]  :        1970                   Requested By:ADALI DELONG  Order #:    [id number]                    Reading MD: Adali Delong    Measurements  Intervals                                Axis  Rate:       79                           P:           77  UT:         175                          QRS:        53  QRSD:       75                           T:          57  QT:         411  QTc:        472    Interpretive Statements  Sinus rhythm  Probable left atrial enlargement  RSR' in V1 or V2, probably normal variant  Compared to ECG 12/16/2022 08:20:05  RSR' in V1 or V2 now present  Prolonged QT interval no longer present  Electronically Signed On 12- 20:05:06 PST by Kendall Norris         Radiology  DX-CHEST-PORTABLE (1 VIEW)   Final Result      No acute cardiopulmonary abnormality.             Problem List  1. Suicidal ideation        2. Alcoholic intoxication with complication (HCC)        3. Shortness of breath                Electronically signed by: Carmella Morales M.D., 12/24/2022 7:48 AM

## 2022-12-24 NOTE — DISCHARGE PLANNING
Alert Team/Behavioral Health   Note:        Mental Health Transfer     Referral: transfer to Mental Health Facility     Intervention: ED LSW (Josephine SCALES) notified writer at 0714 of patient acceptance @ [facility].     Patient's accepting provider is DO Gilberto.     Transport arranged through [facility] ambulance.     The patient will be picked up at 0830.     Notified Attending Provider (Carmen SHANNON), Bedside RN (Jeane SCALES), and Alert Team RN (Rafaela RIOS) via Voalte of the departure time as well as accepting facility (Sandy @ [facility]).     Transfer packet to be created and placed in chart with filled out COBRA form.    Plan: transfer to [facility] via [facility] ambulance for inpatient acute psychiatric care.

## 2022-12-24 NOTE — DISCHARGE PLANNING
Alert Team/Behavioral Health   Note:      Lula @ [facility] called to update pick-up time to 0930.    Notified Attending Provider (Carmen SHANNON), Bedside RN (Jeane SCALES), and Alert Team RN (Rafaela RIOS) via Voalte of the departure time as well as accepting facility (Lula @ [facility]).

## 2022-12-24 NOTE — DISCHARGE PLANNING
[facility] ED Behavioral Health Fax Referral      [facility] ED Behavioral Health Alert Team:  [phone]    Referral: Legal Hold    Intervention: Patient referral to [facility] inpatient  facillity    Legal Hold Initiated: Date: 12/23/2022 Time: 1924    Patient’s Insurance Listed on Face Sheet: [employer] and Medicaid HMO    Referrals sent to: [facility]    Referrals faxed by: DIANA Rodríguez    This referral contains the following information:  Face sheet __x__  Page 1 and Page 2 of Legal Hold __x__  Alert Team Assessment/Psych Assessment ____  Head to toe physical exam ___x_  Urine Drug Screen __x__  Blood Alcohol __x__  Vital signs __x__  Pregnancy test when applicable ___  Medications list __x__  Covid screening ____    Plan: Patient will transfer to mental health facility once acceptance is obtained

## 2022-12-24 NOTE — DISCHARGE PLANNING
Medical Social Work     The pt was accepted to [facility], accepting MD Dr. Macias.     [facility] advised SW that they can take the pt anytime.

## 2022-12-24 NOTE — ED NOTES
Assumed care of pt, received report from DIANA Gillis. Safety rounds completed, pt resting comfortably in the room.

## 2022-12-30 ENCOUNTER — HOSPITAL ENCOUNTER (EMERGENCY)
Facility: MEDICAL CENTER | Age: 52
End: 2022-12-31
Attending: EMERGENCY MEDICINE
Payer: COMMERCIAL

## 2022-12-30 DIAGNOSIS — R45.851 SUICIDAL IDEATION: ICD-10-CM

## 2022-12-30 DIAGNOSIS — F10.10 ALCOHOL ABUSE: Primary | ICD-10-CM

## 2022-12-30 LAB
AMPHET UR QL SCN: NEGATIVE
BARBITURATES UR QL SCN: NEGATIVE
BENZODIAZ UR QL SCN: NEGATIVE
BZE UR QL SCN: NEGATIVE
CANNABINOIDS UR QL SCN: NEGATIVE
METHADONE UR QL SCN: NEGATIVE
OPIATES UR QL SCN: NEGATIVE
OXYCODONE UR QL SCN: NEGATIVE
PCP UR QL SCN: NEGATIVE
POC BREATHALIZER: 0.35 PERCENT (ref 0–0.01)
PROPOXYPH UR QL SCN: NEGATIVE

## 2022-12-30 PROCEDURE — 80307 DRUG TEST PRSMV CHEM ANLYZR: CPT

## 2022-12-30 PROCEDURE — 700102 HCHG RX REV CODE 250 W/ 637 OVERRIDE(OP): Performed by: EMERGENCY MEDICINE

## 2022-12-30 PROCEDURE — 99285 EMERGENCY DEPT VISIT HI MDM: CPT

## 2022-12-30 PROCEDURE — A9270 NON-COVERED ITEM OR SERVICE: HCPCS | Performed by: EMERGENCY MEDICINE

## 2022-12-30 PROCEDURE — 302970 POC BREATHALIZER: Performed by: EMERGENCY MEDICINE

## 2022-12-30 RX ORDER — LORAZEPAM 1 MG/1
1 TABLET ORAL ONCE
Status: COMPLETED | OUTPATIENT
Start: 2022-12-30 | End: 2022-12-30

## 2022-12-30 RX ADMIN — LORAZEPAM 1 MG: 1 TABLET ORAL at 21:53

## 2022-12-30 ASSESSMENT — FIBROSIS 4 INDEX: FIB4 SCORE: 1.22

## 2022-12-31 VITALS
WEIGHT: 140 LBS | DIASTOLIC BLOOD PRESSURE: 78 MMHG | TEMPERATURE: 98.2 F | RESPIRATION RATE: 16 BRPM | BODY MASS INDEX: 20.73 KG/M2 | SYSTOLIC BLOOD PRESSURE: 122 MMHG | HEIGHT: 69 IN | HEART RATE: 89 BPM | OXYGEN SATURATION: 97 %

## 2022-12-31 LAB
POC BREATHALIZER: 0.03 PERCENT (ref 0–0.01)
POC BREATHALIZER: 0.17 PERCENT (ref 0–0.01)

## 2022-12-31 PROCEDURE — 90791 PSYCH DIAGNOSTIC EVALUATION: CPT

## 2022-12-31 PROCEDURE — 302970 POC BREATHALIZER

## 2022-12-31 PROCEDURE — 302970 POC BREATHALIZER: Performed by: EMERGENCY MEDICINE

## 2022-12-31 NOTE — DISCHARGE SUMMARY
"  ED Observation Discharge Summary    Patient:Rhonda Gould  Patient : 1970  Patient MRN: [id number]  Patient PCP: Karo Middleton M.D.    Admit Date: 2022  Discharge Date and Time: 22 8:22 AM  Discharge Diagnosis: Alcohol intoxication and depression  Discharge Attending: Jessica Whitt M.D.  Discharge Service: ED Observation    ED Course  Rhonda is a 52 y.o. female who was evaluated at [facility] for suicidal thoughts and alcohol intoxication.  This morning after clearing her alcohol intoxication patient is regretful and remorseful.  She denies any active suicidal thoughts.  At this point it seems that patient's alcohol use is triggering her suicidal thoughts.  She has forward thinking plans and plans for follow-up.  She has an appointment with a psychiatrist.  Behavioral health team evaluated patient and agrees that she is not in acute danger to herself.  Therefore legal hold is discontinued by myself.  I had a long discussion with patient regarding outpatient follow-up resources.  We will connect her with behavioral health and [facility] recovery resources.  Patient is advised if she has any recurrent suicidal thoughts she should return to the emergency department.  Patient is amenable to this plan.  She is then discharged in stable condition.    Discharge Exam:  /72   Pulse 89   Temp 36.8 °C (98.2 °F) (Temporal)   Resp 16   Ht 1.753 m (5' 9\")   Wt 63.5 kg (140 lb)   LMP 2005   SpO2 98%   BMI 20.67 kg/m² .    Constitutional: Awake and alert. Nontoxic  HENT:  Grossly normal  Eyes: Grossly normal  Neck: Normal range of motion  Cardiovascular: Normal heart rate   Thorax & Lungs: No respiratory distress  Abdomen: Nontender  Skin:  No pathologic rash.   Extremities: Well perfused  Psychiatric: Affect normal    Labs  Results for orders placed or performed during the hospital encounter of 22   URINE DRUG SCREEN   Result Value Ref Range    Amphetamines Urine " Negative Negative    Barbiturates Negative Negative    Benzodiazepines Negative Negative    Cocaine Metabolite Negative Negative    Methadone Negative Negative    Opiates Negative Negative    Oxycodone Negative Negative    Phencyclidine -Pcp Negative Negative    Propoxyphene Negative Negative    Cannabinoid Metab Negative Negative   POC BREATHALIZER   Result Value Ref Range    POC Breathalizer 0.352 (A) 0.00 - 0.01 Percent   POC BREATHALIZER   Result Value Ref Range    POC Breathalizer 0.170 (A) 0.00 - 0.01 Percent   POC BREATHALIZER   Result Value Ref Range    POC Breathalizer 0.033 (A) 0.00 - 0.01 Percent       Radiology  No orders to display       Disposition: Discharged in stable condition    Follow up: Outpatient psychiatry    Medications:   No new medications    Discharge Condition: Stable    Electronically signed by: Jessica Whitt M.D., 12/31/2022 8:22 AM

## 2022-12-31 NOTE — ED NOTES
Report given to Babar ORTIZ. Safety measures in place. Call light within reach. Care relinquished.

## 2022-12-31 NOTE — ED NOTES
Patient remains comfortable on gurney, no identifiable needs at this time. Equal chest rise and fall bilaterally, pt connected to cardiac monitor. Safety measures in place.

## 2022-12-31 NOTE — ED NOTES
Patient resting on gurney with eyes closed. Equal chest rise and fall noted. No labored respirations  No identifiable needs at this time

## 2022-12-31 NOTE — CONSULTS
"[facility]   TRIAGE ASSESSMENT    Name: Rhonda Gould  MRN: [id number]  : 1970  Age: 52 y.o.  Date of assessment: 2022  PCP: Karo Middleton M.D.  Persons in attendance: Patient  Patient Location: [facility]    CHIEF COMPLAINT/PRESENTING ISSUE (as stated by Patient): Pt BIB EMS after making suicidal statements to her landlord while intoxicated. On admission, her BAL was 0.352 at . At time of assessment, pt is clinically sober at 0.033. She states the trigger is loosing her father one year ago, which makes the holidays difficult. She admits to daily drinking, usually 1/2 pint daily. Now that she is sober, she denies suicidal ideation and lists multiple protective factors. She had a similar event on 22 where she made suicidal statements while intoxicated. She was transferred to inpatient psych and was released the same day. Spoke to Sandy at [facility], who stated that per Dr. Streeter, pt needs a long term treatment program. Pt has a  \"sponsor\" type person that works at [employer], counselor at [place] [phone] and an appointment with Dr. Streeter for medication management. Pt is meeting with her supervisor on Tuesday to discuss the possibility of some time off to seek treatment. Pt agreeable to IOP. [facility].   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 She has a son in [place] and a daughter nearby. She has the support of a sponsor.     BEHAVIORAL HEALTH/SUBSTANCE USE TREATMENT HISTORY  Does patient/parent report a history of " prior behavioral health/substance use treatment for patient?   Yes:    Dates Level of Care Facilty/Provider Diagnosis/Problem Medications   12/24/22 Inpatient  [facility]  Alcohol use disorder, suicidal ideation    2020 [facility] Alcohol use disorder                                                                SAFETY ASSESSMENT - SELF  Does patient acknowledge current or past symptoms of dangerousness to self or is previous history noted? yes  Does parent/significant other report patient has current or past symptoms of dangerousness to self? N\A  Does presenting problem suggest symptoms of dangerousness to self?  Pt made suicidal statements while intoxicated twice in the past 2 weeks. Now sober, she denies suicidal ideation. No past attempts.     SAFETY ASSESSMENT - OTHERS  Does patient acknowledge current or past symptoms of aggressive behavior or risk to others or is previous history noted? no  Does parent/significant other report patient has current or past symptoms of aggressive behavior or risk to others?  N\A  Does presenting problem suggest symptoms of dangerousness to others? No    LEGAL HISTORY  Does patient acknowledge history of arrest/prison/care home or is previous history noted? no    Crisis Safety Plan completed and copy given to patient? yes    ABUSE/NEGLECT SCREENING  Does patient report feeling “unsafe” in his/her home, or afraid of anyone?  no  Does patient report any history of physical, sexual, or emotional abuse?  no  Does parent or significant other report any of the above? N\A  Is there evidence of neglect by self?  no  Is there evidence of neglect by a caregiver? no  Does the patient/parent report any history of CPS/APS/police involvement related to suspected abuse/neglect or domestic violence? no  Based on the information provided during the current assessment, is a mandated report of suspected abuse/neglect being made?  No    SUBSTANCE USE SCREENING  Yes:  Gomez all  "substances used in the past 30 days:      Last Use Amount   [x]   Alcohol 12/30/22 >1 pint   [x]   Marijuana 12/23/22    []   Heroin     []   Prescription Opioids  (used without prescription, for    recreation, or in excess of prescribed amount)     []   Other Prescription  (used without prescription, for    recreation, or in excess of prescribed amount)     []   Cocaine      []   Methamphetamine     []   \"\" drugs (ectasy, MDMA)     []   Other substances        UDS results: Negative for all substances. Was positive for marijuana on 12/23/22  Breathalyzer results: 0.352 at time of admission.     What consequences does the patient associate with any of the above substance use and or addictive behaviors? Work problems or losses, Health problems: 2 ED admissions for alcohol intoxication    Risk factors for detox (check all that apply):  [x]  Seizures   [x]  Diaphoretic (sweating)   [x]  Tremors   [x]  Hallucinations   [x]  Increased blood pressure   []  Decreased blood pressure   []  Other   []  None      [x] Patient education on risk factors for detoxification and instructed to return to ER as needed.      MENTAL STATUS   Participation: Active verbal participation, Attentive, Engaged, and Open to feedback  Grooming: Casual  Orientation: Alert and Fully Oriented  Behavior: Calm  Eye contact: Good  Mood: Depressed  Affect: Congruent with content  Thought process: Logical and Goal-directed  Thought content: Within normal limits  Speech: Rate within normal limits and Volume within normal limits  Perception: Within normal limits  Memory:  Recent:  Does not remember events of last night  Insight: Limited  Judgment:  Poor  Other:    Collateral information:    Source:  [] Significant other present in person:   [] Significant other by telephone  [] [facility]   [] [facility]  [x] [facility]  [] Other:     [] Unable to complete full assessment due to:  [] Acute intoxication  [] Patient " declined to participate/engage  [] Patient verbally unresponsive  [] Significant cognitive deficits  [] Significant perceptual distortions or behavioral disorganization  [] Other:      CLINICAL IMPRESSIONS:  Primary:  alcohol use disorder  Secondary:  Grief       IDENTIFIED NEEDS/PLAN:  [Trigger DISPOSITION list for any items marked]    []  Imminent safety risk - self [] Imminent safety risk - others   []  Acute substance withdrawal []  Psychosis/Impaired reality testing   []  Mood/anxiety [x]  Substance use/Addictive behavior   []  Maladaptive behaviro []  Parent/child conflict   []  Family/Couples conflict []  Biomedical   []  Housing []  Financial   []   Legal  Occupational/Educational   []  Domestic violence []  Other:     Recommended Plan of Care:  Refer to [facility], [facility], [facility] , [facility].   *Telesitter may not be utilized for moderate or high risk patients    Has the Recommended Plan of Care/Level of Observation been reviewed with the patient's assigned nurse? yes    Does patient/parent or guardian express agreement with the above plan? yes    Referral appointment(s) scheduled? N\A    Alert team only:   I have discussed findings and recommendations with Dr. Whitt who is in agreement with these recommendations.     Referral information sent to the following outpatient community providers : Refer to [facility], and [facility], [facility]    Referral information sent to the following inpatient community providers :    If applicable : Referred  to  Alert Team for legal hold follow up at (time): discontinued      Crystal Bonner R.N.  12/31/2022

## 2022-12-31 NOTE — ED TRIAGE NOTES
"Rhonda Gould  52 y.o. female  Chief Complaint   Patient presents with    ETOH Intoxication     BIB [facility] from home for etoh, pt states lately she has been drinking every day because she had a rough [place]    Suicidal Ideation     Per EMS pt made comments to her landlord that she wants to harm herself, upon arrival states \"I'm done\"     BIB [facility] from home for above, pt breathalizer 0.352. Per EMS pt was making statements to her landlord that she wanted to harm herself then he called 911. Pt states to this RN that she is \"done\". SI precautions in place.     Pt is alert, disoriented due to etoh, and follows commands. Pt speaking to herself in room. No acute distress noted and respirations are even and unlabored.     Pt to green 37, pending ERP to bedside.    "

## 2022-12-31 NOTE — ED NOTES
Patient remains comfortable on gurney, no identifiable needs at this time. Equal chest rise and fall bilaterally, pt connected to cardiac monitor. Safety measures in place, call light within reach.

## 2022-12-31 NOTE — ED NOTES
Pt repeatedly sticking fingers in her throat, causing vomiting. Emesis bag replaced. Pt provided with pillow and warm blanket per request.

## 2022-12-31 NOTE — ED NOTES
ICU/LVN

Pt states she is no longer suicidal, alert team RN at bedside.

## 2022-12-31 NOTE — ED PROVIDER NOTES
"ED Provider Note    Scribed for Zev Manzo by Caesar Cabrera. 2022  8:15 PM    Primary care provider: Karo Middleton M.D.  Means of arrival: EMS  History obtained from: Patient  History limited by: None    CHIEF COMPLAINT  Chief Complaint   Patient presents with    ETOH Intoxication     BIB [facility] from home for etoh, pt states lately she has been drinking every day because she had a rough Vitor    Suicidal Ideation     Per EMS pt made comments to her landlord that she wants to harm herself, upon arrival states \"I'm done\"     HPI  Rhonda Gould is a 52 y.o. female who presents to the Emergency Department via EMS for suicidal ideation based on the call of a friend. She states that last year her dad  and that it has been a difficult holiday. She reports that her drinking over the past 2-3 weeks she has been drinking a pint to a pint and a half of alcohol consistently and has had thoughts of hurting herself. Rhonda denies ever being in a psychiatric facility for the suicidal ideation but reports being in a rehab center for alcohol use. She admits to smoking tobacco and marijuana use. Rhonda denies any problems with family or significant others stating that she has been \"very much alone\" this holiday. She states that she is taking zoloft and trazodone but does not report any other medications.    Quality: Depressed  Duration: 3 weeks  Severity: Moderate  Associated sx: suicidal ideation    REVIEW OF SYSTEMS  As above, all other systems reviewed and are negative.   See HPI for further details.     PAST MEDICAL HISTORY   has a past medical history of Chronic obstructive pulmonary disease (HCC) and Hypertension.    SURGICAL HISTORY   has a past surgical history that includes hysterectomy laparoscopy (2005).    SOCIAL HISTORY  Social History     Tobacco Use    Smoking status: Every Day    Smokeless tobacco: Never   Vaping Use    Vaping Use: Never used   Substance Use Topics    Alcohol use: Yes     " Comment: Quit 6/15    Drug use: Not Currently     Frequency: 2.0 times per week     Types: Inhaled, Marijuana     Comment: marijuana      Social History     Substance and Sexual Activity   Drug Use Not Currently    Frequency: 2.0 times per week    Types: Inhaled, Marijuana    Comment: marijuana     FAMILY HISTORY  Family History   Problem Relation Age of Onset    Diabetes Mother     Hypertension Mother     Hypertension Father     Diabetes Father     Heart Disease Maternal Grandmother     Cancer Maternal Grandfather      CURRENT MEDICATIONS  Home Medications       Reviewed by Joshua Vidal (Pharmacy Tech) on 12/30/22 at 2100  Med List Status: Unable to Obtain     Medication Last Dose Status   albuterol 108 (90 Base) MCG/ACT Aero Soln inhalation aerosol  Active   loratadine (CLARITIN) 10 MG Tab  Active   meclizine (ANTIVERT) 25 MG Tab  Active   methylPREDNISolone (MEDROL DOSEPAK) 4 MG Tablet Therapy Pack  Active   nicotine (NICODERM) 7 MG/24HR PATCH 24 HR  Active   predniSONE (DELTASONE) 20 MG Tab  Active   promethazine (PHENERGAN) 25 MG Tab  Active   sertraline (ZOLOFT) 100 MG Tab  Active   sulfamethoxazole-trimethoprim (BACTRIM DS) 800-160 MG tablet  Active   traZODone (DESYREL) 50 MG Tab  Active                  ALLERGIES  Allergies   Allergen Reactions    Codeine Hives and Itching     Tolerated morphine previously  Allergy updated from 2015, morphine given in 2016    Fish Allergy Anaphylaxis and Shortness of Breath     Swelling to throat.    Other Drug Hives     shellfish  shellfish  shellfish      Shellfish Allergy Anaphylaxis     Other reaction(s): Anaphylaxis  Strict No Shellfish (avoids most fish, cod ok)  Strict No Shellfish (avoids most fish, cod ok)  Strict No Shellfish (avoids most fish, cod ok)       PHYSICAL EXAM  VITAL SIGNS:   Vitals:    12/30/22 1937 12/30/22 1938 12/30/22 2150 12/30/22 2300   BP:  (!) 165/57 (!) 144/73 101/66   Pulse:  99 92 97   Resp:  18 20 18   Temp:  36.8 °C (98.2 °F)    "  TempSrc:  Temporal     SpO2:  95% 94% 99%   Weight: 63.5 kg (140 lb)      Height: 1.753 m (5' 9\")        Vitals: My interpretation: hypertensive, not tachycardic, afebrile, not hypoxic    Reinterpretation of vitals: Improved    PE:   Gen: sitting comfortably, speaking clearly, appears in no acute distress   ENT: Mucous membranes moist, posterior pharynx clear, uvula midline, nares patent bilaterally   Neck: Supple, FROM  Pulmonary: Lungs are clear to auscultation bilaterally. No tachypnea  CV:  Hypertensive. RRR, no murmur appreciated, pulses 2+ in both upper and lower extremities  Abdomen: soft, NT/ND; no rebound/guarding  : no CVA or suprapubic tenderness   Neuro: A&Ox4 (person, place, time, situation), speech fluent, gait steady, no focal deficits appreciated  Skin: No rash or lesions.  No pallor or jaundice.  No cyanosis.  Warm and dry.   Psychiatric: Appears mildly intoxicated, is pleasant and cooperative, endorses depression, suicidal ideation, borderline attempted scratching her wrist but no cut is present, denies homicidal ideation    DIAGNOSTIC STUDIES / PROCEDURES    LABS  Results for orders placed or performed during the hospital encounter of 12/30/22   URINE DRUG SCREEN   Result Value Ref Range    Amphetamines Urine Negative Negative    Barbiturates Negative Negative    Benzodiazepines Negative Negative    Cocaine Metabolite Negative Negative    Methadone Negative Negative    Opiates Negative Negative    Oxycodone Negative Negative    Phencyclidine -Pcp Negative Negative    Propoxyphene Negative Negative    Cannabinoid Metab Negative Negative   POC BREATHALIZER   Result Value Ref Range    POC Breathalizer 0.352 (A) 0.00 - 0.01 Percent   POC BREATHALIZER   Result Value Ref Range    POC Breathalizer 0.170 (A) 0.00 - 0.01 Percent      All labs reviewed by me. Significant for negative drug screen, alcohol level of 352, repeat of 170    COURSE & MEDICAL DECISION MAKING  Nursing notes, VS, PMSFHx, labs, " "imaging, EKG reviewed in chart.    8:00 PM Patient was in ED Observation and care was transferred to me at this time.  No contributory family history.    MDM: 8:15 PM Rhonda Gould is a 52 y.o. female who presented with alcohol abuse for the past 2 to 3 weeks, depression, passive suicidal ideation, 1 episode where she scratched her wrist but did not draw blood.  Patient been drinking every day for the past 2 to 3 weeks.  She states is the anniversary of her father's death last year and she is having a \"rough Downs\".  She has never had inpatient admission for psychiatric care she states but has had to have care for alcohol abuse in the past.  She is medically cleared with a negative urine drug screen but her alcohol still is a little too elevated for her to be seen for further evaluation for psychiatric care and inpatient admission.  She is given a one-time dose of Ativan here as she was mildly agitated and have difficulty sleeping.  She is calm and cooperative however on reevaluation.  Vital signs are improving.  Signed out to oncoming team who will continue to monitor, reevaluate patient when she is sober and have behavioral health see her when appropriate.    Patient has had high blood pressure while in the emergency department, felt likely secondary to medical condition. Counseled patient to monitor blood pressure at home and follow up with primary care physician.    FINAL IMPRESSION  1. Alcohol abuse Acute   2. Suicidal ideation Acute       Caesar STARR (Olaf), am scribing for, and in the presence of, Zev Manzo.    Electronically signed by: Caesar Cabrera (Olaf), 12/30/2022    Zev STARR personally performed the services described in this documentation, as scribed by Caesar Cabrera in my presence, and it is both accurate and complete.    The note accurately reflects work and decisions made by me.  Zev Manzo  12/31/2022  12:36 AM       "

## 2023-01-29 ENCOUNTER — HOSPITAL ENCOUNTER (EMERGENCY)
Facility: MEDICAL CENTER | Age: 53
End: 2023-01-29
Attending: EMERGENCY MEDICINE
Payer: COMMERCIAL

## 2023-01-29 ENCOUNTER — APPOINTMENT (OUTPATIENT)
Dept: RADIOLOGY | Facility: MEDICAL CENTER | Age: 53
End: 2023-01-29
Attending: EMERGENCY MEDICINE
Payer: COMMERCIAL

## 2023-01-29 VITALS
HEIGHT: 68 IN | RESPIRATION RATE: 16 BRPM | SYSTOLIC BLOOD PRESSURE: 130 MMHG | OXYGEN SATURATION: 93 % | HEART RATE: 70 BPM | BODY MASS INDEX: 19.7 KG/M2 | TEMPERATURE: 97.4 F | DIASTOLIC BLOOD PRESSURE: 68 MMHG | WEIGHT: 130 LBS

## 2023-01-29 DIAGNOSIS — R45.851 SUICIDAL IDEATION: ICD-10-CM

## 2023-01-29 DIAGNOSIS — T50.902A INTENTIONAL DRUG OVERDOSE, INITIAL ENCOUNTER (HCC): ICD-10-CM

## 2023-01-29 LAB
ALBUMIN SERPL BCP-MCNC: 4.4 G/DL (ref 3.2–4.9)
ALBUMIN/GLOB SERPL: 1.9 G/DL
ALP SERPL-CCNC: 68 U/L (ref 30–99)
ALT SERPL-CCNC: 12 U/L (ref 2–50)
AMPHET UR QL SCN: NEGATIVE
ANION GAP SERPL CALC-SCNC: 14 MMOL/L (ref 7–16)
APAP SERPL-MCNC: <5 UG/ML (ref 10–30)
AST SERPL-CCNC: 20 U/L (ref 12–45)
BARBITURATES UR QL SCN: NEGATIVE
BASOPHILS # BLD AUTO: 0.7 % (ref 0–1.8)
BASOPHILS # BLD: 0.03 K/UL (ref 0–0.12)
BENZODIAZ UR QL SCN: NEGATIVE
BILIRUB SERPL-MCNC: 0.6 MG/DL (ref 0.1–1.5)
BUN SERPL-MCNC: 12 MG/DL (ref 8–22)
BZE UR QL SCN: NEGATIVE
CALCIUM ALBUM COR SERPL-MCNC: 8.6 MG/DL (ref 8.5–10.5)
CALCIUM SERPL-MCNC: 8.9 MG/DL (ref 8.5–10.5)
CANNABINOIDS UR QL SCN: NEGATIVE
CHLORIDE SERPL-SCNC: 107 MMOL/L (ref 96–112)
CO2 SERPL-SCNC: 23 MMOL/L (ref 20–33)
CREAT SERPL-MCNC: 0.77 MG/DL (ref 0.5–1.4)
EKG IMPRESSION: NORMAL
EOSINOPHIL # BLD AUTO: 0.21 K/UL (ref 0–0.51)
EOSINOPHIL NFR BLD: 5.1 % (ref 0–6.9)
ERYTHROCYTE [DISTWIDTH] IN BLOOD BY AUTOMATED COUNT: 49.1 FL (ref 35.9–50)
ETHANOL BLD-MCNC: 214.6 MG/DL
GFR SERPLBLD CREATININE-BSD FMLA CKD-EPI: 91 ML/MIN/1.73 M 2
GLOBULIN SER CALC-MCNC: 2.3 G/DL (ref 1.9–3.5)
GLUCOSE SERPL-MCNC: 105 MG/DL (ref 65–99)
HCG SERPL QL: NEGATIVE
HCT VFR BLD AUTO: 39.1 % (ref 37–47)
HGB BLD-MCNC: 13.1 G/DL (ref 12–16)
IMM GRANULOCYTES # BLD AUTO: 0.02 K/UL (ref 0–0.11)
IMM GRANULOCYTES NFR BLD AUTO: 0.5 % (ref 0–0.9)
LIPASE SERPL-CCNC: 29 U/L (ref 11–82)
LYMPHOCYTES # BLD AUTO: 2.12 K/UL (ref 1–4.8)
LYMPHOCYTES NFR BLD: 51.7 % (ref 22–41)
MAGNESIUM SERPL-MCNC: 1.9 MG/DL (ref 1.5–2.5)
MCH RBC QN AUTO: 31.3 PG (ref 27–33)
MCHC RBC AUTO-ENTMCNC: 33.5 G/DL (ref 33.6–35)
MCV RBC AUTO: 93.3 FL (ref 81.4–97.8)
METHADONE UR QL SCN: NEGATIVE
MONOCYTES # BLD AUTO: 0.24 K/UL (ref 0–0.85)
MONOCYTES NFR BLD AUTO: 5.9 % (ref 0–13.4)
NEUTROPHILS # BLD AUTO: 1.48 K/UL (ref 2–7.15)
NEUTROPHILS NFR BLD: 36.1 % (ref 44–72)
NRBC # BLD AUTO: 0.02 K/UL
NRBC BLD-RTO: 0.5 /100 WBC
OPIATES UR QL SCN: NEGATIVE
OXYCODONE UR QL SCN: NEGATIVE
PCP UR QL SCN: NEGATIVE
PLATELET # BLD AUTO: 285 K/UL (ref 164–446)
PMV BLD AUTO: 9.3 FL (ref 9–12.9)
POC BREATHALIZER: 0 PERCENT (ref 0–0.01)
POTASSIUM SERPL-SCNC: 3.7 MMOL/L (ref 3.6–5.5)
PROPOXYPH UR QL SCN: NEGATIVE
PROT SERPL-MCNC: 6.7 G/DL (ref 6–8.2)
RBC # BLD AUTO: 4.19 M/UL (ref 4.2–5.4)
SALICYLATES SERPL-MCNC: <1 MG/DL (ref 15–25)
SODIUM SERPL-SCNC: 144 MMOL/L (ref 135–145)
WBC # BLD AUTO: 4.1 K/UL (ref 4.8–10.8)

## 2023-01-29 PROCEDURE — 700102 HCHG RX REV CODE 250 W/ 637 OVERRIDE(OP): Performed by: EMERGENCY MEDICINE

## 2023-01-29 PROCEDURE — 80307 DRUG TEST PRSMV CHEM ANLYZR: CPT

## 2023-01-29 PROCEDURE — 700111 HCHG RX REV CODE 636 W/ 250 OVERRIDE (IP): Performed by: EMERGENCY MEDICINE

## 2023-01-29 PROCEDURE — 96375 TX/PRO/DX INJ NEW DRUG ADDON: CPT

## 2023-01-29 PROCEDURE — 700102 HCHG RX REV CODE 250 W/ 637 OVERRIDE(OP)

## 2023-01-29 PROCEDURE — 80179 DRUG ASSAY SALICYLATE: CPT

## 2023-01-29 PROCEDURE — 700105 HCHG RX REV CODE 258: Performed by: EMERGENCY MEDICINE

## 2023-01-29 PROCEDURE — 93005 ELECTROCARDIOGRAM TRACING: CPT | Performed by: EMERGENCY MEDICINE

## 2023-01-29 PROCEDURE — 82077 ASSAY SPEC XCP UR&BREATH IA: CPT

## 2023-01-29 PROCEDURE — 71045 X-RAY EXAM CHEST 1 VIEW: CPT

## 2023-01-29 PROCEDURE — 96374 THER/PROPH/DIAG INJ IV PUSH: CPT

## 2023-01-29 PROCEDURE — A9270 NON-COVERED ITEM OR SERVICE: HCPCS | Performed by: EMERGENCY MEDICINE

## 2023-01-29 PROCEDURE — 84703 CHORIONIC GONADOTROPIN ASSAY: CPT

## 2023-01-29 PROCEDURE — 302970 POC BREATHALIZER: Performed by: EMERGENCY MEDICINE

## 2023-01-29 PROCEDURE — A9270 NON-COVERED ITEM OR SERVICE: HCPCS

## 2023-01-29 PROCEDURE — 99285 EMERGENCY DEPT VISIT HI MDM: CPT

## 2023-01-29 PROCEDURE — 85025 COMPLETE CBC W/AUTO DIFF WBC: CPT

## 2023-01-29 PROCEDURE — 80053 COMPREHEN METABOLIC PANEL: CPT

## 2023-01-29 PROCEDURE — 83690 ASSAY OF LIPASE: CPT

## 2023-01-29 PROCEDURE — 90791 PSYCH DIAGNOSTIC EVALUATION: CPT

## 2023-01-29 PROCEDURE — 83735 ASSAY OF MAGNESIUM: CPT

## 2023-01-29 PROCEDURE — 80143 DRUG ASSAY ACETAMINOPHEN: CPT

## 2023-01-29 PROCEDURE — 36415 COLL VENOUS BLD VENIPUNCTURE: CPT

## 2023-01-29 RX ORDER — TRAZODONE HYDROCHLORIDE 50 MG/1
100 TABLET ORAL
Status: DISCONTINUED | OUTPATIENT
Start: 2023-01-29 | End: 2023-01-29 | Stop reason: HOSPADM

## 2023-01-29 RX ORDER — CETIRIZINE HYDROCHLORIDE 10 MG/1
10 TABLET ORAL EVERY EVENING
Status: DISCONTINUED | OUTPATIENT
Start: 2023-01-29 | End: 2023-01-29 | Stop reason: HOSPADM

## 2023-01-29 RX ORDER — GABAPENTIN 300 MG/1
300 CAPSULE ORAL DAILY
Status: DISCONTINUED | OUTPATIENT
Start: 2023-01-29 | End: 2023-01-29 | Stop reason: HOSPADM

## 2023-01-29 RX ORDER — GABAPENTIN 300 MG/1
300 CAPSULE ORAL DAILY
Status: SHIPPED | COMMUNITY
Start: 2023-01-02 | End: 2023-03-02

## 2023-01-29 RX ORDER — MIRTAZAPINE 15 MG/1
7.5 TABLET, FILM COATED ORAL
Status: DISCONTINUED | OUTPATIENT
Start: 2023-01-29 | End: 2023-01-29 | Stop reason: HOSPADM

## 2023-01-29 RX ORDER — SERTRALINE HYDROCHLORIDE 100 MG/1
100 TABLET, FILM COATED ORAL NIGHTLY
Status: DISCONTINUED | OUTPATIENT
Start: 2023-01-29 | End: 2023-01-29 | Stop reason: HOSPADM

## 2023-01-29 RX ORDER — NICOTINE 21 MG/24HR
1 PATCH, TRANSDERMAL 24 HOURS TRANSDERMAL
Status: SHIPPED | COMMUNITY
End: 2023-05-30

## 2023-01-29 RX ORDER — PROPRANOLOL HYDROCHLORIDE 10 MG/1
10 TABLET ORAL 2 TIMES DAILY
Status: SHIPPED | COMMUNITY
Start: 2023-01-02 | End: 2023-03-02

## 2023-01-29 RX ORDER — ACETAMINOPHEN 325 MG/1
650 TABLET ORAL ONCE
Status: COMPLETED | OUTPATIENT
Start: 2023-01-29 | End: 2023-01-29

## 2023-01-29 RX ORDER — DIPHENHYDRAMINE HYDROCHLORIDE 50 MG/ML
25 INJECTION INTRAMUSCULAR; INTRAVENOUS ONCE
Status: COMPLETED | OUTPATIENT
Start: 2023-01-29 | End: 2023-01-29

## 2023-01-29 RX ORDER — DIPHENHYDRAMINE HCL 25 MG
25 TABLET ORAL
Status: SHIPPED | COMMUNITY
End: 2023-05-30

## 2023-01-29 RX ORDER — PROPRANOLOL HYDROCHLORIDE 10 MG/1
10 TABLET ORAL TWICE DAILY
Status: DISCONTINUED | OUTPATIENT
Start: 2023-01-29 | End: 2023-01-29 | Stop reason: HOSPADM

## 2023-01-29 RX ORDER — SODIUM CHLORIDE, SODIUM LACTATE, POTASSIUM CHLORIDE, CALCIUM CHLORIDE 600; 310; 30; 20 MG/100ML; MG/100ML; MG/100ML; MG/100ML
1000 INJECTION, SOLUTION INTRAVENOUS ONCE
Status: COMPLETED | OUTPATIENT
Start: 2023-01-29 | End: 2023-01-29

## 2023-01-29 RX ORDER — ONDANSETRON 2 MG/ML
4 INJECTION INTRAMUSCULAR; INTRAVENOUS ONCE
Status: COMPLETED | OUTPATIENT
Start: 2023-01-29 | End: 2023-01-29

## 2023-01-29 RX ORDER — ACETAMINOPHEN 500 MG
1000 TABLET ORAL ONCE
Status: COMPLETED | OUTPATIENT
Start: 2023-01-29 | End: 2023-01-29

## 2023-01-29 RX ORDER — KETOROLAC TROMETHAMINE 30 MG/ML
15 INJECTION, SOLUTION INTRAMUSCULAR; INTRAVENOUS ONCE
Status: COMPLETED | OUTPATIENT
Start: 2023-01-29 | End: 2023-01-29

## 2023-01-29 RX ORDER — TRAZODONE HYDROCHLORIDE 100 MG/1
100 TABLET ORAL
Status: SHIPPED | COMMUNITY
Start: 2022-12-28 | End: 2023-05-30

## 2023-01-29 RX ORDER — CETIRIZINE HYDROCHLORIDE 10 MG/1
10 TABLET ORAL
Status: SHIPPED | COMMUNITY
Start: 2022-12-28 | End: 2023-03-02

## 2023-01-29 RX ORDER — LORAZEPAM 1 MG/1
1 TABLET ORAL ONCE
Status: COMPLETED | OUTPATIENT
Start: 2023-01-29 | End: 2023-01-29

## 2023-01-29 RX ORDER — ONDANSETRON 4 MG/1
4 TABLET, ORALLY DISINTEGRATING ORAL EVERY 6 HOURS PRN
Status: SHIPPED | COMMUNITY
End: 2023-03-02

## 2023-01-29 RX ORDER — ONDANSETRON 4 MG/1
4 TABLET, ORALLY DISINTEGRATING ORAL EVERY 6 HOURS PRN
Status: DISCONTINUED | OUTPATIENT
Start: 2023-01-29 | End: 2023-01-29 | Stop reason: HOSPADM

## 2023-01-29 RX ORDER — ALBUTEROL SULFATE 90 UG/1
2 AEROSOL, METERED RESPIRATORY (INHALATION) EVERY 4 HOURS PRN
Status: DISCONTINUED | OUTPATIENT
Start: 2023-01-29 | End: 2023-01-29 | Stop reason: HOSPADM

## 2023-01-29 RX ORDER — IBUPROFEN 600 MG/1
600 TABLET ORAL ONCE
Status: COMPLETED | OUTPATIENT
Start: 2023-01-29 | End: 2023-01-29

## 2023-01-29 RX ADMIN — PROPRANOLOL HYDROCHLORIDE 10 MG: 10 TABLET ORAL at 13:13

## 2023-01-29 RX ADMIN — SODIUM CHLORIDE, POTASSIUM CHLORIDE, SODIUM LACTATE AND CALCIUM CHLORIDE 1000 ML: 600; 310; 30; 20 INJECTION, SOLUTION INTRAVENOUS at 01:13

## 2023-01-29 RX ADMIN — LORAZEPAM 1 MG: 1 TABLET ORAL at 02:06

## 2023-01-29 RX ADMIN — IBUPROFEN 600 MG: 600 TABLET, FILM COATED ORAL at 11:22

## 2023-01-29 RX ADMIN — ACETAMINOPHEN 1000 MG: 500 TABLET ORAL at 02:44

## 2023-01-29 RX ADMIN — GABAPENTIN 300 MG: 300 CAPSULE ORAL at 13:13

## 2023-01-29 RX ADMIN — KETOROLAC TROMETHAMINE 15 MG: 30 INJECTION, SOLUTION INTRAMUSCULAR; INTRAVENOUS at 06:14

## 2023-01-29 RX ADMIN — DIPHENHYDRAMINE HYDROCHLORIDE 25 MG: 50 INJECTION INTRAMUSCULAR; INTRAVENOUS at 06:14

## 2023-01-29 RX ADMIN — ONDANSETRON 4 MG: 2 INJECTION INTRAMUSCULAR; INTRAVENOUS at 01:48

## 2023-01-29 RX ADMIN — ACETAMINOPHEN 650 MG: 325 TABLET, FILM COATED ORAL at 15:51

## 2023-01-29 ASSESSMENT — PAIN DESCRIPTION - PAIN TYPE
TYPE: ACUTE PAIN
TYPE: ACUTE PAIN

## 2023-01-29 ASSESSMENT — FIBROSIS 4 INDEX: FIB4 SCORE: 1.22

## 2023-01-29 NOTE — ED NOTES
Noted oxygen saturation of 88-89% on room air while sleeping  Applied oxygen inhalation via nasal cannula at 2 LPM  Saturation noted: >92%
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
## 2023-01-29 NOTE — ED NOTES
Checked on bed, with unlabored respirations. No safety risk noted  Sleeping  Sitter - 1:1; Continued safety precaution  Gurney in low position, side rail up for pt safety.   Will continue to monitor for any complications.
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
## 2023-01-29 NOTE — DISCHARGE SUMMARY
"  ED Observation Discharge Summary    Patient:Kalyani Swan  Patient : 1970  Patient MRN: [id number]  Patient PCP: Karo Middleton M.D.    Admit Date: 2023  Discharge Date and Time: 23 3:29 PM  Discharge Diagnosis:   1. Suicidal ideation        2. Intentional drug overdose, initial encounter ([facility])            Discharge Attending: Colton Cruz D.O.  Discharge Service: ED Observation    ED Course  Kalyani is a 52 y.o. female who was evaluated at [facility] patient has been held in emergency department pending acceptance by psychiatric facility patient is on a legal hold.    Currently patient is calm, and cooperative, acceptance has been made by a renal behavioral health and anticipate transfer via EMS ambulance at [facility].    Discharge Exam:  /82   Pulse 73   Temp 36.3 °C (97.3 °F) (Temporal)   Resp 18   Ht 1.727 m (5' 8\")   Wt 59 kg (130 lb)   LMP 2005   SpO2 92%   BMI 19.77 kg/m² .    Constitutional: Awake and alert. Nontoxic  HENT:  Grossly normal  Eyes: Grossly normal  Neck: Normal range of motion  Cardiovascular: Normal heart rate   Thorax & Lungs: No respiratory distress  Abdomen: Nontender  Skin:  No pathologic rash.   Extremities: Well perfused  Psychiatric: Affect normal    Labs  Results for orders placed or performed during the hospital encounter of 23   Urine Drug Screen   Result Value Ref Range    Amphetamines Urine Negative Negative    Barbiturates Negative Negative    Benzodiazepines Negative Negative    Cocaine Metabolite Negative Negative    Methadone Negative Negative    Opiates Negative Negative    Oxycodone Negative Negative    Phencyclidine -Pcp Negative Negative    Propoxyphene Negative Negative    Cannabinoid Metab Negative Negative   DIAGNOSTIC ALCOHOL (BA)   Result Value Ref Range    Diagnostic Alcohol 214.6 (H) <10.1 mg/dL   CBC WITH DIFFERENTIAL   Result Value Ref Range    WBC 4.1 (L) 4.8 - 10.8 K/uL    RBC 4.19 (L) 4.20 - 5.40 M/uL    " Hemoglobin 13.1 12.0 - 16.0 g/dL    Hematocrit 39.1 37.0 - 47.0 %    MCV 93.3 81.4 - 97.8 fL    MCH 31.3 27.0 - 33.0 pg    MCHC 33.5 (L) 33.6 - 35.0 g/dL    RDW 49.1 35.9 - 50.0 fL    Platelet Count 285 164 - 446 K/uL    MPV 9.3 9.0 - 12.9 fL    Neutrophils-Polys 36.10 (L) 44.00 - 72.00 %    Lymphocytes 51.70 (H) 22.00 - 41.00 %    Monocytes 5.90 0.00 - 13.40 %    Eosinophils 5.10 0.00 - 6.90 %    Basophils 0.70 0.00 - 1.80 %    Immature Granulocytes 0.50 0.00 - 0.90 %    Nucleated RBC 0.50 /100 WBC    Neutrophils (Absolute) 1.48 (L) 2.00 - 7.15 K/uL    Lymphs (Absolute) 2.12 1.00 - 4.80 K/uL    Monos (Absolute) 0.24 0.00 - 0.85 K/uL    Eos (Absolute) 0.21 0.00 - 0.51 K/uL    Baso (Absolute) 0.03 0.00 - 0.12 K/uL    Immature Granulocytes (abs) 0.02 0.00 - 0.11 K/uL    NRBC (Absolute) 0.02 K/uL   HCG QUAL SERUM   Result Value Ref Range    Beta-Hcg Qualitative Serum Negative Negative   Acetaminophen Level   Result Value Ref Range    Acetaminophen -Tylenol <5.0 (L) 10.0 - 30.0 ug/mL   SALICYLATE LEVEL   Result Value Ref Range    Salicylates, Quant. <1.0 (L) 15.0 - 25.0 mg/dL   Comp Metabolic Panel   Result Value Ref Range    Sodium 144 135 - 145 mmol/L    Potassium 3.7 3.6 - 5.5 mmol/L    Chloride 107 96 - 112 mmol/L    Co2 23 20 - 33 mmol/L    Anion Gap 14.0 7.0 - 16.0    Glucose 105 (H) 65 - 99 mg/dL    Bun 12 8 - 22 mg/dL    Creatinine 0.77 0.50 - 1.40 mg/dL    Calcium 8.9 8.5 - 10.5 mg/dL    AST(SGOT) 20 12 - 45 U/L    ALT(SGPT) 12 2 - 50 U/L    Alkaline Phosphatase 68 30 - 99 U/L    Total Bilirubin 0.6 0.1 - 1.5 mg/dL    Albumin 4.4 3.2 - 4.9 g/dL    Total Protein 6.7 6.0 - 8.2 g/dL    Globulin 2.3 1.9 - 3.5 g/dL    A-G Ratio 1.9 g/dL   LIPASE   Result Value Ref Range    Lipase 29 11 - 82 U/L   MAGNESIUM   Result Value Ref Range    Magnesium 1.9 1.5 - 2.5 mg/dL   CORRECTED CALCIUM   Result Value Ref Range    Correct Calcium 8.6 8.5 - 10.5 mg/dL   ESTIMATED GFR   Result Value Ref Range    GFR (CKD-EPI) 91 >60  mL/min/1.73 m 2   POC BREATHALIZER   Result Value Ref Range    POC Breathalizer 0.00 0.00 - 0.01 Percent   EKG   Result Value Ref Range    Report       [facility] Emergency Dept.    Test Date:  2023  Pt Name:    NORMA BUTTS        Department: ER  MRN:        [id number]                      Room:       [facility]  Gender:     Female                       Technician: [id number]  :        1970                   Requested By:ER TRIAGE PROTOCOL  Order #:    [id number]                    Reading MD: Layton Peter MD    Measurements  Intervals                                Axis  Rate:       67                           P:          -29  NE:         196                          QRS:        34  QRSD:       87                           T:          48  QT:         431  QTc:        455    Interpretive Statements  Sinus rhythm, rate of 67, normal intervals and axis, no acute ischemia or  infarction.  unchanged from prior on 2022 19:57:37  Electronically Signed On 2023 5:18:08 PST by Layton Peter MD         Radiology  DX-CHEST-PORTABLE (1 VIEW)   Final Result      No acute cardiopulmonary abnormality.          Medications:   New Prescriptions    No medications on file       My final assessment includes   1. Suicidal ideation        2. Intentional drug overdose, initial encounter (HCC)          Upon Reevaluation, the patient's condition has: not improved; and will be escalated to hospitalization.    Patient discharged from ED Observation status at 3:31 PM   (Time) 23 (Date).     Total time spent on this ED Observation discharge encounter is > 30 Minutes    Electronically signed by: Colton Cruz D.O., 2023 3:29 PM

## 2023-01-29 NOTE — ED NOTES
Checked on bed, connected to monitor,  with unlabored respirations. Vital signs is stable. . Gurney in low position, side rail up for pt safety. Call light within reach. Will continue to monitor for any complications.

## 2023-01-29 NOTE — ED NOTES
Med rec completed per patient at bedside.  Allergies reviewed with patient.  No outpatient antibiotics within the last 30 days.  Patient's preferred pharmacy: [facility] on [address].    Patient states that she is OUT of her sertraline and trazodone.    *Patient reports that she ingested 120 mg of propranolol (10 mg x 12 tablets) around 22:30 last night.

## 2023-01-29 NOTE — ED NOTES
Patient moved to GR 30 without incident. Phone taken away and placed in belongings bag with purse in LKR 4.   1:1 observation by the designee maintained.   Pt. Reports headache, MD messaged or medication. Medication administered per MAR.   No additional needs at this time.

## 2023-01-29 NOTE — ED PROVIDER NOTES
ED Provider Note    CHIEF COMPLAINT  Chief Complaint   Patient presents with    Drug Overdose     Propranolol 120  mg drug overdose  According to the EMS pt  was found lying on the floor also has drinking Vodka    Suicidal Ideation     EXTERNAL RECORDS REVIEWED  Other prior ER    HPI/ROS  LIMITATION TO HISTORY   Select: : None  OUTSIDE HISTORIAN(S):  none    Fiftythrjoao Swan is a 52 y.o. female with a history of EtOH abuse, smoking, marijuana use along with depression takes Zoloft and trazodone who presents to the emergency room for evaluation of attempted ingestion.  The patient says that she has been drinking all day and woke up at approximately 10 PM and took 2 more large drinks of vodka.  She then subsequently opened up on the pill bottles that she had and took 10 to 12 tablets of propranolol.  She does not know why she is on this medication, she says it has been giving her a headache since this occurred, she has some slight slurred speech but is easily arousable and does not have any other acute pain complaints.  She says there has been no vomiting but endorses a small amount of nauseousness.    Chart review also shows that the patient is on 10 mg of propranolol, last filled 60 tablets twice daily on  which would leave her at approximately 12 tablets remaining.    Prior  ED visit on 2022 shows a patient whose real name is Rhonda Gould who presented the emergency room for suicidal ideations and he called a friend and states that she is having increasing stressors since her dad  last year.  She had been drinking over several weeks around December holiday and was having active feelings of hurting herself during that time.    PAST MEDICAL HISTORY   has a past medical history of Chronic obstructive pulmonary disease (HCC) and Hypertension.    SURGICAL HISTORY   has a past surgical history that includes hysterectomy laparoscopy (2005).    FAMILY HISTORY  Family History   Problem Relation  "Age of Onset    Diabetes Mother     Hypertension Mother     Hypertension Father     Diabetes Father     Heart Disease Maternal Grandmother     Cancer Maternal Grandfather        SOCIAL HISTORY  Social History     Tobacco Use    Smoking status: Every Day    Smokeless tobacco: Never   Vaping Use    Vaping Use: Never used   Substance and Sexual Activity    Alcohol use: Yes     Comment: Quit 6/15    Drug use: Not Currently     Frequency: 2.0 times per week     Types: Inhaled, Marijuana     Comment: marijuana    Sexual activity: Not Currently       CURRENT MEDICATIONS  Home Medications    **Home medications have not yet been reviewed for this encounter**         ALLERGIES  Allergies   Allergen Reactions    Codeine Hives and Itching     Tolerated morphine previously  Allergy updated from 2015, morphine given in 2016    Fish Allergy Anaphylaxis and Shortness of Breath     Swelling to throat.    Other Drug Hives     shellfish  shellfish  shellfish      Shellfish Allergy Anaphylaxis     Other reaction(s): Anaphylaxis  Strict No Shellfish (avoids most fish, cod ok)  Strict No Shellfish (avoids most fish, cod ok)  Strict No Shellfish (avoids most fish, cod ok)         PHYSICAL EXAM  VITAL SIGNS: /70   Pulse 68   Temp 36.7 °C (98.1 °F) (Temporal)   Resp 14   Ht 1.727 m (5' 8\")   Wt 59 kg (130 lb)   LMP 06/11/2005   SpO2 96%   BMI 19.77 kg/m²    Genl: sad somewhat disheveled appearing F, sitting in gurney comfortably, speaking clearly, appears sad but in no acute distress   Head: NC/AT   ENT: Mucous membranes moist, posterior pharynx clear, uvula midline, nares patent bilaterally   Eyes: Normal sclera, pupils equal round reactive to light  Neck: Supple, FROM, no LAD appreciated   Pulmonary: Lungs are clear to auscultation bilaterally  Chest: No TTP  CV:  RRR, no murmur appreciated, pulses 2+ in both upper and lower extremities,  Abdomen: soft, epigastric tenderness, no true Whitt sign, no McBurney's point " tenderness. ND; no rebound/guarding, no masses palpated, no HSM   : no CVA or suprapubic tenderness   Musculoskeletal: Pain free ROM of the neck. Moving upper and lower extremities and spontaneous in coordinated fashion  Neuro: A&Ox4 (person, place, time, situation), speech fluent, gait steady, no focal deficits appreciated, No cerebellar signs. Sensation is grossly intact in the distal upper and lower extremities.  5/5 strength in  and dorsiflexion/plantar flexion of the ankles  Psych: Orientation: person, place and time/date   Appearance: disheveled  Behavior: appropriate   Speech: normal, no pressured speech   Mood: sad   Affect: mood congruent   Thought Process: forward thinking   Thought Content: suicidal   Delusions: none   Perceptions/Sensorium: normal   Cognition: poor attention   Language: normal   Insight and judgement: poor based on recent behaviors     DIAGNOSTIC STUDIES / PROCEDURES  EKG  I have independently interpreted this EKG  Results for orders placed or performed during the hospital encounter of 23   EKG   Result Value Ref Range    Report       [facility] Emergency Dept.    Test Date:  2023  Pt Name:    NORMA BUTTS        Department: ER  MRN:        [id number]                      Room:       [facility]  Gender:     Female                       Technician: [id number]  :        1970                   Requested By:ER TRIAGE PROTOCOL  Order #:    [id number]                    Reading MD: Layton Peter MD    Measurements  Intervals                                Axis  Rate:       67                           P:          -29  CA:         196                          QRS:        34  QRSD:       87                           T:          48  QT:         431  QTc:        455    Interpretive Statements  Sinus rhythm, rate of 67, normal intervals and axis, no acute ischemia or  infarction.  unchanged from prior on 2022 19:57:37  Electronically Signed On  1- 5:18:08 PST by Layton Peter MD       LABS  Labs Reviewed   DIAGNOSTIC ALCOHOL - Abnormal; Notable for the following components:       Result Value    Diagnostic Alcohol 214.6 (*)     All other components within normal limits   CBC WITH DIFFERENTIAL - Abnormal; Notable for the following components:    WBC 4.1 (*)     RBC 4.19 (*)     MCHC 33.5 (*)     Neutrophils-Polys 36.10 (*)     Lymphocytes 51.70 (*)     Neutrophils (Absolute) 1.48 (*)     All other components within normal limits   ACETAMINOPHEN - Abnormal; Notable for the following components:    Acetaminophen -Tylenol <5.0 (*)     All other components within normal limits   SALICYLATE - Abnormal; Notable for the following components:    Salicylates, Quant. <1.0 (*)     All other components within normal limits   COMP METABOLIC PANEL - Abnormal; Notable for the following components:    Glucose 105 (*)     All other components within normal limits   HCG QUAL SERUM   LIPASE   MAGNESIUM   CORRECTED CALCIUM   ESTIMATED GFR   URINE DRUG SCREEN     RADIOLOGY  I have independently interpreted the diagnostic imaging associated with this visit and am waiting the final reading from the radiologist.   My preliminary interpretation is a follows: No focal infiltrates.  Radiologist interpretation:   DX-CHEST-PORTABLE (1 VIEW)   Final Result      No acute cardiopulmonary abnormality.        COURSE & MEDICAL DECISION MAKING    ED Observation Status? Yes; I am placing the patient in to an observation status due to a diagnostic uncertainty as well as therapeutic intensity. Patient placed in observation status at 5:19 AM, 1/29/2023.     Observation plan is as follows: Patient will be observed in the emergency department for any signs of possible seizure activity, and appropriate hypotension for the 6 hours at this initial dose of propranolol would have his greatest effect.  At that time the patient will then be reassessed for clinical sobriety and should be seen and  evaluated by ED behavioral health team for her suicidal ideations and intentional self ingestion.    INITIAL ASSESSMENT, COURSE AND PLAN  Care Narrative:   Patient is seen and evaluated emergently for the possibility of ingestion.  She has been out of her tramadol and Zoloft for some time and review of her pharmacy prescription shows that she had about 12 or so tablets of propranolol left with fetch with her reported ingestion.  She denies taking anything beyond alcohol and said she did it is an impulsive act with the thought of taking her life.  She is sad and mournful but does appear clinically intoxicated.  She is complaining of a headache but has no focal findings of increased intracranial pressure, she does not have abnormal hypotension at this time.  The patient's dosing of propranolol is at a dose likely used for anxiety or performance-based issues and is at the levels taken about the initial level for starting doses of hypertension.     Lab work is obtained to rule out other possible coingestions, or any other electrode abnormalities or contributing illness.  Alcohol level is 214, she has a slight neutropenia with normal H&H, no gross electrolyte abnormalities or LFT elevations.  There is normal lipase and undetectable Tylenol and salicylates.  During her period of observation she continues to be somewhat sad mournful and is having developing headache.  This is likely a side effect of taking her medications and serial neurological exams show no other interval complaints.  She is tearful and is notified that she will be on a legal hold pending her clinical sobriety and reassessment.  I have reasonable suspicion about her impulsive behavior to continue this hold until formally evaluated.      Thankfully EKG is without any evolving changes, there is no ectopy on the monitor and during my period of observation no concerning hypotension.  She will the in ED observation status until 8 AM when the patient will then  be appropriate for clinical reassessment from ED behavioral health team.    HYDRATION: Based on the patient's presentation of Dehydration and Other hemodynamic support the patient was given IV fluids. IV Hydration was used because oral hydration was not as rapid as required. Upon recheck following hydration, the patient was improved.      ADDITIONAL PROBLEM LIST  Alcohol intoxication  Suicidal ideation  Substance ingestion, purposeful  Propranolol ingestion  Headache    DISPOSITION AND DISCUSSIONS  Discussion of management with other QHP or appropriate source(s): Behavioral Health team for assessment when clear at 0800      FINAL DIAGNOSIS  1. Suicidal ideation    2. Intentional drug overdose, initial encounter (HCC)      Electronically signed by: Rome Traylor M.D., 1/29/2023 12:59 AM

## 2023-01-29 NOTE — ED NOTES
Call placed back to Poison Control to give requested update on patient status.  Update given to Erma.

## 2023-01-29 NOTE — ED NOTES
Checked on bed, connected to monitor,  with unlabored respirations. Vital signs is stable.Sleeping. Gurney in low position, side rail up for pt safety. Call light within reach. Will continue to monitor for any complications.

## 2023-01-29 NOTE — ED NOTES
Legal hold initiated  Reason Pt has suicidal Ideation, tried to overdose herself with propranolol 120 mg tablet  2nd attempt since December 2022      Respirations even/ unlabored, All items removed from room for safety and to minimize risk of suicide. Verified that Legal Hold appropriate and signed in patient's chart. Confirmed patient's personal items removed from room, and if applicable labeled/ placed in secure area    1:1 Observation. Continuous visual monitoring by Trained Personnel. Sitter has unobstructed view of patient at all times. Discussion with sitter about patient care, safety and support.

## 2023-01-29 NOTE — DISCHARGE PLANNING
Alert Team/Behavioral Health   Note:        - [facility]: Talked to [facility]. Referral has been received and pending review.     - [facility]: Talked to [facility]. No beds currently available/at capacity.

## 2023-01-29 NOTE — ED NOTES
Information was offered by the poison control to observed for any hypotension, bradicardia, ECG changes, Neuro status for 6-8 hours

## 2023-01-29 NOTE — PROGRESS NOTES
"ED Observation Progress Note    Date of Service: 01/29/23    Interval History and Interventions  Reviewed this is a 52-year-old female who presented for suicidal thoughts and alcohol intoxication and attempted overdose on propanolol.  She has been medically cleared and is pending behavioral health evaluation after she is clinically sober.    Upon assessment this morning patient is resting comfortably, no acute complaints.  Behavioral health evaluated patient and feels that she is in acute danger to herself given attempted overdose.  Therefore legal hold is continued and she is currently pending psychiatric placement.    She does have a history of alcohol abuse, not currently demonstrating any signs of alcohol withdrawal, will continue to monitor.  I have resumed her home medications including her propanolol as she is medically cleared from her overdose and she has not been bradycardic or hypertensive.    Physical Exam  /82   Pulse 73   Temp 36.3 °C (97.3 °F) (Temporal)   Resp 18   Ht 1.727 m (5' 8\")   Wt 59 kg (130 lb)   LMP 06/11/2005   SpO2 92%   BMI 19.77 kg/m² .    Constitutional: Awake and alert. Nontoxic  HENT:  Grossly normal  Eyes: Grossly normal  Neck: Normal range of motion  Cardiovascular: Normal heart rate   Thorax & Lungs: No respiratory distress  Abdomen: Nontender  Skin:  No pathologic rash.   Extremities: Well perfused  Psychiatric: Affect normal    Labs  Results for orders placed or performed during the hospital encounter of 01/29/23   DIAGNOSTIC ALCOHOL (BA)   Result Value Ref Range    Diagnostic Alcohol 214.6 (H) <10.1 mg/dL   CBC WITH DIFFERENTIAL   Result Value Ref Range    WBC 4.1 (L) 4.8 - 10.8 K/uL    RBC 4.19 (L) 4.20 - 5.40 M/uL    Hemoglobin 13.1 12.0 - 16.0 g/dL    Hematocrit 39.1 37.0 - 47.0 %    MCV 93.3 81.4 - 97.8 fL    MCH 31.3 27.0 - 33.0 pg    MCHC 33.5 (L) 33.6 - 35.0 g/dL    RDW 49.1 35.9 - 50.0 fL    Platelet Count 285 164 - 446 K/uL    MPV 9.3 9.0 - 12.9 fL    " Neutrophils-Polys 36.10 (L) 44.00 - 72.00 %    Lymphocytes 51.70 (H) 22.00 - 41.00 %    Monocytes 5.90 0.00 - 13.40 %    Eosinophils 5.10 0.00 - 6.90 %    Basophils 0.70 0.00 - 1.80 %    Immature Granulocytes 0.50 0.00 - 0.90 %    Nucleated RBC 0.50 /100 WBC    Neutrophils (Absolute) 1.48 (L) 2.00 - 7.15 K/uL    Lymphs (Absolute) 2.12 1.00 - 4.80 K/uL    Monos (Absolute) 0.24 0.00 - 0.85 K/uL    Eos (Absolute) 0.21 0.00 - 0.51 K/uL    Baso (Absolute) 0.03 0.00 - 0.12 K/uL    Immature Granulocytes (abs) 0.02 0.00 - 0.11 K/uL    NRBC (Absolute) 0.02 K/uL   HCG QUAL SERUM   Result Value Ref Range    Beta-Hcg Qualitative Serum Negative Negative   Acetaminophen Level   Result Value Ref Range    Acetaminophen -Tylenol <5.0 (L) 10.0 - 30.0 ug/mL   SALICYLATE LEVEL   Result Value Ref Range    Salicylates, Quant. <1.0 (L) 15.0 - 25.0 mg/dL   Comp Metabolic Panel   Result Value Ref Range    Sodium 144 135 - 145 mmol/L    Potassium 3.7 3.6 - 5.5 mmol/L    Chloride 107 96 - 112 mmol/L    Co2 23 20 - 33 mmol/L    Anion Gap 14.0 7.0 - 16.0    Glucose 105 (H) 65 - 99 mg/dL    Bun 12 8 - 22 mg/dL    Creatinine 0.77 0.50 - 1.40 mg/dL    Calcium 8.9 8.5 - 10.5 mg/dL    AST(SGOT) 20 12 - 45 U/L    ALT(SGPT) 12 2 - 50 U/L    Alkaline Phosphatase 68 30 - 99 U/L    Total Bilirubin 0.6 0.1 - 1.5 mg/dL    Albumin 4.4 3.2 - 4.9 g/dL    Total Protein 6.7 6.0 - 8.2 g/dL    Globulin 2.3 1.9 - 3.5 g/dL    A-G Ratio 1.9 g/dL   LIPASE   Result Value Ref Range    Lipase 29 11 - 82 U/L   MAGNESIUM   Result Value Ref Range    Magnesium 1.9 1.5 - 2.5 mg/dL   CORRECTED CALCIUM   Result Value Ref Range    Correct Calcium 8.6 8.5 - 10.5 mg/dL   ESTIMATED GFR   Result Value Ref Range    GFR (CKD-EPI) 91 >60 mL/min/1.73 m 2   POC BREATHALIZER   Result Value Ref Range    POC Breathalizer 0.00 0.00 - 0.01 Percent   EKG   Result Value Ref Range    Report       [facility] Emergency Dept.    Test Date:  2023-01-29  [facility]    [facility]        Department: ER  MRN:        [id number]                      Room:        05  Gender:     Female                       Technician: [id number]  :        1970                   Requested By:ER TRIAGE PROTOCOL  Order #:    [id number]                    Reading MD: Layton Peter MD    Measurements  Intervals                                Axis  Rate:       67                           P:          -29  MN:         196                          QRS:        34  QRSD:       87                           T:          48  QT:         431  QTc:        455    Interpretive Statements  Sinus rhythm, rate of 67, normal intervals and axis, no acute ischemia or  infarction.  unchanged from prior on 2022 19:57:37  Electronically Signed On 2023 5:18:08 PST by Layton Peter MD         Radiology  DX-CHEST-PORTABLE (1 VIEW)   Final Result      No acute cardiopulmonary abnormality.          Problem List  1.  Suicidal ideation and attempted overdose-medically cleared from her overdose, pending psychiatric placement  2.  Home medications have been resumed    Electronically signed by: Jessica Whitt M.D., 2023 12:35 PM

## 2023-01-29 NOTE — DISCHARGE PLANNING
Alert Team/Behavioral Health   Note:      WARD ALERT TEAM DISCHARGE PLANNING NOTE    Date:  1/29/23  Patient Name:  Kalyani Swan - 52 y.o. - Discharge Planning  MRN:  [id number]   YOB: 1970  ADMISSION DATE:  1/29/2023      Writer forwarded referral packet for inpatient psychiatric care to the following community providers: [facility], Chris LOW   Items included in the referral packet:   _x____Face Sheet   _x____Pages 1 and 2 of completed legal hold   _x____Alert Team/Psych Assessment   _____H&P   _____UDS   _x____Blood Alcohol   _x____Vital signs   _x neg____Pregnancy Test (if applicable)   _x____Medications List   _____Covid Screen

## 2023-01-29 NOTE — DISCHARGE PLANNING
Alert Team/Behavioral Health   Note:        Mental Health Transfer     Referral: transfer to Mental Health Facility     Intervention: [facility] called to accept patient.     Patient's accepting provider is MD Syl.     Transport arranged through [facility] ambulance.     The patient will be picked up at 1530.     Notified Attending Provider (Anthony), Bedside RN (Elda JEAN-BAPTISTE), and Alert Team RN (Derik ROTMHAN) via Voalte of the departure time as well as accepting facility ([facility]).     Transfer packet created and placed in chart with filled out COBRA form.     Plan: transfer to [facility] via [facility] ambulance for inpatient acute psychiatric care.

## 2023-01-29 NOTE — ED NOTES
Pt complaining of headache, shortness of breath and chest pain  Notified to the provider  Applied supplemental oxygen at 2 LPM  Pain: 10/10 - headache  Pain: chest: 5/10 - sharp

## 2023-01-29 NOTE — ED NOTES
Urine sent to LAB.  Pt. Requesting to speak with Alert team about 'I want to go home'  Alert team notified.   1:1 observation maintained by the designee.     Report given to Elda ORTIZ.

## 2023-01-29 NOTE — ED NOTES
Patient resting comfortably in bed with even respirations. No additional needs stated at this time.     Sitter remains in direct line of sight of patient. Observation continues.

## 2023-01-29 NOTE — ED NOTES
Report rec'd from Rakan ORTIZ.  Patient care assumed.  Pt sleeping, respirations even/unlabored.  Sitter in place for 1:1 observation.

## 2023-01-29 NOTE — ED TRIAGE NOTES
"Chief Complaint   Patient presents with    Drug Overdose     Propranolol 120  mg drug overdose  According to the EMS pt  was found lying on the floor also has drinking Vodka    Suicidal Ideation     Brought by EMS with the above complaint  IV cannula gauge 20 right AC      BP (!) 133/98   Pulse 69   Temp 36.4 °C (97.5 °F) (Temporal)   Resp 17   Ht 1.727 m (5' 8\")   Wt 59 kg (130 lb)   LMP 06/11/2005   SpO2 98%   BMI 19.77 kg/m²     "

## 2023-01-29 NOTE — ED NOTES
Yanira from Poison control contacted. Recommends symptomatic and supportive observation or 6-8 hours or until back to baseline.     Vitals, EKG, mental status changes, UDS and toxicology screen recommended.

## 2023-01-30 NOTE — ED NOTES
Report to [facility]. Belongings including purse and phone given to [facility] crew.

## 2023-02-15 ENCOUNTER — HOSPITAL ENCOUNTER (OUTPATIENT)
Dept: RADIOLOGY | Facility: MEDICAL CENTER | Age: 53
End: 2023-02-15
Payer: COMMERCIAL

## 2023-02-27 ENCOUNTER — HOSPITAL ENCOUNTER (EMERGENCY)
Facility: MEDICAL CENTER | Age: 53
End: 2023-02-27
Attending: EMERGENCY MEDICINE
Payer: COMMERCIAL

## 2023-02-27 ENCOUNTER — EH NON-PROVIDER (OUTPATIENT)
Dept: OCCUPATIONAL MEDICINE | Facility: CLINIC | Age: 53
End: 2023-02-27
Payer: COMMERCIAL

## 2023-02-27 ENCOUNTER — APPOINTMENT (OUTPATIENT)
Dept: RADIOLOGY | Facility: MEDICAL CENTER | Age: 53
End: 2023-02-27
Attending: EMERGENCY MEDICINE
Payer: COMMERCIAL

## 2023-02-27 VITALS
WEIGHT: 130 LBS | OXYGEN SATURATION: 95 % | DIASTOLIC BLOOD PRESSURE: 78 MMHG | TEMPERATURE: 98.4 F | BODY MASS INDEX: 19.26 KG/M2 | RESPIRATION RATE: 18 BRPM | HEART RATE: 78 BPM | HEIGHT: 69 IN | SYSTOLIC BLOOD PRESSURE: 121 MMHG

## 2023-02-27 DIAGNOSIS — S43.402A SPRAIN OF LEFT SHOULDER, UNSPECIFIED SHOULDER SPRAIN TYPE, INITIAL ENCOUNTER: ICD-10-CM

## 2023-02-27 DIAGNOSIS — Z02.83 ENCOUNTER FOR DRUG SCREENING: ICD-10-CM

## 2023-02-27 DIAGNOSIS — Z02.1 PRE-EMPLOYMENT DRUG SCREENING: ICD-10-CM

## 2023-02-27 PROCEDURE — 700102 HCHG RX REV CODE 250 W/ 637 OVERRIDE(OP): Performed by: EMERGENCY MEDICINE

## 2023-02-27 PROCEDURE — A9270 NON-COVERED ITEM OR SERVICE: HCPCS | Performed by: EMERGENCY MEDICINE

## 2023-02-27 PROCEDURE — 80305 DRUG TEST PRSMV DIR OPT OBS: CPT | Performed by: NURSE PRACTITIONER

## 2023-02-27 PROCEDURE — 82075 ASSAY OF BREATH ETHANOL: CPT | Performed by: NURSE PRACTITIONER

## 2023-02-27 PROCEDURE — 73030 X-RAY EXAM OF SHOULDER: CPT | Mod: LT

## 2023-02-27 PROCEDURE — 99283 EMERGENCY DEPT VISIT LOW MDM: CPT

## 2023-02-27 PROCEDURE — 99026 IN-HOSPITAL ON CALL SERVICE: CPT | Performed by: NURSE PRACTITIONER

## 2023-02-27 RX ORDER — ACETAMINOPHEN 500 MG
1000 TABLET ORAL ONCE
Status: COMPLETED | OUTPATIENT
Start: 2023-02-27 | End: 2023-02-27

## 2023-02-27 RX ORDER — IBUPROFEN 600 MG/1
600 TABLET ORAL ONCE
Status: COMPLETED | OUTPATIENT
Start: 2023-02-27 | End: 2023-02-27

## 2023-02-27 RX ORDER — OXYCODONE HYDROCHLORIDE AND ACETAMINOPHEN 5; 325 MG/1; MG/1
1 TABLET ORAL ONCE
Status: DISCONTINUED | OUTPATIENT
Start: 2023-02-27 | End: 2023-02-27

## 2023-02-27 RX ORDER — METHOCARBAMOL 750 MG/1
750 TABLET, FILM COATED ORAL 4 TIMES DAILY
Qty: 20 TABLET | Refills: 0 | Status: SHIPPED | OUTPATIENT
Start: 2023-02-27 | End: 2023-05-30

## 2023-02-27 RX ORDER — NAPROXEN 375 MG/1
375 TABLET ORAL 2 TIMES DAILY WITH MEALS
Qty: 20 TABLET | Refills: 0 | Status: SHIPPED | OUTPATIENT
Start: 2023-02-27 | End: 2023-03-02

## 2023-02-27 RX ORDER — OXYCODONE HYDROCHLORIDE 5 MG/1
5 TABLET ORAL ONCE
Status: COMPLETED | OUTPATIENT
Start: 2023-02-27 | End: 2023-02-27

## 2023-02-27 RX ADMIN — IBUPROFEN 600 MG: 600 TABLET, FILM COATED ORAL at 15:19

## 2023-02-27 RX ADMIN — OXYCODONE 5 MG: 5 TABLET ORAL at 16:53

## 2023-02-27 RX ADMIN — ACETAMINOPHEN 1000 MG: 500 TABLET, FILM COATED ORAL at 15:19

## 2023-02-27 ASSESSMENT — FIBROSIS 4 INDEX: FIB4 SCORE: 1.093926825832975133

## 2023-02-27 ASSESSMENT — PAIN DESCRIPTION - PAIN TYPE: TYPE: ACUTE PAIN

## 2023-02-27 NOTE — ED TRIAGE NOTES
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 Pt immediately began to experience left shoulder and neck pain.     Pt is alert and oriented, speaking in full sentences, follows commands and responds appropriately to questions.      Pt placed in lobby. Pt educated on triage process and apologized for wait time. Pt encouraged to alert staff for any changes.     Patient and staff wearing appropriate PPE      BP (!) 125/91   Pulse 79   Temp 36.3 °C (97.3 °F) (Temporal)   Resp 16   Ht 1.753 m (5' 9\")   Wt 59 kg (130 lb)   SpO2 94%       "

## 2023-02-27 NOTE — ED PROVIDER NOTES
ER Provider Note    Scribed for Colton Cruz D.O. by Abdulaziz Elmore. 2/27/2023  2:32 PM    Primary Care Provider: Karo Middleton M.D.    CHIEF COMPLAINT  Chief Complaint   Patient presents with    Shoulder Pain     Left - x 30 mins    Neck Pain     Hard time looking to the left       HPI/ROS  Rhonda Gould is a 52 y.o. female who presents to the Emergency Department for mild left shoulder pain onset 1:10 PM today. The patient was working here on [employer] when she was attempting to help a patient back in the bed with her hands. She says she has since been having left shoulder pain with neck pain. She has associated symptoms of left hand tingling. Looking to the left, movement, and touching the area exacerbates her pain.     ROS as per HPI.    PAST MEDICAL HISTORY  Past Medical History:   Diagnosis Date    Chronic obstructive pulmonary disease (HCC)     Hypertension        SURGICAL HISTORY  Past Surgical History:   Procedure Laterality Date    HYSTERECTOMY LAPAROSCOPY  6/2005       FAMILY HISTORY  Family History   Problem Relation Age of Onset    Diabetes Mother     Hypertension Mother     Hypertension Father     Diabetes Father     Heart Disease Maternal Grandmother     Cancer Maternal Grandfather        SOCIAL HISTORY   reports that she has been smoking. She has never used smokeless tobacco. She reports current alcohol use. She reports that she does not currently use drugs after having used the following drugs: Inhaled and Marijuana. Frequency: 2.00 times per week.    CURRENT MEDICATIONS  Discharge Medication List as of 2/27/2023  5:02 PM        CONTINUE these medications which have NOT CHANGED    Details   cetirizine (ZYRTEC) 10 MG Tab Take 10 mg by mouth at bedtime., Historical Med      gabapentin (NEURONTIN) 300 MG Cap Take 300 mg by mouth every day., Historical Med      propranolol (INDERAL) 10 MG Tab Take 10 mg by mouth 2 times a day., Historical Med      traZODone (DESYREL) 100 MG Tab Take 100 mg by  "mouth at bedtime., Historical Med      nicotine (NICODERM) 14 MG/24HR PATCH 24 HR Place 1 Patch on the skin every 24 hours as needed. Indications: Nicotine Addiction, Historical Med      ondansetron (ZOFRAN ODT) 4 MG TABLET DISPERSIBLE Take 4 mg by mouth every 6 hours as needed for Nausea/Vomiting., Historical Med      diphenhydrAMINE (BENADRYL) 25 MG Tab Take 25 mg by mouth 1 time a day as needed (Allergies)., Historical Med      sertraline (ZOLOFT) 100 MG Tab Take 100 mg by mouth at bedtime., Historical Med      albuterol 108 (90 Base) MCG/ACT Aero Soln inhalation aerosol Inhale 1-2 Puffs every four hours as needed for Shortness of Breath., Historical Med             ALLERGIES  Fish allergy, Shellfish allergy, and Codeine    PHYSICAL EXAM  BP (!) 125/91   Pulse 79   Temp 36.3 °C (97.3 °F) (Temporal)   Resp 16   Ht 1.753 m (5' 9\")   Wt 59 kg (130 lb)   LMP 06/11/2005   SpO2 94%   BMI 19.20 kg/m²     General: No acute distress.  HENT: Normocephalic, Mucus membranes are moist.   Chest: Lungs have even and unlabored respirations, Clear to auscultation.   Cardiovascular: Regular rate and regular rhythm, No peripheral cyanosis.  Abdomen: Non distended.  Extremities: Capillary refill and sensation of left hand and fingers normal.  Musculoskeletal: Tenderness in left trapezius, left shoulder joint, and upper humerus, Range of motion elicits significant tenderness.  Neuro: Awake, Conversive, Able to relay recent events.  Psychiatric: Calm and cooperative.     INITIAL ASSESSMENT  Patient had an injury from lifting a patient at work. She has significant pain. No deformities are obvious but x-rays will be done for concerns of shoulder displacement or fracture. She will receive pain medication pending x-rays    ED Observation Status? Yes; I am placing the patient in to an observation status due to a diagnostic uncertainty as well as therapeutic intensity. Patient placed in observation status at 2:43 PM, 2/27/2023. "     Observation plan is as follows: Pain management pending evaluation of shoulder pain.    Upon Reevaluation, the patient's condition has: Improved; and will be discharged.    Patient discharged from ED Observation status at 3:46 PM, 2/27/2023.     DIAGNOSTIC STUDIES    Radiology:   The attending emergency physician has independently interpreted the diagnostic imaging associated with this visit and am waiting the final reading from the radiologist.   Preliminary interpretation is as follows: No fracture  Radiologist interpretation:   DX-SHOULDER 2+ LEFT   Final Result      1.  Normal shoulder series.        COURSE & MEDICAL DECISION MAKING     COURSE AND PLAN  2:32 PM - Patient seen and examined at bedside. Discussed plan of care, including imaging and pain medication. She will  be placed in a sling Patient agrees to the plan of care. The patient will be medicated with Tylenol 1,000 mg and ibuprofen 600 mg. Ordered for DX-Shoulder Left to evaluate her symptoms.     3:46 PM - Patient was reevaluated at bedside. Her pain is mildly improved at this time but still has significant discomfort. The patient will be medicated with Percocet 5-325 mg prior to discharge. Discussed plan for discharge, including follow up with Workmen's comp. She is off work for the rest of the day today. Patient is comfortable with discharge.    ED Summary: Patient is an employee here at the hospital, she was working with the patient and lifted the patient and caused pain to her left shoulder.  She has pain in the trapezius shoulder and upper humerus.  There is no deformity and x-ray shows no fracture.  She has tingling to the finger but she has gross sensation and capillary refills are normal.  She is medicated with Motrin and Tylenol without effect.  Additional narcotic was medicated for pain and her pain did mildly improved.  X-ray shows no fracture or disc placement.  She is discharged home with symptomatic care and to follow-up with  Workmen's Comp. clinic prior to going back to work.    The patient will return for new or worsening symptoms and is stable at the time of discharge.    DISPOSITION:  Patient will be discharged home in stable condition.    FOLLOW UP:    Please see Workmen's Comp. clinic on Friday morning for reevaluation        OUTPATIENT MEDICATIONS:  Discharge Medication List as of 2/27/2023  5:02 PM        START taking these medications    Details   naproxen (NAPROSYN) 375 MG Tab Take 1 Tablet by mouth 2 times a day with meals., Disp-20 Tablet, R-0, Normal      methocarbamol (ROBAXIN) 750 MG Tab Take 1 Tablet by mouth 4 times a day., Disp-20 Tablet, R-0, Normal           FINAL DIAGNOSIS  1. Sprain of left shoulder, unspecified shoulder sprain type, initial encounter       Abdulaziz STARR (Scribe), am scribing for, and in the presence of, Colton Cruz D.O..    Electronically signed by: Abdulaziz Elmore (Scribe), 2/27/2023    Colton STARR D.O. personally performed the services described in this documentation, as scribed by Abdulaziz Elmore in my presence, and it is both accurate and complete.    The note accurately reflects work and decisions made by me.  Colton Cruz D.O.  2/27/2023  8:12 PM

## 2023-02-27 NOTE — LETTER
"  FORM C-4:  EMPLOYEE’S CLAIM FOR COMPENSATION/ REPORT OF INITIAL TREATMENT  EMPLOYEE’S CLAIM - PROVIDE ALL INFORMATION REQUESTED   First Name Rhonda Last Name Luis Fernando Birthdate 1970  Sex female Claim Number   Home Address [address]             [place] [address]                                   Age  52 y.o. 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 (if applicable) Holding a patient.    How did this injury or occupational disease occur? Be specific and answer in detail. 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 He is in 2 pt restraints. He threw his legs over the bed rail. As I was trying to put his legs back in bed my Lt shoulder froze and has been spazmins.   If you believe that you have an occupational disease, when did you first have knowledge of the disability and it relationship to your employment?  Witnesses to the Accident  Sofia Eldersam Sheldon   Nature of Injury or Occupational Disease  Workers' Compensation Part(s) of Body Injured or Affected  Shoulder (L), Upper Arm (L), Lower Arm (L)    I CERTIFY THAT THE ABOVE IS TRUE AND CORRECT TO THE BEST OF MY KNOWLEDGE AND THAT I HAVE PROVIDED THIS INFORMATION IN ORDER TO " OBTAIN THE BENEFITS OF [place]  I HEREBY AUTHORIZE ANY PHYSICIAN, CHIROPRACTOR, SURGEON, PRACTITIONER, OR OTHER PERSON, ANY HOSPITAL, INCLUDING [facility] OR [facility] H[facility]ITAL, ANY MEDICAL SERVICE ORGANIZATION, ANY INSURANCE COMPANY, OR OTHER INSTITUTION OR ORGANIZATION TO RELEASE TO EACH OTHER, ANY MEDICAL OR OTHER INFORMATION, INCLUDING BENEFITS PAID OR PAYABLE, PERTINENT TO THIS INJURY OR DISEASE, EXCEPT INFORMATION RELATIVE TO DIAGNOSIS, TREATMENT AND/OR COUNSELING FOR AIDS, PSYCHOLOGICAL CONDITIONS, ALCOHOL OR CONTROLLED SUBSTANCES, FOR WHICH I MUST GIVE SPECIFIC AUTHORIZATION.  A PHOTOSTAT OF THIS AUTHORIZATION SHALL BE AS VALID AS THE ORIGINAL.  Date 02/27/2023    Place [facility]                                                                 Employee’s Signature   THIS REPORT MUST BE COMPLETED AND MAILED WITHIN 3 WORKING DAYS OF TREATMENT   Place [facility], EMERGENCY DEPT                       Name of Facility [facility]   Date  2/27/2023 Diagnosis  (S43.402A) Sprain of left shoulder, unspecified shoulder sprain type, initial encounter Is there evidence the injured employee was under the influence of alcohol and/or another controlled substance at the time of accident?   Hour  3:54 PM Description of Injury or Disease  Sprain of left shoulder, unspecified shoulder sprain type, initial encounter No   Treatment  Symptomatic care  Have you advised the patient to remain off work five days or more?         No   X-Ray Findings  Negative If Yes   From Date    To Date      From information given by the employee, together with medical evidence, can you directly connect this injury or occupational disease as job incurred? Yes If No, is employee capable of: Full Duty  No Modified Duty      Is additional medical care by a physician indicated? Yes If Modified Duty,  "Specify any Limitations / Restrictions   Off work today, see Workmen's Comp. clinic on Friday morning   Do you know of any previous injury or disease contributing to this condition or occupational disease? No    Date 2/27/2023 Print Doctor’s Name Ivette Cruz certify the employer’s copy of this form was mailed on:   Address [address] [place] [address] [id number] [phone] INSURER’S USE ONLY   Provider’s Tax ID Number [id number] Telephone Dept: [phone]    Doctor’s Signature e-IVETTE Jones D.O. Degree  MDO      Form C-4 (rev.10/07)                                                                         BRIEF DESCRIPTION OF RIGHTS AND BENEFITS  (Pursuant to [place] 616C.050)    Notice of Injury or Occupational Disease (Incident Report Form C-1): If an injury or occupational disease (OD) arises out of and in the course of employment, you must provide written notice to your employer as soon as practicable, but no later than 7 days after the accident or OD. Your employer shall maintain a sufficient supply of the required forms.    Claim for Compensation (Form C-4): If medical treatment is sought, the form C-4 is available at the place of initial treatment. A completed \"Claim for Compensation\" (Form C-4) must be filed within 90 days after an accident or OD. The treating physician or chiropractor must, within 3 working days after treatment, complete and mail to the employer, the employer's insurer and third-party , the Claim for Compensation.    Medical Treatment: If you require medical treatment for your on-the-job injury or OD, you may be required to select a physician or chiropractor from a list provided by your workers’ compensation insurer, if it has contracted with an Organization for Managed Care (MCO) or Preferred Provider Organization (PPO) or providers of health care. If your employer has not entered into a contract with an MCO or PPO, you may select a physician or chiropractor " from the Panel of Physicians and Chiropractors. Any medical costs related to your industrial injury or OD will be paid by your insurer.    Temporary Total Disability (TTD): If your doctor has certified that you are unable to work for a period of at least 5 consecutive days, or 5 cumulative days in a 20-day period, or places restrictions on you that your employer does not accommodate, you may be entitled to TTD compensation.    Temporary Partial Disability (TPD): If the wage you receive upon reemployment is less than the compensation for TTD to which you are entitled, the insurer may be required to pay you TPD compensation to make up the difference. TPD can only be paid for a maximum of 24 months.    Permanent Partial Disability (PPD): When your medical condition is stable and there is an indication of a PPD as a result of your injury or OD, within 30 days, your insurer must arrange for an evaluation by a rating physician or chiropractor to determine the degree of your PPD. The amount of your PPD award depends on the date of injury, the results of the PPD evaluation, your age and wage.    Permanent Total Disability (PTD): If you are medically certified by a treating physician or chiropractor as permanently and totally disabled and have been granted a PTD status by your insurer, you are entitled to receive monthly benefits not to exceed 66 2/3% of your average monthly wage. The amount of your PTD payments is subject to reduction if you previously received a lump-sum PPD award.    Vocational Rehabilitation Services: You may be eligible for vocational rehabilitation services if you are unable to return to the job due to a permanent physical impairment or permanent restrictions as a result of your injury or occupational disease.    Transportation and Per Niall Reimbursement: You may be eligible for travel expenses and per niall associated with medical treatment.    Reopening: You may be able to reopen your claim if your  condition worsens after claim closure.     Appeal Process: If you disagree with a written determination issued by the insurer or the insurer does not respond to your request, you may appeal to the Department of Administration, , by following the instructions contained in your determination letter. You must appeal the determination within 70 days from the date of the determination letter at [address], [address], [place], [place] [address], or [address], [address], [place], [place] [address]. If you disagree with the  decision, you may appeal to the Department of Administration, . You must file your appeal within 30 days from the date of the  decision letter at [address], [address], [place], [place] [address], or [address], [address], [place], [place] [address]. If you disagree with a decision of an , you may file a petition for judicial review with the District Court. You must do so within 30 days of the Appeal Officer’s decision. You may be represented by an  at your own expense or you may contact the [facility] for possible representation.    [place]  for Injured Workers (NAIW): If you disagree with a  decision, you may request that NAIW represent you without charge at an  Hearing. For information regarding denial of benefits, you may contact the [facility] at: [address], [address], [place] [address], [phone], or [address], [address], [place] [address], [phone]    To File a Complaint with the Division: If you wish to file a complaint with the  of the Division of Industrial Relations (DIR),  please contact the Workers’ Compensation Section, [address], [address], [place], [place] [address], telephone ([phone], or [address], [place], [place] [address], telephone [phone]  [phone].    For assistance with Workers’ Compensation Issues: You may contact the [facility] Office for Consumer Health Assistance, [address] [facility], [address], [place] [address], Toll Free 1-775.449.4448, Web site: http://[facility] access hospital.[place].gov/Programs/LIA E-mail: lia@[facility].[place].gov  D-2 (rev. 10/20)              __________________________________________________________________                                    _________________            Employee Name / Signature                                                                                                                            Date

## 2023-02-27 NOTE — DISCHARGE INSTRUCTIONS
Use a sling to support the shoulder and improve the discomfort.  Continue using ice packs.  Go home and rest.  You are being prescribed a pain medication and muscle relaxer, most relaxer can make you sleepy do not drive while taking it.    If this not resolved by Friday see Workmen's Comp. clinic before returning back to work.

## 2023-02-28 LAB
AMP AMPHETAMINE: NORMAL
BAR BARBITURATES: NORMAL
BREATH ALCOHOL COMMENT: NORMAL
BZO BENZODIAZEPINES: NORMAL
COC COCAINE: NORMAL
INT CON NEG: NORMAL
INT CON POS: NORMAL
MDMA ECSTASY: NORMAL
MET METHAMPHETAMINES: NORMAL
MTD METHADONE: NORMAL
OPI OPIATES: NORMAL
OXY OXYCODONE: NORMAL
PCP PHENCYCLIDINE: NORMAL
POC BREATHALIZER: 0 PERCENT (ref 0–0.01)
POC URINE DRUG SCREEN OCDRS: NEGATIVE
THC: NORMAL

## 2023-03-02 ENCOUNTER — OCCUPATIONAL MEDICINE (OUTPATIENT)
Dept: URGENT CARE | Facility: CLINIC | Age: 53
End: 2023-03-02
Payer: COMMERCIAL

## 2023-03-02 VITALS
HEART RATE: 91 BPM | DIASTOLIC BLOOD PRESSURE: 82 MMHG | BODY MASS INDEX: 19.83 KG/M2 | TEMPERATURE: 96.7 F | WEIGHT: 133.9 LBS | RESPIRATION RATE: 12 BRPM | HEIGHT: 69 IN | OXYGEN SATURATION: 99 % | SYSTOLIC BLOOD PRESSURE: 118 MMHG
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
## 2023-03-02 DIAGNOSIS — M25.512 ACUTE PAIN OF LEFT SHOULDER: ICD-10-CM

## 2023-03-02 PROCEDURE — 99203 OFFICE O/P NEW LOW 30 MIN: CPT | Performed by: PHYSICIAN ASSISTANT

## 2023-03-02 RX ORDER — NALTREXONE HYDROCHLORIDE 50 MG/1
50 TABLET, FILM COATED ORAL
COMMUNITY
Start: 2023-02-09 | End: 2023-05-30

## 2023-03-02 RX ORDER — NAPROXEN 500 MG/1
500 TABLET ORAL 2 TIMES DAILY WITH MEALS
Qty: 30 TABLET | Refills: 0 | Status: SHIPPED | OUTPATIENT
Start: 2023-03-02 | End: 2023-05-30

## 2023-03-02 RX ORDER — MIRTAZAPINE 7.5 MG/1
TABLET, FILM COATED ORAL
COMMUNITY
Start: 2023-02-11 | End: 2023-05-30

## 2023-03-02 RX ORDER — TIZANIDINE 4 MG/1
4 TABLET ORAL EVERY 6 HOURS PRN
Qty: 15 TABLET | Refills: 0 | Status: SHIPPED | OUTPATIENT
Start: 2023-03-02 | End: 2023-03-21

## 2023-03-02 RX ORDER — METHYLPREDNISOLONE 4 MG/1
4 TABLET ORAL DAILY
Qty: 21 TABLET | Refills: 0 | Status: SHIPPED | OUTPATIENT
Start: 2023-03-02 | End: 2023-03-26

## 2023-03-02 ASSESSMENT — FIBROSIS 4 INDEX: FIB4 SCORE: 1.093926825832975133

## 2023-03-02 NOTE — LETTER
[facility]  [address] [facility] [address] [place] [address]  Phone:  [phone] - Fax:  [phone]   [facility] Progress Report and Disability Certification  Date of Service: 3/2/2023   No Show:  No  Date / Time of Next Visit:     Claim Information   Patient Name: Rhonda Gould  Claim Number:     Employer: [employer]  Date of Injury: 2/27/2023     Insurer / TPA: Esis  ID / SSN:     Occupation: CNA  Diagnosis: Diagnoses of Acute pain of left shoulder and Work related injury were pertinent to this visit.    Medical Information   Related to Industrial Injury? Yes    Subjective Complaints:  DOI: 2/27/2023      BARRERA: Left shoulder injury while trying to restrain a patient and place his legs back into the hospital bed.    Please recall patient was seen in the [facility] emergency department shortly after the injury occurred for evaluation, radiographic imaging at that time was negative.  She presents today wearing a simple sling and continuing to experience left shoulder pain and severely limited range of motion secondary to pain.  Pain is primarily present over the left upper trapezius and left supraspinatus region.  No injury to the shoulder.  Has not experienced numbness and tingling in the left upper extremity.  Has been using over-the-counter medications for symptoms.   Objective Findings: Exam nation of the left shoulder does reveal a simple sling being worn at the time of the office visit today.  There is tenderness to palpation over the left rhomboids, left upper trapezius and left supraspinatus region.  No bony tenderness.  Patient active range of motion in flexion and abduction is limited to less than 15-20 degrees due to pain.  Passive range of motion is limited to 15-20 degrees due to pain.  Dermatomal sensation of the left upper extremity is intact.  Interdigital,  strength intact over the left upper extremity, did not perform  biceps/triceps/deltoid muscle testing due to pain.   Pre-Existing Condition(s):     Assessment:   Condition Same    Status: Discharged / Care Transfer  Comments:Transfer of care to sports medicine  Permanent Disability:No    Plan:   Comments:Trial Medrol Dosepak, naproxen, tizanidine.  Transfer of care to sports medicine.  Work status updated    Diagnostics:      Comments:       Disability Information   Status: Released to Restricted Duty    From:     Through:   Restrictions are: Temporary   Physical Restrictions   Sitting:    Standing:    Stooping:    Bending:      Squatting:    Walking:    Climbing:    Pushing:      Pulling:    Other:    Reaching Above Shoulder (L):   Reaching Above Shoulder (R):       Reaching Below Shoulder (L):    Reaching Below Shoulder (R):      Not to exceed Weight Limits   Carrying(hrs):   Weight Limit(lb):   Lifting(hrs):   Weight  Limit(lb):     Comments: No use of the left upper extremity, she must be able to wear the simple sling while at work.  Allowed full function of the right upper extremity.  Trial Medrol Dosepak, naproxen, Zanaflex; unable to take Zanaflex prior to or during work shifts.  Transfer of care to sports medicine    Repetitive Actions   Hands: i.e. Fine Manipulations from Grasping:     Feet: i.e. Operating Foot Controls:     Driving / Operate Machinery:     Health Care Provider’s Original or Electronic Signature  Arnulfo Lora P.A.-C. Health Care Provider’s Original or Electronic Signature    Chu Latif DO MPH     Clinic Name / Location: [address] [facility]  [place] [address] Clinic Phone Number: Dept: [phone]   Appointment Time: 9:15 Am Visit Start Time: 9:20 AM   Check-In Time:  9:08 Am Visit Discharge Time:     Original-Treating Physician or Chiropractor    Page 2-Insurer/TPA    Page 3-Employer    Page 4-Employee

## 2023-03-02 NOTE — LETTER
"EMPLOYEE’S CLAIM FOR COMPENSATION/ REPORT OF INITIAL TREATMENT  FORM C-4    EMPLOYEE’S CLAIM - PROVIDE ALL INFORMATION REQUESTED   First Name  Rhonda Last Name  Luis Fernando Birthdate                    1970                Sex  female Claim Number (Insurer’s Use Only)   Home Address  [address] Age  52 y.o. 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 (if applicable)  Holding a patient.    How did this injury or occupational disease occur? (Be specific an answer in detail. 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 He is in 2 pt restraints. He threw his legs over the bed rail. As I was trying to put his legs back in bed my Lt shoulder froze and has been spazmins.   If you believe that you have an occupational disease, when did you first have knowledge of the disability and it relationship to your employment?  N/A Witnesses to the Accident  Sofia Jerrod Sheldon      Nature of Injury or Occupational Disease  Workers' Compensation  Part(s) of Body Injured or Affected  Shoulder (L), Upper Arm (L), " Lower Arm (L)    I certify that the above is true and correct to the best of my knowledge and that I have provided this information in order to obtain the benefits of [place]  I hereby authorize any physician, chiropractor, surgeon, practitioner, or other person, any hospital, including [facility], any medical service organization, any insurance company, or other institution or organization to release to each other, any medical or other information, including benefits paid or payable, pertinent to this injury or disease, except information relative to diagnosis, treatment and/or counseling for AIDS, psychological conditions, alcohol or controlled substances, for which I must give specific authorization.  A Photostat of this authorization shall be as valid as the original.     Date   Place Employee’s Original or  *Electronic Signature   THIS REPORT MUST BE COMPLETED AND MAILED WITHIN 3 WORKING DAYS OF TREATMENT   Place  [facility]  Name of Facility  [facility]   Date  3/2/2023 Diagnosis and Description of Injury or Occupational Disease  (M25.512) Acute pain of left shoulder  (Y99.0) Work related injury Is there evidence the injured employee was under the influence of alcohol and/or another controlled substance at the time of accident?  ? No ? Yes (if yes, please explain)   Hour  9:20 AM   Diagnoses of Acute pain of left shoulder and Work related injury were pertinent to this visit. No   Treatment   Trial Medrol Dosepak, naproxen, tizanidine.  Transfer of care to sports medicine.  Work status updated  Have you advised the patient to remain off work five days or     more?    X-Ray Findings      ? Yes Indicate dates:   From   To      From information given by the employee, together with medical evidence, can        you directly connect this injury or occupational  "disease as job incurred?  Yes ? No If no, is the injured employee capable of:  ? full duty  No ? modified duty  Yes   Is additional medical care by a physician indicated?  Yes  Comments:Transfer of care to sports medicine If Modified Duty, Specify any Limitations / Restrictions  See D39   Do you know of any previous injury or disease contributing to this condition or occupational disease?  ? Yes ? No (Explain if yes)                          No   Date  3/2/2023 Print Health Care Provider's   Haley Lora P.A.-C. I certify the employer’s copy of  this form was mailed on:   Address  [address] Insurer’s Use Only     [facility] [facility] [place] Zip  [address]    Provider’s Tax ID Number  [id number] Telephone  Dept: [phone]             Health Care Provider’s Original or Electronic Signature  e-HALEY Fraire P.A.-C. Degree (MD,, DC,MI,APRN)  PAERICK      * Complete and attach Release of Information (Form C-4A) when injured employee signs C-4 Form electronically  ORIGINAL - TREATING HEALTHCARE PROVIDER PAGE 2 - INSURER/TPA PAGE 3 - EMPLOYER PAGE 4 - EMPLOYEE             Form C-4 (rev.08/21)           BRIEF DESCRIPTION OF RIGHTS AND BENEFITS  (Pursuant to [place] 616C.050)    Notice of Injury or Occupational Disease (Incident Report Form C-1): If an injury or occupational disease (OD) arises out of and in the course of employment, you must provide written notice to your employer as soon as practicable, but no later than 7 days after the accident or OD. Your employer shall maintain a sufficient supply of the required forms.    Claim for Compensation (Form C-4): If medical treatment is sought, the form C-4 is available at the place of initial treatment. A completed \"Claim for Compensation\" (Form C-4) must be filed within 90 days after an accident or OD. The treating physician or chiropractor must, within 3 working days after treatment, complete and mail to the employer, the employer's insurer and third-party " , the Claim for Compensation.    Medical Treatment: If you require medical treatment for your on-the-job injury or OD, you may be required to select a physician or chiropractor from a list provided by your workers’ compensation insurer, if it has contracted with an Organization for Managed Care (MCO) or Preferred Provider Organization (PPO) or providers of health care. If your employer has not entered into a contract with an MCO or PPO, you may select a physician or chiropractor from the Panel of Physicians and Chiropractors. Any medical costs related to your industrial injury or OD will be paid by your insurer.    Temporary Total Disability (TTD): If your doctor has certified that you are unable to work for a period of at least 5 consecutive days, or 5 cumulative days in a 20-day period, or places restrictions on you that your employer does not accommodate, you may be entitled to TTD compensation.    Temporary Partial Disability (TPD): If the wage you receive upon reemployment is less than the compensation for TTD to which you are entitled, the insurer may be required to pay you TPD compensation to make up the difference. TPD can only be paid for a maximum of 24 months.    Permanent Partial Disability (PPD): When your medical condition is stable and there is an indication of a PPD as a result of your injury or OD, within 30 days, your insurer must arrange for an evaluation by a rating physician or chiropractor to determine the degree of your PPD. The amount of your PPD award depends on the date of injury, the results of the PPD evaluation, your age and wage.    Permanent Total Disability (PTD): If you are medically certified by a treating physician or chiropractor as permanently and totally disabled and have been granted a PTD status by your insurer, you are entitled to receive monthly benefits not to exceed 66 2/3% of your average monthly wage. The amount of your PTD payments is subject to reduction  if you previously received a lump-sum PPD award.    Vocational Rehabilitation Services: You may be eligible for vocational rehabilitation services if you are unable to return to the job due to a permanent physical impairment or permanent restrictions as a result of your injury or occupational disease.    Transportation and Per Niall Reimbursement: You may be eligible for travel expenses and per niall associated with medical treatment.    Reopening: You may be able to reopen your claim if your condition worsens after claim closure.     Appeal Process: If you disagree with a written determination issued by the insurer or the insurer does not respond to your request, you may appeal to the Department of Administration, , by following the instructions contained in your determination letter. You must appeal the determination within 70 days from the date of the determination letter at [address], [address], [place], [place] [address], or [address], [address], [place] [address]. If you disagree with the  decision, you may appeal to the Department of Administration, . You must file your appeal within 30 days from the date of the  decision letter at [address], [address], [place], [place] [address], or [address], [address], [place], [place] [address]. If you disagree with a decision of an , you may file a petition for judicial review with the District Court. You must do so within 30 days of the Appeal Officer’s decision. You may be represented by an  at your own expense or you may contact the [facility] for possible representation.    [place]  for Injured Workers (NAIW): If you disagree with a  decision, you may request that NAIW represent you without charge at an  Hearing. For information regarding denial of benefits, you may contact the [facility] at: [address], [address], [place] [address], [phone], or [address], [address], [place] [address], [phone]    To File a Complaint with the Division: If you wish to file a complaint with the  of the Division of Industrial Relations (DIR),  please contact the Workers’ Compensation Section, [address], [address], [place], [place] [address], telephone [phone], or [address], [address], [place], [place] [address], telephone [phone]) [phone].    For assistance with Workers’ Compensation Issues: You may contact the [facility] Office for Consumer Health Assistance, [address], [address] [facility] [address], [place] [address], Toll Free 1-497.293.2930, Web site: [email or web address]/Programs/LIA E-mail: [email or web address].[email or web address]              __________________________________________________________________                                    _________________            Employee Name / Signature                                                                                                                            Date                                                                                                                                                                                                                              D-2 (rev. 10/20)

## 2023-03-02 NOTE — PROGRESS NOTES
"Subjective:     Rhonda Gould is a 52 y.o. female who presents for Follow-Up (WC FV DOI: 2/27/23 (L) shoulder pain was not gotten better)      DOI: 2/27/2023      BARRERA: Left shoulder injury while trying to restrain a patient and place his legs back into the hospital bed.    Please recall patient was seen in the [facility] emergency department shortly after the injury occurred for evaluation, radiographic imaging at that time was negative.  She presents today wearing a simple sling and continuing to experience left shoulder pain and severely limited range of motion secondary to pain.  Pain is primarily present over the left upper trapezius and left supraspinatus region.  No injury to the shoulder.  Has not experienced numbness and tingling in the left upper extremity.  Has been using over-the-counter medications for symptoms.    PMH:   No pertinent past medical history to this problem  MEDS:  Medications were reviewed in EMR  ALLERGIES:  Allergies were reviewed in EMR  SOCHX:  Works as a CNA  FH:   No pertinent family history to this problem       Objective:     /82 (BP Location: Right arm, Patient Position: Sitting, BP Cuff Size: Adult)   Pulse 91   Temp 35.9 °C (96.7 °F) (Temporal)   Resp 12   Ht 1.753 m (5' 9\")   Wt 60.7 kg (133 lb 14.4 oz)   LMP 06/11/2005   SpO2 99%   BMI 19.77 kg/m²     Exam nation of the left shoulder does reveal a simple sling being worn at the time of the office visit today.  There is tenderness to palpation over the left rhomboids, left upper trapezius and left supraspinatus region.  No bony tenderness.  Patient active range of motion in flexion and abduction is limited to less than 15-20 degrees due to pain.  Passive range of motion is limited to 15-20 degrees due to pain.  Dermatomal sensation of the left upper extremity is intact.  Interdigital,  strength intact over the left upper extremity, did not perform biceps/triceps/deltoid muscle testing " due to pain.    Assessment/Plan:     Encounter Diagnoses   Name Primary?    Acute pain of left shoulder     Work related injury      Plan:  Trial Medrol Dosepak, naproxen, tizanidine.  Transfer of care to sports medicine.  Work status updated    Differential diagnosis, natural history, supportive care, and indications for immediate follow-up discussed.      Arnulfo Lora PA-C

## 2023-03-09 ENCOUNTER — OCCUPATIONAL MEDICINE (OUTPATIENT)
Dept: SPORTS MEDICINE | Facility: CLINIC | Age: 53
End: 2023-03-09
Payer: COMMERCIAL

## 2023-03-09 VITALS
RESPIRATION RATE: 16 BRPM | WEIGHT: 133.9 LBS | HEART RATE: 104 BPM | SYSTOLIC BLOOD PRESSURE: 118 MMHG | OXYGEN SATURATION: 95 % | HEIGHT: 69 IN | BODY MASS INDEX: 19.83 KG/M2 | DIASTOLIC BLOOD PRESSURE: 78 MMHG | TEMPERATURE: 98.4 F

## 2023-03-09 DIAGNOSIS — S46.012A ROTATOR CUFF STRAIN, LEFT, INITIAL ENCOUNTER: ICD-10-CM

## 2023-03-09 PROCEDURE — 99213 OFFICE O/P EST LOW 20 MIN: CPT | Performed by: FAMILY MEDICINE

## 2023-03-09 ASSESSMENT — ENCOUNTER SYMPTOMS
CHILLS: 0
FEVER: 0
NAUSEA: 0
DIZZINESS: 0
SHORTNESS OF BREATH: 0
VOMITING: 0

## 2023-03-09 ASSESSMENT — FIBROSIS 4 INDEX: FIB4 SCORE: 1.093926825832975133

## 2023-03-09 NOTE — Clinical Note
Mode Arredondo, Thank you for referring Rhonda to our sports medicine clinic. It seems she sustained a rotator cuff strain.  Hopefully, she does not have a labral tear as well.  We will see how she responds to some exercises and therapy. Hope you are well! Respectfully,  MADALYN Rossi M.D. [facility] [phone]

## 2023-03-09 NOTE — LETTER
[facility]  [address] [place] [address]  Phone:  [phone] - Fax:  [phone]   Occupational Health Network Progress Report and Disability Certification  Date of Service: 3/9/2023   No Show:  No  Date / Time of Next Visit: 3/30/2023   Claim Information   Patient Name: Rhonda Gould  Claim Number:     Employer: [employer]  Date of Injury: 2/27/2023     Insurer / TPA: Esis  ID / SSN:     Occupation: CNA  Diagnosis: The encounter diagnosis was Rotator cuff strain, left, initial encounter.    Medical Information   Related to Industrial Injury? Yes    Subjective Complaints:  DOI: 2/27/2023        Left shoulder injury (right-handed dominant) while trying to help restrained patient back onto hospital bed.  Patient had upper extremity restraints and had kicked his lower legs off of the bed.  As Rhonda tried to put the patient back onto the bed with both arms cradling his legs he forcefully kicked against her with his legs.  There was a slight pop at the time of injury with sudden sharp pain, pain is predominantly at the superior shoulder and trapezius region.  Pain is still present, but less severe.  No radiation.  Improved with heat and ice, resting.  Worse with abduction of the LEFT shoulder and even worse if she has any weight.    She denies any prior injuries or issues with the LEFT shoulder.     She was seen at the emergency room and had 1 urgent care visit.  She was placed in a shoulder sling for about a week.  No numbness and tingling in the left upper extremity.  Has been using ibuprofen as well as tizanidine which seem to be helping.  She does feel like there is associated weakness of the LEFT shoulder.      Objective Findings: No acute distress  Cervical spine: Range of motion LIMITED with lateral rotation, particularly to the LEFT, Spurling's testing is NEGATIVE bilaterally, but does produce some local cervical spine discomfort on the LEFT.  No cervical  spine tenderness.  Strength testing NORMAL deltoid, bicep, tricep, wrist extension, wrist flexion and finger abduction  DTRs: 2 out of 4 bilaterally for biceps, brachial radialis  Snow's testing NEGATIVE    BILATERAL shoulder exam:  Range of motion LIMITED with full abduction on the LEFT compared to the right.  Strength testing NORMAL with empty can and external rotation although these maneuvers produce pain on the LEFT compared to the right.  Internal rotation and lift off testing otherwise 5/5 bilaterally.  Positive tenderness of the supraspinatus and infraspinatus as well as the biceps tendon on the LEFT compared to the right.    Apprehension testing is POSITIVE on the LEFT compared to the right.   Pre-Existing Condition(s): N/a   Assessment:   Condition Improved    Status: Additional Care Required  Permanent Disability:No    Plan:      Diagnostics: X-ray    Comments:  LEFT shoulder x-ray performed February 27, 2023 which was also interpreted in the office with the patient was NORMAL    Disability Information   Status: Released to Restricted Duty    From:  3/9/2023  Through: 3/30/2023 Restrictions are: Temporary   Physical Restrictions   Sitting:    Standing:    Stooping:    Bending:      Squatting:    Walking:    Climbing:    Pushing:      Pulling:    Other:    Reaching Above Shoulder (L): 0 hrs/day Reaching Above Shoulder (R):       Reaching Below Shoulder (L):    Reaching Below Shoulder (R):      Not to exceed Weight Limits   Carrying(hrs):   Weight Limit(lb):   Lifting(hrs):   Weight  Limit(lb): < or = to 25 pounds   Comments: Limited use of the LEFT shoulder, avoid lifting greater than 25 pounds and avoid any overhead activity until reevaluation in 3 weeks.    It seems she may have strained her rotator cuff (supraspinatus, infraspinatus) and possibly labrum since she has some painful intermittent popping and tenderness along the proximal biceps tendon.  Her mechanism of injury does pose the possibility of  rotator cuff injury and/or labral injury.    Provided with shoulder exercises, referral for formal physical therapy placed.    Repetitive Actions   Hands: i.e. Fine Manipulations from Grasping:     Feet: i.e. Operating Foot Controls:     Driving / Operate Machinery:     Provider Name:   Jori Rossi M.D. Physician Signature:  Physician Name:     Clinic Name / Location: [address] [address] Clinic Phone Number: Dept: [phone]   Appointment Time: 12:30 Pm Visit Start Time: 12:18 PM   Check-In Time:  12:15 Pm Visit Discharge Time: 12:53 Pm    Original-Treating Physician or Chiropractor    Page 2-Insurer/TPA    Page 3-Employer    Page 4-Employee

## 2023-03-09 NOTE — PROGRESS NOTES
Chief Complaint   Patient presents with    Work-Related Injury     WC L shoulder injury        Subjective     Referred by Arnulfo Lora P.A.-C. for evaluation of LEFT shoulder pain/work-related injury  DOI: 2/27/2023        Left shoulder injury (right-handed dominant) while trying to help restrained patient back onto hospital bed.  Patient had upper extremity restraints and had kicked his lower legs off of the bed.  As Rhonda tried to put the patient back onto the bed with both arms cradling his legs he forcefully kicked against her with his legs.  There was a slight pop at the time of injury with sudden sharp pain, pain is predominantly at the superior shoulder and trapezius region.  Pain is still present, but less severe.  No radiation.  Improved with heat and ice, resting.  Worse with abduction of the LEFT shoulder and even worse if she has any weight.    She denies any prior injuries or issues with the LEFT shoulder.     She was seen at the emergency room and had 1 urgent care visit.  She was placed in a shoulder sling for about a week.  No numbness and tingling in the left upper extremity.  Has been using ibuprofen as well as tizanidine which seem to be helping.  She does feel like there is associated weakness of the LEFT shoulder.    Review of Systems   Constitutional:  Negative for chills and fever.   Respiratory:  Negative for shortness of breath.    Cardiovascular:  Negative for chest pain.   Gastrointestinal:  Negative for nausea and vomiting.   Neurological:  Negative for dizziness.     PMH:  has a past medical history of Chronic obstructive pulmonary disease (HCC) and Hypertension.  MEDS:   Current Outpatient Medications:     naltrexone (DEPADE) 50 MG Tab, Take 50 mg by mouth every day., Disp: , Rfl:     mirtazapine (REMERON) 7.5 MG tablet, TAKE 1 TABLET BY MOUTH ONCE A DAY AT BEDTIME, Disp: , Rfl:     methylPREDNISolone (MEDROL DOSEPAK) 4 MG Tablet Therapy Pack, Take 1 Tablet by mouth every day. Follow  "schedule on package instructions., Disp: 21 Tablet, Rfl: 0    tizanidine (ZANAFLEX) 4 MG Tab, Take 1 Tablet by mouth every 6 hours as needed (Muscle Spasm)., Disp: 15 Tablet, Rfl: 0    naproxen (NAPROSYN) 500 MG Tab, Take 1 Tablet by mouth 2 times a day with meals., Disp: 30 Tablet, Rfl: 0    methocarbamol (ROBAXIN) 750 MG Tab, Take 1 Tablet by mouth 4 times a day., Disp: 20 Tablet, Rfl: 0    traZODone (DESYREL) 100 MG Tab, Take 100 mg by mouth at bedtime., Disp: , Rfl:     nicotine (NICODERM) 14 MG/24HR PATCH 24 HR, Place 1 Patch on the skin every 24 hours as needed. Indications: Nicotine Addiction, Disp: , Rfl:     diphenhydrAMINE (BENADRYL) 25 MG Tab, Take 25 mg by mouth 1 time a day as needed (Allergies)., Disp: , Rfl:     sertraline (ZOLOFT) 100 MG Tab, Take 100 mg by mouth at bedtime., Disp: , Rfl:     albuterol 108 (90 Base) MCG/ACT Aero Soln inhalation aerosol, Inhale 1-2 Puffs every four hours as needed for Shortness of Breath., Disp: , Rfl:   ALLERGIES:   Allergies   Allergen Reactions    Fish Allergy Anaphylaxis and Shortness of Breath     Swelling to throat.    Shellfish Allergy Anaphylaxis     Strict No Shellfish (avoids most fish, cod OK)      Codeine Hives and Itching     Tolerated morphine previously  Allergy updated from 2015, morphine given in 2016     SURGHX:   Past Surgical History:   Procedure Laterality Date    HYSTERECTOMY LAPAROSCOPY  6/2005     SOCHX:  reports that she has been smoking. She has never used smokeless tobacco. She reports that she does not currently use alcohol. She reports that she does not currently use drugs after having used the following drugs: Inhaled and Marijuana. Frequency: 2.00 times per week.  FH: Family history was reviewed, no pertinent findings to report    Objective   /78 (BP Location: Left arm, Patient Position: Sitting, BP Cuff Size: Adult)   Pulse (!) 104   Temp 36.9 °C (98.4 °F) (Temporal)   Resp 16   Ht 1.753 m (5' 9\")   Wt 60.7 kg (133 lb 14.4 " oz)   LMP 06/11/2005   SpO2 95%   BMI 19.77 kg/m²     No acute distress  Cervical spine: Range of motion LIMITED with lateral rotation, particularly to the LEFT, Spurling's testing is NEGATIVE bilaterally, but does produce some local cervical spine discomfort on the LEFT.  No cervical spine tenderness.  Strength testing NORMAL deltoid, bicep, tricep, wrist extension, wrist flexion and finger abduction  DTRs: 2 out of 4 bilaterally for biceps, brachial radialis  Snow's testing NEGATIVE     BILATERAL shoulder exam:  Range of motion LIMITED with full abduction on the LEFT compared to the right.  Strength testing NORMAL with empty can and external rotation although these maneuvers produce pain on the LEFT compared to the right.  Internal rotation and lift off testing otherwise 5/5 bilaterally.  Positive tenderness of the supraspinatus and infraspinatus as well as the biceps tendon on the LEFT compared to the right.    Apprehension testing is POSITIVE on the LEFT compared to the right.    1. Rotator cuff strain, left, initial encounter  Referral to Physical Therapy        LEFT shoulder pain/work-related injury  DOI: 2/27/2023        Left shoulder injury (right-handed dominant) while trying to help restrained patient back onto hospital bed.  Patient had upper extremity restraints and had kicked his lower legs off of the bed.  As Rhonda tried to put the patient back onto the bed with both arms cradling his legs he forcefully kicked against her with his legs.  There was a slight pop at the time of injury with sudden sharp pain, pain is predominantly at the superior shoulder and trapezius region.     Suspect supraspinatus and possibly infraspinatus strain  Possible labral injury since she also has some biceps tendon tenderness and some POSITIVE labral signs    She is approximately 60% improved since her injury    Provided home exercise program  Recommend formal physical therapy  If she does not respond consider  musculoskeletal ultrasound evaluation of the rotator cuff versus MRI study for assessment of the rotator cuff/labrum depending on how she does    Meanwhile, recommend avoiding lifting greater than 25 pounds and avoiding any overhead activity    Return in about 3 weeks (around 3/30/2023).  For reevaluation        2/27/2023 2:55 PM     HISTORY/REASON FOR EXAM:  Pain/Deformity Following Trauma.        TECHNIQUE/EXAM DESCRIPTION AND NUMBER OF VIEWS:  3 views of the left shoulder.     COMPARISON: None     FINDINGS:  There is normal bony mineralization of the shoulder. There is no evidence of fracture, dislocation, or osseous lesion. The acromioclavicular joint is intact.     IMPRESSION:     1.  Normal shoulder series.           Exam Ended: 02/27/23  3:16 PM Last Resulted: 02/27/23  3:23 PM             Interpreted in the office today with the patient    Thank you [place] VLADISLAV Lora for allowing me to participate in caring for your patient.      Greater than 45 minutes was spent reviewing patient history, discussing current issue, physical examination, reviewing results and documenting the visit.

## 2023-03-14 ENCOUNTER — DOCUMENTATION (OUTPATIENT)
Dept: SPORTS MEDICINE | Facility: CLINIC | Age: 53
End: 2023-03-14
Payer: COMMERCIAL

## 2023-03-14 DIAGNOSIS — R29.898 SHOULDER WEAKNESS: ICD-10-CM

## 2023-03-14 DIAGNOSIS — S49.92XD SHOULDER INJURY, LEFT, SUBSEQUENT ENCOUNTER: ICD-10-CM

## 2023-03-14 DIAGNOSIS — S46.012A ROTATOR CUFF STRAIN, LEFT, INITIAL ENCOUNTER: ICD-10-CM

## 2023-03-15 NOTE — PROGRESS NOTES
Received message from patient  Interested in getting an MRI    Called patient    Since her last visit her pain has gotten WORSE  She notices that her shoulder feels more weak and she has worsening of her pain particular with abduction exercises    Since she has history of acute injury with worsening pain, weakness with exercises and worsening of her pain recommend MRI of the LEFT shoulder for evaluation of the rotator cuff    1. Rotator cuff strain, left, initial encounter  MR-SHOULDER-W/O LEFT    Referral to Radiology      2. Shoulder weakness  MR-SHOULDER-W/O LEFT    Referral to Radiology      3. Shoulder injury, left, subsequent encounter  MR-SHOULDER-W/O LEFT    Referral to Radiology              Shoulder x-ray performed on February 27, 2023 was NORMAL WITHOUT any significant signs of OA or degenerative changes      She was to follow-up with us in 2 weeks on March 30 as planned

## 2023-03-17 ENCOUNTER — HOSPITAL ENCOUNTER (OUTPATIENT)
Dept: RADIOLOGY | Facility: MEDICAL CENTER | Age: 53
End: 2023-03-17
Attending: FAMILY MEDICINE
Payer: COMMERCIAL

## 2023-03-17 DIAGNOSIS — S46.012A ROTATOR CUFF STRAIN, LEFT, INITIAL ENCOUNTER: ICD-10-CM

## 2023-03-17 DIAGNOSIS — S49.92XD SHOULDER INJURY, LEFT, SUBSEQUENT ENCOUNTER: ICD-10-CM

## 2023-03-17 DIAGNOSIS — R29.898 SHOULDER WEAKNESS: ICD-10-CM

## 2023-03-17 PROCEDURE — 73221 MRI JOINT UPR EXTREM W/O DYE: CPT | Mod: LT

## 2023-03-21 ENCOUNTER — OCCUPATIONAL MEDICINE (OUTPATIENT)
Dept: SPORTS MEDICINE | Facility: CLINIC | Age: 53
End: 2023-03-21
Payer: COMMERCIAL

## 2023-03-21 VITALS
RESPIRATION RATE: 16 BRPM | BODY MASS INDEX: 19.83 KG/M2 | TEMPERATURE: 98.2 F | DIASTOLIC BLOOD PRESSURE: 76 MMHG | WEIGHT: 133.9 LBS | HEART RATE: 82 BPM | SYSTOLIC BLOOD PRESSURE: 116 MMHG | OXYGEN SATURATION: 97 % | HEIGHT: 69 IN

## 2023-03-21 DIAGNOSIS — S43.432D LABRAL TEAR OF SHOULDER, LEFT, SUBSEQUENT ENCOUNTER: ICD-10-CM

## 2023-03-21 DIAGNOSIS — S46.012D ROTATOR CUFF STRAIN, LEFT, SUBSEQUENT ENCOUNTER: ICD-10-CM

## 2023-03-21 PROCEDURE — 99214 OFFICE O/P EST MOD 30 MIN: CPT | Performed by: FAMILY MEDICINE

## 2023-03-21 RX ORDER — TIZANIDINE 4 MG/1
4 TABLET ORAL NIGHTLY PRN
Qty: 14 TABLET | Refills: 0 | Status: SHIPPED | OUTPATIENT
Start: 2023-03-21 | End: 2023-05-04 | Stop reason: SDUPTHER

## 2023-03-21 ASSESSMENT — FIBROSIS 4 INDEX: FIB4 SCORE: 1.093926825832975133

## 2023-03-21 NOTE — LETTER
[facility]  [address] [place], [place] [address], [place] [address]  Phone:  [phone] - Fax:  [phone]   [facility] Progress Report and Disability Certification  Date of Service: 3/21/2023   No Show:  No  Date / Time of Next Visit: 4/20/2023 @10AM   Claim Information   Patient Name: Rhonda Gould  Claim Number:     Employer: [employer]  Date of Injury: 2/27/2023     Insurer / TPA: [employer]  ID / SSN:     Occupation: CNA  Diagnosis: Diagnoses of Rotator cuff strain, left, subsequent encounter and Labral tear of shoulder, left, subsequent encounter were pertinent to this visit.    Medical Information   Related to Industrial Injury? Yes    Subjective Complaints:  DOI: 2/27/2023        Left shoulder injury (right-handed dominant) while trying to help restrained patient back onto hospital bed.  Patient had upper extremity restraints and had kicked his lower legs off of the bed.  As Rhonda tried to put the patient back onto the bed with both arms cradling his legs he forcefully kicked against her with his legs.  There was a slight pop at the time of injury with sudden sharp pain, pain is predominantly at the superior shoulder and trapezius region.  Pain is a bit worse.  No radiation.  Improved with. Worse with abduction of the LEFT shoulder and even worse if she has any weight.  She starts PT later this week.   Objective Findings: BILATERAL shoulder exam:  Range of motion LIMITED with full abduction on the LEFT compared to the right.  Apprehension testing is POSITIVE on the LEFT compared to the right.   Pre-Existing Condition(s): Some underlying degenerative changes   Assessment:   Condition Worsened    Status: Additional Care Required  Permanent Disability:No    Plan:      Diagnostics: MRI    Comments:  MRI shoulder results  1. Tendinosis with partial-thickness undersurface tears of the supraspinatus and infraspinatus tendons. No full-thickness rotator cuff tear.  2. Tendinosis  of the subscapularis tendon.  3. Complex tear of the superior labrum.   4. Severe chondrosis of the glenoid.  5. Mild subacromial subdeltoid bursitis.    Disability Information   Status: Released to Restricted Duty    From:  3/21/2023  Through: 4/20/2023 Restrictions are: Temporary   Physical Restrictions   Sitting:    Standing:    Stooping:    Bending:      Squatting:    Walking:    Climbing:    Pushing:      Pulling:    Other:    Reaching Above Shoulder (L):   Reaching Above Shoulder (R):       Reaching Below Shoulder (L):    Reaching Below Shoulder (R):      Not to exceed Weight Limits   Carrying(hrs):   Weight Limit(lb):   Lifting(hrs):   Weight  Limit(lb):     Comments: Continue with restrictions < 25 lbs, limited L arm use and no overhead activity until re-evaluation on 4/30/23    Symptoms seem to be a bit worse, she is starting formal physical therapy later this week.  We will see how she fares with formal physical therapy.  We can consider a subacromial corticosteroid injection depending on how she responds to initial therapy.      Repetitive Actions   Hands: i.e. Fine Manipulations from Grasping:     Feet: i.e. Operating Foot Controls:     Driving / Operate Machinery:     Provider Name:   Jori Rossi M.D. Physician Signature:  Physician Name:     Clinic Name / Location: [facility] [address] [address] Clinic Phone Number: Dept: [phone]   Appointment Time: 8:00 Am Visit Start Time: 8:06 AM   Check-In Time:  8:05 Am Visit Discharge Time:  8:38AM   Original-Treating Physician or Chiropractor    Page 2-Insurer/TPA    Page 3-Employer    Page 4-Employee

## 2023-03-21 NOTE — PROGRESS NOTES
"Chief Complaint   Patient presents with    Work-Related Injury     WC F/V      Subjective     DOI: 2/27/2023        Left shoulder injury (right-handed dominant) while trying to help restrained patient back onto hospital bed.  Patient had upper extremity restraints and had kicked his lower legs off of the bed.  As Rhonda tried to put the patient back onto the bed with both arms cradling his legs he forcefully kicked against her with his legs.  There was a slight pop at the time of injury with sudden sharp pain, pain is predominantly at the superior shoulder and trapezius region.  Pain is a bit worse.  No radiation.  Improved with. Worse with abduction of the LEFT shoulder and even worse if she has any weight.  She starts PT later this week.      Objective   /76 (BP Location: Left arm, Patient Position: Sitting, BP Cuff Size: Adult)   Pulse 82   Temp 36.8 °C (98.2 °F) (Temporal)   Resp 16   Ht 1.753 m (5' 9\")   Wt 60.7 kg (133 lb 14.4 oz)   LMP 06/11/2005   SpO2 97%   BMI 19.77 kg/m²     BILATERAL shoulder exam:  Range of motion LIMITED with full abduction on the LEFT compared to the right.  Apprehension testing is POSITIVE on the LEFT compared to the right.    1. Rotator cuff strain, left, subsequent encounter  tizanidine (ZANAFLEX) 4 MG Tab      2. Labral tear of shoulder, left, subsequent encounter  tizanidine (ZANAFLEX) 4 MG Tab        LEFT shoulder pain/work-related injury  DOI: 2/27/2023        Left shoulder injury (right-handed dominant) while trying to help restrained patient back onto hospital bed.  Patient had upper extremity restraints and had kicked his lower legs off of the bed.  As Rhonda tried to put the patient back onto the bed with both arms cradling his legs he forcefully kicked against her with his legs.  There was a slight pop at the time of injury with sudden sharp pain, pain is predominantly at the superior shoulder and trapezius region.     Continue with restrictions < 25 lbs, " limited L arm use and no overhead activity until re-evaluation on 4/30/23     Symptoms seem to be a bit worse, she is starting formal physical therapy later this week.  We will see how she fares with formal physical therapy.  We can consider a subacromial corticosteroid injection depending on how she responds to initial therapy.       Pending formal physical therapy starting later this week    Return in about 4 weeks (around 4/18/2023).  To see how she is doing with formal physical therapy        3/17/2023 8:57 AM     HISTORY/REASON FOR EXAM:  Left shoulder pain        TECHNIQUE/EXAM DESCRIPTION:  MRI of the LEFT shoulder without contrast.     The study was performed on a Mash[employer] Signa 1.5 Nalini MRI scanner. T1 sagittal, fast spin-echo T2 fat-suppressed oblique coronal, sagittal, and axial and intermediate fast spin-echo oblique coronal images were obtained.     COMPARISON:  None.     FINDINGS:     ROTATOR CUFF:  Tendinosis with partial-thickness undersurface tears of the supraspinatus and infraspinatus tendons. Tendinosis of the subscapularis tendon.  No significant rotator cuff muscle atrophy or fatty infiltration.     GLENOHUMERAL JOINT:  - Labrum: Complex tear of the superior labrum.  - Cartilage: High-grade cartilage fissure with subchondral edema in the glenoid  - Bone: Mild marginal spurring. No increased sclerosis of the glenoid fossa. No Hill-Sachs or osseous Bankart, or other fracture or osseous contusion.     LONG HEAD OF BICEPS TENDON:  Intact intra-and extra-articular portions.     SUBACROMIAL-SUBDELTOID BURSA:  Small effusion.     ACROMIOCLAVICULAR JOINT:     Moderate osteoarthritis.     __________________________________     IMPRESSION:           1. Tendinosis with partial-thickness undersurface tears of the supraspinatus and infraspinatus tendons. No full-thickness rotator cuff tear.     2. Tendinosis of the subscapularis tendon.     3. Complex tear of the superior labrum.     4. Severe chondrosis of  the glenoid.     5. Mild subacromial subdeltoid bursitis.           Exam Ended: 03/17/23  9:26 AM Last Resulted: 03/17/23 10:46 AM           Interpreted in the office today with the patient          2/27/2023 2:55 PM     HISTORY/REASON FOR EXAM:  Pain/Deformity Following Trauma.        TECHNIQUE/EXAM DESCRIPTION AND NUMBER OF VIEWS:  3 views of the left shoulder.     COMPARISON: None     FINDINGS:  There is normal bony mineralization of the shoulder. There is no evidence of fracture, dislocation, or osseous lesion. The acromioclavicular joint is intact.     IMPRESSION:     1.  Normal shoulder series.           Exam Ended: 02/27/23  3:16 PM Last Resulted: 02/27/23  3:23 PM             Interpreted in the office today with the patient    Thank you [place] VLADISLAV Lora for allowing me to participate in caring for your patient.      Greater than 45 minutes was spent reviewing patient history, discussing current issue, physical examination, reviewing results and documenting the visit.            Shoulder x-ray performed on February 27, 2023 was NORMAL WITHOUT any significant signs of OA or degenerative changes

## 2023-03-21 NOTE — LETTER
[facility]  [address] [place], [place] [address], [place] [address]  Phone:  [phone] - Fax:  [phone]   [facility] Progress Report and Disability Certification  Date of Service: 3/21/2023   No Show:  No  Date / Time of Next Visit: 4/20/2023   Claim Information   Patient Name: Rhonda Gould  Claim Number:     Employer: [employer] *** Date of Injury: 2/27/2023     Insurer / TPA: Esis *** ID / SSN:     Occupation: CNA *** Diagnosis: Diagnoses of Rotator cuff strain, left, subsequent encounter and Labral tear of shoulder, left, subsequent encounter were pertinent to this visit.    Medical Information   Related to Industrial Injury? Yes ***   Subjective Complaints:  DOI: 2/27/2023        Left shoulder injury (right-handed dominant) while trying to help restrained patient back onto hospital bed.  Patient had upper extremity restraints and had kicked his lower legs off of the bed.  As Rhonda tried to put the patient back onto the bed with both arms cradling his legs he forcefully kicked against her with his legs.  There was a slight pop at the time of injury with sudden sharp pain, pain is predominantly at the superior shoulder and trapezius region.  Pain is a bit worse.  No radiation.  Improved with. Worse with abduction of the LEFT shoulder and even worse if she has any weight.  She starts PT later this week.   Objective Findings: BILATERAL shoulder exam:  Range of motion LIMITED with full abduction on the LEFT compared to the right.  Apprehension testing is POSITIVE on the LEFT compared to the right.   Pre-Existing Condition(s): Some underlying degenerative changes   Assessment:   Condition Worsened    Status: Additional Care Required  Permanent Disability:No    Plan:      Diagnostics: MRI    Comments:  MRI shoulder results  1. Tendinosis with partial-thickness undersurface tears of the supraspinatus and infraspinatus tendons. No full-thickness rotator cuff tear.  2.  Tendinosis of the subscapularis tendon.  3. Complex tear of the superior labrum.   4. Severe chondrosis of the glenoid.  5. Mild subacromial subdeltoid bursitis.    Disability Information   Status: Released to Restricted Duty    From:  3/21/2023  Through: 4/20/2023 Restrictions are: Temporary   Physical Restrictions   Sitting:    Standing:    Stooping:    Bending:      Squatting:    Walking:    Climbing:    Pushing:      Pulling:    Other:    Reaching Above Shoulder (L):   Reaching Above Shoulder (R):       Reaching Below Shoulder (L):    Reaching Below Shoulder (R):      Not to exceed Weight Limits   Carrying(hrs):   Weight Limit(lb):   Lifting(hrs):   Weight  Limit(lb):     Comments: Continue with restrictions < 25 lbs, limited L arm use and no overhead activity until re-evaluation on 4/30/23    Symptoms seem to be a bit worse, she is starting formal physical therapy later this week.  We will see how she fares with formal physical therapy.  We can consider a subacromial corticosteroid injection depending on how she responds to initial therapy.      Repetitive Actions   Hands: i.e. Fine Manipulations from Grasping:     Feet: i.e. Operating Foot Controls:     Driving / Operate Machinery:     Provider Name:   Jori Rossi M.D. Physician Signature:  Physician Name:     Clinic Name / Location: [address] [address] Clinic Phone Number: Dept: [phone]   Appointment Time: 8:00 Am Visit Start Time: 8:06 AM   Check-In Time:  8:05 Am Visit Discharge Time:  ***   Original-Treating Physician or Chiropractor    Page 2-Insurer/TPA    Page 3-Employer    Page 4-Employee

## 2023-03-23 ENCOUNTER — PHYSICAL THERAPY (OUTPATIENT)
Dept: PHYSICAL THERAPY | Facility: REHABILITATION | Age: 53
End: 2023-03-23
Attending: FAMILY MEDICINE
Payer: COMMERCIAL

## 2023-03-23 DIAGNOSIS — S46.012A ROTATOR CUFF STRAIN, LEFT, INITIAL ENCOUNTER: ICD-10-CM

## 2023-03-23 PROCEDURE — 97014 ELECTRIC STIMULATION THERAPY: CPT

## 2023-03-23 PROCEDURE — 97161 PT EVAL LOW COMPLEX 20 MIN: CPT

## 2023-03-23 PROCEDURE — 97140 MANUAL THERAPY 1/> REGIONS: CPT

## 2023-03-23 ASSESSMENT — ENCOUNTER SYMPTOMS
PAIN SCALE AT HIGHEST: 10
PAIN SCALE AT LOWEST: 3
PAIN SCALE: 6

## 2023-03-23 NOTE — OP THERAPY EVALUATION
"  Outpatient Physical Therapy  INITIAL EVALUATION    [facility] Therapy [address]  [address]  [place] [address]  Phone:  [phone]  Fax:  [phone]    Date of Evaluation: 2023    Patient: Rhonda Gould  YOB: 1970  MRN: [id number]     Referring Provider: Jori Rossi M.D.  [address] [place] [address] [place] [address]   Referring Diagnosis Rotator cuff strain, left, initial encounter [S46.012A]     Time Calculation  Start time: 801  Stop time: 900 Time Calculation (min): 59 minutes         Chief Complaint: No chief complaint on file.    Visit Diagnoses     ICD-10-CM   1. Rotator cuff strain, left, initial encounter  S46.012A       Date of onset of impairment: 2023    Subjective   History of Present Illness:     History of chief complaint:  23 Moving patient who was in restraints and as she began moving him he resisted and strained her arm.  Patient felt a \"pop\" w/ burn and sting since in her L shoulder and base of neck.  Patient reports intermittent n/t \"all\" fingers on L hand only    Prior level of function:  Cna    Pain:     Current pain ratin    At best pain rating:  3    At worst pain rating:  10    Location:  L upper trap, L shoulder and paresthesia to her L hand/fingers    Aggravating factors:  Take out garbage w/o pain  Reaching above head to pull something out of a cabinet without pain  Sleep through night   Drive to work w/o pain  Wahs and comb hair w/o pain  Sleep: up 2/night      Past Medical History:   Diagnosis Date    Chronic obstructive pulmonary disease (HCC)     Hypertension      Past Surgical History:   Procedure Laterality Date    HYSTERECTOMY LAPAROSCOPY  2005       Precautions:       Objective   Range of motion and strength:    Ext: 60 deg --erp--L shoulder   Fle: 2\" erp L shoulder/neck  R: 75 --no pain  L 55--erp L trap    Shoulder ROM:   AROM: L flexion: 70-90 erp  Abduction: 60--erp HBB:t11erp  ER:70 erp  AROM: " wnl  Resisted cuff tests:  IR:                     L: strong,painful  ER:                     L:strong painfulr  empty can:                    L:-  Full can L:  drop arm:              L: mild pain  impingement sign:  R   L: +  (+) infraspinatus lag test and end range with 90 abd            Therapeutic Treatments and Modalities:     Therapeutic Treatment and Modalities Summary: L ghj cuadal mobs at end range gd 1-3  IASMT: L trap. Infra nd deltoid  Scap retraction er/#1  Box ex #1--hep  Russiant c/s and L infra with box 5' then 5/5/ mhp   Time-based treatments/modalities:    Physical Therapy Timed Treatment Charges  Manual therapy minutes (CPT 35272): 10 minutes  Therapeutic exercise minutes (CPT 55772): 5 minutes      Assessment, Response and Plan:   Assessment details:  Patient presents with RTC strain and L GHJ sprain due to patient confrontation.--all resisted cuff testes were painful but strong.  Patient presented with significant shoulder girdle trigger point tenderness with allodynia. Patient should progress well for goals if consistent with HEP/POC     Prognosis: good    Goals:   Short Term Goals:   Take out garbage w/o pain    Sleep through night   Drive to work w/o pain    DASH: ,30%  Short term goal time span:  2-4 weeks      Long Term Goals:    Take out garbage w/o pain  Reaching above head to pull something out of a cabinet without pain  Wash and comb hair w/o pain  DASH< 10%  Long term goal time span:  6-8 weeks    Plan:   Planned therapy interventions:  E Stim Unattended (CPT 31740), Manual Therapy (CPT 85289) and Therapeutic Exercise (CPT 47018)  Other planned therapy interventions:  Dry needle  Duration in weeks:  6  Duration in visits:  12  Plan details:  Scap stab, IASTM, cupping, joint mobs, e-stim DN    Functional Assessment Used        Referring provider co-signature:  I have reviewed this plan of care and my co-signature certifies the need for services.    Certification Period: 03/23/2023 to   05/25/23    Physician Signature: ________________________________ Date: ______________

## 2023-03-26 ENCOUNTER — HOSPITAL ENCOUNTER (EMERGENCY)
Facility: MEDICAL CENTER | Age: 53
End: 2023-03-26
Attending: EMERGENCY MEDICINE
Payer: COMMERCIAL

## 2023-03-26 ENCOUNTER — PHARMACY VISIT (OUTPATIENT)
Dept: PHARMACY | Facility: MEDICAL CENTER | Age: 53
End: 2023-03-26
Payer: COMMERCIAL

## 2023-03-26 VITALS
WEIGHT: 143.08 LBS | SYSTOLIC BLOOD PRESSURE: 126 MMHG | DIASTOLIC BLOOD PRESSURE: 90 MMHG | BODY MASS INDEX: 21.19 KG/M2 | OXYGEN SATURATION: 98 % | HEART RATE: 70 BPM | TEMPERATURE: 97 F | RESPIRATION RATE: 14 BRPM | HEIGHT: 69 IN

## 2023-03-26 DIAGNOSIS — L50.9 URTICARIA: ICD-10-CM

## 2023-03-26 PROCEDURE — 700102 HCHG RX REV CODE 250 W/ 637 OVERRIDE(OP): Performed by: EMERGENCY MEDICINE

## 2023-03-26 PROCEDURE — A9270 NON-COVERED ITEM OR SERVICE: HCPCS | Performed by: EMERGENCY MEDICINE

## 2023-03-26 PROCEDURE — 96375 TX/PRO/DX INJ NEW DRUG ADDON: CPT

## 2023-03-26 PROCEDURE — 96374 THER/PROPH/DIAG INJ IV PUSH: CPT

## 2023-03-26 PROCEDURE — RXMED WILLOW AMBULATORY MEDICATION CHARGE: Performed by: EMERGENCY MEDICINE

## 2023-03-26 PROCEDURE — RXOTC WILLOW AMBULATORY OTC CHARGE: Performed by: PHARMACIST

## 2023-03-26 PROCEDURE — 700111 HCHG RX REV CODE 636 W/ 250 OVERRIDE (IP): Performed by: EMERGENCY MEDICINE

## 2023-03-26 PROCEDURE — 99284 EMERGENCY DEPT VISIT MOD MDM: CPT

## 2023-03-26 RX ORDER — CETIRIZINE HYDROCHLORIDE 10 MG/1
10 TABLET ORAL ONCE
Status: COMPLETED | OUTPATIENT
Start: 2023-03-26 | End: 2023-03-26

## 2023-03-26 RX ORDER — FAMOTIDINE 20 MG/1
20 TABLET, FILM COATED ORAL 2 TIMES DAILY
Qty: 10 TABLET | Refills: 0 | Status: SHIPPED | OUTPATIENT
Start: 2023-03-27 | End: 2023-04-01

## 2023-03-26 RX ORDER — DIPHENHYDRAMINE HYDROCHLORIDE 50 MG/ML
50 INJECTION INTRAMUSCULAR; INTRAVENOUS ONCE
Status: COMPLETED | OUTPATIENT
Start: 2023-03-26 | End: 2023-03-26

## 2023-03-26 RX ORDER — PREDNISONE 20 MG/1
40 TABLET ORAL DAILY
Qty: 8 TABLET | Refills: 0 | Status: SHIPPED | OUTPATIENT
Start: 2023-03-27 | End: 2023-03-31

## 2023-03-26 RX ORDER — METHYLPREDNISOLONE SODIUM SUCCINATE 125 MG/2ML
125 INJECTION, POWDER, LYOPHILIZED, FOR SOLUTION INTRAMUSCULAR; INTRAVENOUS ONCE
Status: COMPLETED | OUTPATIENT
Start: 2023-03-26 | End: 2023-03-26

## 2023-03-26 RX ADMIN — FAMOTIDINE 20 MG: 10 INJECTION, SOLUTION INTRAVENOUS at 13:40

## 2023-03-26 RX ADMIN — DIPHENHYDRAMINE HYDROCHLORIDE 50 MG: 50 INJECTION, SOLUTION INTRAMUSCULAR; INTRAVENOUS at 13:41

## 2023-03-26 RX ADMIN — METHYLPREDNISOLONE SODIUM SUCCINATE 125 MG: 125 INJECTION, POWDER, FOR SOLUTION INTRAMUSCULAR; INTRAVENOUS at 13:40

## 2023-03-26 RX ADMIN — CETIRIZINE HYDROCHLORIDE 10 MG: 10 TABLET, FILM COATED ORAL at 14:17

## 2023-03-26 ASSESSMENT — FIBROSIS 4 INDEX: FIB4 SCORE: 1.093926825832975133

## 2023-03-26 NOTE — ED TRIAGE NOTES
Rhonda Gould  52 y.o.  female  Chief Complaint   Patient presents with    Rash     Itchiness all over legs & arms. Nor armin noted, but + erythema & hives from itching. States this has happened before, unknown cause. No airway swelling/GI sx. Uses only unscented detergents. Took benadryl this AM at 0830.     Pt very upset and tearful in triage, encouraged to stop itching if possible. Patient educated on triage process, to alert staff if any changes in condition.

## 2023-03-26 NOTE — DISCHARGE INSTRUCTIONS
Use the medications that we have prescribed, starting tomorrow.  You can also take 1 or 2 Benadryl tablets as needed for itching every 6 hours.  Schedule follow-up with your primary care doctor to consider going to see an allergist.

## 2023-03-26 NOTE — ED NOTES
Discharge instructions given to pt including follow up with pcp or returning if no improvement of symptoms or to return if worse. Prescription x 2 provided to pt. Questions answered by RN. Denies any new complaints. Discharged w/stable vitals and able to ambulate  to the lobby w/steady gait.

## 2023-03-26 NOTE — ED PROVIDER NOTES
ED Provider Note    CHIEF COMPLAINT  Chief Complaint   Patient presents with    Rash     Itchiness all over legs & arms. Nor armin noted, but + erythema & hives from itching. States this has happened before, unknown cause. No airway swelling/GI sx. Uses only unscented detergents. Took benadryl this AM at 0830.       EXTERNAL RECORDS REVIEWED  Outpatient records show patient has a primary care provider, and is also followed by sports medicine for a rotator cuff tear.  No obvious history of significant allergies.    HPI/ROS  LIMITATION TO HISTORY   Select: Behavior -obvious discomfort limits history somewhat.    OUTSIDE HISTORIAN(S):      Rhonda Gould is a 52 y.o. female who presents for diffuse itchiness on her arms and legs, worse on the legs.  This has been going on for a few days.  She has a history of food and environmental allergies, but is not aware of any specific exposures that might of caused this.  She works in the hospital as a CNA.  She has not had any shortness of breath or difficulty breathing or mucosal involvement.  She says that this type of itching happens once or twice a year, but usually not this bad.  She took small dose of Benadryl 4 hours ago without significant relief.  She has not taken any other medications.  She is not a history of anaphylaxis.  She has not had any recent fevers or chills, chest pain or cough or shortness of breath or nausea or vomiting.  She denies any use of new bath or body products or cosmetics, detergents, exposures to new environments or animals.    PAST MEDICAL HISTORY   has a past medical history of Chronic obstructive pulmonary disease (HCC) and Hypertension.    SURGICAL HISTORY   has a past surgical history that includes hysterectomy laparoscopy (6/2005).    FAMILY HISTORY  Family History   Problem Relation Age of Onset    Diabetes Mother     Hypertension Mother     Hypertension Father     Diabetes Father     Heart Disease Maternal Grandmother     Cancer  "Maternal Grandfather        SOCIAL HISTORY  Social History     Tobacco Use    Smoking status: Every Day    Smokeless tobacco: Never   Vaping Use    Vaping Use: Never used   Substance and Sexual Activity    Alcohol use: Not Currently     Comment: Quit 6/15    Drug use: Not Currently     Frequency: 2.0 times per week     Types: Inhaled, Marijuana     Comment: marijuana    Sexual activity: Not Currently       CURRENT MEDICATIONS  Home Medications       Reviewed by Kina Andrea R.N. (Registered Nurse) on 03/26/23 at 1257  Med List Status: Not Addressed     Medication Last Dose Status   albuterol 108 (90 Base) MCG/ACT Aero Soln inhalation aerosol  Active   diphenhydrAMINE (BENADRYL) 25 MG Tab  Active   methocarbamol (ROBAXIN) 750 MG Tab  Active   methylPREDNISolone (MEDROL DOSEPAK) 4 MG Tablet Therapy Pack  Active   mirtazapine (REMERON) 7.5 MG tablet  Active   naltrexone (DEPADE) 50 MG Tab  Active   naproxen (NAPROSYN) 500 MG Tab  Active   nicotine (NICODERM) 14 MG/24HR PATCH 24 HR  Active   sertraline (ZOLOFT) 100 MG Tab  Active   tizanidine (ZANAFLEX) 4 MG Tab  Active   traZODone (DESYREL) 100 MG Tab  Active                    ALLERGIES  Allergies   Allergen Reactions    Fish Allergy Anaphylaxis and Shortness of Breath     Swelling to throat.    Shellfish Allergy Anaphylaxis     Strict No Shellfish (avoids most fish, cod OK)      Codeine Hives and Itching     Tolerated morphine previously  Allergy updated from 2015, morphine given in 2016       PHYSICAL EXAM  VITAL SIGNS: BP (!) 128/95   Pulse 71   Temp 36.2 °C (97.1 °F) (Temporal)   Resp 16   Ht 1.753 m (5' 9\")   Wt 64.9 kg (143 lb 1.3 oz)   LMP 06/11/2005   SpO2 96%   BMI 21.13 kg/m²   Pulse ox interpretation: I interpret this pulse ox as normal.  Constitutional: Alert in no apparent distress.  HENT: No signs of trauma, Bilateral external ears normal, Nose normal.   Eyes: Pupils are equal and reactive, Conjunctiva normal, Non-icteric.   Neck: Normal " range of motion, Supple, No stridor.    Cardiovascular: Normal peripheral perfusion  Thorax & Lungs: Unlabored respirations, equal chest expansion, no accessory muscle use  Abdomen: Non-distended  Skin: Erythematous patches on bilateral anterior thighs consistent with urticaria.  Back: Normal alignment and ROM  Extremities: No gross deformity  Musculoskeletal: Good range of motion in all major joints.   Neurologic: Alert, Normal motor function, No focal deficits noted.   Psychiatric: Affect normal, Judgment normal, Mood normal.            COURSE & MEDICAL DECISION MAKING    ED Observation Status? Yes; I am placing the patient in to an observation status due to a diagnostic uncertainty as well as therapeutic intensity. Patient placed in observation status at 1:29 PM, 3/26/2023.     Observation plan is as follows: Symptomatic treatment and reassessment for improvement.  Patient is extremely uncomfortable.    Upon Reevaluation, the patient's condition has: Improved; and will be discharged.    Patient discharged from ED Observation status at 2:20 PM   (Time) 03/26/23 (Date).     INITIAL ASSESSMENT, COURSE AND PLAN  Care Narrative: 1:29 PM this patient is extremely uncomfortable due to urticaria.  She has a history of allergies, though no specific obvious exposures for this episode.  There is no evidence of anaphylaxis, with only the skin being involved, but she is extremely uncomfortable.  She will be treated with IV Benadryl, Solu-Medrol, Pepcid, and oral Zyrtec and reassessed.    2:20 PM patient now significantly improved.  She will be supported with a steroid burst over the next few days, and is encouraged to follow-up with her primary care provider to consider further testing.      ADDITIONAL PROBLEM LIST  History of allergies, history of similar episodes, inability to identify the trigger.    DISPOSITION AND DISCUSSIONS    Escalation of care considered, and ultimately not performed:blood analysis    Decision tools  and prescription drugs considered including, but not limited to: Medication modification was performed at discharge to provide appropriate supportive care. .    FINAL DIAGNOSIS  1. Urticaria           Electronically signed by: Venancio Liu M.D., 3/26/2023 1:22 PM

## 2023-03-29 ENCOUNTER — APPOINTMENT (OUTPATIENT)
Dept: PHYSICAL THERAPY | Facility: REHABILITATION | Age: 53
End: 2023-03-29
Payer: COMMERCIAL

## 2023-04-05 ENCOUNTER — APPOINTMENT (OUTPATIENT)
Dept: PHYSICAL THERAPY | Facility: REHABILITATION | Age: 53
End: 2023-04-05
Payer: COMMERCIAL

## 2023-04-07 ENCOUNTER — PHARMACY VISIT (OUTPATIENT)
Dept: PHARMACY | Facility: MEDICAL CENTER | Age: 53
End: 2023-04-07
Payer: COMMERCIAL

## 2023-04-07 PROCEDURE — RXMED WILLOW AMBULATORY MEDICATION CHARGE: Performed by: FAMILY MEDICINE

## 2023-04-12 ENCOUNTER — APPOINTMENT (OUTPATIENT)
Dept: PHYSICAL THERAPY | Facility: REHABILITATION | Age: 53
End: 2023-04-12
Payer: COMMERCIAL

## 2023-04-19 ENCOUNTER — APPOINTMENT (OUTPATIENT)
Dept: PHYSICAL THERAPY | Facility: REHABILITATION | Age: 53
End: 2023-04-19
Payer: COMMERCIAL

## 2023-04-20 ENCOUNTER — OCCUPATIONAL MEDICINE (OUTPATIENT)
Dept: SPORTS MEDICINE | Facility: CLINIC | Age: 53
End: 2023-04-20
Payer: COMMERCIAL

## 2023-04-20 VITALS
RESPIRATION RATE: 16 BRPM | SYSTOLIC BLOOD PRESSURE: 116 MMHG | HEART RATE: 105 BPM | HEIGHT: 69 IN | WEIGHT: 143.08 LBS | DIASTOLIC BLOOD PRESSURE: 76 MMHG | OXYGEN SATURATION: 97 % | TEMPERATURE: 98.1 F | BODY MASS INDEX: 21.19 KG/M2

## 2023-04-20 DIAGNOSIS — S46.012D ROTATOR CUFF STRAIN, LEFT, SUBSEQUENT ENCOUNTER: ICD-10-CM

## 2023-04-20 DIAGNOSIS — S43.432D LABRAL TEAR OF SHOULDER, LEFT, SUBSEQUENT ENCOUNTER: ICD-10-CM

## 2023-04-20 PROCEDURE — 99213 OFFICE O/P EST LOW 20 MIN: CPT | Performed by: FAMILY MEDICINE

## 2023-04-20 ASSESSMENT — FIBROSIS 4 INDEX: FIB4 SCORE: 1.093926825832975133

## 2023-04-20 NOTE — PROGRESS NOTES
"Chief Complaint   Patient presents with    Work-Related Injury     WC F/V      Subjective     DOI: 2/27/2023        Left shoulder injury (right-handed dominant) while trying to help restrained patient back onto hospital bed.  Patient had upper extremity restraints and had kicked his lower legs off of the bed.  As Rhonda tried to put the patient back onto the bed with both arms cradling his legs he forcefully kicked against her with his legs.  There was a slight pop at the time of injury with sudden sharp pain, pain is predominantly at the superior shoulder and trapezius region.  Pain is a BETTER, about 50% better overall. Still worse with abduction of the LEFT shoulder and any over head activity. Started PT and is doing well, therapist seem optimistic as well.    Objective   /76 (BP Location: Left arm, Patient Position: Sitting, BP Cuff Size: Adult)   Pulse (!) 105   Temp 36.7 °C (98.1 °F) (Temporal)   Resp 16   Ht 1.753 m (5' 9\")   Wt 64.9 kg (143 lb 1.3 oz)   LMP 06/11/2005   SpO2 97%   BMI 21.13 kg/m²     BILATERAL shoulder exam:  Range of motion near normal with full abduction on the LEFT compared to the right.  Apprehension testing is POSITIVE on the LEFT compared to the right.  Generally, rotator cuff strengthening with can, internal rotation, external rotation and liftoff are all 5/5, although these maneuvers do produce some pain in the LEFT shoulder compared to the right.  Particularly with empty can testing.    1. Rotator cuff strain, left, subsequent encounter        2. Labral tear of shoulder, left, subsequent encounter          DOI: 2/27/2023        Left shoulder injury (right-handed dominant) while trying to help restrained patient back onto hospital bed.  Patient had upper extremity restraints and had kicked his lower legs off of the bed.     Continue with restrictions < 25 lbs, limited L arm use and no overhead activity until re-evaluation in 1 month     Fortunately, symptoms are " improving, she is doing well with formal physical therapy.  We will see how she fares with continued formal physical therapy.  We can consider a subacromial corticosteroid injection depending on how she responds to initial therapy or if she struggles during her recovery.          3/17/2023 8:57 AM     HISTORY/REASON FOR EXAM:  Left shoulder pain        TECHNIQUE/EXAM DESCRIPTION:  MRI of the LEFT shoulder without contrast.     The study was performed on a Unyqe Signa 1.5 Nalini MRI scanner. T1 sagittal, fast spin-echo T2 fat-suppressed oblique coronal, sagittal, and axial and intermediate fast spin-echo oblique coronal images were obtained.     COMPARISON:  None.     FINDINGS:     ROTATOR CUFF:  Tendinosis with partial-thickness undersurface tears of the supraspinatus and infraspinatus tendons. Tendinosis of the subscapularis tendon.  No significant rotator cuff muscle atrophy or fatty infiltration.     GLENOHUMERAL JOINT:  - Labrum: Complex tear of the superior labrum.  - Cartilage: High-grade cartilage fissure with subchondral edema in the glenoid  - Bone: Mild marginal spurring. No increased sclerosis of the glenoid fossa. No Hill-Sachs or osseous Bankart, or other fracture or osseous contusion.     LONG HEAD OF BICEPS TENDON:  Intact intra-and extra-articular portions.     SUBACROMIAL-SUBDELTOID BURSA:  Small effusion.     ACROMIOCLAVICULAR JOINT:     Moderate osteoarthritis.     __________________________________     IMPRESSION:           1. Tendinosis with partial-thickness undersurface tears of the supraspinatus and infraspinatus tendons. No full-thickness rotator cuff tear.     2. Tendinosis of the subscapularis tendon.     3. Complex tear of the superior labrum.     4. Severe chondrosis of the glenoid.     5. Mild subacromial subdeltoid bursitis.           Exam Ended: 03/17/23  9:26 AM Last Resulted: 03/17/23 10:46 AM

## 2023-04-20 NOTE — LETTER
[facility]  [address] [place] [address]  Phone:  [phone] - Fax:  [phone]   [facility] Progress Report and Disability Certification  Date of Service: 4/20/2023   No Show:  No  Date / Time of Next Visit: 5/18/2023   Claim Information   Patient Name: Rhonda Gould  Claim Number:     Employer: [employer]  Date of Injury: 2/27/2023     Insurer / TPA: [employer]  ID / SSN:     Occupation: CNA  Diagnosis: Diagnoses of Rotator cuff strain, left, subsequent encounter and Labral tear of shoulder, left, subsequent encounter were pertinent to this visit.    Medical Information   Related to Industrial Injury? Yes    Subjective Complaints:  DOI: 2/27/2023        Left shoulder injury (right-handed dominant) while trying to help restrained patient back onto hospital bed.  Patient had upper extremity restraints and had kicked his lower legs off of the bed.  As Rhonda tried to put the patient back onto the bed with both arms cradling his legs he forcefully kicked against her with his legs.  There was a slight pop at the time of injury with sudden sharp pain, pain is predominantly at the superior shoulder and trapezius region.  Pain is a BETTER, about 50% better overall. Still worse with abduction of the LEFT shoulder and any over head activity. Started PT and is doing well, therapist seem optimistic as well.   Objective Findings: BILATERAL shoulder exam:  Range of motion near normal with full abduction on the LEFT compared to the right.  Apprehension testing is POSITIVE on the LEFT compared to the right.  Generally, rotator cuff strengthening with can, internal rotation, external rotation and liftoff are all 5/5, although these maneuvers do produce some pain in the LEFT shoulder compared to the right.  Particularly with empty can testing.   Pre-Existing Condition(s):     Assessment:   Condition Improved    Status: Additional Care Required  Permanent Disability:No     Plan:      Diagnostics: MRI    Comments:  3/17/23  MRI shoulder results  1. Tendinosis with partial-thickness undersurface tears of the supraspinatus and infraspinatus tendons. No full-thickness rotator cuff tear.  2. Tendinosis of the subscapularis tendon.  3. Complex tear of the superior labrum.   4. Severe chondrosis of the glenoid.  5. Mild subacromial subdeltoid bursitis.     Disability Information   Status: Released to Restricted Duty    From:  4/20/2023  Through: 5/18/2023 Restrictions are: Temporary   Physical Restrictions   Sitting:    Standing:    Stooping:    Bending:      Squatting:    Walking:    Climbing:    Pushing:      Pulling:    Other:    Reaching Above Shoulder (L):   Reaching Above Shoulder (R):       Reaching Below Shoulder (L):    Reaching Below Shoulder (R):      Not to exceed Weight Limits   Carrying(hrs):   Weight Limit(lb):   Lifting(hrs):   Weight  Limit(lb): < or = to 25 pounds   Comments: Continue with restrictions < 25 lbs, limited L arm use and no overhead activity until re-evaluation in 1 month     Fortunately, symptoms are improving, she is doing well with formal physical therapy.  We will see how she fares with continued formal physical therapy.  We can consider a subacromial corticosteroid injection depending on how she responds to initial therapy or if she struggles during her recovery.     Repetitive Actions   Hands: i.e. Fine Manipulations from Grasping:     Feet: i.e. Operating Foot Controls:     Driving / Operate Machinery:     Provider Name:   Jori Rossi M.D. Physician Signature:  Physician Name:     Clinic Name / Location: [address] [address] Clinic Phone Number: Dept: [phone]   Appointment Time: 10:00 Am Visit Start Time: 10:11 AM   Check-In Time:  10:11 Am Visit Discharge Time: 10:36 Am    Original-Treating Physician or Chiropractor    Page 2-Insurer/TPA    Page 3-Employer    Page 4-Employee

## 2023-04-21 ENCOUNTER — APPOINTMENT (OUTPATIENT)
Dept: PHYSICAL THERAPY | Facility: REHABILITATION | Age: 53
End: 2023-04-21
Payer: COMMERCIAL

## 2023-04-25 ENCOUNTER — EH NON-PROVIDER (OUTPATIENT)
Dept: OCCUPATIONAL MEDICINE | Facility: CLINIC | Age: 53
End: 2023-04-25

## 2023-04-25 DIAGNOSIS — Z02.89 ENCOUNTER FOR OCCUPATIONAL HEALTH EXAMINATION INVOLVING RESPIRATOR: ICD-10-CM

## 2023-04-25 PROCEDURE — 94375 RESPIRATORY FLOW VOLUME LOOP: CPT | Performed by: PREVENTIVE MEDICINE

## 2023-04-26 ENCOUNTER — APPOINTMENT (OUTPATIENT)
Dept: PHYSICAL THERAPY | Facility: REHABILITATION | Age: 53
End: 2023-04-26
Payer: COMMERCIAL

## 2023-05-04 ENCOUNTER — APPOINTMENT (OUTPATIENT)
Dept: PHYSICAL THERAPY | Facility: REHABILITATION | Age: 53
End: 2023-05-04
Attending: FAMILY MEDICINE
Payer: COMMERCIAL

## 2023-05-04 DIAGNOSIS — S43.432D LABRAL TEAR OF SHOULDER, LEFT, SUBSEQUENT ENCOUNTER: ICD-10-CM

## 2023-05-04 DIAGNOSIS — S46.012D ROTATOR CUFF STRAIN, LEFT, SUBSEQUENT ENCOUNTER: ICD-10-CM

## 2023-05-04 PROCEDURE — RXMED WILLOW AMBULATORY MEDICATION CHARGE: Performed by: FAMILY MEDICINE

## 2023-05-04 RX ORDER — TIZANIDINE 4 MG/1
4 TABLET ORAL NIGHTLY PRN
Qty: 14 TABLET | Refills: 0 | Status: CANCELLED | OUTPATIENT
Start: 2023-05-04

## 2023-05-04 RX ORDER — TIZANIDINE 4 MG/1
4 TABLET ORAL NIGHTLY PRN
Qty: 14 TABLET | Refills: 0 | Status: SHIPPED | OUTPATIENT
Start: 2023-05-04 | End: 2023-05-23 | Stop reason: SDUPTHER

## 2023-05-05 ENCOUNTER — PHARMACY VISIT (OUTPATIENT)
Dept: PHARMACY | Facility: MEDICAL CENTER | Age: 53
End: 2023-05-05
Payer: COMMERCIAL

## 2023-05-09 ENCOUNTER — OCCUPATIONAL MEDICINE (OUTPATIENT)
Dept: SPORTS MEDICINE | Facility: CLINIC | Age: 53
End: 2023-05-09
Payer: COMMERCIAL

## 2023-05-09 VITALS
BODY MASS INDEX: 21.19 KG/M2 | RESPIRATION RATE: 16 BRPM | HEIGHT: 69 IN | OXYGEN SATURATION: 97 % | DIASTOLIC BLOOD PRESSURE: 76 MMHG | SYSTOLIC BLOOD PRESSURE: 118 MMHG | TEMPERATURE: 98.2 F | HEART RATE: 78 BPM | WEIGHT: 143.08 LBS

## 2023-05-09 DIAGNOSIS — S46.012D ROTATOR CUFF STRAIN, LEFT, SUBSEQUENT ENCOUNTER: ICD-10-CM

## 2023-05-09 DIAGNOSIS — S43.432D LABRAL TEAR OF SHOULDER, LEFT, SUBSEQUENT ENCOUNTER: ICD-10-CM

## 2023-05-09 PROCEDURE — 99213 OFFICE O/P EST LOW 20 MIN: CPT | Mod: 25 | Performed by: FAMILY MEDICINE

## 2023-05-09 PROCEDURE — 20610 DRAIN/INJ JOINT/BURSA W/O US: CPT | Mod: LT | Performed by: FAMILY MEDICINE

## 2023-05-09 ASSESSMENT — FIBROSIS 4 INDEX: FIB4 SCORE: 1.093926825832975133

## 2023-05-09 NOTE — LETTER
[facility]  [address] [place], [place] [address], [place] [address]  Phone:  [phone] - Fax:  [phone]   [facility] Progress Report and Disability Certification  Date of Service: 5/9/2023   No Show:  No  Date / Time of Next Visit: 6/6/2023 9:00AM   Claim Information   Patient Name: Rhonda Gould  Claim Number:     Employer: [employer]  Date of Injury: 2/27/2023     Insurer / TPA: [employer]  ID / SSN:     Occupation: CNA  Diagnosis: Diagnoses of Rotator cuff strain, left, subsequent encounter and Labral tear of shoulder, left, subsequent encounter were pertinent to this visit.    Medical Information   Related to Industrial Injury? No    Subjective Complaints:  DOI: 2/27/2023        Left shoulder injury (right-handed dominant) while trying to help restrained patient back onto hospital bed.  Patient had upper extremity restraints and had kicked his lower legs off of the bed.  As Rhonda tried to put the patient back onto the bed with both arms cradling his legs he forcefully kicked against her with his legs.  There was a slight pop at the time of injury with sudden sharp pain, pain is predominantly at the superior shoulder and trapezius region.  Pain is stagnant, still only about 50% better overall. Still worse with abduction of the LEFT shoulder and any over head activity. Started PT and is not tolerating PT exercises as much.   Objective Findings: BILATERAL shoulder exam:  Range of motion near normal with full abduction on the LEFT compared to the right.  Apprehension testing is POSITIVE on the LEFT compared to the right.  Generally, rotator cuff strengthening with can, internal rotation, external rotation and liftoff are all 5/5, although these maneuvers do produce some pain in the LEFT shoulder compared to the right.    Pre-Existing Condition(s):     Assessment:   Condition Worsened    Status: Additional Care Required  Permanent Disability:No    Plan:      Diagnostics: MRI     Comments:   3/17/23  MRI shoulder results  1. Tendinosis with partial-thickness undersurface tears of the supraspinatus and infraspinatus tendons. No full-thickness rotator cuff tear.  2. Tendinosis of the subscapularis tendon.  3. Complex tear of the superior labrum.   4. Severe chondrosis of the glenoid.  5. Mild subacromial subdeltoid bursitis.      Disability Information   Status: Released to Restricted Duty    From:  5/9/2023  Through: 6/6/2023 Restrictions are: Temporary   Physical Restrictions   Sitting:    Standing:    Stooping:    Bending:      Squatting:    Walking:    Climbing:    Pushing:      Pulling:    Other:    Reaching Above Shoulder (L):   Reaching Above Shoulder (R):       Reaching Below Shoulder (L):    Reaching Below Shoulder (R):      Not to exceed Weight Limits   Carrying(hrs):   Weight Limit(lb): < or = to 25 pounds Lifting(hrs):   Weight  Limit(lb): < or = to 25 pounds   Comments: LEFT shoulder subacromial corticosteroid injection provided at today's visit (May 9, 2023)    Continue with restrictions < 25 lbs, limited L arm use and no overhead activity until re-evaluation in 1 month     Unfortunately, she is NOT tolerating formal physical therapy.  LEFT subacromial corticosteroid injection performed since she is struggling with formal physical therapy.      Continue therapy and follow-up in 1 month to see how she is doing after LEFT subacromial corticosteroid injection    Repetitive Actions   Hands: i.e. Fine Manipulations from Grasping:     Feet: i.e. Operating Foot Controls:     Driving / Operate Machinery:     Provider Name:   Jori Rossi M.D. Physician Signature:  Physician Name:     Clinic Name / Location: [address] [address] Clinic Phone Number: Dept: [phone]   Appointment Time: 10:45 Am Visit Start Time: 10:43 AM   Check-In Time:  10:29 Am Visit Discharge Time:     Original-Treating Physician or Chiropractor    Page  2-Insurer/TPA    Page 3-Employer    Page 4-Employee

## 2023-05-09 NOTE — PROGRESS NOTES
"Chief Complaint   Patient presents with    Work-Related Injury      F/V      Subjective      DOI: 2/27/2023        Left shoulder injury (right-handed dominant) while trying to help restrained patient back onto hospital bed.  Patient had upper extremity restraints and had kicked his lower legs off of the bed.  As Rhonda tried to put the patient back onto the bed with both arms cradling his legs he forcefully kicked against her with his legs.  There was a slight pop at the time of injury with sudden sharp pain, pain is predominantly at the superior shoulder and trapezius region.  Pain is stagnant, still only about 50% better overall. Still worse with abduction of the LEFT shoulder and any over head activity. Started PT and is not tolerating PT exercises as much.    Objective   /76 (BP Location: Right arm, Patient Position: Sitting, BP Cuff Size: Adult)   Pulse 78   Temp 36.8 °C (98.2 °F) (Temporal)   Resp 16   Ht 1.753 m (5' 9\")   Wt 64.9 kg (143 lb 1.3 oz)   LMP 06/11/2005   SpO2 97%   BMI 21.13 kg/m²     BILATERAL shoulder exam:  Range of motion near normal with full abduction on the LEFT compared to the right.  Apprehension testing is POSITIVE on the LEFT compared to the right.  Generally, rotator cuff strengthening with can, internal rotation, external rotation and liftoff are all 5/5, although these maneuvers do produce some pain in the LEFT shoulder compared to the right.     1. Rotator cuff strain, left, subsequent encounter        2. Labral tear of shoulder, left, subsequent encounter          LEFT shoulder subacromial corticosteroid injection provided at today's visit (May 9, 2023)     Continue with restrictions < 25 lbs, limited L arm use and no overhead activity until re-evaluation in 1 month     Unfortunately, she is NOT tolerating formal physical therapy.  LEFT subacromial corticosteroid injection performed since she is struggling with formal physical therapy.       Continue therapy and " follow-up in 1 month to see how she is doing after LEFT subacromial corticosteroid injection         3/17/2023 8:57 AM     HISTORY/REASON FOR EXAM:  Left shoulder pain        TECHNIQUE/EXAM DESCRIPTION:  MRI of the LEFT shoulder without contrast.     The study was performed on a [employer] Signa 1.5 Nalini MRI scanner. T1 sagittal, fast spin-echo T2 fat-suppressed oblique coronal, sagittal, and axial and intermediate fast spin-echo oblique coronal images were obtained.     COMPARISON:  None.     FINDINGS:     ROTATOR CUFF:  Tendinosis with partial-thickness undersurface tears of the supraspinatus and infraspinatus tendons. Tendinosis of the subscapularis tendon.  No significant rotator cuff muscle atrophy or fatty infiltration.     GLENOHUMERAL JOINT:  - Labrum: Complex tear of the superior labrum.  - Cartilage: High-grade cartilage fissure with subchondral edema in the glenoid  - Bone: Mild marginal spurring. No increased sclerosis of the glenoid fossa. No Hill-Sachs or osseous Bankart, or other fracture or osseous contusion.     LONG HEAD OF BICEPS TENDON:  Intact intra-and extra-articular portions.     SUBACROMIAL-SUBDELTOID BURSA:  Small effusion.     ACROMIOCLAVICULAR JOINT:     Moderate osteoarthritis.     __________________________________     IMPRESSION:           1. Tendinosis with partial-thickness undersurface tears of the supraspinatus and infraspinatus tendons. No full-thickness rotator cuff tear.     2. Tendinosis of the subscapularis tendon.     3. Complex tear of the superior labrum.     4. Severe chondrosis of the glenoid.     5. Mild subacromial subdeltoid bursitis.           Exam Ended: 03/17/23  9:26 AM Last Resulted: 03/17/23 10:46 AM

## 2023-05-09 NOTE — PROCEDURES
PROCEDURE NOTE:  left Shoulder subacromial injection  Risks and benefits discussed  Informed consent obtained  Shoulder prepped in sterile fashion utilizing a posterior approach  40 mg of Kenalog and 5 cc of 2% lidocaine injected into the subacromial space  Vapocoolant spray was utilized  Patient tolerated the procedure well  Postprocedure care and red flags discussed

## 2023-05-11 ENCOUNTER — APPOINTMENT (OUTPATIENT)
Dept: PHYSICAL THERAPY | Facility: REHABILITATION | Age: 53
End: 2023-05-11
Payer: COMMERCIAL

## 2023-05-19 ENCOUNTER — APPOINTMENT (OUTPATIENT)
Dept: PHYSICAL THERAPY | Facility: REHABILITATION | Age: 53
End: 2023-05-19
Payer: COMMERCIAL

## 2023-05-19 DIAGNOSIS — S46.012D ROTATOR CUFF STRAIN, LEFT, SUBSEQUENT ENCOUNTER: ICD-10-CM

## 2023-05-19 DIAGNOSIS — S43.432D LABRAL TEAR OF SHOULDER, LEFT, SUBSEQUENT ENCOUNTER: ICD-10-CM

## 2023-05-19 DIAGNOSIS — S49.92XD SHOULDER INJURY, LEFT, SUBSEQUENT ENCOUNTER: ICD-10-CM

## 2023-05-20 NOTE — PROGRESS NOTES
Received a message from patient that she was getting worse  Spoke with patient by phone  She is getting WORSE  Advised that we should proceed with orthopedic consultation for consideration of surgical management    She was in agreement and grateful for the call    1. Rotator cuff strain, left, subsequent encounter  Referral to Orthopedics      2. Labral tear of shoulder, left, subsequent encounter  Referral to Orthopedics      3. Shoulder injury, left, subsequent encounter  Referral to Orthopedics        Work injury sustained February 27, 2023 (nearly 3 months ago)  Patient has FAILED conservative management including corticosteroid injection  Symptoms are getting WORSE    Recommend orthopedic consultation for consideration of shoulder arthroscopy labral repair and rotator cuff repair

## 2023-05-23 ENCOUNTER — APPOINTMENT (OUTPATIENT)
Dept: RADIOLOGY | Facility: MEDICAL CENTER | Age: 53
End: 2023-05-23
Attending: FAMILY MEDICINE
Payer: COMMERCIAL

## 2023-05-23 ENCOUNTER — PHARMACY VISIT (OUTPATIENT)
Dept: PHARMACY | Facility: MEDICAL CENTER | Age: 53
End: 2023-05-23
Payer: COMMERCIAL

## 2023-05-23 ENCOUNTER — APPOINTMENT (OUTPATIENT)
Dept: PHYSICAL THERAPY | Facility: REHABILITATION | Age: 53
End: 2023-05-23
Payer: COMMERCIAL

## 2023-05-23 DIAGNOSIS — S46.012D ROTATOR CUFF STRAIN, LEFT, SUBSEQUENT ENCOUNTER: ICD-10-CM

## 2023-05-23 DIAGNOSIS — S43.432D LABRAL TEAR OF SHOULDER, LEFT, SUBSEQUENT ENCOUNTER: ICD-10-CM

## 2023-05-23 PROCEDURE — RXMED WILLOW AMBULATORY MEDICATION CHARGE: Performed by: FAMILY MEDICINE

## 2023-05-23 RX ORDER — TIZANIDINE 4 MG/1
4 TABLET ORAL NIGHTLY PRN
Qty: 14 TABLET | Refills: 0 | Status: SHIPPED | OUTPATIENT
Start: 2023-05-23

## 2023-05-25 ENCOUNTER — APPOINTMENT (OUTPATIENT)
Dept: PHYSICAL THERAPY | Facility: REHABILITATION | Age: 53
End: 2023-05-25
Payer: COMMERCIAL

## 2023-05-30 ENCOUNTER — OCCUPATIONAL MEDICINE (OUTPATIENT)
Dept: URGENT CARE | Facility: CLINIC | Age: 53
End: 2023-05-30
Payer: COMMERCIAL

## 2023-05-30 VITALS
SYSTOLIC BLOOD PRESSURE: 122 MMHG | HEIGHT: 69 IN | DIASTOLIC BLOOD PRESSURE: 74 MMHG | WEIGHT: 152 LBS | TEMPERATURE: 97.4 F | HEART RATE: 78 BPM | OXYGEN SATURATION: 98 % | RESPIRATION RATE: 16 BRPM | BODY MASS INDEX: 22.51 KG/M2

## 2023-05-30 DIAGNOSIS — S46.912D STRAIN OF LEFT SHOULDER, SUBSEQUENT ENCOUNTER: ICD-10-CM

## 2023-05-30 PROCEDURE — 3074F SYST BP LT 130 MM HG: CPT | Performed by: NURSE PRACTITIONER

## 2023-05-30 PROCEDURE — 99214 OFFICE O/P EST MOD 30 MIN: CPT | Performed by: NURSE PRACTITIONER

## 2023-05-30 PROCEDURE — 3078F DIAST BP <80 MM HG: CPT | Performed by: NURSE PRACTITIONER

## 2023-05-30 RX ORDER — HYDROCODONE BITARTRATE AND ACETAMINOPHEN 5; 325 MG/1; MG/1
1 TABLET ORAL EVERY 4 HOURS
Qty: 18 TABLET | Refills: 0 | Status: SHIPPED | OUTPATIENT
Start: 2023-05-30 | End: 2023-05-30 | Stop reason: SDUPTHER

## 2023-05-30 RX ORDER — HYDROCODONE BITARTRATE AND ACETAMINOPHEN 5; 325 MG/1; MG/1
1 TABLET ORAL EVERY 4 HOURS
Qty: 18 TABLET | Refills: 0 | Status: SHIPPED | OUTPATIENT
Start: 2023-05-30 | End: 2023-06-02

## 2023-05-30 ASSESSMENT — FIBROSIS 4 INDEX: FIB4 SCORE: 1.093926825832975133

## 2023-05-30 NOTE — LETTER
[place] [address] [facility] [place] [address]  Phone:  [phone] - Fax:  [phone]   [facility] Progress Report and Disability Certification  Date of Service: 2023   No Show:  No  Date / Time of Next Visit: 2023   Claim Information   Patient Name: Rhonda Gould  Claim Number:     Employer: [employer] *** Date of Injury: 2023     Insurer / TPA: Esis *** ID / SSN:     Occupation: CNA *** Diagnosis: The encounter diagnosis was Strain of left shoulder, subsequent encounter.    Medical Information   Related to Industrial Injury? Yes ***   Subjective Complaints:  DOI: 23: Patient is a 52-year-old female who returns to the urgent care for exacerbated pain of the left shoulder.  Patient having ongoing injury she is being followed by sports medicine recently received an injection of her left shoulder which did not seem to help her pain.  Was at physical therapy today and has had severe pain since.  She takes muscle relaxants with minimal relief.  Patient was referred to orthopedics as she has failed conservative treatment.   Objective Findings: Left shoulder: No gross deformities, skin without erythema, no edema.  Tenderness palpation to the anterior and posterior aspect.  Decreased range of motion due to pain.  Patient tearful.  Sensation intact distally, neurovascular intact.   Pre-Existing Condition(s):     Assessment:   Condition Worsened    Status: Discharged / Care Transfer  Permanent Disability:No    Plan:      Diagnostics:      Comments:       Disability Information   Status: Released to Restricted Duty    From:  2023  Through: 2023 Restrictions are: Temporary   Physical Restrictions   Sitting:    Standing:    Stooping:    Bending:      Squatting:    Walking:    Climbing:    Pushin hrs/day   Pullin hrs/day Other:    Reaching Above Shoulder (L): 0 hrs/day Reaching Above Shoulder (R):       Reaching Below  Shoulder (L):  0 hrs/day Reaching Below Shoulder (R):      Not to exceed Weight Limits   Carrying(hrs):   Weight Limit(lb): < or = to 10 pounds Lifting(hrs):   Weight  Limit(lb): < or = to 10 pounds   Comments: Patient having severe 10 unrelieved with muscle relaxants, Tylenol Motrin.  NarxCheck query was performed to review the . The patient appears appropriate to receive medication as prescribed.  Encourage patient continue with temporary work restrictions.  Patient will follow-up with orthopedics and occupational medicine as discussed.      Repetitive Actions   Hands: i.e. Fine Manipulations from Grasping:     Feet: i.e. Operating Foot Controls:     Driving / Operate Machinery:     Health Care Provider’s Original or Electronic Signature  GABRIELLA Guerrero. Health Care Provider’s Original or Electronic Signature    Chu Latif DO MPH     Clinic Name / Location: [address] [address] Clinic Phone Number: Dept: [phone]   Appointment Time: 7:30 Pm Visit Start Time: 7:39 PM   Check-In Time:  7:29 Pm Visit Discharge Time:  ***   Original-Treating Physician or Chiropractor    Page 2-Insurer/TPA    Page 3-Employer    Page 4-Employee

## 2023-05-30 NOTE — LETTER
"EMPLOYEE’S CLAIM FOR COMPENSATION/ REPORT OF INITIAL TREATMENT  FORM C-4  PLEASE TYPE OR PRINT    EMPLOYEE’S CLAIM - PROVIDE ALL INFORMATION REQUESTED   First Name  Rhonda Last Name  Luis Fernando Birthdate                    1970                Sex  female Claim Number (Insurer’s Use Only)   Home Address  [address] Age  52 y.o. 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 (if applicable)  Holding a patient.    How did this injury or occupational disease occur? (Be specific and answer in detail. 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 He is in 2 pt restraints. He threw his legs over the bed rail. As I was trying to put his legs back in bed my Lt shoulder froze and has been spazmins.   If you believe that you have an occupational disease, when did you first have knowledge of the disability and its relationship to your employment?  N/A Witnesses to the Accident (if applicable)  Sofia Sheldon      Nature of Injury or Occupational Disease  Workers' Compensation  Part(s) of Body Injured or Affected  Shoulder (L), Upper Arm (L), " Lower Arm (L)    I CERTIFY THAT THE ABOVE IS TRUE AND CORRECT TO T HE BEST OF MY KNOWLEDGE AND THAT I HAVE PROVIDED THIS INFORMATION IN ORDER TO OBTAIN THE BENEFITS OF [place], OR [place]).  I HEREBY AUTHORIZE ANY PHYSICIAN, CHIROPRACTOR, SURGEON, PRACTITIONER OR ANY OTHER PERSON, ANY HOSPITAL, INCLUDING [facility] OR [facility], ANY  MEDICAL SERVICE ORGANIZATION, ANY INSURANCE COMPANY, OR OTHER INSTITUTION OR ORGANIZATION TO RELEASE TO EACH OTHER, ANY MEDICAL OR OTHER INFORMATION, INCLUDING BENEFITS PAID OR PAYABLE, PERTINENT TO THIS INJURY OR DISEASE, EXCEPT INFORMATION RELATIVE TO DIAGNOSIS, TREATMENT AND/OR COUNSELING FOR AIDS, PSYCHOLOGICAL CONDITIONS, ALCOHOL OR CONTROLLED SUBSTANCES, FOR WHICH I MUST GIVE SPECIFIC AUTHORIZATION.  A PHOTOSTAT OF THIS AUTHORIZATION SHALL BE VALID AS THE ORIGINAL.       Date   Place Employee’s Original or  *Electronic Signature   THIS REPORT MUST BE COMPLETED AND MAILED WITHIN 3 WORKING DAYS OF TREATMENT   Place  [facility]  Name of [facility]   Date  5/30/2023 Diagnosis and Description of Injury or Occupational Disease  (S46.912D) Strain of left shoulder, subsequent encounter Is there evidence that the injured employee was under the influence of alcohol and/or another controlled substance at the time of accident?  ? No ? Yes (if yes, please explain)   Hour  7:39 PM   The encounter diagnosis was Strain of left shoulder, subsequent encounter.     Treatment     Have you advised the patient to remain off work five days or     more?    X-Ray Findings      ? Yes Indicate dates:   From   To      From information given by the employee, together with medical evidence, can        you directly connect this injury or occupational disease as job incurred?    ? No If no, is the injured employee capable of:  ? full duty    ? modified duty      Is additional  "medical care by a physician indicated?    If modified duty, specify any limitations / restrictions      Do you know of any previous injury or disease contributing to this condition or occupational disease?  ? Yes ? No (Explain if yes)                              Date  5/30/2023 Print Health Care Provider's  Name  RED Guerrero I certify that the employer’s copy of  this form was delivered to the employer on:   Address  [address] [place] [address] [address] Insurer’s Use [address]     [facility] Zip  [address]    Provider’s Tax ID Number  [id number] Telephone  Dept: [phone]             Health Care Provider’s Original or Electronic Signature  e-AARON Rosas Degree (MD,DO, DC,PAReginaC,APRN)  APRN      * Complete and attach Release of Information (Form C-4A) when injured employee signs C-4 Form electronically  ORIGINAL - TREATING HEALTHCARE PROVIDER PAGE 2 - INSURER/TPA PAGE 3 - EMPLOYER PAGE 4 - EMPLOYEE             Form C-4 (rev.08/21)           BRIEF DESCRIPTION OF RIGHTS AND BENEFITS  (Pursuant to [place] 616C.050)    Notice of Injury or Occupational Disease (Incident Report Form C-1): If an injury or occupational disease (OD) arises out of and in the course of employment, you must provide written notice to your employer as soon as practicable, but no later than 7 days after the accident or OD. Your employer shall maintain a sufficient supply of the required forms.    Claim for Compensation (Form C-4): If medical treatment is sought, the form C-4 is available at the place of initial treatment. A completed \"Claim for Compensation\" (Form C-4) must be filed within 90 days after an accident or OD. The treating physician or chiropractor must, within 3 working days after treatment, complete and mail to the employer, the employer's insurer and third-party , the Claim for Compensation.    Medical Treatment: If you require medical treatment for your on-the-job injury or OD, you may be required " to select a physician or chiropractor from a list provided by your workers’ compensation insurer, if it has contracted with an Organization for Managed Care (MCO) or Preferred Provider Organization (PPO) or providers of health care. If your employer has not entered into a contract with an MCO or PPO, you may select a physician or chiropractor from the Panel of Physicians and Chiropractors. Any medical costs related to your industrial injury or OD will be paid by your insurer.    Temporary Total Disability (TTD): If your doctor has certified that you are unable to work for a period of at least 5 consecutive days, or 5 cumulative days in a 20-day period, or places restrictions on you that your employer does not accommodate, you may be entitled to TTD compensation.    Temporary Partial Disability (TPD): If the wage you receive upon reemployment is less than the compensation for TTD to which you are entitled, the insurer may be required to pay you TPD compensation to make up the difference. TPD can only be paid for a maximum of 24 months.    Permanent Partial Disability (PPD): When your medical condition is stable and there is an indication of a PPD as a result of your injury or OD, within 30 days, your insurer must arrange for an evaluation by a rating physician or chiropractor to determine the degree of your PPD. The amount of your PPD award depends on the date of injury, the results of the PPD evaluation, your age and wage.    Permanent Total Disability (PTD): If you are medically certified by a treating physician or chiropractor as permanently and totally disabled and have been granted a PTD status by your insurer, you are entitled to receive monthly benefits not to exceed 66 2/3% of your average monthly wage. The amount of your PTD payments is subject to reduction if you previously received a lump-sum PPD award.    Vocational Rehabilitation Services: You may be eligible for vocational rehabilitation services if you  are unable to return to the job due to a permanent physical impairment or permanent restrictions as a result of your injury or occupational disease.    Transportation and Per Niall Reimbursement: You may be eligible for travel expenses and per niall associated with medical treatment.    Reopening: You may be able to reopen your claim if your condition worsens after claim closure.     Appeal Process: If you disagree with a written determination issued by the insurer or the insurer does not respond to your request, you may appeal to the Department of Administration, , by following the instructions contained in your determination letter. You must appeal the determination within 70 days from the date of the determination letter at [address], [address], [place], [place] [address], or [address], [address], [place], [place] [address]. If you disagree with the  decision, you may appeal to the Department of Administration, . You must file your appeal within 30 days from the date of the  decision letter at [address], [address], [place], [place] [address], or [address], [facility] [address], [place] [address]. If you disagree with a decision of an , you may file a petition for judicial review with the District Court. You must do so within 30 days of the Appeal Officer’s decision. You may be represented by an  at your own expense or you may contact the [facility] for possible representation.    [place]  for Injured Workers (NAIW): If you disagree with a  decision, you may request that NAIW represent you without charge at an  Hearing. For information regarding denial of benefits, you may contact the [facility] at: [address], [address] [address], [phone], or [address], [place] [address], [phone]    To File a Complaint with  the Division: If you wish to file a complaint with the  of the Division of Industrial Relations (DIR),  please contact the Workers’ Compensation Section, [address], [address], [place], [place] [address], telephone (016) 146-6146, or [address], [address], [place], [place] [address], telephone [phone]) [phone].    For assistance with Workers’ Compensation Issues: You may contact the [facility] Office for Consumer Health Assistance, [address], [address], [place], [place] [address], Toll Free 1-262.539.8779, Web site: http://[facility].[place].gov/Programs/LIA E-mail: lia@[facility].nv.gov              __________________________________________________________________                                    _________________            Employee Name / Signature                                                                                                                            Date                                                                                                                                                                                                                              D-2 (rev. 10/20)

## 2023-05-30 NOTE — LETTER
[address] [place] [address]  Phone:  [phone] - Fax:  [phone]   [facility] Progress Report and Disability Certification  Date of Service: 2023   No Show:  No  Date / Time of Next Visit: 23 9:15 AM W/ Sports Medicine   Claim Information   Patient Name: Rhonda Gould  Claim Number:     Employer: [employer]  Date of Injury: 2023     Insurer / TPA: Esis  ID / SSN:     Occupation: CNA  Diagnosis: The encounter diagnosis was Strain of left shoulder, subsequent encounter.    Medical Information   Related to Industrial Injury? Yes    Subjective Complaints:  DOI: 23: Patient is a 52-year-old female who returns to the urgent care for exacerbated pain of the left shoulder.  Patient having ongoing injury she is being followed by sports medicine recently received an injection of her left shoulder which did not seem to help her pain.  Was at physical therapy today and has had severe pain since.  She takes muscle relaxants with minimal relief.  Patient was referred to orthopedics as she has failed conservative treatment.   Objective Findings: Left shoulder: No gross deformities, skin without erythema, no edema.  Tenderness palpation to the anterior and posterior aspect.  Decreased range of motion due to pain.  Patient tearful.  Sensation intact distally, neurovascular intact.   Pre-Existing Condition(s):     Assessment:   Condition Worsened    Status: Discharged / Care Transfer  Permanent Disability:No    Plan:      Diagnostics:      Comments:       Disability Information   Status: Released to Restricted Duty    From:  2023  Through: 2023 Restrictions are: Temporary   Physical Restrictions   Sitting:    Standing:    Stooping:    Bending:      Squatting:    Walking:    Climbing:    Pushin hrs/day   Pullin hrs/day Other:    Reaching Above Shoulder (L): 0 hrs/day Reaching Above Shoulder (R):       Reaching  Below Shoulder (L):  0 hrs/day Reaching Below Shoulder (R):      Not to exceed Weight Limits   Carrying(hrs):   Weight Limit(lb): < or = to 10 pounds Lifting(hrs):   Weight  Limit(lb): < or = to 10 pounds   Comments: Patient having severe pain unrelieved with muscle relaxants, Tylenol Motrin.  NarxCheck query was performed to review the . The patient appears appropriate to receive medication as prescribed.  Encourage patient continue with temporary work restrictions.  Patient will follow-up with orthopedics and occupational medicine as discussed.      Repetitive Actions   Hands: i.e. Fine Manipulations from Grasping:     Feet: i.e. Operating Foot Controls:     Driving / Operate Machinery:     Health Care Provider’s Original or Electronic Signature  GABRIELLA Guerrero. Health Care Provider’s Original or Electronic Signature    Chu Latif DO [facility]     Clinic Name / Location: [address] [address] Clinic Phone Number: Dept: [phone]   Appointment Time: 7:30 Pm Visit Start Time: 7:39 PM   Check-In Time:  7:29 Pm Visit Discharge Time:     Original-Treating Physician or Chiropractor    Page 2-Insurer/TPA    Page 3-Employer    Page 4-Employee

## 2023-05-31 ENCOUNTER — APPOINTMENT (OUTPATIENT)
Dept: PHYSICAL THERAPY | Facility: REHABILITATION | Age: 53
End: 2023-05-31
Payer: COMMERCIAL

## 2023-05-31 NOTE — PROGRESS NOTES
"Subjective:   Rhonda Gould  is a 52 y.o. female who presents for Shoulder Injury (L shoulder )    DOI: 2/27/23: Patient is a 52-year-old female who returns to the urgent care for exacerbated pain of the left shoulder.  Patient having ongoing injury she is being followed by sports medicine recently received an injection of her left shoulder which did not seem to help her pain.  Was at physical therapy today and has had severe pain since.  She takes muscle relaxants with minimal relief.  Patient was referred to orthopedics as she has failed conservative treatment.   HPI  Review of Systems   Musculoskeletal:  Positive for joint pain.     Allergies   Allergen Reactions    Fish Allergy Anaphylaxis and Shortness of Breath     Swelling to throat.    Shellfish Allergy Anaphylaxis     Strict No Shellfish (avoids most fish, cod OK)      Codeine Hives and Itching     Tolerated morphine previously  Allergy updated from 2015, morphine given in 2016      Objective:   /74 (BP Location: Left arm, Patient Position: Sitting, BP Cuff Size: Adult)   Pulse 78   Temp 36.3 °C (97.4 °F) (Temporal)   Resp 16   Ht 1.753 m (5' 9\")   Wt 68.9 kg (152 lb)   LMP 06/11/2005   SpO2 98%   BMI 22.45 kg/m²   Physical Exam  Constitutional:       Appearance: Normal appearance. She is not ill-appearing or toxic-appearing.   HENT:      Head: Normocephalic.      Right Ear: External ear normal.      Left Ear: External ear normal.      Nose: Nose normal.      Mouth/Throat:      Lips: Pink.   Eyes:      General: Lids are normal.   Pulmonary:      Effort: Pulmonary effort is normal. No accessory muscle usage.   Musculoskeletal:      Left shoulder: Tenderness present. No swelling, deformity or bony tenderness. Decreased range of motion. Normal strength. Normal pulse.      Cervical back: Full passive range of motion without pain.   Neurological:      Mental Status: She is alert and oriented to person, place, and time.   Psychiatric:         " Mood and Affect: Mood normal.         Thought Content: Thought content normal.       Left shoulder: No gross deformities, skin without erythema, no edema.  Tenderness palpation to the anterior and posterior aspect.  Decreased range of motion due to pain.  Patient tearful.  Sensation intact distally, neurovascular intact.   Assessment/Plan:     1. Strain of left shoulder, subsequent encounter  HYDROcodone-acetaminophen (NORCO) 5-325 MG Tab per tablet           Patient having severe pain unrelieved with muscle relaxants, Tylenol Motrin.  [employer] query was performed to review the . The patient appears appropriate to receive medication as prescribed.  Encourage patient continue with temporary work restrictions.  Patient will follow-up with orthopedics and occupational medicine as discussed.  Differential diagnosis, natural history, supportive care, and indications for immediate follow-up discussed.     My total time spent caring for the patient on the day of the encounter was 31 minutes.   This does not include time spent on separately billable procedures/tests.

## 2023-06-06 ENCOUNTER — OCCUPATIONAL MEDICINE (OUTPATIENT)
Dept: SPORTS MEDICINE | Facility: CLINIC | Age: 53
End: 2023-06-06
Payer: COMMERCIAL

## 2023-06-06 VITALS
WEIGHT: 152 LBS | SYSTOLIC BLOOD PRESSURE: 122 MMHG | RESPIRATION RATE: 18 BRPM | OXYGEN SATURATION: 96 % | HEART RATE: 111 BPM | DIASTOLIC BLOOD PRESSURE: 76 MMHG | HEIGHT: 69 IN | TEMPERATURE: 97.2 F | BODY MASS INDEX: 22.51 KG/M2

## 2023-06-06 DIAGNOSIS — S46.012D ROTATOR CUFF STRAIN, LEFT, SUBSEQUENT ENCOUNTER: ICD-10-CM

## 2023-06-06 DIAGNOSIS — S43.432D LABRAL TEAR OF SHOULDER, LEFT, SUBSEQUENT ENCOUNTER: ICD-10-CM

## 2023-06-06 DIAGNOSIS — S49.92XD SHOULDER INJURY, LEFT, SUBSEQUENT ENCOUNTER: ICD-10-CM

## 2023-06-06 PROCEDURE — 3074F SYST BP LT 130 MM HG: CPT | Performed by: FAMILY MEDICINE

## 2023-06-06 PROCEDURE — 3078F DIAST BP <80 MM HG: CPT | Performed by: FAMILY MEDICINE

## 2023-06-06 PROCEDURE — 99213 OFFICE O/P EST LOW 20 MIN: CPT | Performed by: FAMILY MEDICINE

## 2023-06-06 ASSESSMENT — FIBROSIS 4 INDEX: FIB4 SCORE: 1.093926825832975133

## 2023-06-06 NOTE — LETTER
[facility]  [address] [place] [address]  Phone:  [phone] - Fax:  [phone]   [facility] Progress Report and Disability Certification  Date of Service: 6/6/2023   No Show:  No  Date / Time of Next Visit: 6/20/2023   Claim Information   Patient Name: Rhonda Gould  Claim Number:     Employer: [employer]  Date of Injury: 2/27/2023     Insurer / TPA: [employer]  ID / SSN:     Occupation: CNA  Diagnosis: Diagnoses of Rotator cuff strain, left, subsequent encounter, Labral tear of shoulder, left, subsequent encounter, and Shoulder injury, left, subsequent encounter were pertinent to this visit.    Medical Information   Related to Industrial Injury? Yes    Subjective Complaints:  DOI: 2/27/2023   Left shoulder injury (right-handed dominant) while trying to help restrained patient back onto hospital bed.  Patient had upper extremity restraints and had kicked his lower legs off of the bed.  As Rhonda tried to put the patient back onto the bed with both arms cradling his legs he forcefully kicked against her with his legs.  There was a slight pop at the time of injury with sudden sharp pain, pain is predominantly at the superior shoulder and trapezius region.  Pain is stagnant, still only about 50% better overall. Still worse with abduction of the LEFT shoulder and any over head activity.  FAILED PT.  Pending upcoming appointment with orthopedic specialist at [facility] next week   Objective Findings: BILATERAL shoulder exam:  Range of motion near normal with full abduction on the LEFT compared to the right.  Apprehension testing is POSITIVE on the LEFT compared to the right.  Generally, rotator cuff strengthening with can, internal rotation, external rotation and liftoff are all 5/5, although these maneuvers do produce some pain in the LEFT shoulder with empty can testing, external rotation and liftoff.   Pre-Existing Condition(s):     Assessment:   Condition  Same    Status: Additional Care Required  Permanent Disability:No    Plan:      Diagnostics: MRI    Comments:  3/17/23  MRI shoulder results  1. Tendinosis with partial-thickness undersurface tears of the supraspinatus and infraspinatus tendons. No full-thickness rotator cuff tear.  2. Tendinosis of the subscapularis tendon.  3. Complex tear of the superior labrum.   4. Severe chondrosis of the glenoid.  5. Mild subacromial subdeltoid bursitis.       Disability Information   Status:      From:  6/6/2023  Through: 6/20/2023 Restrictions are: Temporary   Physical Restrictions   Sitting:    Standing:    Stooping:    Bending:      Squatting:    Walking:    Climbing:    Pushing:      Pulling:    Other:    Reaching Above Shoulder (L):   Reaching Above Shoulder (R):       Reaching Below Shoulder (L):    Reaching Below Shoulder (R):      Not to exceed Weight Limits   Carrying(hrs):   Weight Limit(lb): < or = to 25 pounds Lifting(hrs):   Weight  Limit(lb):     Comments: FAILED LEFT shoulder subacromial corticosteroid injection provided (May 9, 2023)  Continue with restrictions < 25 lbs, limited L arm use and no overhead activity   TRANSFER CARE to orthopedic specialist at [facility], upcoming appointment next week   Formal physical therapy.    Repetitive Actions   Hands: i.e. Fine Manipulations from Grasping:     Feet: i.e. Operating Foot Controls:     Driving / Operate Machinery:     Provider Name:   Jori Rossi M.D. Physician Signature:  Physician Name:     Clinic Name / Location: [address] [address] Clinic Phone Number: Dept: [phone]   Appointment Time: 9:15 Am Visit Start Time: 9:15 AM   Check-In Time:  9:13 Am Visit Discharge Time: 9:35 Am    Original-Treating Physician or Chiropractor    Page 2-Insurer/TPA    Page 3-Employer    Page 4-Employee
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
## 2023-06-23 ENCOUNTER — OCCUPATIONAL MEDICINE (OUTPATIENT)
Dept: SPORTS MEDICINE | Facility: CLINIC | Age: 53
End: 2023-06-23
Payer: COMMERCIAL

## 2023-06-23 VITALS
RESPIRATION RATE: 14 BRPM | HEIGHT: 69 IN | WEIGHT: 152 LBS | BODY MASS INDEX: 22.51 KG/M2 | DIASTOLIC BLOOD PRESSURE: 92 MMHG | HEART RATE: 102 BPM | SYSTOLIC BLOOD PRESSURE: 124 MMHG | OXYGEN SATURATION: 99 % | TEMPERATURE: 97.7 F

## 2023-06-23 DIAGNOSIS — S49.92XD SHOULDER INJURY, LEFT, SUBSEQUENT ENCOUNTER: ICD-10-CM

## 2023-06-23 DIAGNOSIS — S43.432D LABRAL TEAR OF SHOULDER, LEFT, SUBSEQUENT ENCOUNTER: ICD-10-CM

## 2023-06-23 DIAGNOSIS — S46.012D ROTATOR CUFF STRAIN, LEFT, SUBSEQUENT ENCOUNTER: ICD-10-CM

## 2023-06-23 PROCEDURE — 3080F DIAST BP >= 90 MM HG: CPT | Performed by: FAMILY MEDICINE

## 2023-06-23 PROCEDURE — 3074F SYST BP LT 130 MM HG: CPT | Performed by: FAMILY MEDICINE

## 2023-06-23 PROCEDURE — 99213 OFFICE O/P EST LOW 20 MIN: CPT | Performed by: FAMILY MEDICINE

## 2023-06-23 ASSESSMENT — FIBROSIS 4 INDEX: FIB4 SCORE: 1.093926825832975133
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
## 2023-08-15 ENCOUNTER — TELEPHONE (OUTPATIENT)
Dept: PHYSICAL THERAPY | Facility: REHABILITATION | Age: 53
End: 2023-08-15
Payer: COMMERCIAL

## 2023-08-15 NOTE — OP THERAPY DISCHARGE SUMMARY
Outpatient Physical Therapy  DISCHARGE SUMMARY NOTE      [address]  [address]  [place] [address]  Phone:  [phone]  Fax:  [phone]    Date of Visit: 08/15/2023    Patient: Rhonda Gould  YOB: 1970  MRN: [id number]     Referring Provider: Jori Rossi M.D.  Reason for Visit      Referring Diagnosis Knee pain         Functional Assessment Used        Your patient is being discharged from Physical Therapy with the following comments:   Patient has failed to schedule or reschedule follow-up visits    Patient seen for evaluation only. No contact from patient since evaluation.  Discharging patient from therapy at this time.     Jose Manuel Dubose, PT, DPT, OCS    Date: 8/15/2023

## 2023-11-06 NOTE — ED NOTES
Pt resting in bed, awakes easily by voice, 2nd troponin drawn and sent. Pt given extra blankets per request. Denies other needs. rr even and unlabored.   Patient/Caregiver provided printed discharge information.

## 2023-12-09 ENCOUNTER — OFFICE VISIT (OUTPATIENT)
Dept: URGENT CARE | Facility: CLINIC | Age: 53
End: 2023-12-09
Payer: COMMERCIAL

## 2023-12-09 VITALS
WEIGHT: 152 LBS | OXYGEN SATURATION: 98 % | HEART RATE: 102 BPM | SYSTOLIC BLOOD PRESSURE: 130 MMHG | HEIGHT: 69 IN | RESPIRATION RATE: 14 BRPM | TEMPERATURE: 97.3 F | BODY MASS INDEX: 22.51 KG/M2 | DIASTOLIC BLOOD PRESSURE: 76 MMHG

## 2023-12-09 DIAGNOSIS — R05.1 ACUTE COUGH: ICD-10-CM

## 2023-12-09 DIAGNOSIS — J02.9 SORE THROAT: ICD-10-CM

## 2023-12-09 DIAGNOSIS — Z20.828 EXPOSURE TO RESPIRATORY SYNCYTIAL VIRUS (RSV): ICD-10-CM

## 2023-12-09 DIAGNOSIS — J44.1 COPD EXACERBATION (HCC): ICD-10-CM

## 2023-12-09 DIAGNOSIS — Z72.0 TOBACCO USE: Chronic | ICD-10-CM

## 2023-12-09 LAB
FLUAV RNA SPEC QL NAA+PROBE: NEGATIVE
FLUBV RNA SPEC QL NAA+PROBE: NEGATIVE
RSV RNA SPEC QL NAA+PROBE: NEGATIVE
S PYO DNA SPEC NAA+PROBE: NOT DETECTED
SARS-COV-2 RNA RESP QL NAA+PROBE: NEGATIVE

## 2023-12-09 PROCEDURE — 87651 STREP A DNA AMP PROBE: CPT | Performed by: NURSE PRACTITIONER

## 2023-12-09 PROCEDURE — 3075F SYST BP GE 130 - 139MM HG: CPT | Performed by: NURSE PRACTITIONER

## 2023-12-09 PROCEDURE — 3078F DIAST BP <80 MM HG: CPT | Performed by: NURSE PRACTITIONER

## 2023-12-09 PROCEDURE — 99214 OFFICE O/P EST MOD 30 MIN: CPT | Performed by: NURSE PRACTITIONER

## 2023-12-09 PROCEDURE — 0241U POCT CEPHEID COV-2, FLU A/B, RSV - PCR: CPT | Performed by: NURSE PRACTITIONER

## 2023-12-09 RX ORDER — BENZONATATE 100 MG/1
100 CAPSULE ORAL 3 TIMES DAILY PRN
Qty: 60 CAPSULE | Refills: 0 | Status: SHIPPED | OUTPATIENT
Start: 2023-12-09

## 2023-12-09 RX ORDER — MIRTAZAPINE 7.5 MG/1
TABLET, FILM COATED ORAL
COMMUNITY

## 2023-12-09 ASSESSMENT — FIBROSIS 4 INDEX: FIB4 SCORE: 1.11

## 2023-12-09 NOTE — PROGRESS NOTES
Rhonda Gould is a 53 y.o. female who presents for Shortness of Breath (Difficulty breathing, x this morning )      HPI  This is a new problem. Rhonda Gould is a 53 y.o. patient who presents to urgent care with c/o: Shortness of breath for 1 day.  Coughing.  Her sputum is darker than normal.  She is a smoker and occasionally coughs up sputum due to her smoking.  She is trying to quit smoking at this point.  She has patches she is using. She has been exposed to persons with RSV and she is worried that she may have RSV.  Treatments tried none.  She denies fever, chills, body ache, ear pain, sore throat, weakness, wheezing.  No other aggravating leaving factors.    ROS See HPI    Allergies:       Allergies   Allergen Reactions    Fish Allergy Anaphylaxis and Shortness of Breath     Swelling to throat.    Shellfish Allergy Anaphylaxis     Strict No Shellfish (avoids most fish, cod OK)      Codeine Hives and Itching     Tolerated morphine previously  Allergy updated from 2015, morphine given in 2016       PMSFS Hx:  Past Medical History:   Diagnosis Date    Chronic obstructive pulmonary disease (HCC)     Hypertension      Past Surgical History:   Procedure Laterality Date    HYSTERECTOMY LAPAROSCOPY  6/2005     Family History   Problem Relation Age of Onset    Diabetes Mother     Hypertension Mother     Hypertension Father     Diabetes Father     Heart Disease Maternal Grandmother     Cancer Maternal Grandfather      Social History     Tobacco Use    Smoking status: Every Day    Smokeless tobacco: Never   Substance Use Topics    Alcohol use: Not Currently     Comment: Quit 6/15       Problems:   Patient Active Problem List   Diagnosis    Chest pain    Anxiety    Insomnia    Essential hypertension    Headache    Vitamin D deficiency    Environmental allergies    Itching    Left leg numbness    Tobacco use    TIA (transient ischemic attack)    Alcoholism (HCC)    Simple chronic bronchitis (HCC)    Hypoglycemia  "   History of hypertension    Tobacco abuse disorder    Low TSH level    HLD (hyperlipidemia)    Anemia    Smoking    Reactive airway disease    Respiratory failure (HCC)    Tobacco dependence    S/P hysterectomy    S/P cardiac catheterization    Mild intermittent asthma without complication    Gout    GERD (gastroesophageal reflux disease)    FH: HTN (hypertension)    FH: diabetes mellitus    Dyslipidemia    COPD exacerbation (HCC)    Cannabis abuse    Below ideal body weight range    Alopecia    Allergic rhinitis due to pollen    Alcohol abuse, in remission       Medications:   Current Outpatient Medications on File Prior to Visit   Medication Sig Dispense Refill    mirtazapine (REMERON) 7.5 MG tablet TAKE 1 TABLET BY MOUTH EVERY DAY AT BEDTIME FOR 90 DAYS      tizanidine (ZANAFLEX) 4 MG Tab Take 1 tablet by mouth at bedtime as needed (as needed for spasm). (Patient not taking: Reported on 5/30/2023) 14 Tablet 0     No current facility-administered medications on file prior to visit.        Objective:     /76   Pulse (!) 102   Temp 36.3 °C (97.3 °F)   Resp 14   Ht 1.753 m (5' 9\")   Wt 68.9 kg (152 lb)   LMP 06/11/2005   SpO2 98%   BMI 22.45 kg/m²     Physical Exam  Vitals and nursing note reviewed.   Constitutional:       General: She is not in acute distress.     Appearance: Normal appearance. She is well-developed. She is not ill-appearing or toxic-appearing.      Comments: + tobacco fetor    HENT:      Head: Normocephalic.      Right Ear: Hearing, ear canal and external ear normal. No middle ear effusion. Tympanic membrane is injected. Tympanic membrane is not erythematous.      Left Ear: Hearing, ear canal and external ear normal.  No middle ear effusion. Tympanic membrane is injected. Tympanic membrane is not erythematous.      Nose: No mucosal edema or rhinorrhea.      Right Sinus: No maxillary sinus tenderness or frontal sinus tenderness.      Left Sinus: No maxillary sinus tenderness or " frontal sinus tenderness.      Mouth/Throat:      Mouth: Mucous membranes are moist.      Pharynx: Uvula midline. Posterior oropharyngeal erythema present.      Tonsils: No tonsillar abscesses.   Neck:      Trachea: Trachea normal.   Cardiovascular:      Rate and Rhythm: Normal rate and regular rhythm.      Chest Wall: PMI is not displaced.      Pulses: Normal pulses.      Heart sounds: Normal heart sounds.   Pulmonary:      Effort: Pulmonary effort is normal.      Breath sounds: Normal breath sounds. No decreased breath sounds.   Musculoskeletal:         General: Normal range of motion.      Cervical back: Full passive range of motion without pain, normal range of motion and neck supple.   Lymphadenopathy:      Cervical: No cervical adenopathy.      Upper Body:      Right upper body: No supraclavicular adenopathy.      Left upper body: No supraclavicular adenopathy.   Skin:     General: Skin is warm and dry.      Capillary Refill: Capillary refill takes less than 2 seconds.   Neurological:      Mental Status: She is alert and oriented to person, place, and time.      Gait: Gait normal.   Psychiatric:         Mood and Affect: Mood normal.         Speech: Speech normal.         Behavior: Behavior normal. Behavior is cooperative.         Thought Content: Thought content normal.       Results for orders placed or performed in visit on 12/09/23   POCT CEPHEID COV-2, FLU A/B, RSV - PCR   Result Value Ref Range    SARS-CoV-2 by PCR Negative Negative, Invalid    Influenza virus A RNA Negative Negative, Invalid    Influenza virus B, PCR Negative Negative, Invalid    RSV, PCR Negative Negative, Invalid   POCT CEPHEID GROUP A STREP - PCR   Result Value Ref Range    POC Group A Strep, PCR Not Detected Not Detected, Invalid         Assessment /Associated Orders:      1. COPD exacerbation (HCC)  POCT CEPHEID COV-2, FLU A/B, RSV - PCR      2. Tobacco use        3. Acute cough  benzonatate (TESSALON) 100 MG Cap      4. Sore throat   POCT CEPHEID GROUP A STREP - PCR      5. Exposure to respiratory syncytial virus (RSV)            Medical Decision Making:    Rhonda is a very pleasant 53 y.o. female who is clinically stable at today's acute urgent care visit.  No acute distress noted.  VSS. Appropriate for outpatient care at this time.   Acute problem today with uncertain prognosis. Neg viral panel today. Neg strep GAS.   Educated in proper administration of  prescription medication(s) ordered today including safety, possible SE, risks, benefits, rationale and alternatives to therapy.   Keep well hydrated  Work note provided to pt.   Discussed that this illness appears to be viral in nature. I did not see any evidence of a bacterial process on exam today.   OTC medications can be used for symptom relief. Follow manufacturers guidelines for dosing and instructions.  These OTC medications will not make you better any faster, but can help reduce your discomfort.   OTC Antipyretic of choice (Acetaminophen, Ibuprofen) for fevers greater than or equal to 101.5 * F or 38.6*C   Keep well hydrated  Increase rest  Counseled in risk to health r/t cig / tobacco use. Verbalized understanding.     Discussed Dx, management options (risks,benefits, and alternatives to planned treatment), natural progression and supportive care.  Expressed understanding and the treatment plan was agreed upon.   Questions were encouraged and answered   Return to urgent care prn if new or worsening sx or if there is no improvement in condition prn.    Educated in Red flags and indications to immediately call 911 or present to the Emergency Department.         Please note that this dictation was created using voice recognition software. I have worked with consultants from the vendor as well as technical experts from [facility] [employer] to optimize the interface. I have made every reasonable attempt to correct obvious errors, but I expect that there are errors of grammar and possibly  content that I did not discover before finalizing the note.  This note was electronically signed by provider

## 2023-12-09 NOTE — LETTER
December 9, 2023       Patient: Rhonda Gould   YOB: 1970   Date of Visit: 12/9/2023         To Whom It May Concern:    Rhonda was seen in urgent care today for a medical illness. Please excuse her work absence.               Sincerely,          EAGLE Arroyo.  Electronically Signed

## 2023-12-21 ENCOUNTER — PHARMACY VISIT (OUTPATIENT)
Dept: PHARMACY | Facility: MEDICAL CENTER | Age: 53
End: 2023-12-21
Payer: COMMERCIAL

## 2023-12-21 PROCEDURE — RXMED WILLOW AMBULATORY MEDICATION CHARGE: Performed by: INTERNAL MEDICINE

## 2023-12-21 RX ORDER — INFLUENZA A VIRUS A/BRISBANE/02/2018 IVR-190 (H1N1) ANTIGEN (FORMALDEHYDE INACTIVATED), INFLUENZA A VIRUS A/KANSAS/14/2017 X-327 (H3N2) ANTIGEN (FORMALDEHYDE INACTIVATED), INFLUENZA B VIRUS B/PHUKET/3073/2013 ANTIGEN (FORMALDEHYDE INACTIVATED), AND INFLUENZA B VIRUS B/MARYLAND/15/2016 BX-69A ANTIGEN (FORMALDEHYDE INACTIVATED) 15; 15; 15; 15 UG/.5ML; UG/.5ML; UG/.5ML; UG/.5ML
INJECTION, SUSPENSION INTRAMUSCULAR
Qty: 0.5 ML | Refills: 0 | OUTPATIENT
Start: 2023-12-21

## 2024-01-16 ENCOUNTER — APPOINTMENT (OUTPATIENT)
Dept: MEDICAL GROUP | Facility: LAB | Age: 54
End: 2024-01-16
Payer: COMMERCIAL

## 2024-02-05 ENCOUNTER — APPOINTMENT (OUTPATIENT)
Dept: RADIOLOGY | Facility: MEDICAL CENTER | Age: 54
End: 2024-02-05
Attending: FAMILY MEDICINE
Payer: COMMERCIAL

## 2024-02-06 ENCOUNTER — HOSPITAL ENCOUNTER (OUTPATIENT)
Dept: CARDIOLOGY | Facility: MEDICAL CENTER | Age: 54
End: 2024-02-06
Attending: NURSE PRACTITIONER
Payer: COMMERCIAL

## 2024-02-06 LAB — EKG IMPRESSION: NORMAL

## 2024-02-06 PROCEDURE — 93010 ELECTROCARDIOGRAM REPORT: CPT | Performed by: INTERNAL MEDICINE

## 2024-02-06 PROCEDURE — 93005 ELECTROCARDIOGRAM TRACING: CPT | Performed by: NURSE PRACTITIONER

## 2024-03-12 ENCOUNTER — HOSPITAL ENCOUNTER (EMERGENCY)
Facility: MEDICAL CENTER | Age: 54
End: 2024-03-13
Attending: EMERGENCY MEDICINE
Payer: COMMERCIAL

## 2024-03-12 DIAGNOSIS — J44.1 ACUTE EXACERBATION OF CHRONIC OBSTRUCTIVE PULMONARY DISEASE (COPD) (HCC): ICD-10-CM

## 2024-03-12 DIAGNOSIS — R06.02 SHORTNESS OF BREATH: ICD-10-CM

## 2024-03-12 LAB — EKG IMPRESSION: NORMAL

## 2024-03-12 PROCEDURE — 80053 COMPREHEN METABOLIC PANEL: CPT

## 2024-03-12 PROCEDURE — 84484 ASSAY OF TROPONIN QUANT: CPT

## 2024-03-12 PROCEDURE — 36415 COLL VENOUS BLD VENIPUNCTURE: CPT

## 2024-03-12 PROCEDURE — 87040 BLOOD CULTURE FOR BACTERIA: CPT

## 2024-03-12 PROCEDURE — 85379 FIBRIN DEGRADATION QUANT: CPT

## 2024-03-12 PROCEDURE — 93005 ELECTROCARDIOGRAM TRACING: CPT

## 2024-03-12 PROCEDURE — 99285 EMERGENCY DEPT VISIT HI MDM: CPT

## 2024-03-12 PROCEDURE — 93005 ELECTROCARDIOGRAM TRACING: CPT | Performed by: EMERGENCY MEDICINE

## 2024-03-12 PROCEDURE — 84145 PROCALCITONIN (PCT): CPT

## 2024-03-12 PROCEDURE — 83880 ASSAY OF NATRIURETIC PEPTIDE: CPT

## 2024-03-12 PROCEDURE — 83605 ASSAY OF LACTIC ACID: CPT

## 2024-03-12 PROCEDURE — 85025 COMPLETE CBC W/AUTO DIFF WBC: CPT

## 2024-03-12 ASSESSMENT — FIBROSIS 4 INDEX: FIB4 SCORE: 1.11

## 2024-03-13 ENCOUNTER — APPOINTMENT (OUTPATIENT)
Dept: RADIOLOGY | Facility: MEDICAL CENTER | Age: 54
End: 2024-03-13
Attending: EMERGENCY MEDICINE
Payer: COMMERCIAL

## 2024-03-13 VITALS
SYSTOLIC BLOOD PRESSURE: 148 MMHG | HEART RATE: 81 BPM | BODY MASS INDEX: 19.99 KG/M2 | TEMPERATURE: 98.4 F | DIASTOLIC BLOOD PRESSURE: 106 MMHG | WEIGHT: 135 LBS | OXYGEN SATURATION: 94 % | HEIGHT: 69 IN | RESPIRATION RATE: 15 BRPM

## 2024-03-13 LAB
ALBUMIN SERPL BCP-MCNC: 4.6 G/DL (ref 3.2–4.9)
ALBUMIN/GLOB SERPL: 1.9 G/DL
ALP SERPL-CCNC: 79 U/L (ref 30–99)
ALT SERPL-CCNC: 11 U/L (ref 2–50)
ANION GAP SERPL CALC-SCNC: 20 MMOL/L (ref 7–16)
AST SERPL-CCNC: 14 U/L (ref 12–45)
BASOPHILS # BLD AUTO: 0.1 % (ref 0–1.8)
BASOPHILS # BLD: 0.01 K/UL (ref 0–0.12)
BILIRUB SERPL-MCNC: 0.5 MG/DL (ref 0.1–1.5)
BUN SERPL-MCNC: 10 MG/DL (ref 8–22)
CALCIUM ALBUM COR SERPL-MCNC: 8.8 MG/DL (ref 8.5–10.5)
CALCIUM SERPL-MCNC: 9.3 MG/DL (ref 8.5–10.5)
CHLORIDE SERPL-SCNC: 98 MMOL/L (ref 96–112)
CO2 SERPL-SCNC: 19 MMOL/L (ref 20–33)
CREAT SERPL-MCNC: 0.63 MG/DL (ref 0.5–1.4)
D DIMER PPP IA.FEU-MCNC: 0.9 UG/ML (FEU) (ref 0–0.5)
EOSINOPHIL # BLD AUTO: 0.03 K/UL (ref 0–0.51)
EOSINOPHIL NFR BLD: 0.4 % (ref 0–6.9)
ERYTHROCYTE [DISTWIDTH] IN BLOOD BY AUTOMATED COUNT: 49.5 FL (ref 35.9–50)
FLUAV RNA SPEC QL NAA+PROBE: NEGATIVE
FLUBV RNA SPEC QL NAA+PROBE: NEGATIVE
GFR SERPLBLD CREATININE-BSD FMLA CKD-EPI: 104 ML/MIN/1.73 M 2
GLOBULIN SER CALC-MCNC: 2.4 G/DL (ref 1.9–3.5)
GLUCOSE SERPL-MCNC: 97 MG/DL (ref 65–99)
HCT VFR BLD AUTO: 37.7 % (ref 37–47)
HGB BLD-MCNC: 13.2 G/DL (ref 12–16)
IMM GRANULOCYTES # BLD AUTO: 0.02 K/UL (ref 0–0.11)
IMM GRANULOCYTES NFR BLD AUTO: 0.3 % (ref 0–0.9)
LACTATE SERPL-SCNC: 1.6 MMOL/L (ref 0.5–2)
LYMPHOCYTES # BLD AUTO: 2.8 K/UL (ref 1–4.8)
LYMPHOCYTES NFR BLD: 39.9 % (ref 22–41)
MCH RBC QN AUTO: 31.9 PG (ref 27–33)
MCHC RBC AUTO-ENTMCNC: 35 G/DL (ref 32.2–35.5)
MCV RBC AUTO: 91.1 FL (ref 81.4–97.8)
MONOCYTES # BLD AUTO: 0.38 K/UL (ref 0–0.85)
MONOCYTES NFR BLD AUTO: 5.4 % (ref 0–13.4)
NEUTROPHILS # BLD AUTO: 3.78 K/UL (ref 1.82–7.42)
NEUTROPHILS NFR BLD: 53.9 % (ref 44–72)
NRBC # BLD AUTO: 0 K/UL
NRBC BLD-RTO: 0 /100 WBC (ref 0–0.2)
NT-PROBNP SERPL IA-MCNC: 207 PG/ML (ref 0–125)
PLATELET # BLD AUTO: 342 K/UL (ref 164–446)
PMV BLD AUTO: 10.1 FL (ref 9–12.9)
POTASSIUM SERPL-SCNC: 2.9 MMOL/L (ref 3.6–5.5)
PROCALCITONIN SERPL-MCNC: <0.05 NG/ML
PROT SERPL-MCNC: 7 G/DL (ref 6–8.2)
RBC # BLD AUTO: 4.14 M/UL (ref 4.2–5.4)
RSV RNA SPEC QL NAA+PROBE: NEGATIVE
SARS-COV-2 RNA RESP QL NAA+PROBE: NOTDETECTED
SODIUM SERPL-SCNC: 137 MMOL/L (ref 135–145)
TROPONIN T SERPL-MCNC: 12 NG/L (ref 6–19)
WBC # BLD AUTO: 7 K/UL (ref 4.8–10.8)

## 2024-03-13 PROCEDURE — 700102 HCHG RX REV CODE 250 W/ 637 OVERRIDE(OP): Mod: UD | Performed by: EMERGENCY MEDICINE

## 2024-03-13 PROCEDURE — 71275 CT ANGIOGRAPHY CHEST: CPT

## 2024-03-13 PROCEDURE — 700105 HCHG RX REV CODE 258: Mod: UD | Performed by: EMERGENCY MEDICINE

## 2024-03-13 PROCEDURE — A9270 NON-COVERED ITEM OR SERVICE: HCPCS | Mod: UD | Performed by: EMERGENCY MEDICINE

## 2024-03-13 PROCEDURE — 96374 THER/PROPH/DIAG INJ IV PUSH: CPT

## 2024-03-13 PROCEDURE — 0241U HCHG SARS-COV-2 COVID-19 NFCT DS RESP RNA 4 TRGT ED POC: CPT

## 2024-03-13 PROCEDURE — 700117 HCHG RX CONTRAST REV CODE 255: Mod: UD | Performed by: EMERGENCY MEDICINE

## 2024-03-13 PROCEDURE — 700111 HCHG RX REV CODE 636 W/ 250 OVERRIDE (IP): Mod: JZ,UD | Performed by: EMERGENCY MEDICINE

## 2024-03-13 PROCEDURE — 87040 BLOOD CULTURE FOR BACTERIA: CPT

## 2024-03-13 PROCEDURE — 71045 X-RAY EXAM CHEST 1 VIEW: CPT

## 2024-03-13 RX ORDER — SODIUM CHLORIDE, SODIUM LACTATE, POTASSIUM CHLORIDE, CALCIUM CHLORIDE 600; 310; 30; 20 MG/100ML; MG/100ML; MG/100ML; MG/100ML
1000 INJECTION, SOLUTION INTRAVENOUS ONCE
Status: COMPLETED | OUTPATIENT
Start: 2024-03-13 | End: 2024-03-13

## 2024-03-13 RX ORDER — ACETAMINOPHEN 325 MG/1
650 TABLET ORAL ONCE
Status: COMPLETED | OUTPATIENT
Start: 2024-03-13 | End: 2024-03-13

## 2024-03-13 RX ORDER — PREDNISONE 20 MG/1
60 TABLET ORAL DAILY
Qty: 15 TABLET | Refills: 0 | Status: SHIPPED | OUTPATIENT
Start: 2024-03-13 | End: 2024-03-18

## 2024-03-13 RX ORDER — ALBUTEROL SULFATE 2.5 MG/3ML
2.5 SOLUTION RESPIRATORY (INHALATION) EVERY 4 HOURS PRN
Qty: 90 ML | Refills: 0 | Status: SHIPPED | OUTPATIENT
Start: 2024-03-13

## 2024-03-13 RX ORDER — METHYLPREDNISOLONE SODIUM SUCCINATE 125 MG/2ML
125 INJECTION, POWDER, LYOPHILIZED, FOR SOLUTION INTRAMUSCULAR; INTRAVENOUS ONCE
Status: COMPLETED | OUTPATIENT
Start: 2024-03-13 | End: 2024-03-13

## 2024-03-13 RX ADMIN — SODIUM CHLORIDE, POTASSIUM CHLORIDE, SODIUM LACTATE AND CALCIUM CHLORIDE 1000 ML: 600; 310; 30; 20 INJECTION, SOLUTION INTRAVENOUS at 01:05

## 2024-03-13 RX ADMIN — IOHEXOL 35 ML: 350 INJECTION, SOLUTION INTRAVENOUS at 02:15

## 2024-03-13 RX ADMIN — ACETAMINOPHEN 650 MG: 325 TABLET, FILM COATED ORAL at 00:50

## 2024-03-13 RX ADMIN — METHYLPREDNISOLONE SODIUM SUCCINATE 125 MG: 125 INJECTION, POWDER, FOR SOLUTION INTRAMUSCULAR; INTRAVENOUS at 00:26

## 2024-03-13 ASSESSMENT — PAIN DESCRIPTION - PAIN TYPE: TYPE: ACUTE PAIN

## 2024-03-13 NOTE — ED NOTES
Pt placed on 2L nasal cannula for hypoxia, oxygen desats to 87-88% on room air  Spo2 now within normal limits above 92%

## 2024-03-13 NOTE — ED NOTES
Pt provided discharge instructions, and paper prescriptions. Pt verbalized understanding of all instructions. IV removed, cathlon intact, site without s/s of infection. Pt ambulatory to lobby w/ steady gait.

## 2024-03-13 NOTE — ED PROVIDER NOTES
ED Provider Note    CHIEF COMPLAINT  Chief Complaint   Patient presents with    Shortness of Breath     Pt report persistent productive cough [id number]. Pt also complaints of SOB which started today. Pt was given 1 albuterol and 1 duoneb by EMS PTA.         HPI    Primary care provider: Karo Middleton M.D.   History obtained from: Patient  History limited by: None     Rhonda Gould is a 53 y.o. female who presents to the ED by EMS complaining of shortness of breath today.  She was given nebulizer treatment by EMS and reports feeling better.  She states that she does have history of COPD but is not on oxygen at baseline.  She also has persistent cough for more than a month along with runny nose and congestion.  She reports that her grandson had positive RSV several weeks ago but she was wearing a mask around him.  Otherwise no ill contacts or recent travels.  She denies fever.  She reports she may have had chills a couple of times.  She states that she had a little bit of nausea earlier today but no vomiting.  She denies any pain except for slight headache.  No sore throat.  No diarrhea or dysuria.  No rash or edema.    REVIEW OF SYSTEMS  Please see HPI for pertinent positives/negatives.  All other systems reviewed and are negative.     PAST MEDICAL HISTORY  Past Medical History:   Diagnosis Date    Chronic obstructive pulmonary disease (HCC)     Hypertension         SURGICAL HISTORY  Past Surgical History:   Procedure Laterality Date    HYSTERECTOMY LAPAROSCOPY  6/2005        SOCIAL HISTORY  Social History     Tobacco Use    Smoking status: Every Day    Smokeless tobacco: Never   Vaping Use    Vaping Use: Never used   Substance and Sexual Activity    Alcohol use: Not Currently     Comment: Quit 6/15    Drug use: Not Currently     Frequency: 2.0 times per week     Types: Inhaled, Marijuana     Comment: marijuana    Sexual activity: Not Currently     "    FAMILY HISTORY  Family History   Problem Relation Age of Onset    Diabetes Mother     Hypertension Mother     Hypertension Father     Diabetes Father     Heart Disease Maternal Grandmother     Cancer Maternal Grandfather         CURRENT MEDICATIONS  Home Medications       Reviewed by Baltazar Fay R.N. (Registered Nurse) on 03/12/24 at 2332  Med List Status: Partial     Medication Last Dose Status   benzonatate (TESSALON) 100 MG Cap  Active   influenza vaccine quad (FLUZONE QUADRIVALENT) 0.5 ML Suspension Prefilled Syringe injection  Active   mirtazapine (REMERON) 7.5 MG tablet  Active   tizanidine (ZANAFLEX) 4 MG Tab  Active                     ALLERGIES  Allergies   Allergen Reactions    Fish Allergy Anaphylaxis and Shortness of Breath     Swelling to throat.    Shellfish Allergy Anaphylaxis     Strict No Shellfish (avoids most fish, cod OK)      Codeine Hives and Itching     Tolerated morphine previously  Allergy updated from 2015, morphine given in 2016        PHYSICAL EXAM  VITAL SIGNS: BP (!) 148/106   Pulse 81   Temp 36.9 °C (98.4 °F) (Temporal)   Resp 15   Ht 1.753 m (5' 9\")   Wt 61.2 kg (135 lb)   LMP 06/11/2005   SpO2 94%   BMI 19.94 kg/m²  @[id number]::@     Pulse ox interpretation: 90% I interpret this pulse ox as abnormal    Cardiac monitor interpretation: Sinus tachycardia with heart rate in the 100s as interpreted by me.  The patient presented with shortness of breath with hypoxia and cardiac monitor was ordered to monitor for dysrhythmia.    Constitutional: Well developed, well nourished, alert in no apparent distress, nontoxic appearance    HENT: No external signs of trauma, normocephalic, oropharynx moist and clear   Eyes: PERRL, conjunctiva without erythema, no discharge, no icterus    Neck: Soft and supple, trachea midline, no stridor, no tenderness, no LAD, good ROM    Cardiovascular: Regular and tachycardic, no murmurs/rubs/gallops, strong distal pulses and good perfusion  "   Thorax & Lungs: No respiratory distress, CTAB    Abdomen: Soft, nontender, nondistended, no guarding, no rebound, normal BS    Back: No CVAT     Extremities: No cyanosis, no edema, no gross deformity, good ROM, intact distal pulses with brisk cap refill    Skin: Warm, dry, no pallor/cyanosis, no rash noted      Neuro: A/O times 3, no focal deficits noted    Psychiatric: Cooperative, normal mood and affect, normal judgement, appropriate for clinical situation       DIAGNOSTIC STUDIES / PROCEDURES    EKG  12 Lead EKG obtained at 2334 and interpreted by me:   Rate: 106   Rhythm: Sinus tachycardia  Ectopy: None  Intervals: Normal   Axis: Normal   QRS: Normal   ST segments: Normal  T Waves: Normal    Clinical Impression: Sinus tachycardia without acute ischemic changes or other dysrhythmia  Compared to February 6, 2024 when patient was seen sinus bradycardia but with similar appearance      LABS  All labs reviewed by me.     Results for orders placed or performed during the hospital encounter of 03/12/24   CBC WITH DIFFERENTIAL   Result Value Ref Range    WBC 7.0 4.8 - 10.8 K/uL    RBC 4.14 (L) 4.20 - 5.40 M/uL    Hemoglobin 13.2 12.0 - 16.0 g/dL    Hematocrit 37.7 37.0 - 47.0 %    MCV 91.1 81.4 - 97.8 fL    MCH 31.9 27.0 - 33.0 pg    MCHC 35.0 32.2 - 35.5 g/dL    RDW 49.5 35.9 - 50.0 fL    Platelet Count 342 164 - 446 K/uL    MPV 10.1 9.0 - 12.9 fL    Neutrophils-Polys 53.90 44.00 - 72.00 %    Lymphocytes 39.90 22.00 - 41.00 %    Monocytes 5.40 0.00 - 13.40 %    Eosinophils 0.40 0.00 - 6.90 %    Basophils 0.10 0.00 - 1.80 %    Immature Granulocytes 0.30 0.00 - 0.90 %    Nucleated RBC 0.00 0.00 - 0.20 /100 WBC    Neutrophils (Absolute) 3.78 1.82 - 7.42 K/uL    Lymphs (Absolute) 2.80 1.00 - 4.80 K/uL    Monos (Absolute) 0.38 0.00 - 0.85 K/uL    Eos (Absolute) 0.03 0.00 - 0.51 K/uL    Baso (Absolute) 0.01 0.00 - 0.12 K/uL    Immature Granulocytes (abs) 0.02 0.00 - 0.11 K/uL    NRBC (Absolute) 0.00 K/uL   COMP METABOLIC  PANEL   Result Value Ref Range    Sodium 137 135 - 145 mmol/L    Potassium 2.9 (L) 3.6 - 5.5 mmol/L    Chloride 98 96 - 112 mmol/L    Co2 19 (L) 20 - 33 mmol/L    Anion Gap 20.0 (H) 7.0 - 16.0    Glucose 97 65 - 99 mg/dL    Bun 10 8 - 22 mg/dL    Creatinine 0.63 0.50 - 1.40 mg/dL    Calcium 9.3 8.5 - 10.5 mg/dL    Correct Calcium 8.8 8.5 - 10.5 mg/dL    AST(SGOT) 14 12 - 45 U/L    ALT(SGPT) 11 2 - 50 U/L    Alkaline Phosphatase 79 30 - 99 U/L    Total Bilirubin 0.5 0.1 - 1.5 mg/dL    Albumin 4.6 3.2 - 4.9 g/dL    Total Protein 7.0 6.0 - 8.2 g/dL    Globulin 2.4 1.9 - 3.5 g/dL    A-G Ratio 1.9 g/dL   TROPONIN   Result Value Ref Range    Troponin T 12 6 - 19 ng/L   proBrain Natriuretic Peptide, NT   Result Value Ref Range    NT-proBNP 207 (H) 0 - 125 pg/mL   D-DIMER   Result Value Ref Range    D-Dimer 0.90 (H) 0.00 - 0.50 ug/mL (FEU)   LACTIC ACID   Result Value Ref Range    Lactic Acid 1.6 0.5 - 2.0 mmol/L   PROCALCITONIN   Result Value Ref Range    Procalcitonin <0.05 <0.25 ng/mL   ESTIMATED GFR   Result Value Ref Range    GFR (CKD-EPI) 104 >60 mL/min/1.73 m 2   EKG   Result Value Ref Range    Report       [facility] Emergency Dept.    Test Date:  2024  Pt Name:    JASON SCHMITZ                Department: ER  MRN:        [id number]                      Room:        39  Gender:     Female                       Technician: [id number]  :        1970                   Requested By:ER TRIAGE PROTOCOL  Order #:    [id number]                    Reading MD:    Measurements  Intervals                                Axis  Rate:       106                          P:          85  OR:         183                          QRS:        55  QRSD:       79                           T:          47  QT:         360  QTc:        479    Interpretive Statements  Sinus tachycardia  LAE, consider biatrial enlargement  RSR' in V1 or V2, probably normal variant  Compared to ECG 2024 15:18:08  RSR' in V1 or V2  now present  Sinus bradycardia no longer present     POC CoV-2, FLU A/B, RSV by PCR   Result Value Ref Range    POC Influenza A RNA, PCR Negative Negative    POC Influenza B RNA, PCR Negative Negative    POC RSV, by PCR Negative Negative    POC SARS-CoV-2, PCR NotDetected         RADIOLOGY  I have independently interpreted the diagnostic imaging associated with this visit and am waiting the final reading from the radiologist.   My preliminary interpretation is as follows: COPD without acute findings on chest x-ray.    CT-CTA CHEST PULMONARY ARTERY W/ RECONS   Final Result         1.  No pulmonary embolus appreciated.      DX-CHEST-PORTABLE (1 VIEW)   Final Result         1.  No acute cardiopulmonary disease.   2.  Trace bilateral pleural effusions   3.  Atherosclerosis             COURSE & MEDICAL DECISION MAKING  Nursing notes, VS, PMSFHx reviewed in chart.     Review of past medical records shows the patient has had several recent outpatient visits regarding left shoulder pain.  Cardiac stress test on September 24, 2014 without evidence for ischemia.  Last cardiac echo on March 3, 2020 had the following findings:    Compared to the images of the study done on 08/05/2016 there has been   no significant change.   Left ventricular ejection fraction is visually estimated to be 65%.  Estimated right ventricular systolic pressure  is 30 mmHg.      Differential diagnoses considered include but are not limited to: AMI, pericardial effusion/tamponade, pericarditis, CHF/pulm edema, PE, pneumothorax, pneumonia, pleural effusion, COPD, bronchospasm, bronchitis, respiratory infection, respiratory failure      ED Observation Status? Yes; I am placing the patient in to an observation status due to a diagnostic uncertainty as well as therapeutic intensity. Patient placed in observation status at 11:52 PM, 3/12/2024.     Observation plan is as follows: Will obtain EKG, imaging and laboratory studies and monitor patient in the  ED.    Upon Reevaluation, the patient's condition has: Improved; and will be discharged.    Patient discharged from ED Observation status at 0355 on March 13, 2024.       INITIAL ASSESSMENT AND PLAN  Care Narrative: This is a 53-year-old female patient with past medical history including COPD and hypertension brought in by EMS to the ED for worsening of shortness of breath today.  Patient also reports that she has had cough and congestion for more than a month.  She reports feeling better after nebulizer treatment by EMS.  Initial exam is fairly benign except for mild tachycardia.  Will obtain EKG, imaging laboratory studies and closely monitor patient in the ED.  Given patient's history of COPD, patient will be given a dose of Solu-Medrol.      Discussion of management with other QHP or appropriate source(s): None     Escalation of care considered, and ultimately not performed: acute inpatient care management, however at this time, the patient is most appropriate for outpatient management.    Decision tools and prescription drugs considered including, but not limited to: Antibiotics   and Medication modification   .      History and physical exam as above.  EKG shows sinus tachycardia without acute ischemic changes and troponin without elevation.  This is unlikely to be ACS.  BNP with mild elevation although patient does not appear to be in decompensated CHF clinically.  Initial chest x-ray shows COPD without acute findings.  Patient subsequently underwent CTPA due to elevated D-dimer and also without acute findings.  Influenza/RSV/COVID testing returned negative.  Patient without leukocytosis or fever in the ED and no elevation of lactic acid or procalcitonin.  Doubt that this is sepsis or acute bacterial infection.  She has mild hypokalemia although this may be due to the albuterol treatment she received by EMS.  She also has mild anion gap acidosis.  No findings of renal or hepatic dysfunction.  Due to suspected  COPD exacerbation, patient received Solu-Medrol.  She also requested acetaminophen.  She was closely monitored in the ED and remained clinically stable.  She is able to maintain good saturation on room air and without respiratory distress.  I discussed the findings with the patient.  This is a very pleasant well-appearing patient in no acute distress and nontoxic in appearance.  She reports that she is feeling much better and feels comfortable with discharge.  She will be started on a short course of prednisone for likely COPD exacerbation.  Patient also requested refill of her albuterol nebulizer solution.  I discussed with her supportive home care, outpatient follow-up and return to ED precautions.  Patient also noted with incidental elevated blood pressure readings for which she can follow-up on outpatient basis for further management.  She verbalized understanding and agreed with plan of care with no further questions or concerns.      HYDRATION: Based on the patient's presentation of Tachycardia the patient was given IV fluids. IV Hydration was used because oral hydration was not as rapid as required. Upon recheck following hydration, the patient was improved.      The patient is referred to a primary physician for blood pressure management, diabetic screening, and for all other preventative health concerns.       FINAL IMPRESSION  1. Shortness of breath Acute   2. Acute exacerbation of chronic obstructive pulmonary disease (COPD) (HCC) Acute          DISPOSITION  Patient will be discharged home in stable condition.       FOLLOW UP  Karo Middleton M.D.  [address] [facility] [id number] [phone]    Call [facility], Emergency Dept  [address] [facility] [address] [phone]    If symptoms worsen         OUTPATIENT MEDICATIONS  Discharge Medication List as of 3/13/2024  3:58 AM        START taking these medications    Details   predniSONE (DELTASONE) 20 MG Tab Take  3 Tablets by mouth every day for 5 days., Disp-15 Tablet, R-0, Print Rx Paper      albuterol (PROVENTIL) 2.5mg/3ml Nebu Soln solution for nebulization Take 3 mL by nebulization every four hours as needed for Shortness of Breath., Disp-90 mL, R-0, Print Rx Paper                Electronically signed by: Alex Zazueta D.O., 3/12/2024 11:51 PM      Portions of this record were made with voice recognition software.  Despite my review, errors may remain.  Please interpret this chart in the appropriate context.

## 2024-03-13 NOTE — ED TRIAGE NOTES
.  Chief Complaint   Patient presents with    Shortness of Breath     Pt report persistent productive cough [id number]. Pt also complaints of SOB which started today. Pt was given 1 albuterol and 1 duoneb by EMS PTA.      Pt BIB EMS from home for above complaint. Pt on RA 90%, and pt has hx of COPD.

## 2024-05-09 DIAGNOSIS — S46.012D ROTATOR CUFF STRAIN, LEFT, SUBSEQUENT ENCOUNTER: ICD-10-CM

## 2024-05-09 DIAGNOSIS — S43.432D LABRAL TEAR OF SHOULDER, LEFT, SUBSEQUENT ENCOUNTER: ICD-10-CM

## 2024-05-10 RX ORDER — TIZANIDINE 4 MG/1
4 TABLET ORAL NIGHTLY PRN
Qty: 14 TABLET | Refills: 0 | Status: SHIPPED | OUTPATIENT
Start: 2024-05-10

## 2024-05-10 NOTE — TELEPHONE ENCOUNTER
Refilled, but she has not been seen for this issue in about a year... She will need an appointment if she is having continued issue.

## 2024-05-16 ENCOUNTER — HOSPITAL ENCOUNTER (EMERGENCY)
Facility: MEDICAL CENTER | Age: 54
End: 2024-05-16
Attending: EMERGENCY MEDICINE
Payer: COMMERCIAL

## 2024-05-16 ENCOUNTER — APPOINTMENT (OUTPATIENT)
Dept: RADIOLOGY | Facility: MEDICAL CENTER | Age: 54
End: 2024-05-16
Attending: EMERGENCY MEDICINE
Payer: COMMERCIAL

## 2024-05-16 VITALS
SYSTOLIC BLOOD PRESSURE: 148 MMHG | OXYGEN SATURATION: 94 % | WEIGHT: 132 LBS | RESPIRATION RATE: 19 BRPM | BODY MASS INDEX: 19.55 KG/M2 | DIASTOLIC BLOOD PRESSURE: 95 MMHG | HEART RATE: 80 BPM | HEIGHT: 69 IN | TEMPERATURE: 97.7 F

## 2024-05-16 DIAGNOSIS — R42 DIZZINESS: ICD-10-CM

## 2024-05-16 DIAGNOSIS — R11.2 NAUSEA AND VOMITING, UNSPECIFIED VOMITING TYPE: ICD-10-CM

## 2024-05-16 LAB
ALBUMIN SERPL BCP-MCNC: 4.5 G/DL (ref 3.2–4.9)
ALBUMIN/GLOB SERPL: 1.9 G/DL
ALP SERPL-CCNC: 91 U/L (ref 30–99)
ALT SERPL-CCNC: 13 U/L (ref 2–50)
ANION GAP SERPL CALC-SCNC: 20 MMOL/L (ref 7–16)
AST SERPL-CCNC: 23 U/L (ref 12–45)
BASOPHILS # BLD AUTO: 0.4 % (ref 0–1.8)
BASOPHILS # BLD: 0.02 K/UL (ref 0–0.12)
BILIRUB SERPL-MCNC: 0.3 MG/DL (ref 0.1–1.5)
BUN SERPL-MCNC: 9 MG/DL (ref 8–22)
CALCIUM ALBUM COR SERPL-MCNC: 8.8 MG/DL (ref 8.5–10.5)
CALCIUM SERPL-MCNC: 9.2 MG/DL (ref 8.5–10.5)
CHLORIDE SERPL-SCNC: 104 MMOL/L (ref 96–112)
CO2 SERPL-SCNC: 19 MMOL/L (ref 20–33)
CREAT SERPL-MCNC: 0.71 MG/DL (ref 0.5–1.4)
EKG IMPRESSION: NORMAL
EOSINOPHIL # BLD AUTO: 0.05 K/UL (ref 0–0.51)
EOSINOPHIL NFR BLD: 1.1 % (ref 0–6.9)
ERYTHROCYTE [DISTWIDTH] IN BLOOD BY AUTOMATED COUNT: 56.6 FL (ref 35.9–50)
GFR SERPLBLD CREATININE-BSD FMLA CKD-EPI: 100 ML/MIN/1.73 M 2
GLOBULIN SER CALC-MCNC: 2.4 G/DL (ref 1.9–3.5)
GLUCOSE SERPL-MCNC: 92 MG/DL (ref 65–99)
HCT VFR BLD AUTO: 39.1 % (ref 37–47)
HGB BLD-MCNC: 13.6 G/DL (ref 12–16)
IMM GRANULOCYTES # BLD AUTO: 0.01 K/UL (ref 0–0.11)
IMM GRANULOCYTES NFR BLD AUTO: 0.2 % (ref 0–0.9)
LYMPHOCYTES # BLD AUTO: 1.39 K/UL (ref 1–4.8)
LYMPHOCYTES NFR BLD: 30.6 % (ref 22–41)
MAGNESIUM SERPL-MCNC: 1.7 MG/DL (ref 1.5–2.5)
MCH RBC QN AUTO: 32.3 PG (ref 27–33)
MCHC RBC AUTO-ENTMCNC: 34.8 G/DL (ref 32.2–35.5)
MCV RBC AUTO: 92.9 FL (ref 81.4–97.8)
MONOCYTES # BLD AUTO: 0.34 K/UL (ref 0–0.85)
MONOCYTES NFR BLD AUTO: 7.5 % (ref 0–13.4)
NEUTROPHILS # BLD AUTO: 2.73 K/UL (ref 1.82–7.42)
NEUTROPHILS NFR BLD: 60.2 % (ref 44–72)
NRBC # BLD AUTO: 0 K/UL
NRBC BLD-RTO: 0 /100 WBC (ref 0–0.2)
PLATELET # BLD AUTO: 329 K/UL (ref 164–446)
PMV BLD AUTO: 8.9 FL (ref 9–12.9)
POTASSIUM SERPL-SCNC: 4 MMOL/L (ref 3.6–5.5)
PROT SERPL-MCNC: 6.9 G/DL (ref 6–8.2)
RBC # BLD AUTO: 4.21 M/UL (ref 4.2–5.4)
SODIUM SERPL-SCNC: 143 MMOL/L (ref 135–145)
TROPONIN T SERPL-MCNC: 7 NG/L (ref 6–19)
WBC # BLD AUTO: 4.5 K/UL (ref 4.8–10.8)

## 2024-05-16 RX ORDER — LISINOPRIL 5 MG/1
5 TABLET ORAL DAILY
Qty: 30 TABLET | Refills: 0 | Status: SHIPPED | OUTPATIENT
Start: 2024-05-16

## 2024-05-16 RX ORDER — ONDANSETRON 2 MG/ML
4 INJECTION INTRAMUSCULAR; INTRAVENOUS ONCE
Status: COMPLETED | OUTPATIENT
Start: 2024-05-16 | End: 2024-05-16

## 2024-05-16 RX ORDER — ACETAMINOPHEN 500 MG
1000 TABLET ORAL ONCE
Status: COMPLETED | OUTPATIENT
Start: 2024-05-16 | End: 2024-05-16

## 2024-05-16 RX ORDER — LISINOPRIL 10 MG/1
5 TABLET ORAL
Status: DISCONTINUED | OUTPATIENT
Start: 2024-05-16 | End: 2024-05-16

## 2024-05-16 RX ORDER — SODIUM CHLORIDE 9 MG/ML
1000 INJECTION, SOLUTION INTRAVENOUS ONCE
Status: COMPLETED | OUTPATIENT
Start: 2024-05-16 | End: 2024-05-16

## 2024-05-16 RX ADMIN — SODIUM CHLORIDE 1000 ML: 9 INJECTION, SOLUTION INTRAVENOUS at 08:26

## 2024-05-16 RX ADMIN — ACETAMINOPHEN 1000 MG: 500 TABLET, FILM COATED ORAL at 08:52

## 2024-05-16 RX ADMIN — ONDANSETRON 4 MG: 2 INJECTION INTRAMUSCULAR; INTRAVENOUS at 07:56

## 2024-05-16 ASSESSMENT — FIBROSIS 4 INDEX: FIB4 SCORE: 0.68

## 2024-05-16 ASSESSMENT — PAIN DESCRIPTION - PAIN TYPE: TYPE: ACUTE PAIN

## 2024-05-16 NOTE — Clinical Note
Rhonda Gould was seen and treated in our emergency department on 5/16/2024.  She may return to work on 05/17/2024.       If you have any questions or concerns, please don't hesitate to call.      Ari Becker M.D.

## 2024-05-16 NOTE — ED PROVIDER NOTES
ED Provider Note    CHIEF COMPLAINT  Chief Complaint   Patient presents with    Dizziness    N/V    Hypertension     Onset of dizziness and nausea ~ one hour ago       EXTERNAL RECORDS REVIEWED  Outpatient Notes PCP, Sports Medicine    HPI/ROS  LIMITATION TO HISTORY   Select: : None  OUTSIDE HISTORIAN(S):  None    Rhonda Gould is a 53 y.o. female with history of COPD and HTN who presents to [facility] ED on 5/16/2024 for evaluation of nausea, vomiting, and lightheadedness. She describes waking up today feeling ill with above symptoms in addition to diarrhea that began yesterday (5/15). She notes mild chest tightness that is often improved with home albuterol use. She denies fever or chills. Uncertain of sick contacts.     She notes several month history of intermittent cough due to her COPD which may have increased in recent days along with increased sputum production. She does use tobacco. She uses albuterol inhaler and describes use of daily inhaler, not recorded in chart. She was previously prescribed lisinopril but was discontinued by PCP approximately 1 year ago.    PAST MEDICAL HISTORY   has a past medical history of Chronic obstructive pulmonary disease (HCC) and Hypertension.    SURGICAL HISTORY   has a past surgical history that includes hysterectomy laparoscopy (6/2005).    FAMILY HISTORY  Family History   Problem Relation Age of Onset    Diabetes Mother     Hypertension Mother     Hypertension Father     Diabetes Father     Heart Disease Maternal Grandmother     Cancer Maternal Grandfather        SOCIAL HISTORY  Social History     Tobacco Use    Smoking status: Every Day    Smokeless tobacco: Never   Vaping Use    Vaping status: Never Used   Substance and Sexual Activity    Alcohol use: Not Currently     Comment: Quit 6/15    Drug use: Not Currently     Frequency: 2.0 times per week     Types: Inhaled, Marijuana     Comment: marijuana    Sexual activity: Not Currently       CURRENT MEDICATIONS  Home  "Medications       Reviewed by Claude Martinez R.N. (Registered Nurse) on 05/16/24 at 0739  Med List Status: Not Addressed     Medication Last Dose Status   albuterol (PROVENTIL) 2.5mg/3ml Nebu Soln solution for nebulization  Active   benzonatate (TESSALON) 100 MG Cap  Active   influenza vaccine quad (FLUZONE QUADRIVALENT) 0.5 ML Suspension Prefilled Syringe injection  Active   mirtazapine (REMERON) 7.5 MG tablet  Active   tizanidine (ZANAFLEX) 4 MG Tab  Active                  Audit from Redirected Encounters    **Home medications have not yet been reviewed for this encounter**         ALLERGIES  Allergies   Allergen Reactions    Fish Allergy Anaphylaxis and Shortness of Breath     Swelling to throat.    Shellfish Allergy Anaphylaxis     Strict No Shellfish (avoids most fish, cod OK)      Codeine Hives and Itching     Tolerated morphine previously  Allergy updated from 2015, morphine given in 2016       PHYSICAL EXAM  VITAL SIGNS: BP (!) 155/103   Pulse 82   Temp 37.1 °C (98.8 °F) (Oral)   Resp 20   Ht 1.753 m (5' 9\")   Wt 59.9 kg (132 lb)   LMP 06/11/2005   SpO2 93%   BMI 19.49 kg/m²      GENERAL: The patient is well developed, well nourished and nontoxic appearing.  HEENT: Nonicteric sclerae, EOMI. Oropharynx clear. Moist mucous membranes. No conjunctival injection.  CHEST: Chest wall is nontender.  HEART: Regular rate and rhythm without murmurs.  LUNGS: Clear to auscultation bilaterally. Normal chest rise.  ABDOMEN: Soft, normal bowel sounds, nontender, non distended.  GENITAL: Deferred  RECTAL: Deferred.  SKIN: No rash, no excessive bruising, petechiae, or purpura.  NEUROLOGIC: Cranial nerves II-XII grossly intact, no motor/sensory deficit.     EKG/LABS    I have independently interpreted this EKG    This is a 12-lead study.  The rhythm is regular with a rate of 101.  There are no ST segment nor T wave abnormalities.  Interpretation: No ST segment elevation myocardial infarction. No arrhythmia.    Results " for orders placed or performed during the hospital encounter of 05/16/24   CBC WITH DIFFERENTIAL   Result Value Ref Range    WBC 4.5 (L) 4.8 - 10.8 K/uL    RBC 4.21 4.20 - 5.40 M/uL    Hemoglobin 13.6 12.0 - 16.0 g/dL    Hematocrit 39.1 37.0 - 47.0 %    MCV 92.9 81.4 - 97.8 fL    MCH 32.3 27.0 - 33.0 pg    MCHC 34.8 32.2 - 35.5 g/dL    RDW 56.6 (H) 35.9 - 50.0 fL    Platelet Count 329 164 - 446 K/uL    MPV 8.9 (L) 9.0 - 12.9 fL    Neutrophils-Polys 60.20 44.00 - 72.00 %    Lymphocytes 30.60 22.00 - 41.00 %    Monocytes 7.50 0.00 - 13.40 %    Eosinophils 1.10 0.00 - 6.90 %    Basophils 0.40 0.00 - 1.80 %    Immature Granulocytes 0.20 0.00 - 0.90 %    Nucleated RBC 0.00 0.00 - 0.20 /100 WBC    Neutrophils (Absolute) 2.73 1.82 - 7.42 K/uL    Lymphs (Absolute) 1.39 1.00 - 4.80 K/uL    Monos (Absolute) 0.34 0.00 - 0.85 K/uL    Eos (Absolute) 0.05 0.00 - 0.51 K/uL    Baso (Absolute) 0.02 0.00 - 0.12 K/uL    Immature Granulocytes (abs) 0.01 0.00 - 0.11 K/uL    NRBC (Absolute) 0.00 K/uL   COMP METABOLIC PANEL   Result Value Ref Range    Sodium 143 135 - 145 mmol/L    Potassium 4.0 3.6 - 5.5 mmol/L    Chloride 104 96 - 112 mmol/L    Co2 19 (L) 20 - 33 mmol/L    Anion Gap 20.0 (H) 7.0 - 16.0    Glucose 92 65 - 99 mg/dL    Bun 9 8 - 22 mg/dL    Creatinine 0.71 0.50 - 1.40 mg/dL    Calcium 9.2 8.5 - 10.5 mg/dL    Correct Calcium 8.8 8.5 - 10.5 mg/dL    AST(SGOT) 23 12 - 45 U/L    ALT(SGPT) 13 2 - 50 U/L    Alkaline Phosphatase 91 30 - 99 U/L    Total Bilirubin 0.3 0.1 - 1.5 mg/dL    Albumin 4.5 3.2 - 4.9 g/dL    Total Protein 6.9 6.0 - 8.2 g/dL    Globulin 2.4 1.9 - 3.5 g/dL    A-G Ratio 1.9 g/dL   TROPONIN   Result Value Ref Range    Troponin T 7 6 - 19 ng/L   MAGNESIUM   Result Value Ref Range    Magnesium 1.7 1.5 - 2.5 mg/dL   ESTIMATED GFR   Result Value Ref Range    GFR (CKD-EPI) 100 >60 mL/min/1.73 m 2   EKG   Result Value Ref Range    Report       [facility] Emergency Dept.    Test Date:   2024  Pt Name:    JASON SCHMITZ                Department: ER  MRN:        [id number]                      Room:       [facility]  Gender:     Female                       Technician: [id number]  :        1970                   Requested By:AVRIL KNIGHT  Order #:    [id number]                    Reading MD: AVRIL KNIGHT MD    Measurements  Intervals                                Axis  Rate:       101                          P:          74  VT:         163                          QRS:        58  QRSD:       75                           T:          65  QT:         356  QTc:        462    Interpretive Statements  Sinus tachycardia  Left atrial enlargement  Compared to ECG 2024 23:34:36  ST (T wave) deviation now present  Electronically Signed On 2024 07:57:54 PDT by AVRIL KNIGHT MD             RADIOLOGY/PROCEDURES   I have independently interpreted the diagnostic imaging associated with this visit and am waiting the final reading from the radiologist.     My preliminary interpretation is as follows: No visible consolidation, pneumothorax, or cardiomegaly.    Radiologist interpretation:  DX-CHEST-PORTABLE (1 VIEW)   Final Result      No acute cardiac or pulmonary abnormalities are identified.          COURSE & MEDICAL DECISION MAKING    ASSESSMENT, COURSE AND PLAN  Care Narrative: 54 yo F presents for evaluation of acute nausea, vomiting, diarrhea, headache, weakness and dizziness. Headache improved with tylenol and nausea resolved with zofran dose. Labs notable for normal troponin, no leukocytosis, no electrolyte abnormalities, and normal renal/hepatic function. EKG without acute changes such as ST elevation or depression. CXR performed and negative for acute abnormalities. Initial elevated BP to max 199/127 improved with rest to 159/106. Received 1L NS bolus with overall improvement of symptoms. Due to improvement in symptoms and reassuring lab/imaging evaluation, patient is appropriate to  discharge home at this time. Will re-initiate lisinopril 5 mg daily with recommendation to follow up with PCP in next 5-7 days for continued care or earlier if symptoms are worsening.     Hydration: Based on the patient's presentation of Acute Diarrhea the patient was given IV fluids. IV Hydration was used because oral hydration was not as rapid as required. Upon recheck following hydration, the patient was improved.          PROBLEMS MANAGED      I independently evaluated the patient and repeated the important components of the history and physical. I discussed the management with the resident. I have reviewed and agree with the pertinent clinical information above including history, exam, study findings, and recommendations.     # Lightheadedness.  At this point no findings of emergent pathology.  Potentially with her diarrhea with some mild volume depletion.  No neurologic signs/symptoms were present to suggest a neurogenic cause such as CVA/TIA.  The patient has no valvular abnormality on my examination to suggest valvular cause or hypertrophic cardiomyopathy. The ekg does not show any evidence of arrythmia, prolonged intervals, early excitation, or other abnormality such as an accessory pathway, qt prolongation, or brugada syndrome. ACS or MI are unlikely causes given nature of sx, unremarkable ECG and given the patients improvement the likely of acs of mi is very low. Serious bacterial illness was considered but ruled out by history and physical exam.   There is no evidence of metabolic abnormalities to explain this episode on labs.  Patient is not anemic and there are no symptoms to suggest GI bleed.    # Hypertension.  Patient with noted hypertension here although no findings suggestive of endorgan damage.  She has been on lisinopril in the past, will restart this and she will follow-up with primary care      Electronically signed by: Thai Joyce M.D., 5/16/2024 10:08 AM    DISPOSITION AND  DISCUSSIONS        Decision tools and prescription drugs considered including, but not limited to: Medication modification send trial as above .    The patient will return for new or worsening symptoms and is stable at the time of discharge.    The patient is referred to a primary physician for blood pressure management, diabetic screening, and for all other preventative health concerns.      DISPOSITION:  Patient will be discharged home in stable condition.    FOLLOW UP:  Karo Middleton M.D.  [address] [place] [id number]  [phone]    In 1 week        OUTPATIENT MEDICATIONS:  New Prescriptions    LISINOPRIL (PRINIVIL) 5 MG TAB    Take 1 Tablet by mouth every day.   =    FINAL IMPRESSION  1. Nausea and vomiting, unspecified vomiting type    2. Dizziness          Electronically signed by: Thai Joyce M.D., 5/16/2024 9:57 AM          FINAL DIAGNOSIS  1. Nausea and vomiting, unspecified vomiting type    2. Dizziness           Electronically signed by: Thai Joyce M.D., 5/16/2024 7:43 AM

## 2024-05-16 NOTE — ED TRIAGE NOTES
"Chief Complaint   Patient presents with    Dizziness    N/V    Hypertension     Onset of dizziness and nausea ~ one hour ago     BP (!) 199/127   Pulse 76   Resp 20   Ht 1.753 m (5' 9\")   Wt 59.9 kg (132 lb)   LMP 06/11/2005   SpO2 97%   BMI 19.49 kg/m²     54 y/o female presents to ED with complaint of dizziness and nausea onset ~1.5 hours ago. Patient was sitting in ED this morning when she developed nausea, vomiting, and dizziness. Markedly hypertensive in ED. Patient states she was on lisinopril, however was taken off of medication by her PMD.  She does describe mild chest tightness. No shortness of breath, but does have a COPD hx and continues to smoke.      Awake, alert, moderate distress.   "

## 2024-05-19 ENCOUNTER — APPOINTMENT (OUTPATIENT)
Dept: RADIOLOGY | Facility: MEDICAL CENTER | Age: 54
End: 2024-05-19
Attending: EMERGENCY MEDICINE
Payer: COMMERCIAL

## 2024-05-19 ENCOUNTER — HOSPITAL ENCOUNTER (EMERGENCY)
Facility: MEDICAL CENTER | Age: 54
End: 2024-05-19
Attending: EMERGENCY MEDICINE
Payer: COMMERCIAL

## 2024-05-19 VITALS
RESPIRATION RATE: 22 BRPM | HEART RATE: 75 BPM | TEMPERATURE: 96.5 F | WEIGHT: 125 LBS | BODY MASS INDEX: 18.51 KG/M2 | HEIGHT: 69 IN | SYSTOLIC BLOOD PRESSURE: 158 MMHG | DIASTOLIC BLOOD PRESSURE: 107 MMHG | OXYGEN SATURATION: 93 %

## 2024-05-19 DIAGNOSIS — I10 HYPERTENSION, UNSPECIFIED TYPE: ICD-10-CM

## 2024-05-19 DIAGNOSIS — R51.9 NONINTRACTABLE HEADACHE, UNSPECIFIED CHRONICITY PATTERN, UNSPECIFIED HEADACHE TYPE: ICD-10-CM

## 2024-05-19 LAB
ALBUMIN SERPL BCP-MCNC: 4.7 G/DL (ref 3.2–4.9)
ALBUMIN/GLOB SERPL: 1.8 G/DL
ALP SERPL-CCNC: 106 U/L (ref 30–99)
ALT SERPL-CCNC: 11 U/L (ref 2–50)
ANION GAP SERPL CALC-SCNC: 24 MMOL/L (ref 7–16)
AST SERPL-CCNC: 23 U/L (ref 12–45)
BASOPHILS # BLD AUTO: 0.4 % (ref 0–1.8)
BASOPHILS # BLD: 0.02 K/UL (ref 0–0.12)
BILIRUB SERPL-MCNC: 1.2 MG/DL (ref 0.1–1.5)
BUN SERPL-MCNC: 10 MG/DL (ref 8–22)
CALCIUM ALBUM COR SERPL-MCNC: 8.8 MG/DL (ref 8.5–10.5)
CALCIUM SERPL-MCNC: 9.4 MG/DL (ref 8.5–10.5)
CHLORIDE SERPL-SCNC: 94 MMOL/L (ref 96–112)
CO2 SERPL-SCNC: 17 MMOL/L (ref 20–33)
CREAT SERPL-MCNC: 0.69 MG/DL (ref 0.5–1.4)
EKG IMPRESSION: NORMAL
EOSINOPHIL # BLD AUTO: 0.06 K/UL (ref 0–0.51)
EOSINOPHIL NFR BLD: 1.1 % (ref 0–6.9)
ERYTHROCYTE [DISTWIDTH] IN BLOOD BY AUTOMATED COUNT: 53.8 FL (ref 35.9–50)
GFR SERPLBLD CREATININE-BSD FMLA CKD-EPI: 102 ML/MIN/1.73 M 2
GLOBULIN SER CALC-MCNC: 2.6 G/DL (ref 1.9–3.5)
GLUCOSE SERPL-MCNC: 77 MG/DL (ref 65–99)
HCT VFR BLD AUTO: 38.9 % (ref 37–47)
HGB BLD-MCNC: 13.4 G/DL (ref 12–16)
IMM GRANULOCYTES # BLD AUTO: 0.01 K/UL (ref 0–0.11)
IMM GRANULOCYTES NFR BLD AUTO: 0.2 % (ref 0–0.9)
LYMPHOCYTES # BLD AUTO: 1.06 K/UL (ref 1–4.8)
LYMPHOCYTES NFR BLD: 19.2 % (ref 22–41)
MCH RBC QN AUTO: 32.3 PG (ref 27–33)
MCHC RBC AUTO-ENTMCNC: 34.4 G/DL (ref 32.2–35.5)
MCV RBC AUTO: 93.7 FL (ref 81.4–97.8)
MONOCYTES # BLD AUTO: 0.28 K/UL (ref 0–0.85)
MONOCYTES NFR BLD AUTO: 5.1 % (ref 0–13.4)
NEUTROPHILS # BLD AUTO: 4.09 K/UL (ref 1.82–7.42)
NEUTROPHILS NFR BLD: 74 % (ref 44–72)
NRBC # BLD AUTO: 0 K/UL
NRBC BLD-RTO: 0 /100 WBC (ref 0–0.2)
PLATELET # BLD AUTO: 303 K/UL (ref 164–446)
PMV BLD AUTO: 9.3 FL (ref 9–12.9)
POTASSIUM SERPL-SCNC: 3.6 MMOL/L (ref 3.6–5.5)
PROT SERPL-MCNC: 7.3 G/DL (ref 6–8.2)
RBC # BLD AUTO: 4.15 M/UL (ref 4.2–5.4)
SODIUM SERPL-SCNC: 135 MMOL/L (ref 135–145)
WBC # BLD AUTO: 5.5 K/UL (ref 4.8–10.8)

## 2024-05-19 RX ORDER — LISINOPRIL 10 MG/1
10 TABLET ORAL DAILY
Qty: 30 TABLET | Refills: 0 | Status: SHIPPED | OUTPATIENT
Start: 2024-05-19

## 2024-05-19 RX ORDER — MORPHINE SULFATE 4 MG/ML
4 INJECTION INTRAVENOUS ONCE
Status: COMPLETED | OUTPATIENT
Start: 2024-05-19 | End: 2024-05-19

## 2024-05-19 RX ORDER — DIPHENHYDRAMINE HYDROCHLORIDE 50 MG/ML
25 INJECTION INTRAMUSCULAR; INTRAVENOUS ONCE
Status: COMPLETED | OUTPATIENT
Start: 2024-05-19 | End: 2024-05-19

## 2024-05-19 RX ORDER — LABETALOL HYDROCHLORIDE 5 MG/ML
20 INJECTION, SOLUTION INTRAVENOUS ONCE
Status: COMPLETED | OUTPATIENT
Start: 2024-05-19 | End: 2024-05-19

## 2024-05-19 RX ORDER — AMLODIPINE BESYLATE 5 MG/1
5 TABLET ORAL DAILY
Qty: 30 TABLET | Refills: 0 | Status: SHIPPED | OUTPATIENT
Start: 2024-05-19

## 2024-05-19 RX ADMIN — IOHEXOL 80 ML: 350 INJECTION, SOLUTION INTRAVENOUS at 08:10

## 2024-05-19 RX ADMIN — LABETALOL HYDROCHLORIDE 20 MG: 5 INJECTION, SOLUTION INTRAVENOUS at 07:41

## 2024-05-19 RX ADMIN — MORPHINE SULFATE 4 MG: 4 INJECTION INTRAVENOUS at 09:42

## 2024-05-19 RX ADMIN — DIPHENHYDRAMINE HYDROCHLORIDE 25 MG: 50 INJECTION, SOLUTION INTRAMUSCULAR; INTRAVENOUS at 07:45

## 2024-05-19 ASSESSMENT — PAIN DESCRIPTION - PAIN TYPE: TYPE: ACUTE PAIN

## 2024-05-19 ASSESSMENT — FIBROSIS 4 INDEX: FIB4 SCORE: 1.07

## 2024-05-19 NOTE — ED PROVIDER NOTES
CHIEF COMPLAINT  Chief Complaint   Patient presents with    Blood Pressure Problem     Previously taking 10mg lisinopril - taken off medication by PCP about 1.5 years ago. Pt seen in ED last week for same cc, prescribed 5mg lisinopril. Pt reports compliance with medication. Reports 7/10 mid sternal CP x 2-3 hrs. +HA     LIMITATION TO HISTORY   Select: none    HPI    Rhonda Gould is a 53 y.o. female who presents to the Emergency Department for hypertension onset prior to arrival. She states that for the 3 days she has been having headache, has been taking Advil and Tylenol for her symptoms. Last week she was seen here for similar blood pressure issues and was started on 5 mg lisinopril. She was previously taking 10 mg lisinopril 1.5 years ago but was taken off of this as her pressures were stable. She has been compliant with her 5 mg course. Today she checked her BP and it was elevated, at home it was 140/131, then at work it was 196/139. Recently she has been increasingly stressed due to recent surgery and family.     EXTERNAL RECORDS REVIEWED  Select: Seen here three days ago for nausea and vomiting, was notably hypertensive and restarted on 5 mg lisinopril    PAST MEDICAL HISTORY  Past Medical History:   Diagnosis Date    Chronic obstructive pulmonary disease (HCC)     Hypertension      SURGICAL HISTORY  Past Surgical History:   Procedure Laterality Date    HYSTERECTOMY LAPAROSCOPY  6/2005     FAMILY HISTORY  Family History   Problem Relation Age of Onset    Diabetes Mother     Hypertension Mother     Hypertension Father     Diabetes Father     Heart Disease Maternal Grandmother     Cancer Maternal Grandfather       SOCIAL HISTORY  Social History     Tobacco Use    Smoking status: Every Day    Smokeless tobacco: Never   Vaping Use    Vaping status: Never Used   Substance Use Topics    Alcohol use: Not Currently     Comment: Quit 6/15    Drug use: Not Currently     Frequency: 2.0 times per week     Types:  "Inhaled, Marijuana     Comment: marijuana     CURRENT MEDICATIONS  No current facility-administered medications on file prior to encounter.     Current Outpatient Medications on File Prior to Encounter   Medication Sig Dispense Refill    lisinopril (PRINIVIL) 5 MG Tab Take 1 Tablet by mouth every day. 30 Tablet 0    tizanidine (ZANAFLEX) 4 MG Tab Take 1 tablet by mouth at bedtime as needed (as needed for spasm). 14 Tablet 0    albuterol (PROVENTIL) 2.5mg/3ml Nebu Soln solution for nebulization Take 3 mL by nebulization every four hours as needed for Shortness of Breath. 90 mL 0    influenza vaccine quad (FLUZONE QUADRIVALENT) 0.5 ML Suspension Prefilled Syringe injection Inject  into the shoulder, thigh, or buttocks. 0.5 mL 0    mirtazapine (REMERON) 7.5 MG tablet TAKE 1 TABLET BY MOUTH EVERY DAY AT BEDTIME FOR 90 DAYS      benzonatate (TESSALON) 100 MG Cap Take 1 Capsule by mouth 3 times a day as needed for Cough. 60 Capsule 0     ALLERGIES  Allergies   Allergen Reactions    Fish Allergy Anaphylaxis and Shortness of Breath     Swelling to throat.    Shellfish Allergy Anaphylaxis     Strict No Shellfish (avoids most fish, cod OK)      Codeine Hives and Itching     Tolerated morphine previously  Allergy updated from 2015, morphine given in 2016       PHYSICAL EXAM  VITAL SIGNS:BP (!) 171/116   Pulse (!) 129   Temp 35.8 °C (96.5 °F) (Temporal)   Resp 20   Ht 1.753 m (5' 9\")   Wt 56.7 kg (125 lb)   LMP 06/11/2005   SpO2 99%   BMI 18.46 kg/m²     Constitutional: Well-developed no acute distress   HENT: Normocephalic, Atraumatic, Bilateral external ears normal.  Eyes:  conjunctiva are normal.   Neck: Supple.  Nontender midline  Cardiovascular: Regular rate and rhythm without murmurs gallops or rubs.   Thorax & Lungs: No respiratory distress. Breathing comfortably. Lungs are clear to auscultation bilaterally, there are no wheezes no rales. Chest wall is nontender.  Abdomen: Soft, non distended, non tender   Skin: " Warm, Dry, No erythema,   Back: No tenderness, No CVA tenderness.  Musculoskeletal: No clubbing cyanosis or edema good range of motion. Left shoulder mildly tender, unchanged from prior.   Neurologic: Cranial nerves II-XII grossly intact, moving all 4 extremities, 5/5 strength in all 4 extremities, cerebellar functions intact, no other focal abnormalities.   Psychiatric: Affect normal, Judgment normal, Mood normal.     DIAGNOSTIC STUDIES / PROCEDURES    LABS  Results for orders placed or performed during the hospital encounter of 05/19/24   CBC with Differential   Result Value Ref Range    WBC 5.5 4.8 - 10.8 K/uL    RBC 4.15 (L) 4.20 - 5.40 M/uL    Hemoglobin 13.4 12.0 - 16.0 g/dL    Hematocrit 38.9 37.0 - 47.0 %    MCV 93.7 81.4 - 97.8 fL    MCH 32.3 27.0 - 33.0 pg    MCHC 34.4 32.2 - 35.5 g/dL    RDW 53.8 (H) 35.9 - 50.0 fL    Platelet Count 303 164 - 446 K/uL    MPV 9.3 9.0 - 12.9 fL    Neutrophils-Polys 74.00 (H) 44.00 - 72.00 %    Lymphocytes 19.20 (L) 22.00 - 41.00 %    Monocytes 5.10 0.00 - 13.40 %    Eosinophils 1.10 0.00 - 6.90 %    Basophils 0.40 0.00 - 1.80 %    Immature Granulocytes 0.20 0.00 - 0.90 %    Nucleated RBC 0.00 0.00 - 0.20 /100 WBC    Neutrophils (Absolute) 4.09 1.82 - 7.42 K/uL    Lymphs (Absolute) 1.06 1.00 - 4.80 K/uL    Monos (Absolute) 0.28 0.00 - 0.85 K/uL    Eos (Absolute) 0.06 0.00 - 0.51 K/uL    Baso (Absolute) 0.02 0.00 - 0.12 K/uL    Immature Granulocytes (abs) 0.01 0.00 - 0.11 K/uL    NRBC (Absolute) 0.00 K/uL   Complete Metabolic Panel (CMP)   Result Value Ref Range    Sodium 135 135 - 145 mmol/L    Potassium 3.6 3.6 - 5.5 mmol/L    Chloride 94 (L) 96 - 112 mmol/L    Co2 17 (L) 20 - 33 mmol/L    Anion Gap 24.0 (H) 7.0 - 16.0    Glucose 77 65 - 99 mg/dL    Bun 10 8 - 22 mg/dL    Creatinine 0.69 0.50 - 1.40 mg/dL    Calcium 9.4 8.5 - 10.5 mg/dL    Correct Calcium 8.8 8.5 - 10.5 mg/dL    AST(SGOT) 23 12 - 45 U/L    ALT(SGPT) 11 2 - 50 U/L    Alkaline Phosphatase 106 (H) 30 - 99 U/L     Total Bilirubin 1.2 0.1 - 1.5 mg/dL    Albumin 4.7 3.2 - 4.9 g/dL    Total Protein 7.3 6.0 - 8.2 g/dL    Globulin 2.6 1.9 - 3.5 g/dL    A-G Ratio 1.8 g/dL   ESTIMATED GFR   Result Value Ref Range    GFR (CKD-EPI) 102 >60 mL/min/1.73 m 2   EKG   Result Value Ref Range    Report       [facility] Emergency Dept.    Test Date:  2024  Pt Name:    JASON SCHMITZ                Department: ER  MRN:        [id number]                      Room:  Gender:     Female                       Technician: [id number]  :        1970                   Requested By:ER TRIAGE PROTOCOL  Order #:    [id number]                    Reading MD: NIMCO WALLACE MD    Measurements  Intervals                                Axis  Rate:       87                           P:          67  UT:         199                          QRS:        54  QRSD:       60                           T:          64  QT:         371  QTc:        447    Interpretive Statements  Sinus rhythm  Left atrial enlargement  Anteroseptal infarct, age indeterminate  Compared to ECG 2024 07:41:18  Myocardial infarct finding now present  Sinus tachycardia no longer present  unchanged ECG  Electronically Signed On 2024 13:07:24 PDT by NIMCO WALLACE MD       All labs reviewed by me.     EKG:   I have independently interpreted this EKG as detailed above.    RADIOLOGY  I have independently interpreted the diagnostic imaging associated with this visit and am waiting the final reading from the radiologist.   My preliminary interpretation is as follows: Chest x-ray shows no significant infiltrates  Radiologist interpretation:   CT-CTA HEAD WITH & W/O-POST PROCESS   Final Result      1.  No large vessel occlusion, high-grade stenosis or aneurysm of the [place] of [place].   2.  No CT evidence of acute infarct, hemorrhage or mass.      DX-CHEST-PORTABLE (1 VIEW)   Final Result      No acute cardiopulmonary abnormality.            COURSE &  MEDICAL DECISION MAKING    ED COURSE:    ED Observation Status? No, The patient does not qualify for observation status     INTERVENTIONS BY ME:  Medications   labetalol (Normodyne/Trandate) injection 20 mg (20 mg Intravenous Given 5/19/24 0741)   diphenhydrAMINE (Benadryl) injection 25 mg (25 mg Intravenous Given 5/19/24 0745)   iohexol (OMNIPAQUE) 350 mg/mL (IV) (80 mL Intravenous Given 5/19/24 0810)   morphine 4 MG/ML injection 4 mg (4 mg Intravenous Given 5/19/24 0942)     6:59 AM - Patient seen and examined at bedside. She presents for hypertension, was seen here recently for this and started on 5 mg lisinopril. Currently having 3 days of headache. Discussed plan of care, including labs, imaging and medications. Patient agrees to the plan of care. The patient will be medicated with labetalol 20 mg and Benadryl 25 mg. Ordered for CT-head w/o, DX-chest, CBC w/ diff, CMP and EKG to evaluate her symptoms.     9:08 AM - Patient was reevaluated at bedside. Discussed lab and radiology results with the patient. She is still complaining of headache so will treat with 4 mg morphine. Her blood pressure is coming down. After medication administration she will be discharged. Discussed return precautions and plan for at home care. Patient verbalizes understanding and agreement to this plan of care.       INITIAL ASSESSMENT, COURSE AND PLAN  Care Narrative: Patient presents emerged part for evaluation.  Patient was rather hypertensive upon arrival here so I did give her 20 mg of labetalol.  She apparently does have a shellfish allergy so I started the patient on Benadryl for this.  CT scan was done to evaluate for signs of significant intercerebral abnormalities to include hemorrhage and aneurysms.  CT was normal.  Blood pressure has come down she still complaining of a headache so I do feel it is more of a tension type headache so we will give her some pain medications here and recommended for her to continue with OTC  medications and I will treat her blood pressure currently she is on 5 mg of lisinopril at home I recommended to increase it to 10 mg and I will start 5 mg of amlodipine because of how elevated her blood pressure was here.  At this point there is no signs of endorgan injury on laboratory studies and I do feel the patient can follow-up with her primary care present for further outpatient treatment and care of her current problems.  At this point I do feel the patient is stable and we will give the patient prescriptions as described return as needed.    ED OBS: No; Patient does not meet criteria for ED Observation.       ADDITIONAL PROBLEM LIST  Hypertension    DISPOSITION AND DISCUSSIONS  I have discussed management of the patient with the following physicians/ FABI's/ ancillary staff:  None    Escalation of care considered, and ultimately not performed: None    Barriers to care at this time, including but not limited to: None    Decision tools and prescription drugs considered including, but not limited to: Patient was started on medications as described below.    DISPOSITION:  Patient will be discharged home in stable condition.    FOLLOW UP:  Karo Middleton M.D.  [address] [place] [phone]  [phone]    Schedule an appointment as soon as possible for a visit   next week for further care, Return if any symptoms worsen    OUTPATIENT MEDICATIONS:  Discharge Medication List as of 5/19/2024 10:03 AM        START taking these medications    Details   !! lisinopril (PRINIVIL) 10 MG Tab Take 1 Tablet by mouth every day., Disp-30 Tablet, R-0, Normal      amLODIPine (NORVASC) 5 MG Tab Take 1 Tablet by mouth every day., Disp-30 Tablet, R-0, Normal       !! - Potential duplicate medications found. Please discuss with provider.        FINAL DIAGNOSIS  1. Hypertension, unspecified type    2. Nonintractable headache, unspecified chronicity pattern, unspecified headache type      I, Ishmael Pope (Olaf)trever  for, and in the presence of, Yanick Padilla M.D..    Electronically signed by: Ishmael Pope (Scribe), 5/19/2024    IYanick M.D. personally performed the services described in this documentation, as scribed by Ishmael Pope in my presence, and it is both accurate and complete.     Electronically signed by: Yanick Padilla M.D.,1:08 PM 05/19/24

## 2024-05-19 NOTE — ED TRIAGE NOTES
"Rhonda Gould  53 y.o.  female    Chief Complaint   Patient presents with    Blood Pressure Problem     Previously taking 10mg lisinopril - taken off medication by PCP about 1.5 years ago. Pt seen in ED last week for same cc, prescribed 5mg lisinopril. Pt reports compliance with medication. Reports 7/10 mid sternal CP x 2-3 hrs. +HA       Pt to triage for above compliant. EKG ordered     Pt placed in lobby and educated on triage process. Pt encouraged to alert staff for any changes, pt verbalized understanding. GCS 15     BP (!) 171/116   Pulse (!) 129   Temp 35.8 °C (96.5 °F) (Temporal)   Resp 20   Ht 1.753 m (5' 9\")   Wt 56.7 kg (125 lb)   LMP 06/11/2005   SpO2 99%   BMI 18.46 kg/m²       "

## 2024-05-19 NOTE — DISCHARGE PLANNING
HTH/ [facility] TCN chart review completed. Due to patients currently documented level of function, no TCN needs anticipated in patient discharge planning at this time. Should post acute discharge needs arise warranting TCN involvement, please contact TCN team via Voalte.  Per RN note on 5/19/24 at 9 AM, patient ambulating with steady gait.  Thank you.

## 2024-05-30 ENCOUNTER — HOSPITAL ENCOUNTER (EMERGENCY)
Facility: MEDICAL CENTER | Age: 54
End: 2024-05-31
Attending: STUDENT IN AN ORGANIZED HEALTH CARE EDUCATION/TRAINING PROGRAM
Payer: COMMERCIAL

## 2024-05-30 DIAGNOSIS — F10.10 ALCOHOL ABUSE: ICD-10-CM

## 2024-05-30 DIAGNOSIS — F10.920 ALCOHOLIC INTOXICATION WITHOUT COMPLICATION (HCC): ICD-10-CM

## 2024-05-30 LAB — POC BREATHALIZER: 0.21 PERCENT (ref 0–0.01)

## 2024-05-30 PROCEDURE — 99285 EMERGENCY DEPT VISIT HI MDM: CPT

## 2024-05-30 PROCEDURE — 302970 POC BREATHALIZER: Performed by: STUDENT IN AN ORGANIZED HEALTH CARE EDUCATION/TRAINING PROGRAM

## 2024-05-30 ASSESSMENT — FIBROSIS 4 INDEX: FIB4 SCORE: 1.26

## 2024-05-31 VITALS
OXYGEN SATURATION: 92 % | TEMPERATURE: 97.9 F | DIASTOLIC BLOOD PRESSURE: 71 MMHG | BODY MASS INDEX: 20.73 KG/M2 | SYSTOLIC BLOOD PRESSURE: 128 MMHG | HEIGHT: 69 IN | WEIGHT: 140 LBS | RESPIRATION RATE: 16 BRPM | HEART RATE: 100 BPM

## 2024-05-31 LAB
POC BREATHALIZER: 0 PERCENT (ref 0–0.01)
POC BREATHALIZER: 0.12 PERCENT (ref 0–0.01)

## 2024-05-31 PROCEDURE — 90791 PSYCH DIAGNOSTIC EVALUATION: CPT

## 2024-05-31 PROCEDURE — 302970 POC BREATHALIZER: Performed by: EMERGENCY MEDICINE

## 2024-05-31 PROCEDURE — 94640 AIRWAY INHALATION TREATMENT: CPT

## 2024-05-31 PROCEDURE — 302970 POC BREATHALIZER: Performed by: STUDENT IN AN ORGANIZED HEALTH CARE EDUCATION/TRAINING PROGRAM

## 2024-05-31 PROCEDURE — 700101 HCHG RX REV CODE 250: Performed by: STUDENT IN AN ORGANIZED HEALTH CARE EDUCATION/TRAINING PROGRAM

## 2024-05-31 PROCEDURE — A9270 NON-COVERED ITEM OR SERVICE: HCPCS | Performed by: EMERGENCY MEDICINE

## 2024-05-31 PROCEDURE — 700102 HCHG RX REV CODE 250 W/ 637 OVERRIDE(OP): Performed by: EMERGENCY MEDICINE

## 2024-05-31 RX ORDER — LORAZEPAM 1 MG/1
1 TABLET ORAL ONCE
Status: COMPLETED | OUTPATIENT
Start: 2024-05-31 | End: 2024-05-31

## 2024-05-31 RX ADMIN — ALBUTEROL SULFATE 2.5 MG: 2.5 SOLUTION RESPIRATORY (INHALATION) at 05:03

## 2024-05-31 RX ADMIN — LORAZEPAM 1 MG: 1 TABLET ORAL at 10:13

## 2024-05-31 NOTE — ED PROVIDER NOTES
ED Provider Note    CHIEF COMPLAINT  Chief Complaint   Patient presents with    Anxiety    Alcohol Intoxication       EXTERNAL RECORDS REVIEWED  Inpatient Notes discharge summary from 6/23/2021 noted history of smoking, hypertension, asthma, alcohol abuse was admitted for dyspnea hypoxemia treated for asthma exacerbation    HPI/ROS  LIMITATION TO HISTORY     OUTSIDE HISTORIAN(S):      Rhonda Gould is a 53 y.o. female who presents with alcohol intoxication.  Patient says that recently she has been stressed and lost her job last week.  Patient says that tonight she was drinking and became increasingly anxious and patient says she had passing thoughts of dying.  Patient says that she does not have any plans to harm herself or others.  Patient denies auditory or visual hallucinations.  Patient says that she has a strong support system and look forward to spending more time with her family.  Patient denies any recent illness or trauma.    PAST MEDICAL HISTORY   has a past medical history of Chronic obstructive pulmonary disease (HCC) and Hypertension.    SURGICAL HISTORY   has a past surgical history that includes hysterectomy laparoscopy (6/2005).    FAMILY HISTORY  Family History   Problem Relation Age of Onset    Diabetes Mother     Hypertension Mother     Hypertension Father     Diabetes Father     Heart Disease Maternal Grandmother     Cancer Maternal Grandfather        SOCIAL HISTORY  Social History     Tobacco Use    Smoking status: Every Day    Smokeless tobacco: Never   Vaping Use    Vaping status: Never Used   Substance and Sexual Activity    Alcohol use: Not Currently     Comment: Quit 6/15    Drug use: Not Currently     Frequency: 2.0 times per week     Types: Inhaled, Marijuana     Comment: marijuana    Sexual activity: Not Currently       CURRENT MEDICATIONS  Home Medications       Reviewed by Main Shannon R.N. (Registered Nurse) on 05/30/24 at 9804  Med List Status: Not Addressed  "    Medication Last Dose Status   albuterol (PROVENTIL) 2.5mg/3ml Nebu Soln solution for nebulization  Active   amLODIPine (NORVASC) 5 MG Tab  Active   benzonatate (TESSALON) 100 MG Cap  Active   influenza vaccine quad (FLUZONE QUADRIVALENT) 0.5 ML Suspension Prefilled Syringe injection  Active   lisinopril (PRINIVIL) 10 MG Tab  Active   lisinopril (PRINIVIL) 5 MG Tab  Active   mirtazapine (REMERON) 7.5 MG tablet  Active   tizanidine (ZANAFLEX) 4 MG Tab  Active                    ALLERGIES  Allergies   Allergen Reactions    Fish Allergy Anaphylaxis and Shortness of Breath     Swelling to throat.    Shellfish Allergy Anaphylaxis     Strict No Shellfish (avoids most fish, cod OK)      Codeine Hives and Itching     Tolerated morphine previously  Allergy updated from 2015, morphine given in 2016       PHYSICAL EXAM  VITAL SIGNS: /78   Pulse 96   Temp 36.2 °C (97.2 °F) (Temporal)   Resp 16   Ht 1.753 m (5' 9\")   Wt 63.5 kg (140 lb)   LMP 06/11/2005   SpO2 97%   BMI 20.67 kg/m²    Constitutional: Alert in no apparent distress.  HENT: No signs of trauma, Bilateral external ears normal, Nose normal.   Eyes: Pupils are equal and reactive, Conjunctiva normal, Non-icteric.   Neck: Normal range of motion, No tenderness, Supple, No stridor.   Cardiovascular: Regular rate and rhythm, no murmurs.   Thorax & Lungs: Normal breath sounds, No respiratory distress, No wheezing, No chest tenderness.   Abdomen: Bowel sounds normal, Soft, No tenderness, No masses, No pulsatile masses.   Skin: Warm, Dry, No erythema, No rash.   Back: No bony tenderness, No CVA tenderness.   Extremities: Intact distal pulses, No edema, No tenderness, No cyanosis  Musculoskeletal: Good range of motion in all major joints. No tenderness to palpation or major deformities noted.   Neurologic: Alert , Normal motor function, Normal sensory function, No focal deficits noted.   Psychiatric: Affect normal, Judgment normal, Mood " normal.    EKG/LABS  Labs Reviewed   POC BREATHALIZER - Abnormal; Notable for the following components:       Result Value    POC Breathalizer 0.209 (*)     All other components within normal limits                 COURSE & MEDICAL DECISION MAKING    ASSESSMENT, COURSE AND PLAN  Care Narrative: On arrival vital signs within normal limits.  Patient is calm with clear speech normal motor coordination.  Patient did have elevated alcohol based on breathalyzer.  Patient had reported to staff earlier that she was having thoughts of dying.  Patient does not appear acutely psychotic is future forward thinking has no active SI or plan.  Patient does drink heavily regularly appears to be clinically sober but will need to have breathalyzer prior to being evaluated by alert team.  At this point I believe patient warrants legal hold but believe she may benefit from additional outpatient psychiatric resources.            ADDITIONAL PROBLEMS MANAGED      DISPOSITION AND DISCUSSIONS    Discussion of management with other [facility] or appropriate source(s): Behavioral Health psychiatric resources        FINAL DIAGNOSIS  1. Alcoholic intoxication without complication (HCC)           Electronically signed by: Sam Cabrera D.O., 5/30/2024 10:28 PM

## 2024-05-31 NOTE — ED NOTES
"Patient remains tearful and stating, \"I have nothing left to leave my kids. I just wish I wasn't here anymore.\" Patient verbalizes suicidal thoughts with no plan at this time.  "

## 2024-05-31 NOTE — ED NOTES
Patient requested water and provided with water - okay per ERP.  Patient also stating that she feels SOB and usually uses her albuterol inhaler when feeling like this. ERP updated orders placed. RT Texted via Voalte.

## 2024-05-31 NOTE — ED TRIAGE NOTES
Chief Complaint   Patient presents with    Anxiety    Alcohol Intoxication     Patient BIB [facility] from home for the above complaints. Patient lost her job and her car recently and has been increasingly anxious and upset because of it. Today patient had an unknown amount to drink to cope and then had a MGLF, No Thinners, Head strike, or LOC.    Patient arrives to the ED anxious and crying.

## 2024-05-31 NOTE — DISCHARGE PLANNING
TCN following. HTH/SCP chart review completed. Noted per RN patient is ambulating with steady gait, is on RA.  Noted patient has support at home and per behavioral health, resources provided to patient.  No TCN needs at this time.

## 2024-05-31 NOTE — ED NOTES
Bedside report received from off going RN, assumed care of patient.  POC discussed with patient. Call light within reach, all needs addressed at this time.       Fall risk interventions in place: Place fall risk sign on patient's door and Keep floor surfaces clean and dry (all applicable per [place] Fall risk assessment)   Continuous monitoring: Not Applicable   IVF/IV medications: Not Applicable   Oxygen: Room Air  Bedside sitter: Not Applicable   Isolation: Not Applicable

## 2024-05-31 NOTE — ED NOTES
Patient ambulated to the bathroom independently with steady gait and NAD.  Patient states that she feels better after breathing treatment.

## 2024-05-31 NOTE — ED NOTES
Alert team RN discussed safe discharge plan with pt and provided outpatient resources, pt states understanding of these resources, VSS on RA, GCS 15, NAD, pt aware POC to dc s/p instructions

## 2024-05-31 NOTE — DISCHARGE INSTRUCTIONS
You should follow-up with your primary care provider and counselor.  If you have thoughts of hurting yourself or others please call 911 or return to the emergency department.  You should gradually cut back on your drinking.

## 2024-05-31 NOTE — DISCHARGE SUMMARY
"  ED Observation Discharge Summary    Patient:Rhonda Gould  Patient : 1970  Patient MRN: [id number]  Patient PCP: Karo Middleton M.D.    Admit Date: 2024  Discharge Date and Time: 24 9:13 AM  Discharge Diagnosis:   1. Alcoholic intoxication without complication (HCC)            Discharge Attending: Svitlana Candelaria M.D.  Discharge Service: ED Observation    ED Course  Rhonda is a 53 y.o. female smoker with history of hypertension, COPD, and alcohol abuse who was evaluated at [facility] for SI/ETOH intoxication in the setting of losing her job recently.    Patient presented with no specific plans but had fleeting thoughts of dying.  Upon signout, patient was to sober and be reevaluated with life skills evaluation as needed.    Patient now sober.  Breathalyzer 0.  Gait steady.    Ativan given prior to discharge.    Patient was seen by life skills and resources were provided for mental health follow-up and alcohol abuse.    Patient reevaluated.  Patient denying any physical complaints and no SI/HI.  She was encouraged to return to the ER for any concerns.    Discharge Exam:  /77   Pulse 91   Temp 36.2 °C (97.2 °F) (Temporal)   Resp 16   Ht 1.753 m (5' 9\")   Wt 63.5 kg (140 lb)   LMP 2005   SpO2 92%   BMI 20.67 kg/m² .    Constitutional: Awake and alert. Nontoxic  HENT:  Grossly normal  Eyes: Grossly normal  Neck: Normal range of motion  Cardiovascular: Normal heart rate   Thorax & Lungs: No respiratory distress; end expiratory wheeze  Abdomen: Not distended  Skin:  No pathologic rash.   Extremities: Well perfused  Psychiatric: Affect normal  Neuro: Gait steady    Labs  Results for orders placed or performed during the hospital encounter of 24   POC BREATHALIZER   Result Value Ref Range    POC Breathalizer 0.209 (A) 0.00 - 0.01 Percent   POC BREATHALIZER   Result Value Ref Range    POC Breathalizer 0.116 (A) 0.00 - 0.01 Percent       Radiology  No " orders to display       Medications:   New Prescriptions    No medications on file       My final assessment includes   1. Alcoholic intoxication without complication (HCC)        2. Alcohol abuse            Upon Reevaluation, the patient's condition has: Improved; and will be discharged.    Patient discharged from ED Observation status at 10:22 AM   (Time) 5/31/24 (Date).     Total time spent on this ED Observation discharge encounter is < 30 Minutes    Electronically signed by: Svitlana Candelaria M.D., 5/31/2024 9:13 AM

## 2024-05-31 NOTE — CONSULTS
"[facility]   TRIAGE ASSESSMENT    Name: Rhonda Gould  MRN: [id number]  : 1970  Age: 53 y.o.  Date of assessment: 2024  PCP: Karo Middleton M.D.  Persons in attendance: Patient  Patient Location: [facility]    CHIEF COMPLAINT/PRESENTING ISSUE (as stated by Patient):   Chief Complaint   Patient presents with    Anxiety    Alcohol Intoxication      Patient is a 53 y.o. female bib [facility] from home while intoxicated after a fall with a medical history of insomnia, etoh-use, HTN & COPD. 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 She denies SI/HI or response to internal stimuli stating \"it was the alcohol talking.\" She has been sober for a sustained amount of time, but \"fell off the wagon\" with vodka and wine in the night previous. She plans to seek help from someone she knows at [facility]. She reports that she has two sons and neighbors that are supportive. She wants to live for her grandchildren and plans to apply for disability since she cannot work due to her shoulder injury. She was provided resources and [facility] Behavior[facility] Health referral has been made.    CURRENT LIVING SITUATION/SOCIAL SUPPORT/FINANCIAL RESOURCES: Lives in an apt. Complex.    BEHAVIORAL HEALTH/SUBSTANCE USE TREATMENT HISTORY  Does patient/parent report a history of prior behavioral health/substance use treatment for patient?   Yes:    Dates Level of Care Facilty/Provider Diagnosis/Problem Medications   2024  2024  2024  3/12/2024  3/26/2023  2023 ED [facility]  SOB  Pain Albuterol, Mirtazapine,  Lisinopril,  Tizanidine,  Benzonatate   [facility] Rot[facility]   23 OP [facility] SOB Benzonatate       SAFETY ASSESSMENT - SELF  Does patient " "acknowledge current or past symptoms of dangerousness to self or is previous history noted? no  Does parent/significant other report patient has current or past symptoms of dangerousness to self? N\A  Does presenting problem suggest symptoms of dangerousness to self? No    SAFETY ASSESSMENT - OTHERS  Does patient acknowledge current or past symptoms of aggressive behavior or risk to others or is previous history noted? no  Does parent/significant other report patient has current or past symptoms of aggressive behavior or risk to others?  N\A  Does presenting problem suggest symptoms of dangerousness to others? No    LEGAL HISTORY  Does patient acknowledge history of arrest/residential/long-term or is previous history noted? no    ABUSE/NEGLECT SCREENING  Does patient report feeling “unsafe” in his/her home, or afraid of anyone?  no  Does patient report any history of physical, sexual, or emotional abuse?  no  Does parent or significant other report any of the above? N\A  Is there evidence of neglect by self?  no  Is there evidence of neglect by a caregiver? no  Does the patient/parent report any history of CPS/APS/police involvement related to suspected abuse/neglect or domestic violence? no  Based on the information provided during the current assessment, is a mandated report of suspected abuse/neglect being made?  No    SUBSTANCE USE SCREENING  Yes:  Gomez all substances used in the past 30 days:      Last Use Amount   [x]   Alcohol <24hrs Vodka/wine   []   Marijuana     []   Heroin     []   Prescription Opioids  (used without prescription, for    recreation, or in excess of prescribed amount)     []   Other Prescription  (used without prescription, for    recreation, or in excess of prescribed amount)     []   Cocaine      []   Methamphetamine     []   \"\" drugs (ectasy, MDMA)     []   Other substances        UDS results: pend  Breathalyzer results: 0.209    What consequences does the patient associate with any of " the above substance use and or addictive behaviors? Work problems or losses    Risk factors for detox (check all that apply):  []  Seizures   [x]  Diaphoretic (sweating)   [x]  Tremors   []  Hallucinations   [x]  Increased blood pressure   []  Decreased blood pressure   []  Other   []  None      [x] Patient education on risk factors for detoxification and instructed to return to ER as needed.      MENTAL STATUS   Participation: Active verbal participation, Attentive, and Engaged  Grooming: Adequate and Casual  Orientation: Alert and Fully Oriented  Behavior: Calm  Eye contact: Good  Mood: Depressed  Affect: Flexible, Full range, and Tearful  Thought process: Logical and Goal-directed  Thought content: Within normal limits  Speech: Rate within normal limits and Volume within normal limits  Perception: Within normal limits  Memory:  No gross evidence of memory deficits  Insight: Good  Judgment:  Adequate  Other:    Collateral information:   Source:  [] Significant other present in person:   [] Significant other by telephone  [] [facility]   [x] [facility] Nursing Staff  [x] [facility]  [] Other:     [] Unable to complete full assessment due to:  [] Acute intoxication  [] Patient declined to participate/engage  [] Patient verbally unresponsive  [] Significant cognitive deficits  [] Significant perceptual distortions or behavioral disorganization  [] Other:      CLINICAL IMPRESSIONS:  Primary:  Substance use disorder  Secondary:  Anxiety       IDENTIFIED NEEDS/PLAN:  [Trigger DISPOSITION list for any items marked]    []  Imminent safety risk - self [] Imminent safety risk - others   [x]  Acute substance withdrawal []  Psychosis/Impaired reality testing   [x]  Mood/anxiety [x]  Substance use/Addictive behavior   [x]  Maladaptive behaviro []  Parent/child conflict   []  Family/Couples conflict []  Biomedical   []  Housing []  Financial   []   Legal  Occupational/Educational   []  Domestic violence []   Other:     Recommended Plan of Care:  Actively being addressed by [facility] and Refer to [facility]. Recommend patient discharge with resources provided. Patient has [employer] insurance.  *Telesitter may not be utilized for moderate or high risk patients    Has the Recommended Plan of Care/Level of Observation been reviewed with the patient's assigned nurse? yes    Does patient/parent or guardian express agreement with the above plan? Yes    Referral appointment(s) scheduled? N\A    Alert team only:   I have discussed findings and recommendations with Dr. Candelaira who is in agreement with these recommendations.     Referral information sent to the following outpatient community providers : [facility] and resources provided for: [facility], [facility], [place] [facility], [facility], [facility] services, AA Intergroup schedule and 988/Suicide Crisis Lifeline.    Referral information sent to the following inpatient community providers :    Nishant Wild R.N.  5/31/2024

## 2024-06-03 ENCOUNTER — HOSPITAL ENCOUNTER (EMERGENCY)
Facility: MEDICAL CENTER | Age: 54
End: 2024-06-03
Attending: EMERGENCY MEDICINE
Payer: MEDICAID

## 2024-06-03 VITALS
WEIGHT: 140 LBS | DIASTOLIC BLOOD PRESSURE: 64 MMHG | BODY MASS INDEX: 20.73 KG/M2 | SYSTOLIC BLOOD PRESSURE: 95 MMHG | HEIGHT: 69 IN | HEART RATE: 97 BPM | RESPIRATION RATE: 18 BRPM | OXYGEN SATURATION: 90 % | TEMPERATURE: 98.1 F

## 2024-06-03 DIAGNOSIS — F10.90 ALCOHOL USE DISORDER: ICD-10-CM

## 2024-06-03 PROCEDURE — A9270 NON-COVERED ITEM OR SERVICE: HCPCS | Performed by: EMERGENCY MEDICINE

## 2024-06-03 PROCEDURE — 700102 HCHG RX REV CODE 250 W/ 637 OVERRIDE(OP): Performed by: EMERGENCY MEDICINE

## 2024-06-03 PROCEDURE — 99283 EMERGENCY DEPT VISIT LOW MDM: CPT

## 2024-06-03 RX ORDER — ACETAMINOPHEN 325 MG/1
650 TABLET ORAL ONCE
Status: COMPLETED | OUTPATIENT
Start: 2024-06-03 | End: 2024-06-03

## 2024-06-03 RX ORDER — PHENOBARBITAL 32.4 MG/1
97.2 TABLET ORAL ONCE
Status: COMPLETED | OUTPATIENT
Start: 2024-06-03 | End: 2024-06-03

## 2024-06-03 RX ADMIN — PHENOBARBITAL 97.2 MG: 32.4 TABLET ORAL at 22:13

## 2024-06-03 RX ADMIN — ACETAMINOPHEN 650 MG: 325 TABLET, FILM COATED ORAL at 20:43

## 2024-06-03 ASSESSMENT — FIBROSIS 4 INDEX: FIB4 SCORE: 1.26

## 2024-06-04 NOTE — ED NOTES
"Pt ambulatory with steady gait to bathroom. Pt stating \"I am ready to go home, my son is coming to pick me up.\"  "

## 2024-06-04 NOTE — DISCHARGE INSTRUCTIONS
You were seen in the ED today for alcohol intoxication. While here you were examined and allowed to sober up while we monitored you. Your physical exam was reassuring. If you continue to drink you will be placing your health at risk. At this time you are sober and can return home.    Please be alert for signs of alcohol withdrawal, including shaking hands, agitation, feeling pins and needles, nausea, vomiting, headache. Please seek medical care if you develop these. Alcohol withdrawal can be dangerous and even lead to seizures or death if untreated.  You have been given a dose of medication that should slowly taper over the next several days to help prevent withdrawal symptoms.  Please do not drink alcohol while this is in your system.

## 2024-06-04 NOTE — ED PROVIDER NOTES
"ED Provider Note    Scribed for Dr. Delong by Carmela Sesay. 6/3/2024,  7:44 PM.      CHIEF COMPLAINT  Chief Complaint   Patient presents with    Alcohol Intoxication     Pt BIB EMS after pt was found down at a gas station. Unknown LOC - Thinners +ETOH   Pt unable to tell me what happened. Pt does endorse having ETOH today.        EXTERNAL RECORDS REVIEWED  Inpatient Notes Patient last seen on 5/30/24 for alcohol intoxication. At this time she was intoxicated secondary to life stressors.     HPI  LIMITATION TO HISTORY   Select: : None  OUTSIDE HISTORIAN(S):  None    Rhonda Gould is a 53 y.o. female, with a history of COPD and Hypertension, who presents to the Emergency Department via EMS to bedside after being found down at a gas station. The patient is intoxicated on alcohol throughout the day. When asked what is wrong she says \"everything\" and that she is \"pissed off at herself\". She reports falling and hitting the left side of her head. She has a mild headache which is new today. She has no associated nausea or vomiting. She denies any SI/HI. She has been drinking alcohol daily, and does have mild tremors when she is not drinking.    REVIEW OF SYSTEMS  See HPI for further details. All other systems are negative.     PAST MEDICAL HISTORY     Past Medical History:   Diagnosis Date    Chronic obstructive pulmonary disease (HCC)     Hypertension        SURGICAL HISTORY  Past Surgical History:   Procedure Laterality Date    HYSTERECTOMY LAPAROSCOPY  6/2005       FAMILY HISTORY  Family History   Problem Relation Age of Onset    Diabetes Mother     Hypertension Mother     Hypertension Father     Diabetes Father     Heart Disease Maternal Grandmother     Cancer Maternal Grandfather        SOCIAL HISTORY    reports that she has been smoking. She has never used smokeless tobacco. She reports current alcohol use. She reports that she does not currently use drugs after having used the following drugs: Inhaled and " "Marijuana. Frequency: 2.00 times per week.    CURRENT MEDICATIONS  Home Medications       Reviewed by Mignon Rogers R.N. (Registered Nurse) on 06/03/24 at 1939  Med List Status: Not Addressed     Medication Last Dose Status   albuterol (PROVENTIL) 2.5mg/3ml Nebu Soln solution for nebulization  Active   amLODIPine (NORVASC) 5 MG Tab  Active   benzonatate (TESSALON) 100 MG Cap  Active   influenza vaccine quad (FLUZONE QUADRIVALENT) 0.5 ML Suspension Prefilled Syringe injection  Active   lisinopril (PRINIVIL) 10 MG Tab  Active   lisinopril (PRINIVIL) 5 MG Tab  Active   mirtazapine (REMERON) 7.5 MG tablet  Active   tizanidine (ZANAFLEX) 4 MG Tab  Active        ALLERGIES  Allergies   Allergen Reactions    Fish Allergy Anaphylaxis and Shortness of Breath     Swelling to throat.    Shellfish Allergy Anaphylaxis     Strict No Shellfish (avoids most fish, cod OK)      Codeine Hives and Itching     Tolerated morphine previously  Allergy updated from 2015, morphine given in 2016       PHYSICAL EXAM  VITAL SIGNS: BP 94/65   Pulse 99   Temp 37.1 °C (98.7 °F) (Temporal)   Resp 19   Ht 1.753 m (5' 9\")   Wt 63.5 kg (140 lb)   LMP 06/11/2005   SpO2 90%   BMI 20.67 kg/m²   Gen: Alert, no acute distress  HEENT: ATNC  Eyes: PERRL, EOMI, normal conjunctiva  Neck: trachea midline  Resp: no respiratory distress  CV: No JVD, regular rate and rhythm  Abd: non-distended, soft, nontender  Ext: No deformities  Neuro: speech fluent, GCS 15, moves all extremity    DIAGNOSTIC STUDIES / PROCEDURES        COURSE & MEDICAL DECISION MAKING  Pertinent Labs & Imaging studies were reviewed. (See chart for details)      7:44 PM Patient seen and examined at bedside. I discussed management of care with the patient including observation and consult with the behavioral health team. We spoke about potential of scanning her head which she denies at this time. Patient verbalizes understanding and agreement to this plan of care.     INITIAL ASSESSMENT AND " PLAN  Medical Decision Making: Patient presents with alcohol intoxication.  She endorses alcohol use in the setting of some recent life stressors.  She does report a follow-up walking her head but is demonstrating no evidence of clinically significant traumatic brain injury, and is not on any blood thinners.  We discussed the possibility of obtaining a CT scan of the head, however using shared decision-making, the patient declines.  She is GCS 15, moving all extremities, showing no signs of impairment at this point in time.  She was observed, no clinical deterioration of her mental state, able to ambulate independently.  She does not intend to continue drinking will be given a dose of phenobarbital to help prevent withdrawal symptoms.  She is going to stay with a friend until she is able to get into the [employer] program.    ADDITIONAL PROBLEM LIST AND DISPOSITION    I have discussed management of the patient with the following medical professionals:  Behavioral health    Escalation of care considered, and ultimately not performed: diagnostic imaging.     Barriers to care at this time, including but not limited to: Patient does not have insurance.     The patient will return for new or worsening symptoms and is stable at the time of discharge.    The patient is referred to a primary physician for blood pressure management, diabetic screening, and for all other preventative health concerns.    DISPOSITION:  Patient will be discharged home in stable condition.    FOLLOW UP:  Karo Middleton M.D.  [address] [address] [phone]    Schedule an appointment as soon as possible for a visit       [facility], Emergency Dept  [address] [facility] [address] [phone]    If symptoms worsen      FINAL IMPRESSION  1. Alcohol use disorder             I, Carmela Castro), trever scribing for, and in the presence of, Kendall Delong M.D..    Electronically signed by: Carmela  Lázaro (Scribkrzysztof), 6/3/2024    IKendall M.D. personally performed the services described in this documentation, as scribed by Carmela Sesay in my presence, and it is both accurate and complete.    The note accurately reflects work and decisions made by me.  Kendall Delong M.D.  6/3/2024  10:19 PM      This dictation was created using voice recognition software. The accuracy of the dictation is limited to the abilities of the software. I expect there may be some errors of grammar and possibly content. The nursing notes were reviewed and certain aspects of this information were incorporated into this note.

## 2024-06-04 NOTE — ED TRIAGE NOTES
"Chief Complaint   Patient presents with    Alcohol Intoxication     Pt BIB EMS after pt was found down at a gas station. Unknown LOC - Thinners +ETOH   Pt unable to tell me what happened. Pt does endorse having ETOH today.      Blood Pressure: 94/65, Pulse: 99, Respiration: 19, Temperature: 37.1 °C (98.7 °F), Height: 175.3 cm (5' 9\"), Weight: 63.5 kg (140 lb), BMI (Calculated): 20.67, BSA (Calculated): 1.8, Pulse Oximetry: (!) 85 %, O2 Delivery Device: None - Room Air    Pt breathalyzer 0.221 upon arrival   "

## 2024-06-04 NOTE — DISCHARGE PLANNING
Alert Team:    Spoke with patient at bedside regarding OP mental health providers and sober living and rehab options. Patient reports she is pending acceptance to [facility] this week which will provide both alcohol and mental health wrap around care until program completion. Patient reports she is pending Medicaid so provided patient with printed resource list and [facility], [facility], [facility] and [facility]. No other questions or concerns at this time.

## 2024-06-08 ENCOUNTER — HOSPITAL ENCOUNTER (INPATIENT)
Facility: MEDICAL CENTER | Age: 54
LOS: 1 days | DRG: 894 | End: 2024-06-09
Attending: EMERGENCY MEDICINE | Admitting: STUDENT IN AN ORGANIZED HEALTH CARE EDUCATION/TRAINING PROGRAM

## 2024-06-08 ENCOUNTER — APPOINTMENT (OUTPATIENT)
Dept: RADIOLOGY | Facility: MEDICAL CENTER | Age: 54
DRG: 894 | End: 2024-06-08
Attending: EMERGENCY MEDICINE

## 2024-06-08 DIAGNOSIS — D64.9 ANEMIA, UNSPECIFIED TYPE: ICD-10-CM

## 2024-06-08 DIAGNOSIS — F10.920 ALCOHOLIC INTOXICATION WITHOUT COMPLICATION (HCC): ICD-10-CM

## 2024-06-08 DIAGNOSIS — E87.20 LACTIC ACIDOSIS: ICD-10-CM

## 2024-06-08 PROBLEM — I95.9 HYPOTENSION: Status: ACTIVE | Noted: 2024-06-08

## 2024-06-08 PROBLEM — F10.221 ACUTE ALCOHOL INTOXICATION WITH ALCOHOLISM WITH DELIRIUM (HCC): Status: ACTIVE | Noted: 2024-06-08

## 2024-06-08 PROBLEM — D61.818 PANCYTOPENIA (HCC): Status: ACTIVE | Noted: 2019-08-29

## 2024-06-08 PROBLEM — E44.0 MODERATE PROTEIN-CALORIE MALNUTRITION (HCC): Status: ACTIVE | Noted: 2024-06-08

## 2024-06-08 PROBLEM — E83.39 HYPOPHOSPHATEMIA: Status: ACTIVE | Noted: 2024-06-08

## 2024-06-08 LAB
ALBUMIN SERPL BCP-MCNC: 4.4 G/DL (ref 3.2–4.9)
ALBUMIN/GLOB SERPL: 1.8 G/DL
ALP SERPL-CCNC: 91 U/L (ref 30–99)
ALT SERPL-CCNC: 9 U/L (ref 2–50)
ANION GAP SERPL CALC-SCNC: 21 MMOL/L (ref 7–16)
APPEARANCE UR: CLEAR
AST SERPL-CCNC: 21 U/L (ref 12–45)
BASOPHILS # BLD AUTO: 0.3 % (ref 0–1.8)
BASOPHILS # BLD: 0.01 K/UL (ref 0–0.12)
BILIRUB SERPL-MCNC: 0.2 MG/DL (ref 0.1–1.5)
BILIRUB UR QL STRIP.AUTO: NEGATIVE
BUN SERPL-MCNC: 9 MG/DL (ref 8–22)
CALCIUM ALBUM COR SERPL-MCNC: 8.9 MG/DL (ref 8.5–10.5)
CALCIUM SERPL-MCNC: 9.2 MG/DL (ref 8.5–10.5)
CHLORIDE SERPL-SCNC: 104 MMOL/L (ref 96–112)
CK SERPL-CCNC: 74 U/L (ref 0–154)
CO2 SERPL-SCNC: 18 MMOL/L (ref 20–33)
COLOR UR: YELLOW
CREAT SERPL-MCNC: 0.53 MG/DL (ref 0.5–1.4)
CRP SERPL HS-MCNC: <0.3 MG/DL (ref 0–0.75)
EKG IMPRESSION: NORMAL
EOSINOPHIL # BLD AUTO: 0.02 K/UL (ref 0–0.51)
EOSINOPHIL NFR BLD: 0.6 % (ref 0–6.9)
ERYTHROCYTE [DISTWIDTH] IN BLOOD BY AUTOMATED COUNT: 58.1 FL (ref 35.9–50)
ETHANOL BLD-MCNC: 269.9 MG/DL
GFR SERPLBLD CREATININE-BSD FMLA CKD-EPI: 109 ML/MIN/1.73 M 2
GLOBULIN SER CALC-MCNC: 2.5 G/DL (ref 1.9–3.5)
GLUCOSE SERPL-MCNC: 89 MG/DL (ref 65–99)
GLUCOSE UR STRIP.AUTO-MCNC: 500 MG/DL
HCT VFR BLD AUTO: 31.2 % (ref 37–47)
HGB BLD-MCNC: 10.4 G/DL (ref 12–16)
IMM GRANULOCYTES # BLD AUTO: 0.01 K/UL (ref 0–0.11)
IMM GRANULOCYTES NFR BLD AUTO: 0.3 % (ref 0–0.9)
KETONES UR STRIP.AUTO-MCNC: NEGATIVE MG/DL
LACTATE SERPL-SCNC: 4.2 MMOL/L (ref 0.5–2)
LACTATE SERPL-SCNC: 5 MMOL/L (ref 0.5–2)
LACTATE SERPL-SCNC: 5 MMOL/L (ref 0.5–2)
LACTATE SERPL-SCNC: 5.3 MMOL/L (ref 0.5–2)
LEUKOCYTE ESTERASE UR QL STRIP.AUTO: NEGATIVE
LIPASE SERPL-CCNC: 37 U/L (ref 11–82)
LYMPHOCYTES # BLD AUTO: 1.23 K/UL (ref 1–4.8)
LYMPHOCYTES NFR BLD: 34.8 % (ref 22–41)
MAGNESIUM SERPL-MCNC: 1.7 MG/DL (ref 1.5–2.5)
MCH RBC QN AUTO: 32.5 PG (ref 27–33)
MCHC RBC AUTO-ENTMCNC: 33.3 G/DL (ref 32.2–35.5)
MCV RBC AUTO: 97.5 FL (ref 81.4–97.8)
MICRO URNS: ABNORMAL
MONOCYTES # BLD AUTO: 0.13 K/UL (ref 0–0.85)
MONOCYTES NFR BLD AUTO: 3.7 % (ref 0–13.4)
NEUTROPHILS # BLD AUTO: 2.13 K/UL (ref 1.82–7.42)
NEUTROPHILS NFR BLD: 60.3 % (ref 44–72)
NITRITE UR QL STRIP.AUTO: NEGATIVE
NRBC # BLD AUTO: 0 K/UL
NRBC BLD-RTO: 0 /100 WBC (ref 0–0.2)
PH UR STRIP.AUTO: 5.5 [PH] (ref 5–8)
PHOSPHATE SERPL-MCNC: 2.2 MG/DL (ref 2.5–4.5)
PLATELET # BLD AUTO: 279 K/UL (ref 164–446)
PMV BLD AUTO: 9.3 FL (ref 9–12.9)
POTASSIUM SERPL-SCNC: 3.7 MMOL/L (ref 3.6–5.5)
PROCALCITONIN SERPL-MCNC: <0.05 NG/ML
PROT SERPL-MCNC: 6.9 G/DL (ref 6–8.2)
PROT UR QL STRIP: NEGATIVE MG/DL
RBC # BLD AUTO: 3.2 M/UL (ref 4.2–5.4)
RBC UR QL AUTO: NEGATIVE
SODIUM SERPL-SCNC: 143 MMOL/L (ref 135–145)
SP GR UR STRIP.AUTO: 1.01
TROPONIN T SERPL-MCNC: <6 NG/L (ref 6–19)
UROBILINOGEN UR STRIP.AUTO-MCNC: 0.2 MG/DL
WBC # BLD AUTO: 3.5 K/UL (ref 4.8–10.8)

## 2024-06-08 PROCEDURE — 700105 HCHG RX REV CODE 258: Performed by: EMERGENCY MEDICINE

## 2024-06-08 PROCEDURE — 700102 HCHG RX REV CODE 250 W/ 637 OVERRIDE(OP): Mod: JZ | Performed by: STUDENT IN AN ORGANIZED HEALTH CARE EDUCATION/TRAINING PROGRAM

## 2024-06-08 PROCEDURE — 700111 HCHG RX REV CODE 636 W/ 250 OVERRIDE (IP): Performed by: STUDENT IN AN ORGANIZED HEALTH CARE EDUCATION/TRAINING PROGRAM

## 2024-06-08 PROCEDURE — 82550 ASSAY OF CK (CPK): CPT

## 2024-06-08 PROCEDURE — 700101 HCHG RX REV CODE 250: Performed by: STUDENT IN AN ORGANIZED HEALTH CARE EDUCATION/TRAINING PROGRAM

## 2024-06-08 PROCEDURE — 81003 URINALYSIS AUTO W/O SCOPE: CPT

## 2024-06-08 PROCEDURE — 87040 BLOOD CULTURE FOR BACTERIA: CPT

## 2024-06-08 PROCEDURE — 84145 PROCALCITONIN (PCT): CPT

## 2024-06-08 PROCEDURE — 71045 X-RAY EXAM CHEST 1 VIEW: CPT

## 2024-06-08 PROCEDURE — 99406 BEHAV CHNG SMOKING 3-10 MIN: CPT | Performed by: STUDENT IN AN ORGANIZED HEALTH CARE EDUCATION/TRAINING PROGRAM

## 2024-06-08 PROCEDURE — 36415 COLL VENOUS BLD VENIPUNCTURE: CPT

## 2024-06-08 PROCEDURE — 80053 COMPREHEN METABOLIC PANEL: CPT

## 2024-06-08 PROCEDURE — 85025 COMPLETE CBC W/AUTO DIFF WBC: CPT

## 2024-06-08 PROCEDURE — HZ2ZZZZ DETOXIFICATION SERVICES FOR SUBSTANCE ABUSE TREATMENT: ICD-10-PCS | Performed by: STUDENT IN AN ORGANIZED HEALTH CARE EDUCATION/TRAINING PROGRAM

## 2024-06-08 PROCEDURE — A9270 NON-COVERED ITEM OR SERVICE: HCPCS | Performed by: EMERGENCY MEDICINE

## 2024-06-08 PROCEDURE — 99291 CRITICAL CARE FIRST HOUR: CPT | Mod: 25 | Performed by: STUDENT IN AN ORGANIZED HEALTH CARE EDUCATION/TRAINING PROGRAM

## 2024-06-08 PROCEDURE — 700111 HCHG RX REV CODE 636 W/ 250 OVERRIDE (IP): Performed by: EMERGENCY MEDICINE

## 2024-06-08 PROCEDURE — 83690 ASSAY OF LIPASE: CPT

## 2024-06-08 PROCEDURE — A9270 NON-COVERED ITEM OR SERVICE: HCPCS | Mod: JZ | Performed by: STUDENT IN AN ORGANIZED HEALTH CARE EDUCATION/TRAINING PROGRAM

## 2024-06-08 PROCEDURE — 700101 HCHG RX REV CODE 250: Performed by: EMERGENCY MEDICINE

## 2024-06-08 PROCEDURE — C9113 INJ PANTOPRAZOLE SODIUM, VIA: HCPCS | Performed by: STUDENT IN AN ORGANIZED HEALTH CARE EDUCATION/TRAINING PROGRAM

## 2024-06-08 PROCEDURE — 96375 TX/PRO/DX INJ NEW DRUG ADDON: CPT

## 2024-06-08 PROCEDURE — 770000 HCHG ROOM/CARE - INTERMEDIATE ICU *

## 2024-06-08 PROCEDURE — 83735 ASSAY OF MAGNESIUM: CPT

## 2024-06-08 PROCEDURE — 99406 BEHAV CHNG SMOKING 3-10 MIN: CPT

## 2024-06-08 PROCEDURE — 700102 HCHG RX REV CODE 250 W/ 637 OVERRIDE(OP): Performed by: EMERGENCY MEDICINE

## 2024-06-08 PROCEDURE — 86140 C-REACTIVE PROTEIN: CPT

## 2024-06-08 PROCEDURE — 96365 THER/PROPH/DIAG IV INF INIT: CPT

## 2024-06-08 PROCEDURE — 99291 CRITICAL CARE FIRST HOUR: CPT

## 2024-06-08 PROCEDURE — 84100 ASSAY OF PHOSPHORUS: CPT

## 2024-06-08 PROCEDURE — 700105 HCHG RX REV CODE 258: Performed by: STUDENT IN AN ORGANIZED HEALTH CARE EDUCATION/TRAINING PROGRAM

## 2024-06-08 PROCEDURE — 96366 THER/PROPH/DIAG IV INF ADDON: CPT

## 2024-06-08 PROCEDURE — 83605 ASSAY OF LACTIC ACID: CPT | Mod: 91

## 2024-06-08 PROCEDURE — 84484 ASSAY OF TROPONIN QUANT: CPT

## 2024-06-08 PROCEDURE — 87086 URINE CULTURE/COLONY COUNT: CPT

## 2024-06-08 PROCEDURE — 82077 ASSAY SPEC XCP UR&BREATH IA: CPT

## 2024-06-08 PROCEDURE — 93005 ELECTROCARDIOGRAM TRACING: CPT | Performed by: EMERGENCY MEDICINE

## 2024-06-08 RX ORDER — MIRTAZAPINE 7.5 MG/1
7.5 TABLET, FILM COATED ORAL
Status: DISCONTINUED | OUTPATIENT
Start: 2024-06-08 | End: 2024-06-09 | Stop reason: HOSPADM

## 2024-06-08 RX ORDER — NICOTINE 21 MG/24HR
21 PATCH, TRANSDERMAL 24 HOURS TRANSDERMAL
Status: DISCONTINUED | OUTPATIENT
Start: 2024-06-08 | End: 2024-06-08

## 2024-06-08 RX ORDER — POLYETHYLENE GLYCOL 3350 17 G/17G
1 POWDER, FOR SOLUTION ORAL
Status: DISCONTINUED | OUTPATIENT
Start: 2024-06-08 | End: 2024-06-09 | Stop reason: HOSPADM

## 2024-06-08 RX ORDER — ALBUTEROL SULFATE 90 UG/1
1-2 AEROSOL, METERED RESPIRATORY (INHALATION) EVERY 4 HOURS PRN
Status: DISCONTINUED | OUTPATIENT
Start: 2024-06-08 | End: 2024-06-09

## 2024-06-08 RX ORDER — TIZANIDINE 4 MG/1
4 TABLET ORAL NIGHTLY PRN
Status: DISCONTINUED | OUTPATIENT
Start: 2024-06-08 | End: 2024-06-09 | Stop reason: HOSPADM

## 2024-06-08 RX ORDER — SODIUM CHLORIDE, SODIUM LACTATE, POTASSIUM CHLORIDE, CALCIUM CHLORIDE 600; 310; 30; 20 MG/100ML; MG/100ML; MG/100ML; MG/100ML
1000 INJECTION, SOLUTION INTRAVENOUS ONCE
Status: COMPLETED | OUTPATIENT
Start: 2024-06-08 | End: 2024-06-08

## 2024-06-08 RX ORDER — PROCHLORPERAZINE EDISYLATE 5 MG/ML
5-10 INJECTION INTRAMUSCULAR; INTRAVENOUS EVERY 4 HOURS PRN
Status: DISCONTINUED | OUTPATIENT
Start: 2024-06-08 | End: 2024-06-09 | Stop reason: HOSPADM

## 2024-06-08 RX ORDER — AMOXICILLIN 250 MG
2 CAPSULE ORAL EVERY EVENING
Status: DISCONTINUED | OUTPATIENT
Start: 2024-06-08 | End: 2024-06-09 | Stop reason: HOSPADM

## 2024-06-08 RX ORDER — LORAZEPAM 1 MG/1
1 TABLET ORAL EVERY 4 HOURS PRN
Status: DISCONTINUED | OUTPATIENT
Start: 2024-06-08 | End: 2024-06-09 | Stop reason: HOSPADM

## 2024-06-08 RX ORDER — KETOROLAC TROMETHAMINE 15 MG/ML
15 INJECTION, SOLUTION INTRAMUSCULAR; INTRAVENOUS EVERY 6 HOURS PRN
Status: DISCONTINUED | OUTPATIENT
Start: 2024-06-08 | End: 2024-06-09

## 2024-06-08 RX ORDER — LABETALOL HYDROCHLORIDE 5 MG/ML
10 INJECTION, SOLUTION INTRAVENOUS EVERY 4 HOURS PRN
Status: DISCONTINUED | OUTPATIENT
Start: 2024-06-08 | End: 2024-06-09 | Stop reason: HOSPADM

## 2024-06-08 RX ORDER — AMLODIPINE BESYLATE 10 MG/1
5 TABLET ORAL DAILY
Status: DISCONTINUED | OUTPATIENT
Start: 2024-06-09 | End: 2024-06-09 | Stop reason: HOSPADM

## 2024-06-08 RX ORDER — NICOTINE 21 MG/24HR
21 PATCH, TRANSDERMAL 24 HOURS TRANSDERMAL
Status: DISCONTINUED | OUTPATIENT
Start: 2024-06-08 | End: 2024-06-09 | Stop reason: HOSPADM

## 2024-06-08 RX ORDER — GAUZE BANDAGE 2" X 2"
100 BANDAGE TOPICAL DAILY
Qty: 4 TABLET | Refills: 0 | Status: DISCONTINUED | OUTPATIENT
Start: 2024-06-10 | End: 2024-06-09 | Stop reason: HOSPADM

## 2024-06-08 RX ORDER — LORAZEPAM 2 MG/1
2 TABLET ORAL
Status: DISCONTINUED | OUTPATIENT
Start: 2024-06-08 | End: 2024-06-09 | Stop reason: HOSPADM

## 2024-06-08 RX ORDER — ALBUTEROL SULFATE 90 UG/1
2 AEROSOL, METERED RESPIRATORY (INHALATION) EVERY 4 HOURS PRN
COMMUNITY

## 2024-06-08 RX ORDER — IPRATROPIUM BROMIDE AND ALBUTEROL SULFATE 2.5; .5 MG/3ML; MG/3ML
3 SOLUTION RESPIRATORY (INHALATION)
Status: DISCONTINUED | OUTPATIENT
Start: 2024-06-08 | End: 2024-06-09 | Stop reason: HOSPADM

## 2024-06-08 RX ORDER — PROMETHAZINE HYDROCHLORIDE 25 MG/1
12.5-25 TABLET ORAL EVERY 4 HOURS PRN
Status: DISCONTINUED | OUTPATIENT
Start: 2024-06-08 | End: 2024-06-09 | Stop reason: HOSPADM

## 2024-06-08 RX ORDER — DIAZEPAM 5 MG/ML
5 INJECTION, SOLUTION INTRAMUSCULAR; INTRAVENOUS ONCE
Status: COMPLETED | OUTPATIENT
Start: 2024-06-08 | End: 2024-06-08

## 2024-06-08 RX ORDER — CHLORDIAZEPOXIDE HYDROCHLORIDE 25 MG/1
25 CAPSULE, GELATIN COATED ORAL EVERY 6 HOURS
Status: DISCONTINUED | OUTPATIENT
Start: 2024-06-09 | End: 2024-06-09 | Stop reason: HOSPADM

## 2024-06-08 RX ORDER — LORAZEPAM 2 MG/1
4 TABLET ORAL
Status: DISCONTINUED | OUTPATIENT
Start: 2024-06-08 | End: 2024-06-09 | Stop reason: HOSPADM

## 2024-06-08 RX ORDER — SODIUM CHLORIDE, SODIUM LACTATE, POTASSIUM CHLORIDE, AND CALCIUM CHLORIDE .6; .31; .03; .02 G/100ML; G/100ML; G/100ML; G/100ML
30 INJECTION, SOLUTION INTRAVENOUS ONCE
Status: COMPLETED | OUTPATIENT
Start: 2024-06-08 | End: 2024-06-08

## 2024-06-08 RX ORDER — SODIUM CHLORIDE, SODIUM LACTATE, POTASSIUM CHLORIDE, CALCIUM CHLORIDE 600; 310; 30; 20 MG/100ML; MG/100ML; MG/100ML; MG/100ML
INJECTION, SOLUTION INTRAVENOUS CONTINUOUS
Status: DISCONTINUED | OUTPATIENT
Start: 2024-06-08 | End: 2024-06-09

## 2024-06-08 RX ORDER — ACETAMINOPHEN 325 MG/1
650 TABLET ORAL EVERY 6 HOURS PRN
Status: DISCONTINUED | OUTPATIENT
Start: 2024-06-08 | End: 2024-06-09 | Stop reason: HOSPADM

## 2024-06-08 RX ORDER — CHLORDIAZEPOXIDE HYDROCHLORIDE 25 MG/1
50 CAPSULE, GELATIN COATED ORAL EVERY 6 HOURS
Status: COMPLETED | OUTPATIENT
Start: 2024-06-08 | End: 2024-06-09

## 2024-06-08 RX ORDER — POTASSIUM CHLORIDE 20 MEQ/1
40 TABLET, EXTENDED RELEASE ORAL ONCE
Status: COMPLETED | OUTPATIENT
Start: 2024-06-08 | End: 2024-06-08

## 2024-06-08 RX ORDER — PROMETHAZINE HYDROCHLORIDE 25 MG/1
12.5-25 SUPPOSITORY RECTAL EVERY 4 HOURS PRN
Status: DISCONTINUED | OUTPATIENT
Start: 2024-06-08 | End: 2024-06-09 | Stop reason: HOSPADM

## 2024-06-08 RX ORDER — DIAZEPAM 5 MG/ML
10 INJECTION, SOLUTION INTRAMUSCULAR; INTRAVENOUS
Status: DISCONTINUED | OUTPATIENT
Start: 2024-06-08 | End: 2024-06-09 | Stop reason: HOSPADM

## 2024-06-08 RX ORDER — ACETAMINOPHEN 325 MG/1
650 TABLET ORAL ONCE
Status: COMPLETED | OUTPATIENT
Start: 2024-06-08 | End: 2024-06-08

## 2024-06-08 RX ORDER — LORAZEPAM 0.5 MG/1
0.5 TABLET ORAL EVERY 4 HOURS PRN
Status: DISCONTINUED | OUTPATIENT
Start: 2024-06-08 | End: 2024-06-09 | Stop reason: HOSPADM

## 2024-06-08 RX ORDER — LISINOPRIL 5 MG/1
5 TABLET ORAL DAILY
Status: DISCONTINUED | OUTPATIENT
Start: 2024-06-09 | End: 2024-06-09 | Stop reason: HOSPADM

## 2024-06-08 RX ORDER — IBUPROFEN 200 MG
400 TABLET ORAL 2 TIMES DAILY PRN
COMMUNITY
End: 2024-06-17

## 2024-06-08 RX ORDER — ONDANSETRON 4 MG/1
4 TABLET, ORALLY DISINTEGRATING ORAL EVERY 4 HOURS PRN
Status: DISCONTINUED | OUTPATIENT
Start: 2024-06-08 | End: 2024-06-09 | Stop reason: HOSPADM

## 2024-06-08 RX ORDER — PANTOPRAZOLE SODIUM 40 MG/10ML
40 INJECTION, POWDER, LYOPHILIZED, FOR SOLUTION INTRAVENOUS 2 TIMES DAILY
Status: DISCONTINUED | OUTPATIENT
Start: 2024-06-08 | End: 2024-06-09

## 2024-06-08 RX ORDER — FOLIC ACID 1 MG/1
1 TABLET ORAL DAILY
Qty: 4 TABLET | Refills: 0 | Status: DISCONTINUED | OUTPATIENT
Start: 2024-06-09 | End: 2024-06-09 | Stop reason: HOSPADM

## 2024-06-08 RX ORDER — ONDANSETRON 2 MG/ML
4 INJECTION INTRAMUSCULAR; INTRAVENOUS EVERY 4 HOURS PRN
Status: DISCONTINUED | OUTPATIENT
Start: 2024-06-08 | End: 2024-06-09 | Stop reason: HOSPADM

## 2024-06-08 RX ADMIN — MAGNESIUM SULFATE HEPTAHYDRATE: 500 INJECTION, SOLUTION INTRAMUSCULAR; INTRAVENOUS at 22:53

## 2024-06-08 RX ADMIN — SODIUM CHLORIDE, POTASSIUM CHLORIDE, SODIUM LACTATE AND CALCIUM CHLORIDE 1000 ML: 600; 310; 30; 20 INJECTION, SOLUTION INTRAVENOUS at 21:55

## 2024-06-08 RX ADMIN — SODIUM CHLORIDE, POTASSIUM CHLORIDE, SODIUM LACTATE AND CALCIUM CHLORIDE: 600; 310; 30; 20 INJECTION, SOLUTION INTRAVENOUS at 20:08

## 2024-06-08 RX ADMIN — PANTOPRAZOLE SODIUM 40 MG: 40 INJECTION, POWDER, FOR SOLUTION INTRAVENOUS at 22:01

## 2024-06-08 RX ADMIN — DIAZEPAM 5 MG: 5 INJECTION INTRAMUSCULAR; INTRAVENOUS at 20:29

## 2024-06-08 RX ADMIN — ACETAMINOPHEN 650 MG: 325 TABLET, FILM COATED ORAL at 17:41

## 2024-06-08 RX ADMIN — ONDANSETRON 4 MG: 2 INJECTION INTRAMUSCULAR; INTRAVENOUS at 20:07

## 2024-06-08 RX ADMIN — MAGNESIUM SULFATE HEPTAHYDRATE: 500 INJECTION, SOLUTION INTRAMUSCULAR; INTRAVENOUS at 14:46

## 2024-06-08 RX ADMIN — POTASSIUM CHLORIDE 40 MEQ: 1500 TABLET, EXTENDED RELEASE ORAL at 20:25

## 2024-06-08 RX ADMIN — THIAMINE HYDROCHLORIDE 500 MG: 100 INJECTION, SOLUTION INTRAMUSCULAR; INTRAVENOUS at 22:50

## 2024-06-08 RX ADMIN — MIRTAZAPINE 7.5 MG: 7.5 TABLET, FILM COATED ORAL at 22:45

## 2024-06-08 RX ADMIN — SODIUM CHLORIDE, POTASSIUM CHLORIDE, SODIUM LACTATE AND CALCIUM CHLORIDE: 600; 310; 30; 20 INJECTION, SOLUTION INTRAVENOUS at 22:50

## 2024-06-08 RX ADMIN — POTASSIUM PHOSPHATE, MONOBASIC AND POTASSIUM PHOSPHATE, DIBASIC 15 MMOL: 224; 236 INJECTION, SOLUTION, CONCENTRATE INTRAVENOUS at 23:40

## 2024-06-08 RX ADMIN — CHLORDIAZEPOXIDE HYDROCHLORIDE 50 MG: 25 CAPSULE ORAL at 20:26

## 2024-06-08 RX ADMIN — CHLORDIAZEPOXIDE HYDROCHLORIDE 50 MG: 25 CAPSULE ORAL at 23:39

## 2024-06-08 RX ADMIN — NICOTINE TRANSDERMAL SYSTEM 21 MG: 21 PATCH, EXTENDED RELEASE TRANSDERMAL at 20:32

## 2024-06-08 RX ADMIN — SODIUM CHLORIDE, POTASSIUM CHLORIDE, SODIUM LACTATE AND CALCIUM CHLORIDE 1905 ML: 600; 310; 30; 20 INJECTION, SOLUTION INTRAVENOUS at 17:09

## 2024-06-08 RX ADMIN — LORAZEPAM 2 MG: 2 TABLET ORAL at 22:00

## 2024-06-08 SDOH — ECONOMIC STABILITY: TRANSPORTATION INSECURITY
IN THE PAST 12 MONTHS, HAS THE LACK OF TRANSPORTATION KEPT YOU FROM MEDICAL APPOINTMENTS OR FROM GETTING MEDICATIONS?: NO

## 2024-06-08 SDOH — ECONOMIC STABILITY: TRANSPORTATION INSECURITY
IN THE PAST 12 MONTHS, HAS LACK OF RELIABLE TRANSPORTATION KEPT YOU FROM MEDICAL APPOINTMENTS, MEETINGS, WORK OR FROM GETTING THINGS NEEDED FOR DAILY LIVING?: NO

## 2024-06-08 ASSESSMENT — LIFESTYLE VARIABLES
PAROXYSMAL SWEATS: BARELY PERCEPTIBLE SWEATING. PALMS MOIST
AGITATION: NORMAL ACTIVITY
TACTILE DISTURBANCES: VERY MILD ITCHING, PINS AND NEEDLES SENSATION, BURNING OR NUMBNESS
HEADACHE, FULLNESS IN HEAD: NOT PRESENT
TOTAL SCORE: 8
ON A TYPICAL DAY WHEN YOU DRINK ALCOHOL HOW MANY DRINKS DO YOU HAVE: 4
TOTAL SCORE: 4
AVERAGE NUMBER OF DAYS PER WEEK YOU HAVE A DRINK CONTAINING ALCOHOL: 7
TOTAL SCORE: 4
EVER HAD A DRINK FIRST THING IN THE MORNING TO STEADY YOUR NERVES TO GET RID OF A HANGOVER: YES
HEADACHE, FULLNESS IN HEAD: MILD
TREMOR: *
AGITATION: NORMAL ACTIVITY
EVER FELT BAD OR GUILTY ABOUT YOUR DRINKING: YES
TOTAL SCORE: VERY MILD ITCHING, PINS AND NEEDLES SENSATION, BURNING OR NUMBNESS
TOTAL SCORE: 4
CONSUMPTION TOTAL: POSITIVE
ORIENTATION AND CLOUDING OF SENSORIUM: ORIENTED AND CAN DO SERIAL ADDITIONS
NAUSEA AND VOMITING: *
DO YOU DRINK ALCOHOL: YES
NAUSEA AND VOMITING: *
TREMOR: MODERATE TREMOR WITH ARMS EXTENDED
VISUAL DISTURBANCES: NOT PRESENT
DOES PATIENT WANT TO STOP DRINKING: YES
DOES PATIENT WANT TO TALK TO SOMEONE ABOUT QUITTING: YES
EVER HAD A DRINK FIRST THING IN THE MORNING TO STEADY YOUR NERVES TO GET RID OF A HANGOVER: YES
HAVE YOU EVER FELT YOU SHOULD CUT DOWN ON YOUR DRINKING: YES
AUDITORY DISTURBANCES: NOT PRESENT
TOTAL SCORE: 13
ALCOHOL_USE: YES
SUBSTANCE_ABUSE: 0
CONSUMPTION TOTAL: INCOMPLETE
TOTAL SCORE: 4
AUDITORY DISTURBANCES: VERY MILD HARSHNESS OR ABILITY TO FRIGHTEN
HAVE PEOPLE ANNOYED YOU BY CRITICIZING YOUR DRINKING: YES
TOTAL SCORE: 4
ORIENTATION AND CLOUDING OF SENSORIUM: ORIENTED AND CAN DO SERIAL ADDITIONS
ANXIETY: *
EVER FELT BAD OR GUILTY ABOUT YOUR DRINKING: YES
TOTAL SCORE: 4
AVERAGE NUMBER OF DAYS PER WEEK YOU HAVE A DRINK CONTAINING ALCOHOL: 7
HAVE PEOPLE ANNOYED YOU BY CRITICIZING YOUR DRINKING: YES
PAROXYSMAL SWEATS: NO SWEAT VISIBLE
DOES PATIENT WANT TO STOP DRINKING: YES
VISUAL DISTURBANCES: NOT PRESENT
HAVE YOU EVER FELT YOU SHOULD CUT DOWN ON YOUR DRINKING: YES
ANXIETY: *
ON A TYPICAL DAY WHEN YOU DRINK ALCOHOL HOW MANY DRINKS DO YOU HAVE: 4
HOW MANY TIMES IN THE PAST YEAR HAVE YOU HAD 5 OR MORE DRINKS IN A DAY: 200

## 2024-06-08 ASSESSMENT — SOCIAL DETERMINANTS OF HEALTH (SDOH)
WITHIN THE LAST YEAR, HAVE TO BEEN RAPED OR FORCED TO HAVE ANY KIND OF SEXUAL ACTIVITY BY YOUR PARTNER OR EX-PARTNER?: NO
IN THE PAST 12 MONTHS, HAS THE ELECTRIC, GAS, OIL, OR WATER COMPANY THREATENED TO SHUT OFF SERVICE IN YOUR HOME?: NO
WITHIN THE LAST YEAR, HAVE YOU BEEN KICKED, HIT, SLAPPED, OR OTHERWISE PHYSICALLY HURT BY YOUR PARTNER OR EX-PARTNER?: NO
WITHIN THE LAST YEAR, HAVE YOU BEEN HUMILIATED OR EMOTIONALLY ABUSED IN OTHER WAYS BY YOUR PARTNER OR EX-PARTNER?: NO
WITHIN THE LAST YEAR, HAVE YOU BEEN AFRAID OF YOUR PARTNER OR EX-PARTNER?: NO
WITHIN THE PAST 12 MONTHS, THE FOOD YOU BOUGHT JUST DIDN'T LAST AND YOU DIDN'T HAVE MONEY TO GET MORE: NEVER TRUE
WITHIN THE PAST 12 MONTHS, YOU WORRIED THAT YOUR FOOD WOULD RUN OUT BEFORE YOU GOT THE MONEY TO BUY MORE: NEVER TRUE

## 2024-06-08 ASSESSMENT — PATIENT HEALTH QUESTIONNAIRE - PHQ9
6. FEELING BAD ABOUT YOURSELF - OR THAT YOU ARE A FAILURE OR HAVE LET YOURSELF OR YOUR FAMILY DOWN: SEVERAL DAYS
3. TROUBLE FALLING OR STAYING ASLEEP OR SLEEPING TOO MUCH: SEVERAL DAYS
4. FEELING TIRED OR HAVING LITTLE ENERGY: SEVERAL DAYS
SUM OF ALL RESPONSES TO PHQ9 QUESTIONS 1 AND 2: 2
1. LITTLE INTEREST OR PLEASURE IN DOING THINGS: SEVERAL DAYS
5. POOR APPETITE OR OVEREATING: SEVERAL DAYS
9. THOUGHTS THAT YOU WOULD BE BETTER OFF DEAD, OR OF HURTING YOURSELF: NOT AT ALL
8. MOVING OR SPEAKING SO SLOWLY THAT OTHER PEOPLE COULD HAVE NOTICED. OR THE OPPOSITE, BEING SO FIGETY OR RESTLESS THAT YOU HAVE BEEN MOVING AROUND A LOT MORE THAN USUAL: SEVERAL DAYS
SUM OF ALL RESPONSES TO PHQ QUESTIONS 1-9: 7
7. TROUBLE CONCENTRATING ON THINGS, SUCH AS READING THE NEWSPAPER OR WATCHING TELEVISION: NOT AT ALL
2. FEELING DOWN, DEPRESSED, IRRITABLE, OR HOPELESS: SEVERAL DAYS

## 2024-06-08 ASSESSMENT — ENCOUNTER SYMPTOMS
DOUBLE VISION: 0
WEAKNESS: 1
SEIZURES: 0
COUGH: 0
TREMORS: 1
HEADACHES: 0
HEARTBURN: 1
VOMITING: 1
MYALGIAS: 1
FEVER: 0
DEPRESSION: 0
BLURRED VISION: 0
PALPITATIONS: 1
LOSS OF CONSCIOUSNESS: 0
NAUSEA: 1
DIZZINESS: 0
SHORTNESS OF BREATH: 0
ABDOMINAL PAIN: 0
CHILLS: 0
BRUISES/BLEEDS EASILY: 0

## 2024-06-08 ASSESSMENT — COGNITIVE AND FUNCTIONAL STATUS - GENERAL
SUGGESTED CMS G CODE MODIFIER DAILY ACTIVITY: CH
SUGGESTED CMS G CODE MODIFIER MOBILITY: CH
MOBILITY SCORE: 24
DAILY ACTIVITIY SCORE: 24

## 2024-06-08 ASSESSMENT — PAIN DESCRIPTION - PAIN TYPE
TYPE: ACUTE PAIN
TYPE: ACUTE PAIN

## 2024-06-08 ASSESSMENT — FIBROSIS 4 INDEX
FIB4 SCORE: 1.38
FIB4 SCORE: 1.26

## 2024-06-08 NOTE — ED TRIAGE NOTES
Chief Complaint   Patient presents with    Weakness    ETOH Withdrawal     BIB EMS from home. Reports states patient is usually a 4 pint a day drinker. Patient reports only a small amount of ETOH over past 2 days     Patient is slow to respond to questions and confused at times. Patient having tremors.

## 2024-06-08 NOTE — ED PROVIDER NOTES
ER Provider Note    Scribed for Maria Del Carmen Live M.d. by Rajendra Mccollum. 6/8/2024  2:26 PM    Primary Care Provider: Karo Middleton M.D.    CHIEF COMPLAINT   Chief Complaint   Patient presents with    Weakness    ETOH Withdrawal     BIB EMS from home. Reports states patient is usually a 4 pint a day drinker. Patient reports only a small amount of ETOH over past 2 days     EXTERNAL RECORDS REVIEWED  Inpatient Notes Patient was seen in the ED three times in May. She was seen on May 30th and June 3rd for the alcohol use/abuse.   HPI/ROS  LIMITATION TO HISTORY   Select: Intoxication  OUTSIDE HISTORIAN(S):  None    Rhonda Gould is a 53 y.o. female who presents to the ED via ambulance complaining of alcohol withdrawal onset prior to arrival. Patient explains she lost her wallet last night and she began stressing when she could not find it. As a result, she broke her 3 days of sobriety and started drinking again. She notes she drank a lot and soon after had about 7 to 8 episodes of emesis as a result there were no nausea medicines administered and route because EMS thought her QT interval was a little bit long.. She reports calling her [facility] who [facility] her [facility] abuse and multiple episodes of vomiting. Patient reports feeling nauseous. She states her last drink was 3 hours ago. Per triage, she reports drinking about 4 pints a day. She denies recent illness.  No cough.  No sore throat.  No abdominal pain.  No fevers or chills.  No hemoptysis, diarrhea, or chest pain. The patient also adds she has shortness of breath. En route, her oxygen saturation level was 88%. She denies using at home oxygen. She has histroy of COPD, but notes that she is still an active smoker. Otherwise, patient denies any other sick symptoms. She reports being interested in Detox, admitting that she has made wrong decisions in the past. No other drug use. Patient has history of hypertension.     PAST MEDICAL  "HISTORY  Past Medical History:   Diagnosis Date    Chronic obstructive pulmonary disease (HCC)     Hypertension        SURGICAL HISTORY  Past Surgical History:   Procedure Laterality Date    HYSTERECTOMY LAPAROSCOPY  6/2005       FAMILY HISTORY  Family History   Problem Relation Age of Onset    Diabetes Mother     Hypertension Mother     Hypertension Father     Diabetes Father     Heart Disease Maternal Grandmother     Cancer Maternal Grandfather        SOCIAL HISTORY   reports that she has been smoking. She has never used smokeless tobacco. She reports current alcohol use. She reports that she does not currently use drugs after having used the following drugs: Inhaled and Marijuana. Frequency: 2.00 times per week.    CURRENT MEDICATIONS  Previous Medications    ALBUTEROL (PROVENTIL) 2.5MG/3ML NEBU SOLN SOLUTION FOR NEBULIZATION    Take 3 mL by nebulization every four hours as needed for Shortness of Breath.    ALBUTEROL 108 (90 BASE) MCG/ACT AERO SOLN INHALATION AEROSOL    Inhale 1-2 Puffs every four hours as needed for Shortness of Breath.    AMLODIPINE (NORVASC) 5 MG TAB    Take 1 Tablet by mouth every day.    IBUPROFEN (MOTRIN) 200 MG TAB    Take 400 mg by mouth 2 times a day as needed for Mild Pain. 2 tablets = 400 mg.    LISINOPRIL (PRINIVIL) 10 MG TAB    Take 1 Tablet by mouth every day.    LISINOPRIL (PRINIVIL) 5 MG TAB    Take 1 Tablet by mouth every day.    MIRTAZAPINE (REMERON) 7.5 MG TABLET    Take 7.5 mg by mouth at bedtime.    TIZANIDINE (ZANAFLEX) 4 MG TAB    Take 1 tablet by mouth at bedtime as needed (as needed for spasm).       ALLERGIES  Fish allergy, Shellfish allergy, and Codeine    PHYSICAL EXAM  Ht 1.753 m (5' 9\")   Wt 63.5 kg (140 lb)   LMP 06/11/2005   BMI 20.67 kg/m²   Constitutional: Well developed, well nourished; No acute distress; Non-toxic appearance.   HENT: Normocephalic, atraumatic; Bilateral external ears normal; slightly dry mucous membranes.  Eyes: PERRL, EOMI, Conjunctiva " normal. No discharge.   Neck:  Supple, nontender midline; No stridor; No nuchal rigidity.   Lymphatic: No cervical lymphadenopathy noted.   Cardiovascular: Regular rate and rhythm without murmurs, rubs, or gallop.   Thorax & Lungs: No respiratory distress, decreased breath sounds throughout.  No wheezes rales or rhonchi.  Nontender chest wall. No crepitus or subcutaneous air  Abdomen: Soft, nontender, bowel sounds normal. No obvious masses; No pulsatile masses; no rebound, guarding, or peritoneal signs.   Skin: Good color; warm and dry without rash or petechia.  Back: Nontender, No CVA tenderness.   Rectal: Normal appearance, Normal sphincter tone. Stool is normal color and heme negative.   Extremities: Distal radial, dorsalis pedis, posterior tibial pulses are equal bilaterally; No edema; Nontender calves or saphenous, No cyanosis, No clubbing.   Musculoskeletal: Good range of motion in all major joints. No tenderness to palpation or major deformities noted.   Neurologic: Alert & oriented x 4, clear speech,    DIAGNOSTIC STUDIES    EKG/LABS  Results for orders placed or performed during the hospital encounter of 06/08/24   COMP METABOLIC PANEL   Result Value Ref Range    Sodium 143 135 - 145 mmol/L    Potassium 3.7 3.6 - 5.5 mmol/L    Chloride 104 96 - 112 mmol/L    Co2 18 (L) 20 - 33 mmol/L    Anion Gap 21.0 (H) 7.0 - 16.0    Glucose 89 65 - 99 mg/dL    Bun 9 8 - 22 mg/dL    Creatinine 0.53 0.50 - 1.40 mg/dL    Calcium 9.2 8.5 - 10.5 mg/dL    Correct Calcium 8.9 8.5 - 10.5 mg/dL    AST(SGOT) 21 12 - 45 U/L    ALT(SGPT) 9 2 - 50 U/L    Alkaline Phosphatase 91 30 - 99 U/L    Total Bilirubin 0.2 0.1 - 1.5 mg/dL    Albumin 4.4 3.2 - 4.9 g/dL    Total Protein 6.9 6.0 - 8.2 g/dL    Globulin 2.5 1.9 - 3.5 g/dL    A-G Ratio 1.8 g/dL   LIPASE   Result Value Ref Range    Lipase 37 11 - 82 U/L   LACTIC ACID   Result Value Ref Range    Lactic Acid 4.2 (HH) 0.5 - 2.0 mmol/L   DIAGNOSTIC ALCOHOL   Result Value Ref Range     Diagnostic Alcohol 269.9 (H) <10.1 mg/dL   CBC WITH DIFFERENTIAL   Result Value Ref Range    WBC 3.5 (L) 4.8 - 10.8 K/uL    RBC 3.20 (L) 4.20 - 5.40 M/uL    Hemoglobin 10.4 (L) 12.0 - 16.0 g/dL    Hematocrit 31.2 (L) 37.0 - 47.0 %    MCV 97.5 81.4 - 97.8 fL    MCH 32.5 27.0 - 33.0 pg    MCHC 33.3 32.2 - 35.5 g/dL    RDW 58.1 (H) 35.9 - 50.0 fL    Platelet Count 279 164 - 446 K/uL    MPV 9.3 9.0 - 12.9 fL    Neutrophils-Polys 60.30 44.00 - 72.00 %    Lymphocytes 34.80 22.00 - 41.00 %    Monocytes 3.70 0.00 - 13.40 %    Eosinophils 0.60 0.00 - 6.90 %    Basophils 0.30 0.00 - 1.80 %    Immature Granulocytes 0.30 0.00 - 0.90 %    Nucleated RBC 0.00 0.00 - 0.20 /100 WBC    Neutrophils (Absolute) 2.13 1.82 - 7.42 K/uL    Lymphs (Absolute) 1.23 1.00 - 4.80 K/uL    Monos (Absolute) 0.13 0.00 - 0.85 K/uL    Eos (Absolute) 0.02 0.00 - 0.51 K/uL    Baso (Absolute) 0.01 0.00 - 0.12 K/uL    Immature Granulocytes (abs) 0.01 0.00 - 0.11 K/uL    NRBC (Absolute) 0.00 K/uL   ESTIMATED GFR   Result Value Ref Range    GFR (CKD-EPI) 109 >60 mL/min/1.73 m 2   Lactic Acid   Result Value Ref Range    Lactic Acid 5.0 (HH) 0.5 - 2.0 mmol/L   Lactic Acid   Result Value Ref Range    Lactic Acid 5.0 (HH) 0.5 - 2.0 mmol/L   TROPONIN   Result Value Ref Range    Troponin T <6 6 - 19 ng/L   EKG   Result Value Ref Range    Report       [facility] Emergency Dept.    Test Date:  2024  Pt Name:    JASON SCHMITZ                Department: ER  MRN:        [id number]                      Room:       [facility]  Gender:     Female                       Technician: [id number]  :        1970                   Requested By:NIK LIVE  Order #:    [id number]                    Reading MD: Nik Live    Measurements  Intervals                                Axis  Rate:       93                           P:          80  SC:         178                          QRS:        57  QRSD:       76                           T:          61  QT:          365  QTc:        454    Interpretive Statements  Sinus rhythm rate 93  Normal axis  Normal intervals  No ST elevation or depression  Compared to ECG 05/19/2024 07:36:49  Atrial abnormality no longer present  Myocardial infarct finding no longer present  Electronically Signed On 06- 16:33:56 PDT by Maria Del Carmen Live       I have independently interpreted this EKG    COURSE & MEDICAL DECISION MAKING     ASSESSMENT, COURSE AND PLAN  Care Narrative: Patient presents to the ER with alcohol intoxication and vomiting.  The patient says she went to the grocery store last night.  She bought alcohol.  When she woke up this morning she realized she lost her wallet.  She called the [employer] and they did not have any loss of wallets.  She started getting very upset.  For this reason she started drinking more.  She then started vomiting.  She called her mother who told her she should go to the ER to get checked out for her alcohol abuse and her vomiting.  The patient is interested in going through detox.  She reports that she was sober for 3 days but then started drinking again when she lost her wallet.  She says stressful situations cause her to drink.  She denies any recent illness.  No cough, flu or cold-like symptoms.  No diarrhea.  No chest pain or abdominal pain.  No urinary symptoms.  She denies any trauma or injury to her head.  Patient had an O2 sat of 88% on room air upon EMS arrival.  She has COPD.  She uses inhalers.  She has some decreased breath sounds on examination but no wheezes rales or rhonchi.  No respiratory distress.  Chest x-ray is negative for any obvious pneumonia.  Laboratory evaluation reveals a lactic acidosis with a lactic acid level of 4.2.  She was given fluids (LR) as well as a rally bag and lactic acid level was repeated.  Lactic came back even higher at 5.0.  She was given more LR and then a repeat lactic acid level remains high at 5.0.  Patient says she feels fine.  She is no longer  vomiting.  She denies any fevers or chills lately.  She is afebrile here.  Again, she denies any recent illnesses.  She has no abdominal pain.  She has no abdominal tenderness.  At this time I think her lactic acidosis is due to dehydration and alcohol abuse.  I do not think it is related to sepsis.  I drew some blood cultures because lactic acidosis, but I have low suspicion for infection and therefore did not give her any antibiotics.  Urine is clear.  No evidence of UTI.  She also complained of some generalized weakness.  EKG was performed and there is no ST elevation or depression.  Initial troponin is less than 6.  At this time no concern for STEMI, non-STEMI, myocarditis or pericarditis.  Electrolytes are normal.  Magnesium is 1.7.  She does have an anemia today with a hemoglobin of 10.4.  She denies any melena.  I did a rectal examination and she is guaiac negative.  This is a 3 g drop from 2 weeks ago.  Perhaps trending this during her hospitalization would be prudent.  At this time, no evidence of GI bleed.  Patient has not exhibited any withdrawal symptoms here in the ER though she is at high risk of withdrawal.  She will be admitted for lactic acidosis.  Since her lactic acid level still 5 she will need to go IMCU.  I spoke with Dr. Clinton, intensivist on-call.  He agrees with IMCU admission.  I then spoke with Dr. Feliciano, hospitalist on-call, and he will kindly evaluate the patient for hospitalization in the IMCU.    2:33 PM - Patient was evaluated at bedside. She presents to the ED for alcohol withdrawal. She reports her last drink being 3 hours ago and has been experiencing episodes of emesis and nausea.  I will be ordering imaging to further evaluate. Patient verbalizes understanding and agreement to this plan of care.     5:06 PM - I will be performing a rectal exam on this patient.     5:30 PM - I performed a rectal exam at this time. It was Guaiac negative as indicated above.     1905: Patient is  feeling better.  No headache.  No abdominal pain.  No nausea.  Again, she reiterates that she has not been sick or ill lately.  No abdominal pain.  No cough.  No sore throat.  No chest pain.  No fevers or chills.  No diarrhea.  She is getting up right now to use the bathroom.  Blood pressure is 117 systolic at this time.  She understands that her lactic acid level is still elevated at 5.0.  She will need hospitalization for lactic acidosis.  She would like detox from alcohol.  We will be able to detox her here by [facility] protocol.    1905: Paged intensivist for an IMCU admission.    1915: Discussed with Dr. Clinton, intensivist.  He agrees with IMCU admission.  He will let the RTOC know.    Hydration: Based on the patient's presentation of Dehydration the patient was given IV fluids. IV Hydration was used because oral hydration was not adequate alone. Upon recheck following hydration, the patient was Improved.          ADDITIONAL PROBLEM LIST  Problem #1: Alcohol intoxication  Problem #2: Nausea and vomiting    DISPOSITION AND DISCUSSIONS  I have discussed management of the patient with the following physicians and FABI's: Intensivist    Discussion of management with other QHP or appropriate source(s): None     Escalation of care considered, and ultimately not performed: diagnostic imaging.  Patient denies falls or injury.  No recent head trauma.  No complaint of abdominal pain.  At this time I do not think she needs a CT head or abdomen.    Barriers to care at this time, including but not limited to:  None .     Decision tools and prescription drugs considered including, but not limited to: Antibiotics patient has not been sick or ill.  No fevers or chills.  White count is normal.  Low suspicion for infection despite elevated lactic acid level.  At this time we will hold off on antibiotics. .    FINAL DIANGOSIS  1. Lactic acidosis Acute   2. Alcoholic intoxication without complication (HCC) Acute   3. Anemia,  unspecified type Acute       This dictation has been created using voice recognition software. The accuracy of the dictation is limited by the abilities of the software. I expect there may be some errors of grammar and possibly content. I made every attempt to manually correct the errors within my dictation. However, errors related to voice recognition software may still exist and should be interpreted within the appropriate context.     Rajendra STARR (Olaf), am scribing for, and in the presence of, Maria Del Carmen Live M.D..    Electronically signed by: Rajendra Mccollum (Olaf), 6/8/2024    Maria Del Carmen STARR M.D. personally performed the services described in this documentation, as scribed by Rajendra Mccollum in my presence, and it is both accurate and complete.       The note accurately reflects work and decisions made by me.  Maria Del Carmen Live M.D.  6/8/2024  7:11 PM

## 2024-06-09 VITALS
HEIGHT: 69 IN | RESPIRATION RATE: 15 BRPM | HEART RATE: 94 BPM | WEIGHT: 126.76 LBS | DIASTOLIC BLOOD PRESSURE: 97 MMHG | TEMPERATURE: 97.8 F | SYSTOLIC BLOOD PRESSURE: 128 MMHG | BODY MASS INDEX: 18.78 KG/M2 | OXYGEN SATURATION: 95 %

## 2024-06-09 PROBLEM — I95.9 HYPOTENSION: Status: RESOLVED | Noted: 2024-06-08 | Resolved: 2024-06-09

## 2024-06-09 PROBLEM — D64.9 ANEMIA: Status: ACTIVE | Noted: 2024-06-09

## 2024-06-09 LAB
ALBUMIN SERPL BCP-MCNC: 3.1 G/DL (ref 3.2–4.9)
ALBUMIN/GLOB SERPL: 1.9 G/DL
ALP SERPL-CCNC: 60 U/L (ref 30–99)
ALT SERPL-CCNC: 7 U/L (ref 2–50)
ANION GAP SERPL CALC-SCNC: 10 MMOL/L (ref 7–16)
AST SERPL-CCNC: 17 U/L (ref 12–45)
BASOPHILS # BLD AUTO: 0.3 % (ref 0–1.8)
BASOPHILS # BLD: 0.01 K/UL (ref 0–0.12)
BILIRUB SERPL-MCNC: <0.2 MG/DL (ref 0.1–1.5)
BUN SERPL-MCNC: 6 MG/DL (ref 8–22)
CALCIUM ALBUM COR SERPL-MCNC: 8.7 MG/DL (ref 8.5–10.5)
CALCIUM SERPL-MCNC: 8 MG/DL (ref 8.5–10.5)
CHLORIDE SERPL-SCNC: 107 MMOL/L (ref 96–112)
CHOLEST SERPL-MCNC: 99 MG/DL (ref 100–199)
CO2 SERPL-SCNC: 22 MMOL/L (ref 20–33)
CREAT SERPL-MCNC: 0.54 MG/DL (ref 0.5–1.4)
EOSINOPHIL # BLD AUTO: 0.03 K/UL (ref 0–0.51)
EOSINOPHIL NFR BLD: 0.8 % (ref 0–6.9)
ERYTHROCYTE [DISTWIDTH] IN BLOOD BY AUTOMATED COUNT: 58.4 FL (ref 35.9–50)
GFR SERPLBLD CREATININE-BSD FMLA CKD-EPI: 108 ML/MIN/1.73 M 2
GLOBULIN SER CALC-MCNC: 1.6 G/DL (ref 1.9–3.5)
GLUCOSE SERPL-MCNC: 109 MG/DL (ref 65–99)
HCT VFR BLD AUTO: 29 % (ref 37–47)
HDLC SERPL-MCNC: 74 MG/DL
HGB BLD-MCNC: 9.8 G/DL (ref 12–16)
IMM GRANULOCYTES # BLD AUTO: 0.01 K/UL (ref 0–0.11)
IMM GRANULOCYTES NFR BLD AUTO: 0.3 % (ref 0–0.9)
LACTATE SERPL-SCNC: 1.4 MMOL/L (ref 0.5–2)
LDLC SERPL CALC-MCNC: 15 MG/DL
LYMPHOCYTES # BLD AUTO: 1.68 K/UL (ref 1–4.8)
LYMPHOCYTES NFR BLD: 43.9 % (ref 22–41)
MAGNESIUM SERPL-MCNC: 1.8 MG/DL (ref 1.5–2.5)
MCH RBC QN AUTO: 32.3 PG (ref 27–33)
MCHC RBC AUTO-ENTMCNC: 33.8 G/DL (ref 32.2–35.5)
MCV RBC AUTO: 95.7 FL (ref 81.4–97.8)
MONOCYTES # BLD AUTO: 0.28 K/UL (ref 0–0.85)
MONOCYTES NFR BLD AUTO: 7.3 % (ref 0–13.4)
NEUTROPHILS # BLD AUTO: 1.82 K/UL (ref 1.82–7.42)
NEUTROPHILS NFR BLD: 47.4 % (ref 44–72)
NRBC # BLD AUTO: 0 K/UL
NRBC BLD-RTO: 0 /100 WBC (ref 0–0.2)
PHOSPHATE SERPL-MCNC: 4.2 MG/DL (ref 2.5–4.5)
PLATELET # BLD AUTO: 242 K/UL (ref 164–446)
PMV BLD AUTO: 9.1 FL (ref 9–12.9)
POTASSIUM SERPL-SCNC: 4.5 MMOL/L (ref 3.6–5.5)
PROT SERPL-MCNC: 4.7 G/DL (ref 6–8.2)
RBC # BLD AUTO: 3.03 M/UL (ref 4.2–5.4)
SODIUM SERPL-SCNC: 139 MMOL/L (ref 135–145)
TRIGL SERPL-MCNC: 52 MG/DL (ref 0–149)
WBC # BLD AUTO: 3.8 K/UL (ref 4.8–10.8)

## 2024-06-09 PROCEDURE — 99233 SBSQ HOSP IP/OBS HIGH 50: CPT | Performed by: HOSPITALIST

## 2024-06-09 PROCEDURE — 85025 COMPLETE CBC W/AUTO DIFF WBC: CPT

## 2024-06-09 PROCEDURE — A9270 NON-COVERED ITEM OR SERVICE: HCPCS | Performed by: STUDENT IN AN ORGANIZED HEALTH CARE EDUCATION/TRAINING PROGRAM

## 2024-06-09 PROCEDURE — 700102 HCHG RX REV CODE 250 W/ 637 OVERRIDE(OP): Performed by: STUDENT IN AN ORGANIZED HEALTH CARE EDUCATION/TRAINING PROGRAM

## 2024-06-09 PROCEDURE — 700111 HCHG RX REV CODE 636 W/ 250 OVERRIDE (IP): Performed by: STUDENT IN AN ORGANIZED HEALTH CARE EDUCATION/TRAINING PROGRAM

## 2024-06-09 PROCEDURE — 92610 EVALUATE SWALLOWING FUNCTION: CPT

## 2024-06-09 PROCEDURE — 80053 COMPREHEN METABOLIC PANEL: CPT

## 2024-06-09 PROCEDURE — 80061 LIPID PANEL: CPT

## 2024-06-09 PROCEDURE — 83735 ASSAY OF MAGNESIUM: CPT

## 2024-06-09 PROCEDURE — 700105 HCHG RX REV CODE 258: Performed by: STUDENT IN AN ORGANIZED HEALTH CARE EDUCATION/TRAINING PROGRAM

## 2024-06-09 PROCEDURE — 700111 HCHG RX REV CODE 636 W/ 250 OVERRIDE (IP): Mod: JZ | Performed by: HOSPITALIST

## 2024-06-09 PROCEDURE — C9113 INJ PANTOPRAZOLE SODIUM, VIA: HCPCS | Performed by: STUDENT IN AN ORGANIZED HEALTH CARE EDUCATION/TRAINING PROGRAM

## 2024-06-09 PROCEDURE — 83605 ASSAY OF LACTIC ACID: CPT

## 2024-06-09 PROCEDURE — 84100 ASSAY OF PHOSPHORUS: CPT

## 2024-06-09 RX ORDER — OMEPRAZOLE 20 MG/1
20 CAPSULE, DELAYED RELEASE ORAL DAILY
Status: DISCONTINUED | OUTPATIENT
Start: 2024-06-10 | End: 2024-06-09 | Stop reason: HOSPADM

## 2024-06-09 RX ORDER — MAGNESIUM SULFATE HEPTAHYDRATE 40 MG/ML
2 INJECTION, SOLUTION INTRAVENOUS ONCE
Status: COMPLETED | OUTPATIENT
Start: 2024-06-09 | End: 2024-06-09

## 2024-06-09 RX ADMIN — THIAMINE HYDROCHLORIDE 500 MG: 100 INJECTION, SOLUTION INTRAMUSCULAR; INTRAVENOUS at 09:16

## 2024-06-09 RX ADMIN — CHLORDIAZEPOXIDE HYDROCHLORIDE 50 MG: 25 CAPSULE ORAL at 06:33

## 2024-06-09 RX ADMIN — FOLIC ACID 1 MG: 1 TABLET ORAL at 05:27

## 2024-06-09 RX ADMIN — THERA TABS 1 TABLET: TAB at 05:27

## 2024-06-09 RX ADMIN — LORAZEPAM 2 MG: 2 TABLET ORAL at 05:27

## 2024-06-09 RX ADMIN — SODIUM CHLORIDE, POTASSIUM CHLORIDE, SODIUM LACTATE AND CALCIUM CHLORIDE: 600; 310; 30; 20 INJECTION, SOLUTION INTRAVENOUS at 05:41

## 2024-06-09 RX ADMIN — THIAMINE HYDROCHLORIDE 500 MG: 100 INJECTION, SOLUTION INTRAMUSCULAR; INTRAVENOUS at 15:26

## 2024-06-09 RX ADMIN — CHLORDIAZEPOXIDE HYDROCHLORIDE 50 MG: 25 CAPSULE ORAL at 12:48

## 2024-06-09 RX ADMIN — SODIUM CHLORIDE, POTASSIUM CHLORIDE, SODIUM LACTATE AND CALCIUM CHLORIDE: 600; 310; 30; 20 INJECTION, SOLUTION INTRAVENOUS at 12:55

## 2024-06-09 RX ADMIN — TIZANIDINE 4 MG: 4 TABLET ORAL at 06:33

## 2024-06-09 RX ADMIN — MAGNESIUM SULFATE HEPTAHYDRATE 2 G: 2 INJECTION, SOLUTION INTRAVENOUS at 09:11

## 2024-06-09 RX ADMIN — PANTOPRAZOLE SODIUM 40 MG: 40 INJECTION, POWDER, FOR SOLUTION INTRAVENOUS at 05:28

## 2024-06-09 ASSESSMENT — LIFESTYLE VARIABLES
TOTAL SCORE: 2
AUDITORY DISTURBANCES: NOT PRESENT
PAROXYSMAL SWEATS: BARELY PERCEPTIBLE SWEATING. PALMS MOIST
HEADACHE, FULLNESS IN HEAD: MODERATE
PAROXYSMAL SWEATS: NO SWEAT VISIBLE
TREMOR: TREMOR NOT VISIBLE BUT CAN BE FELT, FINGERTIP TO FINGERTIP
ANXIETY: NO ANXIETY (AT EASE)
NAUSEA AND VOMITING: *
ORIENTATION AND CLOUDING OF SENSORIUM: ORIENTED AND CAN DO SERIAL ADDITIONS
TREMOR: *
VISUAL DISTURBANCES: NOT PRESENT
ORIENTATION AND CLOUDING OF SENSORIUM: ORIENTED AND CAN DO SERIAL ADDITIONS
HEADACHE, FULLNESS IN HEAD: NOT PRESENT
PAROXYSMAL SWEATS: NO SWEAT VISIBLE
NAUSEA AND VOMITING: NO NAUSEA AND NO VOMITING
VISUAL DISTURBANCES: NOT PRESENT
TOTAL SCORE: 3
HEADACHE, FULLNESS IN HEAD: NOT PRESENT
TREMOR: *
TOTAL SCORE: 11
AGITATION: NORMAL ACTIVITY
TREMOR: *
NAUSEA AND VOMITING: MILD NAUSEA WITH NO VOMITING
SUBSTANCE_ABUSE: 1
HEADACHE, FULLNESS IN HEAD: VERY MILD
AUDITORY DISTURBANCES: NOT PRESENT
VISUAL DISTURBANCES: NOT PRESENT
TOTAL SCORE: VERY MILD ITCHING, PINS AND NEEDLES SENSATION, BURNING OR NUMBNESS
AGITATION: NORMAL ACTIVITY
AUDITORY DISTURBANCES: NOT PRESENT
VISUAL DISTURBANCES: NOT PRESENT
AGITATION: NORMAL ACTIVITY
TOTAL SCORE: 3
AGITATION: NORMAL ACTIVITY
PAROXYSMAL SWEATS: NO SWEAT VISIBLE
NAUSEA AND VOMITING: NO NAUSEA AND NO VOMITING
TREMOR: NO TREMOR
ORIENTATION AND CLOUDING OF SENSORIUM: ORIENTED AND CAN DO SERIAL ADDITIONS
TOTAL SCORE: VERY MILD ITCHING, PINS AND NEEDLES SENSATION, BURNING OR NUMBNESS
ORIENTATION AND CLOUDING OF SENSORIUM: ORIENTED AND CAN DO SERIAL ADDITIONS
AGITATION: NORMAL ACTIVITY
PAROXYSMAL SWEATS: NO SWEAT VISIBLE
ANXIETY: MILDLY ANXIOUS
ORIENTATION AND CLOUDING OF SENSORIUM: ORIENTED AND CAN DO SERIAL ADDITIONS
AUDITORY DISTURBANCES: NOT PRESENT
NAUSEA AND VOMITING: NO NAUSEA AND NO VOMITING
VISUAL DISTURBANCES: NOT PRESENT
HEADACHE, FULLNESS IN HEAD: NOT PRESENT
ANXIETY: NO ANXIETY (AT EASE)
ANXIETY: NO ANXIETY (AT EASE)
ANXIETY: *
TOTAL SCORE: VERY MILD ITCHING, PINS AND NEEDLES SENSATION, BURNING OR NUMBNESS
AUDITORY DISTURBANCES: NOT PRESENT
TOTAL SCORE: 3

## 2024-06-09 ASSESSMENT — ENCOUNTER SYMPTOMS
NERVOUS/ANXIOUS: 1
FEVER: 0
TREMORS: 1

## 2024-06-09 NOTE — DIETARY
"Nutrition services: Day 1 of admit.  Rhonda Gould is a 53 y.o. female with admitting DX of acute alcohol intoxication with alcoholism with delirium.    Consult received for FTT, moderate protein-calorie malnutrition per problems list, BMI <19. Admit screen is negative for poor PO and wt loss. Visited with pt at bedside. Pt mainly covered with blankets, though with moderate fat loss evidenced slight hollowness to orbitals and buccal region; moderate muscle loss evidenced by some scooping to temporals and some prominence to zygomatic arches. Pt resting during visit, she stated a fair appetite, previously experiencing nausea. UBW per pt report is ~120 lbs. Order in place for oral nutrition supplements. RD will adjust menu accordingly for pt to receive with meals to optimize nutrition.     Assessment:  Height: 175.3 cm (5' 9\")  Weight: 57.5 kg (126 lb 12.2 oz)  Body mass index is 18.72 kg/m²., BMI classification: normal  Diet/Intake: Cardiac, No decaf/caffeine; PO >50% for two meals thus far    Evaluation:   Admitted with weakness and ETOH withdrawal.   PMH: COPD, hypertension.  Wt stable per chart review.  BUN 6  Remeron, Protonix, Pericolace, Thiamine, Multivitamin, Folvite per MAR.    Malnutrition Risk: Pt with moderate, chronic malnutrition related to ETOH abuse, AEB physcial markers for moderate fat and moderate muscle loss as noted above.    Recommendations/Plan:  Ensure Plus with meals.  Encourage intake of meals and supplements.  Document intake of all meals and supplements as % taken in ADLs to provide interdisciplinary communication across all shifts.   Monitor weight.  Nutrition rep will continue to see patient for ongoing meal and snack preferences.     RD will follow.         "

## 2024-06-09 NOTE — PROGRESS NOTES
Report received from DIANA Valentin. Patient care assumed. Patient resting in bed, LR running at 150 ml/hr. Bed alarm on.

## 2024-06-09 NOTE — THERAPY
Speech Language Pathology   Clinical Swallow Evaluation     Patient Name: Rhonda Gould  AGE:  53 y.o., SEX:  female  Medical Record #: [id number]  Date of Service: 6/9/2024      History of Present Illness  Rhonda Gould is a 53 y.o. female presented 6/8/2024 with evaluation for weakness, alcohol intoxication.     PMHx: alcohol abuse, tobacco abuse, COPD, hypertension     CXR 6/8/24: Probable mild bibasilar atelectasis.      General Information:  Vitals  O2 Delivery Device: None - Room Air  Level of Consciousness: Alert, Awake  Orientation: Oriented x 4  Follows Directives: Yes      Prior Living Situation & Level of Function:  Housing / Facility: 2 Story Apartment / Condo  Lives with - Patient's Self Care Capacity: Alone and Able to Care For Self  Communication: WFL  Swallowing: WFL       Oral Mechanism Evaluation:  Dentition: Fair, Natural dentition   Facial Symmetry: Equal  Facial Sensation: Equal     Labial Observations: WFL   Lingual Observations: Midline  Motor Speech: WFL            Laryngeal Function:  Secretion Management: Adequate  Voice Quality: WFL  Cough: Perceptually WNL       Subjective  Patient received awake, sitting up in bed and AAOx4. Voice was hypophonic but clear. Pt denied hx of dysphagia.      Assessment  Current Method of Nutrition: Oral diet (RG/TN)  Positioning: Viramontes's (60-90 degrees)  Bolus Administration: Patient    O2 Delivery Device: None - Room Air  Factor(s) Affecting Performance: None  Tracheostomy : No       Swallowing Trials:  Swallowing Trials  Ice: WFL  Thin Liquid (TN0): WFL  Regular (RG7): WFL    Comments: Adequate bolus acceptance and containment. Presumed timely AP transit and bolus formation evidenced by absence of oral residue. Subtle reflexive throat clearing response occurred between trials intermittently which pt reported was baseline. No overt s/sx of aspiration appreciated across all trials tested. Voice and breath sounds remained clear. Mastication timely and  functional. Patient pt denied globus sensation.     Clinical Impressions  No clinical signs of oropharyngeal dysphagia. A modified diet is not indicated. No further acute SLP services indicated. Please re-consult with change in status (ETOH withdrawal) or new concerns for aspiration.       Recommendations  Diet Consistency: Regular diet  Instrumentation: None indicated at this time  Medication: Whole with liquid, As tolerated  Supervision: Independent  Positioning: Fully upright and midline during oral intake  Risk Management : None  Oral Care: BID         SLP Treatment Plan  Treatment Plan: None Indicated  SLP Frequency: N/A - Evaluation Only  Estimated Duration: N/A - Evaluation Only      Anticipated Discharge Needs  Discharge Recommendations: Anticipate that the patient will have no further speech therapy needs after discharge from the hospital   Therapy Recommendations Upon DC: Not Indicated                  Reina Corado, SLP

## 2024-06-09 NOTE — CARE PLAN
The patient is Stable - Low risk of patient condition declining or worsening    Shift Goals  Clinical Goals: Safety, fluids  Patient Goals: Stop drinking  Family Goals: LENNIE    Progress made toward(s) clinical / shift goals:    Problem: Pain - Standard  Goal: Alleviation of pain or a reduction in pain to the patient’s comfort goal  Outcome: Progressing  Flowsheets (Taken 6/8/2024 2205)  Pain Rating Scale (NPRS): 0     Problem: Knowledge Deficit - COPD  Goal: Patient/significant other demonstrates understanding of disease process, utilization of the Action Plan, medications and discharge instruction  Outcome: Progressing     Problem: Risk for Infection - COPD  Goal: Patient will remain free from signs and symptoms of infection  Outcome: Progressing  Flowsheets (Taken 6/9/2024 0051)  Standard Infection Interventions:   Assessed for signs and symptoms of infection   Implemented standard precautions   Instructed patient/family on signs and symptoms of infection   Provided education on proper hand hygiene and infection prevention measures   Assessed for removal IV, central lines, intra-arterial or urinary catheters  COPD Infection Interventions:   Reviewed importance of breathing exercises, effective cough, frequent position changes and adequate fluid intake   Recommended rinsing mouth with water and spitting, not swallowing   Recommended use of a spacer on the mouthpiece of inhaled corticosteroids     Problem: Nutrition - Advanced  Goal: Patient will display progressive weight gain toward goal have adequate food and fluid intake  Outcome: Progressing  Flowsheets (Taken 6/9/2024 0051)  Advanced Nutrition:   Promoted systemic fluid hydration within cardiac tolerance   Provided oral care     Problem: Lifestyle Changes  Goal: Patient's ability to identify lifestyle changes and available resources to help reduce recurrence of condition will improve  Outcome: Progressing     Problem: Psychosocial  Goal: Patient's level of  anxiety will decrease  Outcome: Progressing  Flowsheets  Taken 6/9/2024 0051  Decrease Anxiety Level: Collaborated with patient to identify and develop coping strategies  Taken 6/8/2024 2203  Patient Behaviors:   Fatigue   Depressed     Problem: Hemodynamics  Goal: Patient's hemodynamics, fluid balance and neurologic status will be stable or improve  Outcome: Progressing     Problem: Fall Risk  Goal: Patient will remain free from falls  Outcome: Progressing

## 2024-06-09 NOTE — HOSPITAL COURSE
Rhonda Gould is a 53 y.o. female with history of alcohol abuse, tobacco abuse, COPD, hypertension who presented 6/8/2024 with evaluation for weakness, alcohol intoxication.  Patient reported consuming at least 1 pint of vodka daily, reported having lost her wallet earlier today.  She was at home, became upset and started drinking even more.  Ultimately, presented to ER with alcohol intoxication.  In ER, blood alcohol level 270, lactic acid as high as 5.0.  She was also hypotensive, however, appears to have resolved with administered IVF bolus per sepsis protocol.  Due to lactic acidosis and hypotension, ICU was consulted, recommended IMCU admission.  Therefore, admitted to medicine service for further evaluation and treatment.

## 2024-06-09 NOTE — H&P
Hospital Medicine History & Physical Note    Date of Service  6/8/2024    Primary Care Physician  Karo Middleton M.D.    Consultants  ICU (Dr. Clinton) - to IMCU    Code Status  Full Code    Chief Complaint  Chief Complaint   Patient presents with    Weakness    ETOH Withdrawal     BIB EMS from home. Reports states patient is usually a 4 pint a day drinker. Patient reports only a small amount of ETOH over past 2 days       History of Presenting Illness  Rhonda Gould is a 53 y.o. female with history of alcohol abuse, tobacco abuse, COPD, hypertension who presented 6/8/2024 with evaluation for weakness, alcohol intoxication.  Patient reported consuming at least 1 pint of vodka daily, reported having lost her wallet earlier today.  She was at home, became upset and started drinking even more.  Ultimately, presented to ER with alcohol intoxication.  In ER, blood alcohol level 270, lactic acid as high as 5.0.  She was also hypotensive, however, appears to have resolved with administered IVF bolus per sepsis protocol.  Due to lactic acidosis and hypotension, ICU was consulted, recommended IMCU admission.  Therefore, admitted to medicine service for further evaluation and treatment.    I discussed the plan of care with patient, bedside RN, and pharmacy.    Review of Systems  Review of Systems   Constitutional:  Positive for malaise/fatigue. Negative for chills and fever.   HENT:  Negative for hearing loss and tinnitus.    Eyes:  Negative for blurred vision and double vision.   Respiratory:  Negative for cough and shortness of breath.    Cardiovascular:  Positive for palpitations. Negative for chest pain.   Gastrointestinal:  Positive for heartburn, nausea and vomiting. Negative for abdominal pain.   Genitourinary:  Negative for dysuria and urgency.   Musculoskeletal:  Positive for myalgias. Negative for joint pain.   Skin:  Negative for itching and rash.   Neurological:  Positive for tremors and weakness. Negative  for dizziness, seizures, loss of consciousness and headaches.   Endo/Heme/Allergies:  Negative for environmental allergies. Does not bruise/bleed easily.   Psychiatric/Behavioral:  Negative for depression and substance abuse.    All other systems reviewed and are negative.      Past Medical History   has a past medical history of Chronic obstructive pulmonary disease (HCC) and Hypertension.    Surgical History   has a past surgical history that includes hysterectomy laparoscopy (6/2005).     Family History  family history includes Cancer in her maternal grandfather; Diabetes in her father and mother; Heart Disease in her maternal grandmother; Hypertension in her father and mother.   Family history reviewed with patient. There is family history that is pertinent to the chief complaint.     Social History   reports that she has been smoking. She has never used smokeless tobacco. She reports current alcohol use. She reports that she does not currently use drugs after having used the following drugs: Inhaled and Marijuana. Frequency: 2.00 times per week.    Allergies  Allergies   Allergen Reactions    Fish Allergy Anaphylaxis and Shortness of Breath     Swelling to throat.    Shellfish Allergy Anaphylaxis     Strict No Shellfish (avoids most fish, cod OK)      Codeine Hives and Itching     Tolerated morphine previously  Allergy updated from 2015, morphine given in 2016       Medications  Prior to Admission Medications   Prescriptions Last Dose Informant Patient Reported? Taking?   albuterol (PROVENTIL) 2.5mg/3ml Nebu Soln solution for nebulization PRN at PRN Patient No No   Sig: Take 3 mL by nebulization every four hours as needed for Shortness of Breath.   albuterol 108 (90 Base) MCG/ACT Aero Soln inhalation aerosol 6/8/2024 at AFTERNOON Patient Yes Yes   Sig: Inhale 1-2 Puffs every four hours as needed for Shortness of Breath.   amLODIPine (NORVASC) 5 MG Tab 6/8/2024 at 0930 Patient No Yes   Sig: Take 1 Tablet by  mouth every day.   ibuprofen (MOTRIN) 200 MG Tab 6/7/2024 at PRN Patient Yes Yes   Sig: Take 400 mg by mouth 2 times a day as needed for Mild Pain. 2 tablets = 400 mg.   lisinopril (PRINIVIL) 10 MG Tab NOT TAKING at NOT TAKING Patient No No   Sig: Take 1 Tablet by mouth every day.   Patient not taking: Reported on 6/8/2024   lisinopril (PRINIVIL) 5 MG Tab 6/8/2024 at 0930 Patient No Yes   Sig: Take 1 Tablet by mouth every day.   mirtazapine (REMERON) 7.5 MG tablet 6/7/2024 at 2200 Patient Yes Yes   Sig: Take 7.5 mg by mouth at bedtime.   tizanidine (ZANAFLEX) 4 MG Tab ABOUT 1 WEEK AGO at OUT Patient No No   Sig: Take 1 tablet by mouth at bedtime as needed (as needed for spasm).      Facility-Administered Medications: None       Physical Exam  Temp:  [36.1 °C (97 °F)-36.8 °C (98.2 °F)] 36.1 °C (97 °F)  Pulse:  [] 100  Resp:  [14-28] 18  BP: ()/(57-90) 129/90  SpO2:  [90 %-99 %] 93 %  Blood Pressure: (!) 86/57       Pulse: 90   Respiration: 14   Pulse Oximetry: 97 %       Physical Exam  Vitals and nursing note reviewed.   Constitutional:       General: She is not in acute distress.  HENT:      Head: Normocephalic and atraumatic.      Nose: Nose normal.      Mouth/Throat:      Mouth: Mucous membranes are dry.      Pharynx: Oropharynx is clear.   Eyes:      General: No scleral icterus.     Extraocular Movements: Extraocular movements intact.   Cardiovascular:      Rate and Rhythm: Normal rate and regular rhythm.      Pulses: Normal pulses.      Heart sounds:      No friction rub.   Pulmonary:      Effort: No respiratory distress.      Breath sounds: Rales present. No wheezing.   Chest:      Chest wall: No tenderness.   Abdominal:      General: There is distension.      Tenderness: There is no abdominal tenderness. There is no guarding or rebound.   Musculoskeletal:         General: Normal range of motion.      Cervical back: Neck supple. No tenderness.      Right lower leg: No edema.      Left lower leg: No  "edema.   Skin:     General: Skin is warm and dry.      Capillary Refill: Capillary refill takes less than 2 seconds.   Neurological:      General: No focal deficit present.      Mental Status: She is alert and oriented to person, place, and time.   Psychiatric:         Mood and Affect: Mood normal.         Laboratory:  Recent Labs     06/08/24  1638   WBC 3.5*   RBC 3.20*   HEMOGLOBIN 10.4*   HEMATOCRIT 31.2*   MCV 97.5   MCH 32.5   MCHC 33.3   RDW 58.1*   PLATELETCT 279   MPV 9.3     Recent Labs     06/08/24  1430   SODIUM 143   POTASSIUM 3.7   CHLORIDE 104   CO2 18*   GLUCOSE 89   BUN 9   CREATININE 0.53   CALCIUM 9.2     Recent Labs     06/08/24  1430   ALTSGPT 9   ASTSGOT 21   ALKPHOSPHAT 91   TBILIRUBIN 0.2   LIPASE 37   GLUCOSE 89         No results for input(s): \"NTPROBNP\" in the last 72 hours.      Recent Labs     06/08/24  1430   TROPONINT <6       Imaging:  DX-CHEST-PORTABLE (1 VIEW)   Final Result      Probable mild bibasilar atelectasis.          X-Ray:  I have personally reviewed the images and compared with prior images.  EKG:  I have personally reviewed the images and compared with prior images.    Assessment/Plan:  Justification for Admission Status  I anticipate this patient  will require at least 2 nights of hospitalization, therefore appropriate for inpatient status.        * Acute alcohol intoxication with alcoholism with delirium (HCC)- (present on admission)  Assessment & Plan    Alcohol cessation strongly advised  Detox bag  Multivitamins  High-dose IV thiamine  Scheduled Librium  CIWA protocol    High propensity to go into withdrawal  ICU consulted, recommended IMCU admission  Admit to IMCU for close hemodynamic monitoring    Moderate protein-calorie malnutrition (HCC)  Assessment & Plan  Oral intake as tolerated  Ensure  Nutrition eval    Hypotension  Assessment & Plan  Resolved with administered IVF  Continue maintenance IVF for now    Lactic acidosis- (present on " admission)  Assessment & Plan  LA 4.2 --> 5.0  Likely secondary to EtOH, liver disease  IVF  High-dose IV thiamine  Trend    Hypertension- (present on admission)  Assessment & Plan  Continue amlodipine and lisinopril tomorrow  Initially hypotensive, however resolved at this time    Hypophosphatemia  Assessment & Plan  ?refeeding syndrome  Replace    Tobacco use- (present on admission)  Assessment & Plan  Reported smoking less then 10 cigarettes daily  Smoking cessation counseling provided: 5 minutes  We discussed tobacco smoking cessation given concern for COPD, as well as cardiovascular health concerns.  She expressed understanding.  She requested nicotine patch-which has been ordered.  Also provided nicotine gum, Chantix as alternative.  Provided patient standard tobacco cessation information per protocol        VTE prophylaxis: SCDs/TEDs    Critical care time: 45 minutes spent in chart review, medical management, coordinating care with patient/RN/ER staff/IMCU staff/pharmacy/consulting provider, frequent reevaluation of patient's clinical response to treatment

## 2024-06-09 NOTE — ED NOTES
Med rec completed per patient at bedside.    Allergies reviewed with patient.    Outpatient antibiotics within the last 30 days: none.    ANTICOAGULANTS: none.    Patient's preferred pharmacy: [facility] on [address].    Patient was seen in the ED at [facility] on 5/16/2024 for hypertension and prescribed lisinopril 5 mg with directions to take 1 tablet every day. Patient was then seen in the ED on 5/19/2024 for the same reason and was prescribed amlodipine 5 mg with directions to take 1 tablet every day, and lisinopril 10 mg with directions to take 1 tablet every day. Patient does report using the amlodipine, but states that she has still been using the lisinopril 5 mg rather than the lisinopril 10 mg.

## 2024-06-09 NOTE — PROGRESS NOTES
4 Eyes Skin Assessment Completed by DIANA Lopez and DIANA Ragland.    Head WDL  Ears WDL  Nose WDL  Mouth WDL  Neck WDL  Breast/Chest WDL  Shoulder Blades WDL  Spine WDL  (R) Arm/Elbow/Hand WDL  (L) Arm/Elbow/Hand WDL  Abdomen WDL  Groin WDL  Scrotum/Coccyx/Buttocks WDL  (R) Leg WDL  (L) Leg WDL  (R) Heel/Foot/Toe WDL  (L) Heel/Foot/Toe WDL          Devices In Places Tele Box, Blood Pressure Cuff, and Pulse Ox      Interventions In Place Pillows and Low Air Loss Mattress    Possible Skin Injury No    Pictures Uploaded Into Epic N/A  Wound Consult Placed N/A  RN Wound Prevention Protocol Ordered No

## 2024-06-09 NOTE — ED NOTES
RN at bedside to transport pt to T624. Pt alert and oriented with even and regular respirations on room air, pt on cardiac monitor for transport. Pt with all belongings and chart. Report given to receiving RN. Fluids continued to floor.

## 2024-06-09 NOTE — ED NOTES
Bedside report received from off going RN Javier, assumed care of patient.  POC discussed with patient. Call light within reach, all needs addressed at this time. UA and lactic collected and sent.     Fall risk interventions in place: Patient's personal possessions are with in their safe reach, Place socks on patient, Place fall risk sign on patient's door, Keep floor surfaces clean and dry, and Accompanied to restroom (all applicable per [facility] Fall risk assessment)   Continuous monitoring: Cardiac Leads, Pulse Ox, or Blood Pressure  IVF/IV medications: Not Applicable   Oxygen: Room Air  Bedside sitter: Not Applicable   Isolation: Not Applicable

## 2024-06-09 NOTE — CARE PLAN
The patient is Stable - Low risk of patient condition declining or worsening    Shift Goals  Clinical Goals: CIWA, fluids, safety  Patient Goals: rest  Family Goals: LENNIE    Progress made toward(s) clinical / shift goals:    Problem: Pain - Standard  Goal: Alleviation of pain or a reduction in pain to the patient’s comfort goal  Description: Target End Date:  Prior to discharge or change in level of care    Document on Vitals flowsheet    1.  Document pain using the appropriate pain scale per order or unit policy  2.  Educate and implement non-pharmacologic comfort measures (i.e. relaxation, distraction, massage, cold/heat therapy, etc.)  3.  Pain management medications as ordered  4.  Reassess pain after pain med administration per policy  5.  If opiods administered assess patient's response to pain medication is appropriate per POSS sedation scale  6.  Follow pain management plan developed in collaboration with patient and interdisciplinary team (including palliative care or pain specialists if applicable)  Outcome: Progressing     Problem: Self Care  Goal: Patient will have the ability to perform ADLs independently or with assistance (bathe, groom, dress, toilet and feed)  Description: Target End Date:  Prior to discharge or change in level of care    Document on ADL flowsheet    1.  Assess the capability and level of deficiency to perform ADLs  2.  Encourage family/care giver involvement  3.  Provide assistive devices  4.  Consider PT/OT evaluations  5.  Maintain support, give positive feedback, encourage self-care allowing extra time and verbal cuing as needed  6.  Avoid doing something for patients they can do themselves, but provide assistance as needed  7.  Assist in anticipating/planning individual needs  8.  Collaborate with Case Management and  to meet discharge needs  Outcome: Progressing     Problem: Optimal Care for Alcohol Withdrawal  Goal: Optimal Care for the alcohol withdrawal  patient  Description: Target End Date:  1 to 3 days    1.  Alcohol history screening done on admission  2.  CIWA-AR score assessment (includes assessment of nausea/vomiting, tremor, sweats, anxiety, agitation, tactile, visual and auditory disturbances, headache and orientation/sensorium).  Document on CIWA flowsheet.  3.  Follow CIWA-AR score protocol  4.  Frequent reorientation  5.  Monitor for fluid and electrolyte imbalance.  6.  Assess for respiratory depression due to sedation (pulse ox)  7.  Consider thiamine, multivitamins, folic acid and magnesium sulfate per provider order  8.  Collaborate with Social Workers/Case Management  9.  Collaborate with mental health  Outcome: Progressing       Patient is not progressing towards the following goals:

## 2024-06-09 NOTE — ED NOTES
Handoff report to DIANA Narvaez. Patient remains resting in bed. No immediate needs stated at this time.

## 2024-06-09 NOTE — ASSESSMENT & PLAN NOTE
Counseled on cessation  Patient now with alcohol drawl symptoms  Continue Librium lorazepam as needed   Thiamine and folate

## 2024-06-09 NOTE — ED NOTES
Second blood cultures sent to lab. Patient remains resting in bed. No immediate needs stated at this time. Patient reminded about need for urine sample.
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
## 2024-06-09 NOTE — ED NOTES
Pt medicated per MAR. CIWA score 8. Maintenance fluids started. Updated on POC, awaiting admit bed.

## 2024-06-09 NOTE — ASSESSMENT & PLAN NOTE
Continue amlodipine and lisinopril  Monitor blood pressure and adjust accordingly  As needed labetalol

## 2024-06-09 NOTE — PROGRESS NOTES
4 Eyes Skin Assessment Completed by Pia, RN and Neeta, RN.    Head WDL  Ears WDL  Nose WDL  Mouth WDL  Neck WDL  Breast/Chest WDL  Shoulder Blades WDL  Spine WDL  (R) Arm/Elbow/Hand WDL  (L) Arm/Elbow/Hand WDL  Abdomen WDL  Groin WDL  Scrotum/Coccyx/Buttocks WDL  (R) Leg WDL  (L) Leg WDL  (R) Heel/Foot/Toe WDL  (L) Heel/Foot/Toe WDL          Devices In Places ECG, Blood Pressure Cuff, and Pulse Ox      Interventions In Place Low Air Loss Mattress    Possible Skin Injury No    Pictures Uploaded Into Epic N/A  Wound Consult Placed N/A  RN Wound Prevention Protocol Ordered No

## 2024-06-09 NOTE — PROGRESS NOTES
ERP requests higher level of monitoring for this intoxicated patient with an elevated lactate. She has slightly low BP recorded in epic but I am told these were incorrect and she is well appearing. Critical care is not caring for this patient but is available should her condition change. Ok for IMCU under the care of the hospitalists.     Gavin Clinton M.D.

## 2024-06-10 LAB
BACTERIA UR CULT: NORMAL
SIGNIFICANT IND 70042: NORMAL
SITE SITE: NORMAL
SOURCE SOURCE: NORMAL
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
## 2024-06-10 NOTE — PROGRESS NOTES
Patient notified this RN she would like to leave AMA. Pt A&Ox4. Educated on risks of leaving hospital against medical advice, patient verbalized understanding. Dr. Steven Bautista notified, MD to bedside. Dr. Steven Bautista educated patient on risks of withdrawing from alcohol, which include seizures that could lead to serious injury or death. Pt verbalized understanding. Pt educated she may return to ER if withdrawal symptoms worsen. 2 PIVs in left arm removed. Tele box removed, patient disconnected from monitor.

## 2024-06-12 ENCOUNTER — HOSPITAL ENCOUNTER (EMERGENCY)
Facility: MEDICAL CENTER | Age: 54
End: 2024-06-13
Attending: EMERGENCY MEDICINE

## 2024-06-12 DIAGNOSIS — F10.920 ALCOHOLIC INTOXICATION WITHOUT COMPLICATION (HCC): ICD-10-CM

## 2024-06-12 DIAGNOSIS — R45.851 SUICIDAL THOUGHTS: ICD-10-CM

## 2024-06-12 LAB — POC BREATHALIZER: 0.22 PERCENT (ref 0–0.01)

## 2024-06-12 PROCEDURE — 99284 EMERGENCY DEPT VISIT MOD MDM: CPT

## 2024-06-12 PROCEDURE — 302970 POC BREATHALIZER: Performed by: EMERGENCY MEDICINE

## 2024-06-12 ASSESSMENT — FIBROSIS 4 INDEX: FIB4 SCORE: 1.46

## 2024-06-13 ENCOUNTER — HOSPITAL ENCOUNTER (EMERGENCY)
Facility: MEDICAL CENTER | Age: 54
End: 2024-06-13
Attending: EMERGENCY MEDICINE

## 2024-06-13 VITALS
HEART RATE: 89 BPM | WEIGHT: 126 LBS | TEMPERATURE: 97 F | HEIGHT: 69 IN | BODY MASS INDEX: 18.66 KG/M2 | RESPIRATION RATE: 16 BRPM | DIASTOLIC BLOOD PRESSURE: 82 MMHG | SYSTOLIC BLOOD PRESSURE: 119 MMHG | OXYGEN SATURATION: 95 %

## 2024-06-13 VITALS
BODY MASS INDEX: 19.99 KG/M2 | TEMPERATURE: 96.9 F | HEIGHT: 69 IN | RESPIRATION RATE: 16 BRPM | DIASTOLIC BLOOD PRESSURE: 80 MMHG | SYSTOLIC BLOOD PRESSURE: 112 MMHG | HEART RATE: 82 BPM | WEIGHT: 135 LBS | OXYGEN SATURATION: 100 %

## 2024-06-13 DIAGNOSIS — F32.A DEPRESSION, UNSPECIFIED DEPRESSION TYPE: ICD-10-CM

## 2024-06-13 DIAGNOSIS — F10.920 ALCOHOLIC INTOXICATION WITHOUT COMPLICATION (HCC): ICD-10-CM

## 2024-06-13 PROCEDURE — 99285 EMERGENCY DEPT VISIT HI MDM: CPT

## 2024-06-13 PROCEDURE — 36415 COLL VENOUS BLD VENIPUNCTURE: CPT

## 2024-06-13 ASSESSMENT — FIBROSIS 4 INDEX: FIB4 SCORE: 1.46

## 2024-06-13 NOTE — ED NOTES
Bedside report received from DIANA Hodge. Fall risk precautions in place. Call bell in reach. Pt on 2L O2 all lines traced. POC discussed with pt. Bed alarm in place.

## 2024-06-13 NOTE — ED NOTES
Pt is awake and requesting to go home. Denies any SI/HI. Pt is getting her clothes on independently. Given a sandwich and water.

## 2024-06-13 NOTE — ED TRIAGE NOTES
"Chief Complaint   Patient presents with    Alcohol Intoxication     BIB EMS to [facility], family called EMS w/ a complaint that patient was grossly intoxicated at home.  Patient was here yesterday evening for ETOH and SI, discharged early this a.m, denying SI and stating she would likely seek assistance at [facility].  Patient went home and started drinking heavily again.  Patient arrives intoxicated.  Patient denies SI during triage assessment.  States \"I have grandkids that I love\".  Patient reports daily heavy etoh consumption over the last 2 weeks due to losing her job.  Patient tearful.  Patient shortly back to sleep after triage.  Patient desats to 77% on RA.  Placed on O2 2L via NC.   "

## 2024-06-13 NOTE — ED PROVIDER NOTES
ER Provider Note    Scribed for Mignon Zuleta M.d. by Kirill Lowry. 6/13/2024  12:40 PM    Primary Care Provider: Karo Middleton M.D.    CHIEF COMPLAINT  Chief Complaint   Patient presents with    Alcohol Intoxication     LIMITATION TO HISTORY   Select: : None    HPI/ROS  OUTSIDE HISTORIAN(S):  None.     EXTERNAL RECORDS REVIEWED  Outpatient Notes reviewed [facility] clinic note.    Rhonda Gould is a 53 y.o. female who presents to the ED for evaluation of alcohol intoxication onset prior to arrival today. Patient reports that she has been depressed and upset, which was recently worsened after she lost her job. She denies any suicidal ideations. Patient does not take antidepressants, but states that she has tried antidepressants in the past, and they did not help her very much. She reports drinking a lot of alcohol daily, and does not plan to stop drinking. She is prescribed lisinopril for her history of hypertension. Patient reports that she does have a primary care physician in [place].    PAST MEDICAL HISTORY  Past Medical History:   Diagnosis Date    Chronic obstructive pulmonary disease (HCC)     Hypertension        SURGICAL HISTORY  Past Surgical History:   Procedure Laterality Date    HYSTERECTOMY LAPAROSCOPY  6/2005       FAMILY HISTORY  Family History   Problem Relation Age of Onset    Diabetes Mother     Hypertension Mother     Hypertension Father     Diabetes Father     Heart Disease Maternal Grandmother     Cancer Maternal Grandfather        SOCIAL HISTORY   reports that she has been smoking. She has never used smokeless tobacco. She reports current alcohol use. She reports that she does not currently use drugs after having used the following drugs: Inhaled and Marijuana. Frequency: 2.00 times per week.    CURRENT MEDICATIONS  Previous Medications    ALBUTEROL (PROVENTIL) 2.5MG/3ML NEBU SOLN SOLUTION FOR NEBULIZATION    Take 3 mL by nebulization every four hours as needed for Shortness of Breath.     "ALBUTEROL 108 (90 BASE) MCG/ACT AERO SOLN INHALATION AEROSOL    Inhale 1-2 Puffs every four hours as needed for Shortness of Breath.    AMLODIPINE (NORVASC) 5 MG TAB    Take 1 Tablet by mouth every day.    IBUPROFEN (MOTRIN) 200 MG TAB    Take 400 mg by mouth 2 times a day as needed for Mild Pain. 2 tablets = 400 mg.    LISINOPRIL (PRINIVIL) 10 MG TAB    Take 1 Tablet by mouth every day.    LISINOPRIL (PRINIVIL) 5 MG TAB    Take 1 Tablet by mouth every day.    MIRTAZAPINE (REMERON) 7.5 MG TABLET    Take 7.5 mg by mouth at bedtime.    TIZANIDINE (ZANAFLEX) 4 MG TAB    Take 1 tablet by mouth at bedtime as needed (as needed for spasm).       ALLERGIES  Fish allergy, Shellfish allergy, and Codeine    PHYSICAL EXAM  /76   Pulse 91   Temp 36.2 °C (97.2 °F) (Temporal)   Resp 16   Ht 1.753 m (5' 9\")   Wt 61.2 kg (135 lb)   LMP 06/11/2005   SpO2 95%   BMI 19.94 kg/m²   Constitutional: Alert in no apparent distress. Well appearing. Pleasant and appropriate. Interactive.   HENT: Normocephalic, Atraumatic, Bilateral external ears normal. Nose normal.   Eyes:  Conjunctiva normal, non-icteric.   Lungs: Non-labored respirations  Skin: Warm, Dry, No erythema, No rash.   Neurologic: Alert, Grossly non-focal.   Psychiatric: Affect normal, Judgment normal, Mood normal, Appears appropriate and not intoxicated.     DIAGNOSTIC STUDIES & PROCEDURES    Labs:   Results for orders placed or performed during the hospital encounter of 06/12/24   POC BREATHALIZER   Result Value Ref Range    POC Breathalizer 0.222 (A) 0.00 - 0.01 Percent      All labs reviewed by me.    COURSE & MEDICAL DECISION MAKING    ED Observation Status? No; Patient does not meet criteria for ED Observation.     12:40 PM - Patient seen and evaluated at bedside. Patient presents today with alcohol intoxication, and reports that she is depressed after losing her job. I informed the patient of the plan to obtain resources for her. She understands and agrees to " the plan of care.     INITIAL ASSESSMENT AND PLAN  Care Narrative: This is a 53-year-old female that presents with alcohol use and depression.  She is not acutely suicidal at this time she is forward thinking.  She was reassessed at 4 PM and she continues to be not suicidal she is eating sandwich at bedside.  She will be given resources by life skills and discharge.  She is requesting to go to [facility] voluntarily.  I do think this is reasonable she was to be set up to be discharged after interventions I do not think she meets criteria for hold at this time.                   DISPOSITION AND DISCUSSIONS  I have discussed management of the patient with the following physicians and FABI's: none    Discussion of management with other QHP or appropriate source(s): Behavioral Health        Escalation of care considered, and ultimately not performed: acute inpatient care management, however at this time, the patient is most appropriate for outpatient management.    Barriers to care at this time, including but not limited to:  none .     Decision tools and prescription drugs considered including, but not limited to:  none .     The patient will return for new or worsening symptoms and is stable at the time of discharge. Patient was given return precautions. Patient and/or family member verbalizes understanding and will comply.    DISPOSITION:  Patient will be discharged home in stable condition.    FOLLOW UP:  Karo Middleton M.D.  [address] [facility] [address] [phone]    Schedule an appointment as soon as possible for a visit       [facility], Emergency Dept  [address] [address] [phone]    Return for worsening thoughts of wanting to hurt yourself issues with alcohol or other concerns.      OUTPATIENT MEDICATIONS:  New Prescriptions    No medications on file         FINAL IMPRESSION   1. Alcoholic intoxication without complication (HCC)    2. Depression, unspecified  depression type        I, Kirill Lowry (Scribe), am scribing for, and in the presence of, Mignon Zuleta M.D..    Electronically signed by: Kirill Lowry ([email or web address]), 6/13/2024    IMignon M.D. personally performed the services described in this documentation, as scribed by Kirill Lowry in my presence, and it is both accurate and complete.    The note accurately reflects work and decisions made by me.  Mignon Zuleta M.D.  6/13/2024  4:12 PM
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
## 2024-06-13 NOTE — ED NOTES
Pt is denying SI, but rather that she feels like she cannot take care of her children or grandchildren and is feeling upset about that.

## 2024-06-13 NOTE — ED NOTES
Medication history reviewed with patient at bedside.   Med rec is complete  Allergies reviewed.     Patient has not had any outpatient antibiotics in the last 30 days.   Anticoagulants: No    Joshua Abad

## 2024-06-13 NOTE — ED NOTES
Pt up to restroom with unsteady stumbling gait, this RN had to hold pt up in order to prevent her from falling. Pt is back in bed, side rails up, bed in low position. Pt was given water.

## 2024-06-13 NOTE — ED PROVIDER NOTES
"ED Provider Note    CHIEF COMPLAINT  Chief Complaint   Patient presents with    Suicidal Ideation     Pt BIB EMS from home suicidal ideation, pt friend called because friend found her with a knife to her wrist. Superficial lacerations to left anterior aspect of wrist. Per EMS (+) ETOH, pt is crying in triage and stating \"I can't take care of anyone.\" Per EMS this is 3rd time pt has been here this week for SI.        EXTERNAL RECORDS REVIEWED  Inpatient Notes patient was just admitted on 6/8 for alcohol withdrawal actually admitted to the ICU ended up leaving AMA on 6/10    HPI/ROS  LIMITATION TO HISTORY   Select: Intoxication  OUTSIDE HISTORIAN(S):  EMS brought in from home for concern for suicidal ideation and because a friend called because they found her with a knife to her wrist.    Rhonda Gould is a 53 y.o. female who presents to the emergency department with intoxication.  To me she states she does not wish to kill herself she was just feeling a little down and stressed out.  Because she feels like she cannot take care of herself or anyone else.  She did she was and actually can kill her self she just wants to go home.  She does endorse daily drinking but she does not feel like she is withdrawing at this time.    PAST MEDICAL HISTORY   has a past medical history of Chronic obstructive pulmonary disease (HCC) and Hypertension.    SURGICAL HISTORY   has a past surgical history that includes hysterectomy laparoscopy (6/2005).    FAMILY HISTORY  Family History   Problem Relation Age of Onset    Diabetes Mother     Hypertension Mother     Hypertension Father     Diabetes Father     Heart Disease Maternal Grandmother     Cancer Maternal Grandfather        SOCIAL HISTORY  Social History     Tobacco Use    Smoking status: Every Day    Smokeless tobacco: Never   Vaping Use    Vaping status: Never Used   Substance and Sexual Activity    Alcohol use: Yes    Drug use: Not Currently     Frequency: 2.0 times per week " "    Types: Inhaled, Marijuana     Comment: marijuana    Sexual activity: Not Currently       CURRENT MEDICATIONS  Home Medications       Reviewed by Stacey Love R.N. (Registered Nurse) on 06/12/24 at 2152  Med List Status: Partial     Medication Last Dose Status   albuterol (PROVENTIL) 2.5mg/3ml Nebu Soln solution for nebulization  Active   albuterol 108 (90 Base) MCG/ACT Aero Soln inhalation aerosol  Active   amLODIPine (NORVASC) 5 MG Tab  Active   ibuprofen (MOTRIN) 200 MG Tab  Active   lisinopril (PRINIVIL) 10 MG Tab  Active   lisinopril (PRINIVIL) 5 MG Tab  Active   mirtazapine (REMERON) 7.5 MG tablet  Active   tizanidine (ZANAFLEX) 4 MG Tab  Active                    ALLERGIES  Allergies   Allergen Reactions    Fish Allergy Anaphylaxis and Shortness of Breath     Swelling to throat.    Shellfish Allergy Anaphylaxis     Strict No Shellfish (avoids most fish, cod OK)      Codeine Hives and Itching     Tolerated morphine previously  Allergy updated from 2015, morphine given in 2016       PHYSICAL EXAM  VITAL SIGNS: /89   Pulse (!) 109   Temp 36.1 °C (97 °F) (Temporal)   Resp 14   Ht 1.753 m (5' 9\")   Wt 57.2 kg (126 lb)   LMP 06/11/2005   SpO2 96%   BMI 18.61 kg/m²    Pulse OX: Pulse Oxygen level is within normal limits on room air  Constitutional: Alert not tearful  HENT: Normocephalic, Atraumatic, MMM  Eyes: PERound. Conjunctiva normal, non-icteric.   Heart: Regular rate and rhythm, intact distal pulses   Lungs: Symmetrical movement, no resp distress clear to auscultation bilaterally  Abdomen: Non-tender, non-distended, normal bowel sounds  EXT/Back mild superficial abrasion to the left wrist does not really even break the skin  Skin: Warm, Dry, No erythema, No rash.   Neurologic: Alert and oriented, Grossly non-focal.   Psych: Calm cooperative not tearful denies SI denies HI not responding to internal stimuli      EKG/LABS  Labs Reviewed   POC BREATHALIZER - Abnormal; Notable for the " following components:       Result Value    POC Breathalizer 0.222 (*)     All other components within normal limits   URINE DRUG SCREEN       I have independently interpreted this EKG      COURSE & MEDICAL DECISION MAKING    ASSESSMENT, COURSE AND PLAN  Care Narrative:     Patient is a 53-year-old female history of alcohol dependence and COPD who presents emerged part with thoughts of wanting to hurt herself.  She is denying thoughts of wanting herself aggressively to me.  She is intoxicated but she still not steady on her feet so we will just observe her in the ED at this time and repeat assessment after she is more sober.    DISPOSITION AND DISCUSSIONS  Patient is still requiring a little bit assistance ambulation but she states she does not want to kill herself she has a plan she wants to see her grandkids in the morning and she wants to go home      12:36 AM  Patient is ambulatory unassisted put her own close on superficial abrasion the left wrist she does not want to hurt herself she is got outpatient resources she is feeling more confident and stable on her feet.  She really wishes to go home so she can see her family.  She is contracted for safety and she will be discharged home with strict return precautions.      I have discussed management of the patient with the following physicians and FABI's:  none    Discussion of management with other [facility] or appropriate source(s): None     Escalation of care considered, and ultimately not performed:Laboratory analysis and diagnostic imaging    Barriers to care at this time, including but not limited to:  na .     Decision tools and prescription drugs considered including, but not limited to:  na .    The patient will return for new or worsening symptoms and is stable at the time of discharge.    The patient is referred to a primary physician for blood pressure management, diabetic screening, and for all other preventative health concerns.    DISPOSITION:  Patient will  be discharged home in stable condition.    FOLLOW UP:  Karo Middleton M.D.  [address] [facility] [address]  [phone]    Schedule an appointment as soon as possible for a visit         OUTPATIENT MEDICATIONS:  Discharge Medication List as of 6/13/2024 12:37 AM            FINAL DIAGNOSIS  1. Alcoholic intoxication without complication (HCC)    2. Suicidal thoughts           Electronically signed by: Deanne Monroe M.D., 6/12/2024 9:55 PM

## 2024-06-13 NOTE — ED NOTES
Patient discharged home per ERP.  Discharge teaching and education discussed with patient. POC discussed.   Patient verbalized understanding of discharge teaching and education. No other questions at this time.       VSS. Patient alert and oriented. Patient arranged ride for self. Able to ambulate off unit safely with steady gait.

## 2024-06-14 ENCOUNTER — APPOINTMENT (OUTPATIENT)
Dept: RADIOLOGY | Facility: MEDICAL CENTER | Age: 54
DRG: 897 | End: 2024-06-14
Attending: STUDENT IN AN ORGANIZED HEALTH CARE EDUCATION/TRAINING PROGRAM
Payer: MEDICAID

## 2024-06-14 ENCOUNTER — HOSPITAL ENCOUNTER (INPATIENT)
Facility: MEDICAL CENTER | Age: 54
LOS: 1 days | DRG: 897 | End: 2024-06-15
Attending: EMERGENCY MEDICINE | Admitting: STUDENT IN AN ORGANIZED HEALTH CARE EDUCATION/TRAINING PROGRAM
Payer: MEDICAID

## 2024-06-14 ENCOUNTER — APPOINTMENT (OUTPATIENT)
Dept: RADIOLOGY | Facility: MEDICAL CENTER | Age: 54
DRG: 897 | End: 2024-06-14
Attending: EMERGENCY MEDICINE
Payer: MEDICAID

## 2024-06-14 DIAGNOSIS — F10.11 ALCOHOL ABUSE, IN REMISSION: ICD-10-CM

## 2024-06-14 DIAGNOSIS — F10.920 ALCOHOLIC INTOXICATION WITHOUT COMPLICATION (HCC): ICD-10-CM

## 2024-06-14 DIAGNOSIS — F10.221: ICD-10-CM

## 2024-06-14 DIAGNOSIS — R29.6 RECURRENT FALLS: ICD-10-CM

## 2024-06-14 DIAGNOSIS — F10.139 ALCOHOL ABUSE WITH WITHDRAWAL (HCC): ICD-10-CM

## 2024-06-14 PROBLEM — R09.02 HYPOXIA: Status: ACTIVE | Noted: 2024-06-14

## 2024-06-14 LAB
ALBUMIN SERPL BCP-MCNC: 4.5 G/DL (ref 3.2–4.9)
ALBUMIN/GLOB SERPL: 1.7 G/DL
ALP SERPL-CCNC: 115 U/L (ref 30–99)
ALT SERPL-CCNC: 16 U/L (ref 2–50)
AMMONIA PLAS-SCNC: 13 UMOL/L (ref 11–45)
ANION GAP SERPL CALC-SCNC: 24 MMOL/L (ref 7–16)
APTT PPP: 30.2 SEC (ref 24.7–36)
AST SERPL-CCNC: 33 U/L (ref 12–45)
BASOPHILS # BLD AUTO: 0.2 % (ref 0–1.8)
BASOPHILS # BLD: 0.01 K/UL (ref 0–0.12)
BILIRUB SERPL-MCNC: 0.4 MG/DL (ref 0.1–1.5)
BUN SERPL-MCNC: 13 MG/DL (ref 8–22)
CALCIUM ALBUM COR SERPL-MCNC: 9 MG/DL (ref 8.5–10.5)
CALCIUM SERPL-MCNC: 9.4 MG/DL (ref 8.5–10.5)
CHLORIDE SERPL-SCNC: 99 MMOL/L (ref 96–112)
CO2 SERPL-SCNC: 17 MMOL/L (ref 20–33)
CREAT SERPL-MCNC: 0.71 MG/DL (ref 0.5–1.4)
EOSINOPHIL # BLD AUTO: 0.07 K/UL (ref 0–0.51)
EOSINOPHIL NFR BLD: 1.3 % (ref 0–6.9)
ERYTHROCYTE [DISTWIDTH] IN BLOOD BY AUTOMATED COUNT: 57 FL (ref 35.9–50)
ETHANOL BLD-MCNC: 338.1 MG/DL
GFR SERPLBLD CREATININE-BSD FMLA CKD-EPI: 100 ML/MIN/1.73 M 2
GLOBULIN SER CALC-MCNC: 2.7 G/DL (ref 1.9–3.5)
GLUCOSE SERPL-MCNC: 70 MG/DL (ref 65–99)
HCT VFR BLD AUTO: 38.8 % (ref 37–47)
HGB BLD-MCNC: 12.6 G/DL (ref 12–16)
IMM GRANULOCYTES # BLD AUTO: 0.02 K/UL (ref 0–0.11)
IMM GRANULOCYTES NFR BLD AUTO: 0.4 % (ref 0–0.9)
INR PPP: 0.98 (ref 0.87–1.13)
LYMPHOCYTES # BLD AUTO: 1.19 K/UL (ref 1–4.8)
LYMPHOCYTES NFR BLD: 22.1 % (ref 22–41)
MCH RBC QN AUTO: 31.3 PG (ref 27–33)
MCHC RBC AUTO-ENTMCNC: 32.5 G/DL (ref 32.2–35.5)
MCV RBC AUTO: 96.3 FL (ref 81.4–97.8)
MONOCYTES # BLD AUTO: 0.36 K/UL (ref 0–0.85)
MONOCYTES NFR BLD AUTO: 6.7 % (ref 0–13.4)
NEUTROPHILS # BLD AUTO: 3.74 K/UL (ref 1.82–7.42)
NEUTROPHILS NFR BLD: 69.3 % (ref 44–72)
NRBC # BLD AUTO: 0 K/UL
NRBC BLD-RTO: 0 /100 WBC (ref 0–0.2)
PLATELET # BLD AUTO: 320 K/UL (ref 164–446)
PMV BLD AUTO: 9.1 FL (ref 9–12.9)
POC BREATHALIZER: 0.26 PERCENT (ref 0–0.01)
POTASSIUM SERPL-SCNC: 4.5 MMOL/L (ref 3.6–5.5)
PROT SERPL-MCNC: 7.2 G/DL (ref 6–8.2)
PROTHROMBIN TIME: 13.1 SEC (ref 12–14.6)
RBC # BLD AUTO: 4.03 M/UL (ref 4.2–5.4)
SODIUM SERPL-SCNC: 140 MMOL/L (ref 135–145)
WBC # BLD AUTO: 5.4 K/UL (ref 4.8–10.8)

## 2024-06-14 PROCEDURE — 82140 ASSAY OF AMMONIA: CPT

## 2024-06-14 PROCEDURE — 71045 X-RAY EXAM CHEST 1 VIEW: CPT

## 2024-06-14 PROCEDURE — 82077 ASSAY SPEC XCP UR&BREATH IA: CPT

## 2024-06-14 PROCEDURE — 700102 HCHG RX REV CODE 250 W/ 637 OVERRIDE(OP): Performed by: STUDENT IN AN ORGANIZED HEALTH CARE EDUCATION/TRAINING PROGRAM

## 2024-06-14 PROCEDURE — 302970 POC BREATHALIZER

## 2024-06-14 PROCEDURE — 85730 THROMBOPLASTIN TIME PARTIAL: CPT

## 2024-06-14 PROCEDURE — 70450 CT HEAD/BRAIN W/O DYE: CPT

## 2024-06-14 PROCEDURE — HZ2ZZZZ DETOXIFICATION SERVICES FOR SUBSTANCE ABUSE TREATMENT: ICD-10-PCS | Performed by: STUDENT IN AN ORGANIZED HEALTH CARE EDUCATION/TRAINING PROGRAM

## 2024-06-14 PROCEDURE — 85610 PROTHROMBIN TIME: CPT

## 2024-06-14 PROCEDURE — 700101 HCHG RX REV CODE 250: Performed by: EMERGENCY MEDICINE

## 2024-06-14 PROCEDURE — 96365 THER/PROPH/DIAG IV INF INIT: CPT

## 2024-06-14 PROCEDURE — 700105 HCHG RX REV CODE 258

## 2024-06-14 PROCEDURE — 85025 COMPLETE CBC W/AUTO DIFF WBC: CPT

## 2024-06-14 PROCEDURE — 99223 1ST HOSP IP/OBS HIGH 75: CPT | Performed by: STUDENT IN AN ORGANIZED HEALTH CARE EDUCATION/TRAINING PROGRAM

## 2024-06-14 PROCEDURE — 80053 COMPREHEN METABOLIC PANEL: CPT

## 2024-06-14 PROCEDURE — 700111 HCHG RX REV CODE 636 W/ 250 OVERRIDE (IP): Mod: JZ | Performed by: EMERGENCY MEDICINE

## 2024-06-14 PROCEDURE — 99285 EMERGENCY DEPT VISIT HI MDM: CPT

## 2024-06-14 PROCEDURE — 302970 POC BREATHALIZER: Performed by: EMERGENCY MEDICINE

## 2024-06-14 PROCEDURE — 36415 COLL VENOUS BLD VENIPUNCTURE: CPT

## 2024-06-14 PROCEDURE — A9270 NON-COVERED ITEM OR SERVICE: HCPCS | Performed by: STUDENT IN AN ORGANIZED HEALTH CARE EDUCATION/TRAINING PROGRAM

## 2024-06-14 PROCEDURE — 96366 THER/PROPH/DIAG IV INF ADDON: CPT

## 2024-06-14 PROCEDURE — 700105 HCHG RX REV CODE 258: Performed by: EMERGENCY MEDICINE

## 2024-06-14 PROCEDURE — 770006 HCHG ROOM/CARE - MED/SURG/GYN SEMI*

## 2024-06-14 RX ORDER — SODIUM CHLORIDE, SODIUM LACTATE, POTASSIUM CHLORIDE, AND CALCIUM CHLORIDE .6; .31; .03; .02 G/100ML; G/100ML; G/100ML; G/100ML
500 INJECTION, SOLUTION INTRAVENOUS ONCE
Status: COMPLETED | OUTPATIENT
Start: 2024-06-14 | End: 2024-06-14

## 2024-06-14 RX ORDER — ALBUTEROL SULFATE 90 UG/1
1-2 AEROSOL, METERED RESPIRATORY (INHALATION) EVERY 4 HOURS PRN
Status: DISCONTINUED | OUTPATIENT
Start: 2024-06-14 | End: 2024-06-16 | Stop reason: HOSPADM

## 2024-06-14 RX ORDER — SODIUM CHLORIDE, SODIUM LACTATE, POTASSIUM CHLORIDE, AND CALCIUM CHLORIDE .6; .31; .03; .02 G/100ML; G/100ML; G/100ML; G/100ML
1000 INJECTION, SOLUTION INTRAVENOUS ONCE
Status: COMPLETED | OUTPATIENT
Start: 2024-06-14 | End: 2024-06-15

## 2024-06-14 RX ORDER — DIAZEPAM 10 MG/2ML
10 INJECTION, SOLUTION INTRAMUSCULAR; INTRAVENOUS
Status: DISCONTINUED | OUTPATIENT
Start: 2024-06-14 | End: 2024-06-16 | Stop reason: HOSPADM

## 2024-06-14 RX ORDER — MIRTAZAPINE 15 MG/1
7.5 TABLET, FILM COATED ORAL
Status: DISCONTINUED | OUTPATIENT
Start: 2024-06-14 | End: 2024-06-16 | Stop reason: HOSPADM

## 2024-06-14 RX ORDER — FOLIC ACID 1 MG/1
1 TABLET ORAL DAILY
Status: DISCONTINUED | OUTPATIENT
Start: 2024-06-15 | End: 2024-06-16 | Stop reason: HOSPADM

## 2024-06-14 RX ORDER — LORAZEPAM 2 MG/1
2 TABLET ORAL
Status: DISCONTINUED | OUTPATIENT
Start: 2024-06-14 | End: 2024-06-16 | Stop reason: HOSPADM

## 2024-06-14 RX ORDER — ACETAMINOPHEN 325 MG/1
650 TABLET ORAL EVERY 6 HOURS PRN
Status: DISCONTINUED | OUTPATIENT
Start: 2024-06-14 | End: 2024-06-16 | Stop reason: HOSPADM

## 2024-06-14 RX ORDER — LORAZEPAM 0.5 MG/1
0.5 TABLET ORAL EVERY 4 HOURS PRN
Status: DISCONTINUED | OUTPATIENT
Start: 2024-06-14 | End: 2024-06-16 | Stop reason: HOSPADM

## 2024-06-14 RX ORDER — LISINOPRIL 10 MG/1
10 TABLET ORAL DAILY
Status: DISCONTINUED | OUTPATIENT
Start: 2024-06-14 | End: 2024-06-16 | Stop reason: HOSPADM

## 2024-06-14 RX ORDER — LORAZEPAM 1 MG/1
1 TABLET ORAL EVERY 4 HOURS PRN
Status: DISCONTINUED | OUTPATIENT
Start: 2024-06-14 | End: 2024-06-16 | Stop reason: HOSPADM

## 2024-06-14 RX ORDER — LORAZEPAM 2 MG/1
4 TABLET ORAL
Status: DISCONTINUED | OUTPATIENT
Start: 2024-06-14 | End: 2024-06-16 | Stop reason: HOSPADM

## 2024-06-14 RX ORDER — AMLODIPINE BESYLATE 5 MG/1
5 TABLET ORAL DAILY
Status: DISCONTINUED | OUTPATIENT
Start: 2024-06-14 | End: 2024-06-16 | Stop reason: HOSPADM

## 2024-06-14 RX ORDER — GAUZE BANDAGE 2" X 2"
100 BANDAGE TOPICAL DAILY
Status: DISCONTINUED | OUTPATIENT
Start: 2024-06-15 | End: 2024-06-16 | Stop reason: HOSPADM

## 2024-06-14 RX ADMIN — LORAZEPAM 3 MG: 2 TABLET ORAL at 19:41

## 2024-06-14 RX ADMIN — SODIUM CHLORIDE, POTASSIUM CHLORIDE, SODIUM LACTATE AND CALCIUM CHLORIDE 1000 ML: 600; 310; 30; 20 INJECTION, SOLUTION INTRAVENOUS at 23:30

## 2024-06-14 RX ADMIN — LORAZEPAM 3 MG: 2 TABLET ORAL at 14:14

## 2024-06-14 RX ADMIN — SODIUM CHLORIDE, POTASSIUM CHLORIDE, SODIUM LACTATE AND CALCIUM CHLORIDE 500 ML: 600; 310; 30; 20 INJECTION, SOLUTION INTRAVENOUS at 21:32

## 2024-06-14 RX ADMIN — MAGNESIUM SULFATE HEPTAHYDRATE: 500 INJECTION, SOLUTION INTRAMUSCULAR; INTRAVENOUS at 11:15

## 2024-06-14 RX ADMIN — AMLODIPINE BESYLATE 5 MG: 5 TABLET ORAL at 14:14

## 2024-06-14 RX ADMIN — LISINOPRIL 10 MG: 10 TABLET ORAL at 14:14

## 2024-06-14 RX ADMIN — LORAZEPAM 3 MG: 2 TABLET ORAL at 17:00

## 2024-06-14 ASSESSMENT — LIFESTYLE VARIABLES
AGITATION: NORMAL ACTIVITY
AGITATION: NORMAL ACTIVITY
HEADACHE, FULLNESS IN HEAD: MODERATE
HEADACHE, FULLNESS IN HEAD: SEVERE
VISUAL DISTURBANCES: VERY MILD SENSITIVITY
VISUAL DISTURBANCES: NOT PRESENT
TOTAL SCORE: 13
ANXIETY: MODERATELY ANXIOUS OR GUARDED, SO ANXIETY IS INFERRED
ANXIETY: MODERATELY ANXIOUS OR GUARDED, SO ANXIETY IS INFERRED
AUDITORY DISTURBANCES: NOT PRESENT
VISUAL DISTURBANCES: NOT PRESENT
CONSUMPTION TOTAL: POSITIVE
AGITATION: NORMAL ACTIVITY
PAROXYSMAL SWEATS: NO SWEAT VISIBLE
TOTAL SCORE: 16
AVERAGE NUMBER OF DAYS PER WEEK YOU HAVE A DRINK CONTAINING ALCOHOL: 7
EVER FELT BAD OR GUILTY ABOUT YOUR DRINKING: YES
NAUSEA AND VOMITING: *
AUDITORY DISTURBANCES: NOT PRESENT
AUDITORY DISTURBANCES: NOT PRESENT
NAUSEA AND VOMITING: NO NAUSEA AND NO VOMITING
TOTAL SCORE: 4
HAVE PEOPLE ANNOYED YOU BY CRITICIZING YOUR DRINKING: YES
TOTAL SCORE: 11
VISUAL DISTURBANCES: MODERATE SENSITIVITY
TREMOR: MODERATE TREMOR WITH ARMS EXTENDED
TOTAL SCORE: 4
NAUSEA AND VOMITING: MILD NAUSEA WITH NO VOMITING
ALCOHOL_USE: YES
AGITATION: NORMAL ACTIVITY
NAUSEA AND VOMITING: NO NAUSEA AND NO VOMITING
AUDITORY DISTURBANCES: NOT PRESENT
ORIENTATION AND CLOUDING OF SENSORIUM: ORIENTED AND CAN DO SERIAL ADDITIONS
PAROXYSMAL SWEATS: NO SWEAT VISIBLE
VISUAL DISTURBANCES: VERY MILD SENSITIVITY
ORIENTATION AND CLOUDING OF SENSORIUM: ORIENTED AND CAN DO SERIAL ADDITIONS
TOTAL SCORE: 4
TOTAL SCORE: 15
HEADACHE, FULLNESS IN HEAD: MODERATELY SEVERE
PAROXYSMAL SWEATS: NO SWEAT VISIBLE
AUDITORY DISTURBANCES: NOT PRESENT
AUDITORY DISTURBANCES: VERY MILD HARSHNESS OR ABILITY TO FRIGHTEN
NAUSEA AND VOMITING: NO NAUSEA AND NO VOMITING
ORIENTATION AND CLOUDING OF SENSORIUM: DATE DISORIENTATION BY NO MORE THAN TWO CALENDAR DAYS
ON A TYPICAL DAY WHEN YOU DRINK ALCOHOL HOW MANY DRINKS DO YOU HAVE: 2
ORIENTATION AND CLOUDING OF SENSORIUM: DATE DISORIENTATION BY NO MORE THAN TWO CALENDAR DAYS
ORIENTATION AND CLOUDING OF SENSORIUM: DATE DISORIENTATION BY NO MORE THAN TWO CALENDAR DAYS
HAVE YOU EVER FELT YOU SHOULD CUT DOWN ON YOUR DRINKING: YES
ORIENTATION AND CLOUDING OF SENSORIUM: ORIENTED AND CAN DO SERIAL ADDITIONS
NAUSEA AND VOMITING: *
PAROXYSMAL SWEATS: NO SWEAT VISIBLE
HEADACHE, FULLNESS IN HEAD: SEVERE
DOES PATIENT WANT TO STOP DRINKING: YES
ANXIETY: MODERATELY ANXIOUS OR GUARDED, SO ANXIETY IS INFERRED
TOTAL SCORE: 12
PAROXYSMAL SWEATS: NO SWEAT VISIBLE
PAROXYSMAL SWEATS: BARELY PERCEPTIBLE SWEATING. PALMS MOIST
EVER HAD A DRINK FIRST THING IN THE MORNING TO STEADY YOUR NERVES TO GET RID OF A HANGOVER: YES
TOTAL SCORE: 24
HEADACHE, FULLNESS IN HEAD: MODERATELY SEVERE
TREMOR: *
DOES PATIENT WANT TO TALK TO SOMEONE ABOUT QUITTING: YES
SUBSTANCE_ABUSE: 0
TREMOR: *
AGITATION: *
TREMOR: *
ANXIETY: MILDLY ANXIOUS
ANXIETY: MODERATELY ANXIOUS OR GUARDED, SO ANXIETY IS INFERRED
ANXIETY: MODERATELY ANXIOUS OR GUARDED, SO ANXIETY IS INFERRED
HEADACHE, FULLNESS IN HEAD: SEVERE
AGITATION: NORMAL ACTIVITY
TREMOR: MODERATE TREMOR WITH ARMS EXTENDED
VISUAL DISTURBANCES: MILD SENSITIVITY
HOW MANY TIMES IN THE PAST YEAR HAVE YOU HAD 5 OR MORE DRINKS IN A DAY: 180
TOTAL SCORE: MODERATE ITCHING, PINS AND NEEDLES SENSATION, BURNING OR NUMBNESS
TREMOR: *

## 2024-06-14 ASSESSMENT — COGNITIVE AND FUNCTIONAL STATUS - GENERAL
DAILY ACTIVITIY SCORE: 20
MOVING TO AND FROM BED TO CHAIR: A LITTLE
HELP NEEDED FOR BATHING: A LITTLE
MOBILITY SCORE: 20
CLIMB 3 TO 5 STEPS WITH RAILING: A LITTLE
TOILETING: A LITTLE
WALKING IN HOSPITAL ROOM: A LITTLE
STANDING UP FROM CHAIR USING ARMS: A LITTLE
DRESSING REGULAR LOWER BODY CLOTHING: A LITTLE
SUGGESTED CMS G CODE MODIFIER MOBILITY: CJ
DRESSING REGULAR UPPER BODY CLOTHING: A LITTLE
SUGGESTED CMS G CODE MODIFIER DAILY ACTIVITY: CJ

## 2024-06-14 ASSESSMENT — PATIENT HEALTH QUESTIONNAIRE - PHQ9
SUM OF ALL RESPONSES TO PHQ QUESTIONS 1-9: 25
8. MOVING OR SPEAKING SO SLOWLY THAT OTHER PEOPLE COULD HAVE NOTICED. OR THE OPPOSITE, BEING SO FIGETY OR RESTLESS THAT YOU HAVE BEEN MOVING AROUND A LOT MORE THAN USUAL: MORE THAN HALF THE DAYS
5. POOR APPETITE OR OVEREATING: MORE THAN HALF THE DAYS
3. TROUBLE FALLING OR STAYING ASLEEP OR SLEEPING TOO MUCH: NEARLY EVERY DAY
9. THOUGHTS THAT YOU WOULD BE BETTER OFF DEAD, OR OF HURTING YOURSELF: NEARLY EVERY DAY
7. TROUBLE CONCENTRATING ON THINGS, SUCH AS READING THE NEWSPAPER OR WATCHING TELEVISION: NEARLY EVERY DAY
4. FEELING TIRED OR HAVING LITTLE ENERGY: NEARLY EVERY DAY
6. FEELING BAD ABOUT YOURSELF - OR THAT YOU ARE A FAILURE OR HAVE LET YOURSELF OR YOUR FAMILY DOWN: NEARLY EVERY DAY
2. FEELING DOWN, DEPRESSED, IRRITABLE, OR HOPELESS: NEARLY EVERY DAY
1. LITTLE INTEREST OR PLEASURE IN DOING THINGS: NEARLY EVERY DAY
SUM OF ALL RESPONSES TO PHQ9 QUESTIONS 1 AND 2: 6

## 2024-06-14 ASSESSMENT — ENCOUNTER SYMPTOMS
CHILLS: 0
NAUSEA: 1
PALPITATIONS: 0
BLURRED VISION: 0
FALLS: 1
INSOMNIA: 0
NERVOUS/ANXIOUS: 1
BACK PAIN: 0
HEADACHES: 1
DIZZINESS: 0
COUGH: 0
SHORTNESS OF BREATH: 0
VOMITING: 1
ABDOMINAL PAIN: 0
EYE PAIN: 0
LOSS OF CONSCIOUSNESS: 0
FOCAL WEAKNESS: 0
FEVER: 0
SENSORY CHANGE: 0

## 2024-06-14 ASSESSMENT — FIBROSIS 4 INDEX
FIB4 SCORE: 1.46
FIB4 SCORE: 1.42
FIB4 SCORE: 1.42

## 2024-06-14 ASSESSMENT — SOCIAL DETERMINANTS OF HEALTH (SDOH)
WITHIN THE LAST YEAR, HAVE TO BEEN RAPED OR FORCED TO HAVE ANY KIND OF SEXUAL ACTIVITY BY YOUR PARTNER OR EX-PARTNER?: NO
WITHIN THE LAST YEAR, HAVE YOU BEEN AFRAID OF YOUR PARTNER OR EX-PARTNER?: YES
WITHIN THE LAST YEAR, HAVE YOU BEEN HUMILIATED OR EMOTIONALLY ABUSED IN OTHER WAYS BY YOUR PARTNER OR EX-PARTNER?: YES
WITHIN THE LAST YEAR, HAVE YOU BEEN KICKED, HIT, SLAPPED, OR OTHERWISE PHYSICALLY HURT BY YOUR PARTNER OR EX-PARTNER?: NO

## 2024-06-14 ASSESSMENT — PAIN DESCRIPTION - PAIN TYPE
TYPE: ACUTE PAIN
TYPE: ACUTE PAIN

## 2024-06-14 NOTE — ED TRIAGE NOTES
Chief Complaint   Patient presents with    Fall     Unknown when unknown LOC, no blood thinners, no obvious head trauma.     Other     Has been drinking alcohol for 2 days straight     PT EDNA MOON, has been drinking for 2 days straight, lives alone but EMS states there was family who had been staying with her who called, there were multiple mini bottles of wine trash cans were full, patient was found on the floor by family, when EMS got there patient was complaining of neck pain. Patient following commands, but is slow to respond. Patient wearing 2L of O2 NC, room air saturation of 89%.

## 2024-06-14 NOTE — DISCHARGE PLANNING
"Patient interviewed in room. She requests resources for chemical dependency rehabilitation. She states that her preferred drink is \"vodka and wine.\" She was tearful requesting her \"mother and middle son.\" She states that she does not like [facility] chemical dependency unit nor [facility] rehabilitation. She prefers to go to [facility] upon discharge. She was provided resources for chemical dependency: [facility], [facility], [facility], [facility], [facility], [facility], [facility], [facility], [facility] and [facility]. She accepts resources and expressed understanding.  "

## 2024-06-14 NOTE — ED NOTES
Pt transported to [facility] with transport tech via [employer].  Pt transported with all belongings, on 2L nc oxygen. Pt awake and oriented.

## 2024-06-14 NOTE — H&P
Hospital Medicine History & Physical Note    Date of Service  6/14/2024    Primary Care Physician  Karo Middleton M.D.      Code Status  Prior    Chief Complaint  Chief Complaint   Patient presents with    Fall     Unknown when unknown LOC, no blood thinners, no obvious head trauma.     Other     Has been drinking alcohol for 2 days straight       History of Presenting Illness  Rhonda Gould is a 53 y.o. female who presented 6/14/2024 with alcohol use. Patient with history of alcohol abuse, alcohol withdrawal, hypertension, COPD, who presents with fall. Patient reports drinking daily, last drink this morning. She had a fall while intoxicated. She is interested in alcohol rehab but needs to detox before going to rehab she says and is interested in quitting alcohol altogether. She reports some shakiness, as well as nausea and non bloody vomiting this morning. She reports some headache but denies other focal complaints.  In the ED, , on 2L oxygen. CBC and CMP fairly unremarkable. Ammonia 13. . CT head shows no acute findings. Patient is admitted for alcohol use and alcohol withdrawal.    I discussed the plan of care with patient.    Review of Systems  Review of Systems   Constitutional:  Negative for chills and fever.   Eyes:  Negative for blurred vision and pain.   Respiratory:  Negative for cough and shortness of breath.    Cardiovascular:  Negative for chest pain, palpitations and leg swelling.   Gastrointestinal:  Positive for nausea and vomiting. Negative for abdominal pain.   Genitourinary:  Negative for dysuria and urgency.   Musculoskeletal:  Positive for falls. Negative for back pain.   Skin:  Negative for itching and rash.   Neurological:  Positive for headaches. Negative for dizziness, sensory change, focal weakness and loss of consciousness.   Psychiatric/Behavioral:  Negative for substance abuse. The patient is nervous/anxious. The patient does not have insomnia.        Past Medical  History   has a past medical history of Chronic obstructive pulmonary disease (HCC) and Hypertension.    Surgical History   has a past surgical history that includes hysterectomy laparoscopy (6/2005).     Family History  family history includes Cancer in her maternal grandfather; Diabetes in her father and mother; Heart Disease in her maternal grandmother; Hypertension in her father and mother.   Family history reviewed with patient. There is no family history that is pertinent to the chief complaint.     Social History   reports that she has been smoking. She has never used smokeless tobacco. She reports current alcohol use. She reports that she does not currently use drugs after having used the following drugs: Inhaled and Marijuana. Frequency: 2.00 times per week.    Allergies  Allergies   Allergen Reactions    Fish Allergy Anaphylaxis and Shortness of Breath     Swelling to throat.    Shellfish Allergy Anaphylaxis     Strict No Shellfish (avoids most fish, cod OK)      Codeine Hives and Itching     Tolerated morphine previously  Allergy updated from 2015, morphine given in 2016       Medications  Prior to Admission Medications   Prescriptions Last Dose Informant Patient Reported? Taking?   albuterol (PROVENTIL) 2.5mg/3ml Nebu Soln solution for nebulization  Patient No No   Sig: Take 3 mL by nebulization every four hours as needed for Shortness of Breath.   albuterol 108 (90 Base) MCG/ACT Aero Soln inhalation aerosol  Patient Yes No   Sig: Inhale 1-2 Puffs every four hours as needed for Shortness of Breath.   amLODIPine (NORVASC) 5 MG Tab  Patient No No   Sig: Take 1 Tablet by mouth every day.   ibuprofen (MOTRIN) 200 MG Tab  Patient Yes No   Sig: Take 400 mg by mouth 2 times a day as needed for Mild Pain. 2 tablets = 400 mg.   lisinopril (PRINIVIL) 10 MG Tab  Patient No No   Sig: Take 1 Tablet by mouth every day.   Patient not taking: Reported on 6/12/2024   lisinopril (PRINIVIL) 5 MG Tab  Patient No No   Sig:  Take 1 Tablet by mouth every day.   mirtazapine (REMERON) 7.5 MG tablet  Patient Yes No   Sig: Take 7.5 mg by mouth at bedtime.   tizanidine (ZANAFLEX) 4 MG Tab  Patient No No   Sig: Take 1 tablet by mouth at bedtime as needed (as needed for spasm).   Patient not taking: Reported on 6/12/2024      Facility-Administered Medications: None       Physical Exam  Temp:  [35.9 °C (96.7 °F)-36.8 °C (98.2 °F)] 36.8 °C (98.2 °F)  Pulse:  [] 97  Resp:  [14-17] 17  BP: ()/(54-85) 114/85  SpO2:  [91 %-99 %] 99 %  Blood Pressure: 114/85   Temperature: 36.8 °C (98.2 °F)   Pulse: 97   Respiration: 17   Pulse Oximetry: 99 %       Physical Exam  Vitals reviewed.   Constitutional:       General: She is not in acute distress.     Appearance: She is not diaphoretic.   HENT:      Head: Normocephalic and atraumatic.      Right Ear: External ear normal.      Left Ear: External ear normal.      Nose: Nose normal. No congestion.      Mouth/Throat:      Pharynx: No oropharyngeal exudate or posterior oropharyngeal erythema.   Eyes:      Extraocular Movements: Extraocular movements intact.      Pupils: Pupils are equal, round, and reactive to light.   Cardiovascular:      Rate and Rhythm: Regular rhythm. Tachycardia present.   Pulmonary:      Effort: Pulmonary effort is normal. No respiratory distress.      Breath sounds: Normal breath sounds. No wheezing or rales.   Abdominal:      General: Bowel sounds are normal. There is no distension.      Palpations: Abdomen is soft.      Tenderness: There is no abdominal tenderness. There is no guarding.   Musculoskeletal:         General: No swelling. Normal range of motion.      Cervical back: Normal range of motion and neck supple.      Right lower leg: No edema.      Left lower leg: No edema.   Skin:     General: Skin is warm and dry.   Neurological:      General: No focal deficit present.      Mental Status: She is alert and oriented to person, place, and time.      Cranial Nerves: No  "cranial nerve deficit.      Sensory: No sensory deficit.      Motor: No weakness.   Psychiatric:         Mood and Affect: Mood normal.         Behavior: Behavior normal.         Laboratory:  Recent Labs     06/14/24  1110   WBC 5.4   RBC 4.03*   HEMOGLOBIN 12.6   HEMATOCRIT 38.8   MCV 96.3   MCH 31.3   MCHC 32.5   RDW 57.0*   PLATELETCT 320   MPV 9.1     Recent Labs     06/14/24  1110   SODIUM 140   POTASSIUM 4.5   CHLORIDE 99   CO2 17*   GLUCOSE 70   BUN 13   CREATININE 0.71   CALCIUM 9.4     Recent Labs     06/14/24  1110   ALTSGPT 16   ASTSGOT 33   ALKPHOSPHAT 115*   TBILIRUBIN 0.4   GLUCOSE 70     Recent Labs     06/14/24  1110   APTT 30.2   INR 0.98     No results for input(s): \"NTPROBNP\" in the last 72 hours.      No results for input(s): \"TROPONINT\" in the last 72 hours.    Imaging:  CT-HEAD W/O   Final Result      Head CT without contrast within normal limits. No evidence of acute cerebral infarction, hemorrhage or mass lesion.         DX-CHEST-PORTABLE (1 VIEW)    (Results Pending)           Assessment/Plan:  Justification for Admission Status  I anticipate this patient will require at least two midnights for appropriate medical management, necessitating inpatient admission because treatment of alcohol withdrawal is expected to take >2 midnights.    Patient will need a Med/Surg bed on MEDICAL service .  The need is secondary to continued need to treat and monitor alcohol withdrawal.    * Alcohol abuse with withdrawal (HCC)- (present on admission)  Assessment & Plan  Presents with fall, alcohol intoxication, signs of withdrawal beginning  Daily drinker, last drink morning of presentation  Start CIWA protocol, rally bag, vitamins  Monitor need for escalation of care  Patient states she is interested in quitting, going to alcohol rehab    Hypoxia  Assessment & Plan  Requiring 2L oxygen on presentation  Hx of COPD, but not in acute exacerbation  CXR ordered  Incentive spirometry  No chest pain " symptoms  Continue home inhalers for COPD    Simple chronic bronchitis (HCC)- (present on admission)  Assessment & Plan  Continue home albuterol inhaler prn  Not in acute exacerbation    Hypertension- (present on admission)  Assessment & Plan  History of,  Resume home medications        VTE prophylaxis: enoxaparin ppx

## 2024-06-14 NOTE — ED NOTES
Patient denies wanting to hurt herself or anyone else at this time. States she drank a lot and cannot remember how much

## 2024-06-14 NOTE — ED NOTES
Med Rec complete per patient and historical   Allergies reviewed  Antibiotics in the past 30 days:no  Anticoagulant in past 14 days:no  Pharmacy patient utilizes:[facility] on [address]    Pt states she is taking Lisinopril 10 mg and not the 5 mg     Pt states she does take Tizanidine PRN     Pt states she has not taken her medications since she's been home

## 2024-06-14 NOTE — ED NOTES
Bedside report from DIANA De Luna. Pt resting in rney comfortably, on monitor, call light in reach.  Pt is on 2L nc traced to wall O2, GCS 14.  Necessary fall precautions in place.  IVF continuing

## 2024-06-14 NOTE — ED NOTES
Pt ambulatory to restroom with steady gait with x1 assist.  Back to room and Hospitalist at bedside

## 2024-06-14 NOTE — ASSESSMENT & PLAN NOTE
Presents with fall, alcohol intoxication, signs of withdrawal beginning  Daily drinker, last drink morning of presentation  CIWA protocl  Thiamine, folate, and MV  Interesting in quitting and requesting detox and rehab   Seizure precaution

## 2024-06-14 NOTE — DISCHARGE PLANNING
ALERT team BH note:  Per [facility] ERP, Dr. Zuleta's request, Writer RN reviewed community CD and MH resources with pt, with written information given, including AA mtgs, sober living programs including [facility], [facility], and [facility], [place] [facility], [facility], and [facility], [facility] cliniic, and Pending sale to [facility], as pt is currently does not have an active insurance plan; she states she has recently applied for a [place] Medicaid insurance plan, and [facility], [facility], [facility], [facility] were also reviewed that have MH and CD programs that are accessible to her once she has an active insurance plan; pt verbalized understanding; no SI, HI, or self-harm ideation noted; pt Dc'd to self tonight

## 2024-06-14 NOTE — ED PROVIDER NOTES
ED Provider Note    CHIEF COMPLAINT  Chief Complaint   Patient presents with    Fall     Unknown when unknown LOC, no blood thinners, no obvious head trauma.     Other     Has been drinking alcohol for 2 days straight       HPI/ROS      Rhonda Ivette Gould is a 53 y.o. female who presents via EMS for alcohol abuse and intoxication.  Patient states she has had some falls secondary to her alcohol abuse.  She states she would like help with treatment.  She is well-known to the emergency department for her alcohol abuse.  She is unaware of any injuries from the falls.  She does not have any focal weakness.    PAST MEDICAL HISTORY   has a past medical history of Chronic obstructive pulmonary disease (HCC) and Hypertension.    SURGICAL HISTORY   has a past surgical history that includes hysterectomy laparoscopy (6/2005).    FAMILY HISTORY  Family History   Problem Relation Age of Onset    Diabetes Mother     Hypertension Mother     Hypertension Father     Diabetes Father     Heart Disease Maternal Grandmother     Cancer Maternal Grandfather        SOCIAL HISTORY  Social History     Tobacco Use    Smoking status: Every Day    Smokeless tobacco: Never   Vaping Use    Vaping status: Never Used   Substance and Sexual Activity    Alcohol use: Yes    Drug use: Not Currently     Frequency: 2.0 times per week     Types: Inhaled, Marijuana     Comment: marijuana    Sexual activity: Not Currently       CURRENT MEDICATIONS  Home Medications    **Home medications have not yet been reviewed for this encounter**         ALLERGIES  Allergies   Allergen Reactions    Fish Allergy Anaphylaxis and Shortness of Breath     Swelling to throat.    Shellfish Allergy Anaphylaxis     Strict No Shellfish (avoids most fish, cod OK)      Codeine Hives and Itching     Tolerated morphine previously  Allergy updated from 2015, morphine given in 2016       PHYSICAL EXAM  VITAL SIGNS: /79   Pulse (!) 101   Temp 35.9 °C (96.7 °F) (Temporal)   Resp  "14   Ht 1.753 m (5' 9\")   Wt 61.2 kg (135 lb)   LMP 06/11/2005   SpO2 94%   BMI 19.94 kg/m²    In general the patient is cachectic and appears intoxicated    HEENT unremarkable    Pulmonary the patient's lungs are clear to auscultation bilaterally    Cardiovascular S1-S2 with a slightly tachycardic rate    GI abdomen soft    Skin no rashes, pallor, no jaundice    Extremities no edema    Neurologic examination is grossly intact    EKG/LABS  Results for orders placed or performed during the hospital encounter of 06/14/24   CBC WITH DIFFERENTIAL   Result Value Ref Range    WBC 5.4 4.8 - 10.8 K/uL    RBC 4.03 (L) 4.20 - 5.40 M/uL    Hemoglobin 12.6 12.0 - 16.0 g/dL    Hematocrit 38.8 37.0 - 47.0 %    MCV 96.3 81.4 - 97.8 fL    MCH 31.3 27.0 - 33.0 pg    MCHC 32.5 32.2 - 35.5 g/dL    RDW 57.0 (H) 35.9 - 50.0 fL    Platelet Count 320 164 - 446 K/uL    MPV 9.1 9.0 - 12.9 fL    Neutrophils-Polys 69.30 44.00 - 72.00 %    Lymphocytes 22.10 22.00 - 41.00 %    Monocytes 6.70 0.00 - 13.40 %    Eosinophils 1.30 0.00 - 6.90 %    Basophils 0.20 0.00 - 1.80 %    Immature Granulocytes 0.40 0.00 - 0.90 %    Nucleated RBC 0.00 0.00 - 0.20 /100 WBC    Neutrophils (Absolute) 3.74 1.82 - 7.42 K/uL    Lymphs (Absolute) 1.19 1.00 - 4.80 K/uL    Monos (Absolute) 0.36 0.00 - 0.85 K/uL    Eos (Absolute) 0.07 0.00 - 0.51 K/uL    Baso (Absolute) 0.01 0.00 - 0.12 K/uL    Immature Granulocytes (abs) 0.02 0.00 - 0.11 K/uL    NRBC (Absolute) 0.00 K/uL   COMP METABOLIC PANEL   Result Value Ref Range    Sodium 140 135 - 145 mmol/L    Potassium 4.5 3.6 - 5.5 mmol/L    Chloride 99 96 - 112 mmol/L    Co2 17 (L) 20 - 33 mmol/L    Anion Gap 24.0 (H) 7.0 - 16.0    Glucose 70 65 - 99 mg/dL    Bun 13 8 - 22 mg/dL    Creatinine 0.71 0.50 - 1.40 mg/dL    Calcium 9.4 8.5 - 10.5 mg/dL    Correct Calcium 9.0 8.5 - 10.5 mg/dL    AST(SGOT) 33 12 - 45 U/L    ALT(SGPT) 16 2 - 50 U/L    Alkaline Phosphatase 115 (H) 30 - 99 U/L    Total Bilirubin 0.4 0.1 - 1.5 " mg/dL    Albumin 4.5 3.2 - 4.9 g/dL    Total Protein 7.2 6.0 - 8.2 g/dL    Globulin 2.7 1.9 - 3.5 g/dL    A-G Ratio 1.7 g/dL   APTT   Result Value Ref Range    APTT 30.2 24.7 - 36.0 sec   PROTHROMBIN TIME   Result Value Ref Range    PT 13.1 12.0 - 14.6 sec    INR 0.98 0.87 - 1.13   AMMONIA   Result Value Ref Range    Ammonia 13 11 - 45 umol/L   DIAGNOSTIC ALCOHOL   Result Value Ref Range    Diagnostic Alcohol 338.1 (H) <10.1 mg/dL   ESTIMATED GFR   Result Value Ref Range    GFR (CKD-EPI) 100 >60 mL/min/1.73 m 2   POC BREATHALIZER   Result Value Ref Range    POC Breathalizer 0.259 (A) 0.00 - 0.01 Percent       RADIOLOGY/PROCEDURES     Radiologist interpretation:  CT-HEAD W/O   Final Result      Head CT without contrast within normal limits. No evidence of acute cerebral infarction, hemorrhage or mass lesion.             COURSE & MEDICAL DECISION MAKING    This a 53-year-old female who presents to the emergency department stated she has had some recurrent falls and more notably had a fall this morning and contacted EMS.  The patient does have a known history of alcohol abuse and intoxication.  Secondary to recurrent falls I did perform a CT scan of the head to make sure there is no evidence of a traumatic injury such as a subdural hematoma and this was negative.  I also administered IV fluids with thiamine, folate, and a multivitamin to treat for any possible Warnicke's encephalopathy and malnutrition from her alcohol abuse.  The patient's exam is unchanged.  She is significantly intoxicated.  Her ammonia level is normal which is comforting.  She would like help for her alcohol abuse and she will be at very high risk for withdrawals.  She is currently uninsured does not have resources to be transferred to a treatment facility.  Therefore admit the patient to the hospital for further treatment and as mentioned above the patient will be at high risk for withdrawals.    FINAL DIAGNOSIS  1.  Recurrent falls  2.  Alcohol  abuse and intoxication    Disposition  The patient will be admitted will be admitted she is currently in stable condition but could decompensate from an alcohol withdrawal standpoint.       Electronically signed by: Patrick Landaverde M.D., 6/14/2024 10:37 AM

## 2024-06-14 NOTE — ASSESSMENT & PLAN NOTE
Requiring 2L oxygen on presentation  Hx of COPD, but not in acute exacerbation  CXR with no acute pathology  Incentive spirometry  Continue home inhalers for COPD

## 2024-06-15 ENCOUNTER — PHARMACY VISIT (OUTPATIENT)
Dept: PHARMACY | Facility: MEDICAL CENTER | Age: 54
End: 2024-06-15
Payer: COMMERCIAL

## 2024-06-15 VITALS
DIASTOLIC BLOOD PRESSURE: 70 MMHG | BODY MASS INDEX: 17.8 KG/M2 | SYSTOLIC BLOOD PRESSURE: 105 MMHG | RESPIRATION RATE: 20 BRPM | TEMPERATURE: 97.5 F | HEIGHT: 69 IN | HEART RATE: 90 BPM | WEIGHT: 120.15 LBS | OXYGEN SATURATION: 95 %

## 2024-06-15 LAB
ANION GAP SERPL CALC-SCNC: 11 MMOL/L (ref 7–16)
BUN SERPL-MCNC: 13 MG/DL (ref 8–22)
CALCIUM SERPL-MCNC: 8.8 MG/DL (ref 8.5–10.5)
CHLORIDE SERPL-SCNC: 103 MMOL/L (ref 96–112)
CO2 SERPL-SCNC: 25 MMOL/L (ref 20–33)
CREAT SERPL-MCNC: 0.8 MG/DL (ref 0.5–1.4)
GFR SERPLBLD CREATININE-BSD FMLA CKD-EPI: 87 ML/MIN/1.73 M 2
GLUCOSE SERPL-MCNC: 84 MG/DL (ref 65–99)
POTASSIUM SERPL-SCNC: 4.3 MMOL/L (ref 3.6–5.5)
SODIUM SERPL-SCNC: 139 MMOL/L (ref 135–145)

## 2024-06-15 PROCEDURE — A9270 NON-COVERED ITEM OR SERVICE: HCPCS

## 2024-06-15 PROCEDURE — A9270 NON-COVERED ITEM OR SERVICE: HCPCS | Performed by: STUDENT IN AN ORGANIZED HEALTH CARE EDUCATION/TRAINING PROGRAM

## 2024-06-15 PROCEDURE — 36415 COLL VENOUS BLD VENIPUNCTURE: CPT

## 2024-06-15 PROCEDURE — 80048 BASIC METABOLIC PNL TOTAL CA: CPT

## 2024-06-15 PROCEDURE — 99239 HOSP IP/OBS DSCHRG MGMT >30: CPT | Performed by: STUDENT IN AN ORGANIZED HEALTH CARE EDUCATION/TRAINING PROGRAM

## 2024-06-15 PROCEDURE — RXMED WILLOW AMBULATORY MEDICATION CHARGE: Performed by: STUDENT IN AN ORGANIZED HEALTH CARE EDUCATION/TRAINING PROGRAM

## 2024-06-15 PROCEDURE — 99406 BEHAV CHNG SMOKING 3-10 MIN: CPT

## 2024-06-15 PROCEDURE — 700102 HCHG RX REV CODE 250 W/ 637 OVERRIDE(OP)

## 2024-06-15 PROCEDURE — 770006 HCHG ROOM/CARE - MED/SURG/GYN SEMI*

## 2024-06-15 PROCEDURE — 700102 HCHG RX REV CODE 250 W/ 637 OVERRIDE(OP): Performed by: STUDENT IN AN ORGANIZED HEALTH CARE EDUCATION/TRAINING PROGRAM

## 2024-06-15 PROCEDURE — 94664 DEMO&/EVAL PT USE INHALER: CPT

## 2024-06-15 RX ORDER — LANOLIN ALCOHOL/MO/W.PET/CERES
100 CREAM (GRAM) TOPICAL DAILY
Qty: 30 TABLET | Refills: 0 | Status: SHIPPED | OUTPATIENT
Start: 2024-06-15 | End: 2024-06-17

## 2024-06-15 RX ORDER — CHLORDIAZEPOXIDE HYDROCHLORIDE 10 MG/1
10 CAPSULE, GELATIN COATED ORAL 3 TIMES DAILY PRN
Qty: 9 CAPSULE | Refills: 0 | Status: SHIPPED | OUTPATIENT
Start: 2024-06-15 | End: 2024-06-17

## 2024-06-15 RX ORDER — FOLIC ACID 1 MG/1
1 TABLET ORAL DAILY
Qty: 30 TABLET | Refills: 0 | Status: SHIPPED | OUTPATIENT
Start: 2024-06-15 | End: 2024-06-17

## 2024-06-15 RX ORDER — CHLORDIAZEPOXIDE HYDROCHLORIDE 10 MG/1
10 CAPSULE, GELATIN COATED ORAL 3 TIMES DAILY PRN
Qty: 9 CAPSULE | Refills: 0 | Status: SHIPPED | OUTPATIENT
Start: 2024-06-15 | End: 2024-06-15

## 2024-06-15 RX ADMIN — LORAZEPAM 2 MG: 2 TABLET ORAL at 01:09

## 2024-06-15 RX ADMIN — LORAZEPAM 2 MG: 2 TABLET ORAL at 10:08

## 2024-06-15 RX ADMIN — THERA TABS 1 TABLET: TAB at 05:11

## 2024-06-15 RX ADMIN — FOLIC ACID 1 MG: 1 TABLET ORAL at 05:11

## 2024-06-15 RX ADMIN — ACETAMINOPHEN 650 MG: 325 TABLET, FILM COATED ORAL at 01:08

## 2024-06-15 RX ADMIN — MIRTAZAPINE 7.5 MG: 15 TABLET, FILM COATED ORAL at 01:09

## 2024-06-15 RX ADMIN — ACETAMINOPHEN 650 MG: 325 TABLET, FILM COATED ORAL at 10:08

## 2024-06-15 RX ADMIN — LORAZEPAM 2 MG: 2 TABLET ORAL at 13:09

## 2024-06-15 RX ADMIN — Medication 100 MG: at 05:11

## 2024-06-15 ASSESSMENT — LIFESTYLE VARIABLES
TOTAL SCORE: 5
AGITATION: NORMAL ACTIVITY
ORIENTATION AND CLOUDING OF SENSORIUM: ORIENTED AND CAN DO SERIAL ADDITIONS
PAROXYSMAL SWEATS: BARELY PERCEPTIBLE SWEATING. PALMS MOIST
TREMOR: *
VISUAL DISTURBANCES: VERY MILD SENSITIVITY
TREMOR: *
ANXIETY: *
AUDITORY DISTURBANCES: NOT PRESENT
ANXIETY: MODERATELY ANXIOUS OR GUARDED, SO ANXIETY IS INFERRED
PAROXYSMAL SWEATS: NO SWEAT VISIBLE
ANXIETY: MILDLY ANXIOUS
VISUAL DISTURBANCES: NOT PRESENT
HEADACHE, FULLNESS IN HEAD: VERY MILD
AUDITORY DISTURBANCES: NOT PRESENT
NAUSEA AND VOMITING: NO NAUSEA AND NO VOMITING
NAUSEA AND VOMITING: MILD NAUSEA WITH NO VOMITING
ORIENTATION AND CLOUDING OF SENSORIUM: DATE DISORIENTATION BY NO MORE THAN TWO CALENDAR DAYS
HEADACHE, FULLNESS IN HEAD: MODERATE
AGITATION: NORMAL ACTIVITY
NAUSEA AND VOMITING: MILD NAUSEA WITH NO VOMITING
NAUSEA AND VOMITING: MILD NAUSEA WITH NO VOMITING
AGITATION: NORMAL ACTIVITY
TREMOR: *
TOTAL SCORE: 13
TREMOR: *
ANXIETY: MODERATELY ANXIOUS OR GUARDED, SO ANXIETY IS INFERRED
TOTAL SCORE: VERY MILD ITCHING, PINS AND NEEDLES SENSATION, BURNING OR NUMBNESS
HEADACHE, FULLNESS IN HEAD: SEVERE
TOTAL SCORE: 10
TOTAL SCORE: 14
ORIENTATION AND CLOUDING OF SENSORIUM: ORIENTED AND CAN DO SERIAL ADDITIONS
AGITATION: NORMAL ACTIVITY
VISUAL DISTURBANCES: NOT PRESENT
PAROXYSMAL SWEATS: NO SWEAT VISIBLE
VISUAL DISTURBANCES: NOT PRESENT
ORIENTATION AND CLOUDING OF SENSORIUM: DATE DISORIENTATION BY NO MORE THAN TWO CALENDAR DAYS
ANXIETY: MODERATELY ANXIOUS OR GUARDED, SO ANXIETY IS INFERRED
ORIENTATION AND CLOUDING OF SENSORIUM: ORIENTED AND CAN DO SERIAL ADDITIONS
AUDITORY DISTURBANCES: NOT PRESENT
AUDITORY DISTURBANCES: NOT PRESENT
TREMOR: *
NAUSEA AND VOMITING: NO NAUSEA AND NO VOMITING
VISUAL DISTURBANCES: NOT PRESENT
PAROXYSMAL SWEATS: NO SWEAT VISIBLE
TOTAL SCORE: 9
AGITATION: NORMAL ACTIVITY
TREMOR: *
PAROXYSMAL SWEATS: BARELY PERCEPTIBLE SWEATING. PALMS MOIST
AUDITORY DISTURBANCES: VERY MILD HARSHNESS OR ABILITY TO FRIGHTEN
TOTAL SCORE: 13
HEADACHE, FULLNESS IN HEAD: MILD
ANXIETY: *
NAUSEA AND VOMITING: MILD NAUSEA WITH NO VOMITING
VISUAL DISTURBANCES: NOT PRESENT
HEADACHE, FULLNESS IN HEAD: MODERATELY SEVERE
ORIENTATION AND CLOUDING OF SENSORIUM: ORIENTED AND CAN DO SERIAL ADDITIONS
AUDITORY DISTURBANCES: NOT PRESENT
AGITATION: NORMAL ACTIVITY
HEADACHE, FULLNESS IN HEAD: NOT PRESENT
PAROXYSMAL SWEATS: BARELY PERCEPTIBLE SWEATING. PALMS MOIST

## 2024-06-15 ASSESSMENT — ENCOUNTER SYMPTOMS
DOUBLE VISION: 0
VOMITING: 0
ABDOMINAL PAIN: 0
SHORTNESS OF BREATH: 0
HEARTBURN: 0
DIZZINESS: 0
SORE THROAT: 0
DEPRESSION: 0
WEAKNESS: 0
FEVER: 0
COUGH: 0
NAUSEA: 0
BLURRED VISION: 0
CHILLS: 0

## 2024-06-15 ASSESSMENT — PAIN DESCRIPTION - PAIN TYPE
TYPE: ACUTE PAIN

## 2024-06-15 NOTE — PROGRESS NOTES
Pt has stated that she would like to discharge now. Spoke with pt on multiple occasions about the benefit of staying for treatment. Despite education, pt states she wants to leave. Dr chacko

## 2024-06-15 NOTE — PROGRESS NOTES
Assumed care of pt from DIANA Soto. Pt is A&Ox4 on CIWA protocol. Pt is on 1L o2, fall band present, bed is locked, low position with frame and strip alarm on. Call light is within reach with all needs met at this time.

## 2024-06-15 NOTE — PROGRESS NOTES
4 Eyes Skin Assessment Completed by DIANA Busch and Jessika RN.    Head WDL  Ears WDL  Nose WDL  Mouth WDL  Neck WDL  Breast/Chest Scar - left side   Shoulder Blades WDL  Spine WDL  (R) Arm/Elbow/Hand WDL  (L) Arm/Elbow/Hand WDL  Abdomen WDL  Groin WDL  Scrotum/Coccyx/Buttocks WDL  (R) Leg Scar - knee  (L) Leg Scar - knee  (R) Heel/Foot/Toe WDL  (L) Heel/Foot/Toe WDL          Devices In Places Pulse Ox and Nasal Cannula      Interventions In Place Gray Ear Foams    Possible Skin Injury No    Pictures Uploaded Into Epic Yes  Wound Consult Placed N/A  RN Wound Prevention Protocol Ordered No

## 2024-06-15 NOTE — CARE PLAN
The patient is Watcher - Medium risk of patient condition declining or worsening    Shift Goals  Clinical Goals: CIWA scoring will be completed throughout the shift  Patient Goals: rest  Family Goals: LENNIE    Progress made toward(s) clinical / shift goals:  CIWA scoring completed throughout the shift. Some of the PRN ativan had to be held off since patient was having low blood pressure.     Patient is not progressing towards the following goals:

## 2024-06-15 NOTE — PROGRESS NOTES
Received report from day shift nurse. Patient is alert and oriented x3. Bed is locked and in the lowest position. Pt's CIWA score at 16, 3 mg ativan given.    2038: Pt CIWA score at 12, Pt blood pressure at 72/52, on call Thalia Mcmahan notified, manual blood pressure taken, manual BP at 90/65. 500 LR bolus ordered. Asked on call if okay to hold ativan and Remeron for now due to pt's low BP, per on call Marj, okay to give bolus first .     2227: bolus completed, BP at 72/60, even when manually taking BP. On call Jesus Mcghee notified. Still holding off on ativan and Remeron. 1000 LR bolus ordered and given.     0055: bolus completed, BP at 103/69: CIWA scoring at 13, Scheduled nightly Remeron given and 2mg ativan.     0315: CIWA score 9, patient wakes up to voice but falls back to sleep very quickly. Holding off on 0.5 ativan until pt more awake and can re score again. Pt's o2 sat at 99 on 1L of o2, respirations at 14, BP at 99/67.       0511: CIWA at 5, pt still drowsy and falls back to sleep easily RR at 17, BP at 100/62, HR at 67, temp 97. Due to pt being drowsy will hold off on 0.5 ativan

## 2024-06-15 NOTE — CARE PLAN
The patient is Stable - Low risk of patient condition declining or worsening         Progress made toward(s) clinical / shift goals:  Pt has reported that anxiety decreases for a short time after medications are administered. CIWA protocol in place due to ETOH.       Problem: Psychosocial  Goal: Patient's level of anxiety will decrease  Outcome: Progressing     Problem: Skin Integrity  Goal: Skin integrity is maintained or improved  Outcome: Progressing

## 2024-06-15 NOTE — CARE PLAN
The patient is Stable - Low risk of patient condition declining or worsening    Shift Goals  Clinical Goals: Patient will contine to trend down with CIWA score during this shift  Patient Goals: rest  Family Goals: not present    Progress made toward(s) clinical / shift goals:  CIWAs completed per protocol. Pt continues to report headache and anxiety.       Problem: Pain - Standard  Goal: Alleviation of pain or a reduction in pain to the patient’s comfort goal  Outcome: Progressing     Problem: Knowledge Deficit - Standard  Goal: Patient and family/care givers will demonstrate understanding of plan of care, disease process/condition, diagnostic tests and medications  Outcome: Progressing     Problem: Seizure Precautions  Goal: Implementation of seizure precautions  Outcome: Progressing     Problem: Lifestyle Changes  Goal: Patient's ability to identify lifestyle changes and available resources to help reduce recurrence of condition will improve  Outcome: Progressing     Problem: Skin Integrity  Goal: Skin integrity is maintained or improved  Outcome: Progressing     Problem: Fall Risk  Goal: Patient will remain free from falls  Outcome: Progressing          show

## 2024-06-15 NOTE — PROGRESS NOTES
NOC HOSPITALIST CROSS COVER    Notified by RN regarding hypotension of 72/60. No signs of fluid overload at this time. 500 cc LR bolus ordered.    Vitals:    06/14/24 2227   BP: (!) 72/60   Pulse: 92   Resp: 16   Temp: 36.8 °C (98.2 °F)   SpO2: 98%      -----------------------------------------------------------------------------------------------------------    Electronically signed by:  Jakub Mcmahan, TRISHA, APRN, GRACIEP-BC  Hospitalist Services

## 2024-06-15 NOTE — PROGRESS NOTES
[facility] Medicine Daily Progress Note    Date of Service  6/15/2024    Chief Complaint  Rhonda Gould is a 53 y.o. female admitted 6/14/2024 with alcohol abuse with withdrawal. Requesting alcohol detox.     Hospital Course    Rhonda Gould is a 53 y.o. female who presented 6/14/2024 with alcohol use. Patient with history of alcohol abuse, alcohol withdrawal, hypertension, COPD, who presents with fall. Patient reports drinking daily, last drink this morning. She had a fall while intoxicated. CT head with no acute pathology. Requesting detox and alcohol rehab.    Interval Problem Update    6/15: No event. On 1L of oxygen. Denies seizure and hallucination. Interested in alcohol rehab. Ammonia neg. [id number]. Last drink two days ago. Normal lactic acid. Discussed with case management for resources and placement and send referral to [facility]. Was slightly hypotensive last night and received IV fluids resuscitation with improvement. Denies fever and chills.     I have discussed this patient's plan of care and discharge plan at IDT rounds today with Case Management, Nursing, Nursing leadership, and other members of the IDT team.    Consultants/Specialty  None    Code Status  Prior    Disposition  Medically Cleared  I have placed the appropriate orders for post-discharge needs.    Review of Systems  Review of Systems   Constitutional:  Negative for chills and fever.   HENT:  Negative for congestion and sore throat.    Eyes:  Negative for blurred vision and double vision.   Respiratory:  Negative for cough and shortness of breath.    Cardiovascular:  Negative for chest pain and leg swelling.   Gastrointestinal:  Negative for abdominal pain, heartburn, nausea and vomiting.   Neurological:  Negative for dizziness and weakness.   Psychiatric/Behavioral:  Negative for depression.         Physical Exam  Temp:  [36 °C (96.8 °F)-36.8 °C (98.2 °F)] 36 °C (96.8 °F)  Pulse:  [] 77  Resp:  [14-18] 18  BP:  ()/(52-85) 117/83  SpO2:  [86 %-99 %] 98 %    Physical Exam  Constitutional:       Appearance: Normal appearance.   HENT:      Head: Normocephalic.      Nose: Nose normal.      Mouth/Throat:      Mouth: Mucous membranes are moist.   Eyes:      Extraocular Movements: Extraocular movements intact.      Pupils: Pupils are equal, round, and reactive to light.   Cardiovascular:      Rate and Rhythm: Normal rate and regular rhythm.   Pulmonary:      Effort: Pulmonary effort is normal.      Breath sounds: Normal breath sounds.   Abdominal:      General: Abdomen is flat.      Palpations: Abdomen is soft.   Musculoskeletal:         General: Normal range of motion.   Skin:     General: Skin is warm.   Neurological:      General: No focal deficit present.      Mental Status: She is alert and oriented to person, place, and time.   Psychiatric:         Mood and Affect: Mood normal.         Fluids  No intake or output data in the 24 hours ending 06/15/24 1326    Laboratory  Recent Labs     06/14/24  1110   WBC 5.4   RBC 4.03*   HEMOGLOBIN 12.6   HEMATOCRIT 38.8   MCV 96.3   MCH 31.3   MCHC 32.5   RDW 57.0*   PLATELETCT 320   MPV 9.1     Recent Labs     06/14/24  1110 06/15/24  0403   SODIUM 140 139   POTASSIUM 4.5 4.3   CHLORIDE 99 103   CO2 17* 25   GLUCOSE 70 84   BUN 13 13   CREATININE 0.71 0.80   CALCIUM 9.4 8.8     Recent Labs     06/14/24  1110   APTT 30.2   INR 0.98               Imaging  DX-CHEST-PORTABLE (1 VIEW)   Final Result      No acute cardiac or pulmonary abnormalities are identified.      CT-HEAD W/O   Final Result      Head CT without contrast within normal limits. No evidence of acute cerebral infarction, hemorrhage or mass lesion.               Assessment/Plan  * Alcohol abuse with withdrawal (HCC)- (present on admission)  Assessment & Plan  Presents with fall, alcohol intoxication, signs of withdrawal beginning  Daily drinker, last drink morning of presentation  CIWA protocl  Thiamine, folate, and  MV  Interesting in quitting and requesting detox and rehab   Seizure precaution     Hypoxia  Assessment & Plan  Requiring 2L oxygen on presentation  Hx of COPD, but not in acute exacerbation  CXR with no acute pathology  Incentive spirometry  Continue home inhalers for COPD    Simple chronic bronchitis (HCC)- (present on admission)  Assessment & Plan  Continue home albuterol inhaler prn  Not in acute exacerbation    Hypertension- (present on admission)  Assessment & Plan  Holding amlodipine and lisinopril due to low BP  Resume once BP stable         VTE prophylaxis: SCD    I have performed a physical exam and reviewed and updated ROS and Plan today (6/15/2024). In review of yesterday's note (6/14/2024), there are no changes except as documented above.

## 2024-06-16 NOTE — DISCHARGE SUMMARY
Discharge Summary    CHIEF COMPLAINT ON ADMISSION  Chief Complaint   Patient presents with    Fall     Unknown when unknown LOC, no blood thinners, no obvious head trauma.     Other     Has been drinking alcohol for 2 days straight       Reason for Admission  ems     Admission Date  6/14/2024    CODE STATUS  Prior    HPI & HOSPITAL COURSE  For full details please refer to HPI    Rhonda Gould is a 53 y.o. female who presented 6/14/2024 with alcohol use. Patient with history of alcohol abuse, alcohol withdrawal, hypertension, COPD, who presents with fall. Patient reports drinking daily, last drink this morning. She had a fall while intoxicated. CT head with no acute pathology. Requesting detox and alcohol rehab. Today, patient said she doesn't want to go through detox with us anymore. She said she stuff to take care of at home before going to rehab. She understand the risk of withdrawing including seizure, hallucination. She said she will follow up with [facility] outpatient.  I will discharge her with couple more days of low dose librium. Thus discharged in a stable condition with close follow up with PCP. Return to ER precaution was given.     Therefore, she is discharged in good and stable condition to home with close outpatient follow-up.    The patient recovered much more quickly than anticipated on admission.    Discharge Date  6/15/2024    FOLLOW UP ITEMS POST DISCHARGE  PCP    DISCHARGE DIAGNOSES  Principal Problem:    Alcohol abuse with withdrawal (HCC) (POA: Yes)  Active Problems:    Hypertension (POA: Yes)      Overview: Hypertension since 2010.    Simple chronic bronchitis (HCC) (POA: Yes)    Hypoxia (POA: Unknown)  Resolved Problems:    * No resolved hospital problems. *      FOLLOW UP  No future appointments.  No follow-up provider specified.    MEDICATIONS ON DISCHARGE     Medication List        START taking these medications        Instructions   chlordiazepoxide 10 MG capsule  Commonly  known as: Librium   Take 1 Capsule by mouth 3 times a day as needed for Anxiety for up to 3 days.  Dose: 10 mg     folic acid 1 MG Tabs  Commonly known as: Folvite   Take 1 Tablet by mouth every day for 30 days.  Dose: 1 mg     thiamine 100 MG tablet  Commonly known as: Thiamine   Take 1 Tablet by mouth every day for 30 days.  Dose: 100 mg            CONTINUE taking these medications        Instructions   * albuterol 108 (90 Base) MCG/ACT Aers inhalation aerosol   Inhale 1-2 Puffs every four hours as needed for Shortness of Breath.  Dose: 1-2 Puff     * albuterol 2.5mg/3ml Nebu solution for nebulization  Commonly known as: Proventil   Take 3 mL by nebulization every four hours as needed for Shortness of Breath.  Dose: 2.5 mg     amLODIPine 5 MG Tabs  Commonly known as: Norvasc   Take 1 Tablet by mouth every day.  Dose: 5 mg     ibuprofen 200 MG Tabs  Commonly known as: Motrin   Take 400 mg by mouth 2 times a day as needed for Mild Pain. 2 tablets = 400 mg.  Dose: 400 mg     * lisinopril 5 MG Tabs  Commonly known as: Prinivil   Take 1 Tablet by mouth every day.  Dose: 5 mg     * lisinopril 10 MG Tabs  Commonly known as: Prinivil   Take 1 Tablet by mouth every day.  Dose: 10 mg     mirtazapine 7.5 MG tablet  Commonly known as: Remeron   Take 7.5 mg by mouth at bedtime.  Dose: 7.5 mg     tizanidine 4 MG Tabs  Commonly known as: Zanaflex   Take 1 tablet by mouth at bedtime as needed (as needed for spasm).  Dose: 4 mg           * This list has 4 medication(s) that are the same as other medications prescribed for you. Read the directions carefully, and ask your doctor or other care provider to review them with you.                  Allergies  Allergies   Allergen Reactions    Fish Allergy Anaphylaxis and Shortness of Breath     Swelling to throat.    Shellfish Allergy Anaphylaxis     Strict No Shellfish (avoids most fish, cod OK)      Codeine Hives and Itching     Tolerated morphine previously  Allergy updated from 2015,  morphine given in 2016       DIET  Orders Placed This Encounter   Procedures    Diet Order Diet: Regular     Standing Status:   Standing     Number of Occurrences:   1     Order Specific Question:   Diet:     Answer:   Regular [1]       ACTIVITY  As tolerated.  Weight bearing as tolerated    CONSULTATIONS  None    PROCEDURES  None    LABORATORY  Lab Results   Component Value Date    SODIUM 139 06/15/2024    POTASSIUM 4.3 06/15/2024    CHLORIDE 103 06/15/2024    CO2 25 06/15/2024    GLUCOSE 84 06/15/2024    BUN 13 06/15/2024    CREATININE 0.80 06/15/2024        Lab Results   Component Value Date    WBC 5.4 06/14/2024    HEMOGLOBIN 12.6 06/14/2024    HEMATOCRIT 38.8 06/14/2024    PLATELETCT 320 06/14/2024        Total time of the discharge process exceeds 70 minutes.

## 2024-06-16 NOTE — PROGRESS NOTES
Speaker phone call between , patient, and this RN.  spoke to pt about the risks of leaving, including having seizures. Pt stated that her plan is to have her neighbor stay with her, she will not drink, and she is going to have her son take her to [facility] where she knows someone who can get a bed for her.  stated he will prescribe Librium to prevent seizures and advised pt not to consume with alcohol. Pt was agreeable.  to place discharge orders.

## 2024-06-16 NOTE — RESPIRATORY CARE
"  COPD EDUCATION by COPD CLINICAL EDUCATOR  6/15/2024  at  1515 PM by Caterina Yates, RRT     Patient interviewed by education team.  Patient declined or is unable to participate in the full program.  Therefore, a short intervention has been conducted- including information about COPD, types of treatments to manage their disease and safe home Oxygen usage ( should it be required ) was provided and reviewed with patient at the bedside.       Smoking Cessation Intervention and education completed, 5 minutes spent on smoking cessation education with patient.  Provided smoking cessation packet with \"Tips to Quit\" and brochure for \"Free Smoking Cessation Classes\".       COPD Screen  COPD Risk Screening  Do you have a history of COPD?: Yes  Do you have a Pulmonologist?: No    COPD Assessment  COPD Clinical Specialists ONLY  COPD Education Initiated: Yes--Short Intervention (Met briefly w/ patient as she is expresing pain. Pt states he breathing is \"stable\" only has her Albuterol inhaler that she only uses occassionally. Admits to current smoking - but no exacerbations for a while now. Provided cessation booklet/ class info)  Is this a COPD exacerbation patient?: No  DME Company: N/A  DME Equipment Type: None at this time  Physician Name: Karo Middelton M.D.  Pulmonologist Name: None at this time - will be asking [facility] for poss referral  Referrals Initiated: Yes  Pulmonary Rehab: N/A  Smoking Cessation: Yes  $ Smoking Cessation 3-10 Minutes: Symptomatic (pt admits to current smoking - \"almost\" ready to quit - Provided her with booket + class info and encouragment)  Hospice: N/A  Home Health Care: N/A  Mobile Urgent Care Services: N/A  Geriatric Specialty Group: N/A  Private In-Home Care Agency: N/A  $ Demo/Eval of SVN's, MDI's and Aerosols: Yes (reviewed respiratory medicaitons and use)  (OP) Pulmonary Function Testing:  (none found on file at this time)  Interdisciplinary Rounds: Attendance at Rounds (30 " "Min)    PFT Results    No results found for: \"PFT\"    Meds to [facility]  [facility] bedside medication delivery for all eligible patients at discharge.  Would you like to opt out of this program for any reason?: No - Stay Opted In     MY COPD ACTION PLAN     It is recommended that patients and physicians /healthcare providers complete this action plan together. This plan should be discussed at each physician visit and updated as needed.    The green, yellow and red zones show groups of symptoms of COPD. This list of symptoms is not comprehensive, and you may experience other symptoms. In the \"Actions\" column, your healthcare provider has recommended actions for you to take based on your symptoms.    Patient Name: Rhonda Gould   YOB: 1970   Last Updated on: 6/15/2024  5:23 PM   Green Zone:  I am doing well today Actions     Usual activitiy and exercise level   Take daily medications     Usual amounts of cough and phlegm/mucus   Use oxygen as prescribed     Sleep well at night   Continue regular exercise/diet plan     Appetite is good   At all times avoid cigarette smoke, inhaled irritants     Daily Medications (these medications are taken every day):                Yellow Zone:  I am having a bad day or a COPD flare Actions     More breathless than usual   Continue daily medications     I have less energy for my daily activities   Use quick relief inhaler as ordered     Increased or thicker phlegm/mucus   Use oxygen as prescribed     Using quick relief inhaler/nebulizer more often   Get plenty of rest     Swelling of ankles more than usual   Use pursed lip breathing     More coughing than usual   At all times avoid cigarette smoke, inhaled irritants     I feel like I have a \"chest cold\"     Poor sleep and my symptoms woke me up     My appetite is not good     My medicine is not helping      Call provider immediately if symptoms don’t improve     Continue daily medications, add rescue " medications:   Albuterol  Albuterol 2 Puffs  2.5mg via nebulizer Every 4 hours PRN  Every 4 hours PRN       Medications to be used during a flare up, (as Discussed with Provider):           Additional Information:  Once you have the nebulizer, use before the inhaler if available.  If not available, use spacer with inhaler.    Red Zone:  I need urgent medical care Actions     Severe shortness of breath even at rest   Call 911 or seek medical care immediately     Not able to do any activity because of breathing      Fever or shaking chills      Feeling confused or very drowsy       Chest pains      Coughing up blood

## 2024-06-16 NOTE — CARE PLAN
The patient is Stable - Low risk of patient condition declining or worsening    Shift Goals  Clinical Goals: Patient will contine to trend down with CIWA score during this shift  Patient Goals: rest  Family Goals: not present    Pt is discharging and has been advised of copay for medications.     Progress made toward(s) clinical / shift goals:    Problem: Pain - Standard  Goal: Alleviation of pain or a reduction in pain to the patient’s comfort goal  Outcome: Met       Patient is not progressing towards the following goals:

## 2024-06-16 NOTE — PROGRESS NOTES
Pt escorted via wheelchair to get her meds to beds medications from pharmacy. Patient escorted down stairs, pt left waiting for her lyft which was already ordered. Patient sitting down waiting for lyft to pick her up. Patient axox4 on room air. All belongings with her.

## 2024-06-17 ENCOUNTER — HOSPITAL ENCOUNTER (INPATIENT)
Facility: MEDICAL CENTER | Age: 54
LOS: 2 days | DRG: 897 | End: 2024-06-19
Attending: EMERGENCY MEDICINE | Admitting: STUDENT IN AN ORGANIZED HEALTH CARE EDUCATION/TRAINING PROGRAM
Payer: MEDICAID

## 2024-06-17 DIAGNOSIS — Z72.0 TOBACCO USE: Chronic | ICD-10-CM

## 2024-06-17 DIAGNOSIS — F10.939 ALCOHOL WITHDRAWAL SYNDROME WITH COMPLICATION (HCC): ICD-10-CM

## 2024-06-17 DIAGNOSIS — J45.20 MILD INTERMITTENT ASTHMA WITHOUT COMPLICATION: ICD-10-CM

## 2024-06-17 DIAGNOSIS — F17.200 SMOKING: ICD-10-CM

## 2024-06-17 DIAGNOSIS — F10.930 ALCOHOL WITHDRAWAL SYNDROME, UNCOMPLICATED (HCC): ICD-10-CM

## 2024-06-17 DIAGNOSIS — Z72.0 TOBACCO ABUSE DISORDER: ICD-10-CM

## 2024-06-17 DIAGNOSIS — F17.200 TOBACCO DEPENDENCE: ICD-10-CM

## 2024-06-17 DIAGNOSIS — J44.1 COPD EXACERBATION (HCC): ICD-10-CM

## 2024-06-17 LAB
ALBUMIN SERPL BCP-MCNC: 4.2 G/DL (ref 3.2–4.9)
ALBUMIN/GLOB SERPL: 1.6 G/DL
ALP SERPL-CCNC: 118 U/L (ref 30–99)
ALT SERPL-CCNC: 24 U/L (ref 2–50)
ANION GAP SERPL CALC-SCNC: 16 MMOL/L (ref 7–16)
AST SERPL-CCNC: 43 U/L (ref 12–45)
BASOPHILS # BLD AUTO: 0.4 % (ref 0–1.8)
BASOPHILS # BLD: 0.02 K/UL (ref 0–0.12)
BILIRUB SERPL-MCNC: 0.7 MG/DL (ref 0.1–1.5)
BUN SERPL-MCNC: 10 MG/DL (ref 8–22)
CALCIUM ALBUM COR SERPL-MCNC: 9.4 MG/DL (ref 8.5–10.5)
CALCIUM SERPL-MCNC: 9.6 MG/DL (ref 8.5–10.5)
CHLORIDE SERPL-SCNC: 100 MMOL/L (ref 96–112)
CO2 SERPL-SCNC: 21 MMOL/L (ref 20–33)
CREAT SERPL-MCNC: 0.83 MG/DL (ref 0.5–1.4)
EKG IMPRESSION: NORMAL
EOSINOPHIL # BLD AUTO: 0.06 K/UL (ref 0–0.51)
EOSINOPHIL NFR BLD: 1.2 % (ref 0–6.9)
ERYTHROCYTE [DISTWIDTH] IN BLOOD BY AUTOMATED COUNT: 57.7 FL (ref 35.9–50)
GFR SERPLBLD CREATININE-BSD FMLA CKD-EPI: 83 ML/MIN/1.73 M 2
GLOBULIN SER CALC-MCNC: 2.6 G/DL (ref 1.9–3.5)
GLUCOSE SERPL-MCNC: 167 MG/DL (ref 65–99)
HCT VFR BLD AUTO: 36.3 % (ref 37–47)
HGB BLD-MCNC: 11.6 G/DL (ref 12–16)
IMM GRANULOCYTES # BLD AUTO: 0.01 K/UL (ref 0–0.11)
IMM GRANULOCYTES NFR BLD AUTO: 0.2 % (ref 0–0.9)
LYMPHOCYTES # BLD AUTO: 1.09 K/UL (ref 1–4.8)
LYMPHOCYTES NFR BLD: 22.7 % (ref 22–41)
MAGNESIUM SERPL-MCNC: 1.5 MG/DL (ref 1.5–2.5)
MCH RBC QN AUTO: 31 PG (ref 27–33)
MCHC RBC AUTO-ENTMCNC: 32 G/DL (ref 32.2–35.5)
MCV RBC AUTO: 97.1 FL (ref 81.4–97.8)
MONOCYTES # BLD AUTO: 0.27 K/UL (ref 0–0.85)
MONOCYTES NFR BLD AUTO: 5.6 % (ref 0–13.4)
NEUTROPHILS # BLD AUTO: 3.36 K/UL (ref 1.82–7.42)
NEUTROPHILS NFR BLD: 69.9 % (ref 44–72)
NRBC # BLD AUTO: 0 K/UL
NRBC BLD-RTO: 0 /100 WBC (ref 0–0.2)
PHOSPHATE SERPL-MCNC: 3.9 MG/DL (ref 2.5–4.5)
PLATELET # BLD AUTO: 339 K/UL (ref 164–446)
PMV BLD AUTO: 9.3 FL (ref 9–12.9)
POC BREATHALIZER: 0 PERCENT (ref 0–0.01)
POTASSIUM SERPL-SCNC: 4.4 MMOL/L (ref 3.6–5.5)
PROT SERPL-MCNC: 6.8 G/DL (ref 6–8.2)
RBC # BLD AUTO: 3.74 M/UL (ref 4.2–5.4)
SODIUM SERPL-SCNC: 137 MMOL/L (ref 135–145)
WBC # BLD AUTO: 4.8 K/UL (ref 4.8–10.8)

## 2024-06-17 PROCEDURE — 80053 COMPREHEN METABOLIC PANEL: CPT

## 2024-06-17 PROCEDURE — 85025 COMPLETE CBC W/AUTO DIFF WBC: CPT

## 2024-06-17 PROCEDURE — 770006 HCHG ROOM/CARE - MED/SURG/GYN SEMI*

## 2024-06-17 PROCEDURE — 83735 ASSAY OF MAGNESIUM: CPT

## 2024-06-17 PROCEDURE — A9270 NON-COVERED ITEM OR SERVICE: HCPCS | Performed by: STUDENT IN AN ORGANIZED HEALTH CARE EDUCATION/TRAINING PROGRAM

## 2024-06-17 PROCEDURE — 700111 HCHG RX REV CODE 636 W/ 250 OVERRIDE (IP): Performed by: EMERGENCY MEDICINE

## 2024-06-17 PROCEDURE — 700102 HCHG RX REV CODE 250 W/ 637 OVERRIDE(OP): Performed by: STUDENT IN AN ORGANIZED HEALTH CARE EDUCATION/TRAINING PROGRAM

## 2024-06-17 PROCEDURE — 700111 HCHG RX REV CODE 636 W/ 250 OVERRIDE (IP): Mod: JZ | Performed by: STUDENT IN AN ORGANIZED HEALTH CARE EDUCATION/TRAINING PROGRAM

## 2024-06-17 PROCEDURE — 700101 HCHG RX REV CODE 250: Performed by: STUDENT IN AN ORGANIZED HEALTH CARE EDUCATION/TRAINING PROGRAM

## 2024-06-17 PROCEDURE — 99406 BEHAV CHNG SMOKING 3-10 MIN: CPT

## 2024-06-17 PROCEDURE — 99223 1ST HOSP IP/OBS HIGH 75: CPT | Performed by: STUDENT IN AN ORGANIZED HEALTH CARE EDUCATION/TRAINING PROGRAM

## 2024-06-17 PROCEDURE — 96366 THER/PROPH/DIAG IV INF ADDON: CPT

## 2024-06-17 PROCEDURE — 99285 EMERGENCY DEPT VISIT HI MDM: CPT

## 2024-06-17 PROCEDURE — 700105 HCHG RX REV CODE 258: Performed by: EMERGENCY MEDICINE

## 2024-06-17 PROCEDURE — HZ2ZZZZ DETOXIFICATION SERVICES FOR SUBSTANCE ABUSE TREATMENT: ICD-10-PCS | Performed by: STUDENT IN AN ORGANIZED HEALTH CARE EDUCATION/TRAINING PROGRAM

## 2024-06-17 PROCEDURE — 93005 ELECTROCARDIOGRAM TRACING: CPT

## 2024-06-17 PROCEDURE — 700105 HCHG RX REV CODE 258: Performed by: STUDENT IN AN ORGANIZED HEALTH CARE EDUCATION/TRAINING PROGRAM

## 2024-06-17 PROCEDURE — 93005 ELECTROCARDIOGRAM TRACING: CPT | Performed by: EMERGENCY MEDICINE

## 2024-06-17 PROCEDURE — 302970 POC BREATHALIZER: Performed by: EMERGENCY MEDICINE

## 2024-06-17 PROCEDURE — 36415 COLL VENOUS BLD VENIPUNCTURE: CPT

## 2024-06-17 PROCEDURE — 84100 ASSAY OF PHOSPHORUS: CPT

## 2024-06-17 PROCEDURE — 96365 THER/PROPH/DIAG IV INF INIT: CPT

## 2024-06-17 PROCEDURE — 96375 TX/PRO/DX INJ NEW DRUG ADDON: CPT

## 2024-06-17 RX ORDER — GAUZE BANDAGE 2" X 2"
100 BANDAGE TOPICAL DAILY
Status: DISCONTINUED | OUTPATIENT
Start: 2024-06-18 | End: 2024-06-19 | Stop reason: HOSPADM

## 2024-06-17 RX ORDER — LISINOPRIL 10 MG/1
10 TABLET ORAL DAILY
Status: DISCONTINUED | OUTPATIENT
Start: 2024-06-19 | End: 2024-06-19 | Stop reason: HOSPADM

## 2024-06-17 RX ORDER — PROCHLORPERAZINE EDISYLATE 5 MG/ML
5-10 INJECTION INTRAMUSCULAR; INTRAVENOUS EVERY 4 HOURS PRN
Status: DISCONTINUED | OUTPATIENT
Start: 2024-06-17 | End: 2024-06-19 | Stop reason: HOSPADM

## 2024-06-17 RX ORDER — PROMETHAZINE HYDROCHLORIDE 25 MG/1
12.5-25 TABLET ORAL EVERY 4 HOURS PRN
Status: DISCONTINUED | OUTPATIENT
Start: 2024-06-17 | End: 2024-06-19 | Stop reason: HOSPADM

## 2024-06-17 RX ORDER — LORAZEPAM 0.5 MG/1
0.5 TABLET ORAL EVERY 4 HOURS PRN
Status: DISCONTINUED | OUTPATIENT
Start: 2024-06-17 | End: 2024-06-19 | Stop reason: HOSPADM

## 2024-06-17 RX ORDER — AMOXICILLIN 250 MG
2 CAPSULE ORAL EVERY EVENING
Status: DISCONTINUED | OUTPATIENT
Start: 2024-06-17 | End: 2024-06-19 | Stop reason: HOSPADM

## 2024-06-17 RX ORDER — AMLODIPINE BESYLATE 5 MG/1
5 TABLET ORAL DAILY
Status: DISCONTINUED | OUTPATIENT
Start: 2024-06-17 | End: 2024-06-19 | Stop reason: HOSPADM

## 2024-06-17 RX ORDER — DIAZEPAM 10 MG/2ML
10 INJECTION, SOLUTION INTRAMUSCULAR; INTRAVENOUS
Status: DISCONTINUED | OUTPATIENT
Start: 2024-06-17 | End: 2024-06-19 | Stop reason: HOSPADM

## 2024-06-17 RX ORDER — ONDANSETRON 4 MG/1
4 TABLET, ORALLY DISINTEGRATING ORAL EVERY 4 HOURS PRN
Status: DISCONTINUED | OUTPATIENT
Start: 2024-06-17 | End: 2024-06-19 | Stop reason: HOSPADM

## 2024-06-17 RX ORDER — CHLORDIAZEPOXIDE HYDROCHLORIDE 25 MG/1
25 CAPSULE, GELATIN COATED ORAL EVERY 6 HOURS
Status: DISCONTINUED | OUTPATIENT
Start: 2024-06-18 | End: 2024-06-18

## 2024-06-17 RX ORDER — ONDANSETRON 2 MG/ML
4 INJECTION INTRAMUSCULAR; INTRAVENOUS EVERY 4 HOURS PRN
Status: DISCONTINUED | OUTPATIENT
Start: 2024-06-17 | End: 2024-06-19 | Stop reason: HOSPADM

## 2024-06-17 RX ORDER — SODIUM CHLORIDE 9 MG/ML
1000 INJECTION, SOLUTION INTRAVENOUS ONCE
Status: COMPLETED | OUTPATIENT
Start: 2024-06-17 | End: 2024-06-17

## 2024-06-17 RX ORDER — POLYETHYLENE GLYCOL 3350 17 G/17G
1 POWDER, FOR SOLUTION ORAL
Status: DISCONTINUED | OUTPATIENT
Start: 2024-06-17 | End: 2024-06-19 | Stop reason: HOSPADM

## 2024-06-17 RX ORDER — ONDANSETRON 2 MG/ML
4 INJECTION INTRAMUSCULAR; INTRAVENOUS ONCE
Status: COMPLETED | OUTPATIENT
Start: 2024-06-17 | End: 2024-06-17

## 2024-06-17 RX ORDER — LABETALOL HYDROCHLORIDE 5 MG/ML
10 INJECTION, SOLUTION INTRAVENOUS EVERY 4 HOURS PRN
Status: DISCONTINUED | OUTPATIENT
Start: 2024-06-17 | End: 2024-06-19 | Stop reason: HOSPADM

## 2024-06-17 RX ORDER — CHLORDIAZEPOXIDE HYDROCHLORIDE 25 MG/1
50 CAPSULE, GELATIN COATED ORAL EVERY 6 HOURS
Status: COMPLETED | OUTPATIENT
Start: 2024-06-17 | End: 2024-06-18

## 2024-06-17 RX ORDER — ENOXAPARIN SODIUM 100 MG/ML
40 INJECTION SUBCUTANEOUS DAILY
Status: DISCONTINUED | OUTPATIENT
Start: 2024-06-18 | End: 2024-06-19 | Stop reason: HOSPADM

## 2024-06-17 RX ORDER — LORAZEPAM 1 MG/1
1 TABLET ORAL EVERY 4 HOURS PRN
Status: DISCONTINUED | OUTPATIENT
Start: 2024-06-17 | End: 2024-06-19 | Stop reason: HOSPADM

## 2024-06-17 RX ORDER — ACETAMINOPHEN 325 MG/1
650 TABLET ORAL EVERY 6 HOURS PRN
Status: DISCONTINUED | OUTPATIENT
Start: 2024-06-17 | End: 2024-06-19 | Stop reason: HOSPADM

## 2024-06-17 RX ORDER — LORAZEPAM 2 MG/1
4 TABLET ORAL
Status: DISCONTINUED | OUTPATIENT
Start: 2024-06-17 | End: 2024-06-19 | Stop reason: HOSPADM

## 2024-06-17 RX ORDER — IPRATROPIUM BROMIDE AND ALBUTEROL SULFATE 2.5; .5 MG/3ML; MG/3ML
3 SOLUTION RESPIRATORY (INHALATION)
Status: DISCONTINUED | OUTPATIENT
Start: 2024-06-17 | End: 2024-06-19 | Stop reason: HOSPADM

## 2024-06-17 RX ORDER — FOLIC ACID 1 MG/1
1 TABLET ORAL DAILY
Status: DISCONTINUED | OUTPATIENT
Start: 2024-06-18 | End: 2024-06-19 | Stop reason: HOSPADM

## 2024-06-17 RX ORDER — DIAZEPAM 10 MG/2ML
5 INJECTION, SOLUTION INTRAMUSCULAR; INTRAVENOUS ONCE
Status: COMPLETED | OUTPATIENT
Start: 2024-06-17 | End: 2024-06-17

## 2024-06-17 RX ORDER — LORAZEPAM 2 MG/1
2 TABLET ORAL
Status: DISCONTINUED | OUTPATIENT
Start: 2024-06-17 | End: 2024-06-19 | Stop reason: HOSPADM

## 2024-06-17 RX ORDER — MIRTAZAPINE 7.5 MG/1
7.5 TABLET, FILM COATED ORAL
Status: DISCONTINUED | OUTPATIENT
Start: 2024-06-17 | End: 2024-06-19 | Stop reason: HOSPADM

## 2024-06-17 RX ORDER — PROMETHAZINE HYDROCHLORIDE 25 MG/1
12.5-25 SUPPOSITORY RECTAL EVERY 4 HOURS PRN
Status: DISCONTINUED | OUTPATIENT
Start: 2024-06-17 | End: 2024-06-19 | Stop reason: HOSPADM

## 2024-06-17 RX ADMIN — LORAZEPAM 0.5 MG: 0.5 TABLET ORAL at 22:22

## 2024-06-17 RX ADMIN — LORAZEPAM 2 MG: 2 TABLET ORAL at 20:29

## 2024-06-17 RX ADMIN — CHLORDIAZEPOXIDE HYDROCHLORIDE 50 MG: 25 CAPSULE ORAL at 19:24

## 2024-06-17 RX ADMIN — AMLODIPINE BESYLATE 5 MG: 5 TABLET ORAL at 16:05

## 2024-06-17 RX ADMIN — DIAZEPAM 5 MG: 10 INJECTION, SOLUTION INTRAMUSCULAR; INTRAVENOUS at 14:58

## 2024-06-17 RX ADMIN — SODIUM CHLORIDE 1000 ML: 9 INJECTION, SOLUTION INTRAVENOUS at 14:03

## 2024-06-17 RX ADMIN — MAGNESIUM SULFATE HEPTAHYDRATE: 500 INJECTION, SOLUTION INTRAMUSCULAR; INTRAVENOUS at 16:05

## 2024-06-17 RX ADMIN — ONDANSETRON 4 MG: 2 INJECTION INTRAMUSCULAR; INTRAVENOUS at 14:42

## 2024-06-17 RX ADMIN — MIRTAZAPINE 7.5 MG: 7.5 TABLET, FILM COATED ORAL at 20:28

## 2024-06-17 ASSESSMENT — LIFESTYLE VARIABLES
TREMOR: *
AGITATION: SOMEWHAT MORE THAN NORMAL ACTIVITY
VISUAL DISTURBANCES: NOT PRESENT
HOW MANY TIMES IN THE PAST YEAR HAVE YOU HAD 5 OR MORE DRINKS IN A DAY: 100
CONSUMPTION TOTAL: POSITIVE
TREMOR: MODERATE TREMOR WITH ARMS EXTENDED
TOTAL SCORE: 4
TOTAL SCORE: 13
AUDITORY DISTURBANCES: NOT PRESENT
EVER HAD A DRINK FIRST THING IN THE MORNING TO STEADY YOUR NERVES TO GET RID OF A HANGOVER: YES
PAROXYSMAL SWEATS: BARELY PERCEPTIBLE SWEATING. PALMS MOIST
HEADACHE, FULLNESS IN HEAD: VERY MILD
NAUSEA AND VOMITING: CONSTANT NAUSEA, FREQUENT DRY HEAVES AND VOMITING
NAUSEA AND VOMITING: NO NAUSEA AND NO VOMITING
TOTAL SCORE: 4
VISUAL DISTURBANCES: NOT PRESENT
ORIENTATION AND CLOUDING OF SENSORIUM: ORIENTED AND CAN DO SERIAL ADDITIONS
TOTAL SCORE: 6
AUDITORY DISTURBANCES: NOT PRESENT
PAROXYSMAL SWEATS: BARELY PERCEPTIBLE SWEATING. PALMS MOIST
AGITATION: SOMEWHAT MORE THAN NORMAL ACTIVITY
PAROXYSMAL SWEATS: NO SWEAT VISIBLE
ALCOHOL_USE: YES
HAVE PEOPLE ANNOYED YOU BY CRITICIZING YOUR DRINKING: YES
ORIENTATION AND CLOUDING OF SENSORIUM: ORIENTED AND CAN DO SERIAL ADDITIONS
TREMOR: SEVERE TREMOR, EVEN WITH ARMS NOT EXTENDED
EVER FELT BAD OR GUILTY ABOUT YOUR DRINKING: YES
DOES PATIENT WANT TO STOP DRINKING: YES
ANXIETY: MILDLY ANXIOUS
ORIENTATION AND CLOUDING OF SENSORIUM: ORIENTED AND CAN DO SERIAL ADDITIONS
TOTAL SCORE: 4
DOES PATIENT WANT TO TALK TO SOMEONE ABOUT QUITTING: YES
ANXIETY: MILDLY ANXIOUS
AVERAGE NUMBER OF DAYS PER WEEK YOU HAVE A DRINK CONTAINING ALCOHOL: 7
HAVE YOU EVER FELT YOU SHOULD CUT DOWN ON YOUR DRINKING: YES
VISUAL DISTURBANCES: VERY MILD SENSITIVITY
ON A TYPICAL DAY WHEN YOU DRINK ALCOHOL HOW MANY DRINKS DO YOU HAVE: 4
AUDITORY DISTURBANCES: NOT PRESENT
NAUSEA AND VOMITING: *
HEADACHE, FULLNESS IN HEAD: MILD
HEADACHE, FULLNESS IN HEAD: MODERATE
AGITATION: SOMEWHAT MORE THAN NORMAL ACTIVITY
ANXIETY: MILDLY ANXIOUS
TOTAL SCORE: 19

## 2024-06-17 ASSESSMENT — SOCIAL DETERMINANTS OF HEALTH (SDOH)
WITHIN THE LAST YEAR, HAVE YOU BEEN HUMILIATED OR EMOTIONALLY ABUSED IN OTHER WAYS BY YOUR PARTNER OR EX-PARTNER?: NO
IN THE PAST 12 MONTHS, HAS THE ELECTRIC, GAS, OIL, OR WATER COMPANY THREATENED TO SHUT OFF SERVICE IN YOUR HOME?: NO
WITHIN THE PAST 12 MONTHS, YOU WORRIED THAT YOUR FOOD WOULD RUN OUT BEFORE YOU GOT THE MONEY TO BUY MORE: NEVER TRUE
WITHIN THE PAST 12 MONTHS, THE FOOD YOU BOUGHT JUST DIDN'T LAST AND YOU DIDN'T HAVE MONEY TO GET MORE: NEVER TRUE
WITHIN THE LAST YEAR, HAVE YOU BEEN KICKED, HIT, SLAPPED, OR OTHERWISE PHYSICALLY HURT BY YOUR PARTNER OR EX-PARTNER?: NO
WITHIN THE LAST YEAR, HAVE TO BEEN RAPED OR FORCED TO HAVE ANY KIND OF SEXUAL ACTIVITY BY YOUR PARTNER OR EX-PARTNER?: NO
WITHIN THE LAST YEAR, HAVE YOU BEEN AFRAID OF YOUR PARTNER OR EX-PARTNER?: NO

## 2024-06-17 ASSESSMENT — PATIENT HEALTH QUESTIONNAIRE - PHQ9
6. FEELING BAD ABOUT YOURSELF - OR THAT YOU ARE A FAILURE OR HAVE LET YOURSELF OR YOUR FAMILY DOWN: NOT AL ALL
8. MOVING OR SPEAKING SO SLOWLY THAT OTHER PEOPLE COULD HAVE NOTICED. OR THE OPPOSITE, BEING SO FIGETY OR RESTLESS THAT YOU HAVE BEEN MOVING AROUND A LOT MORE THAN USUAL: NOT AT ALL
3. TROUBLE FALLING OR STAYING ASLEEP OR SLEEPING TOO MUCH: NOT AT ALL
5. POOR APPETITE OR OVEREATING: NOT AT ALL
9. THOUGHTS THAT YOU WOULD BE BETTER OFF DEAD, OR OF HURTING YOURSELF: NOT AT ALL
1. LITTLE INTEREST OR PLEASURE IN DOING THINGS: NOT AT ALL
2. FEELING DOWN, DEPRESSED, IRRITABLE, OR HOPELESS: NOT AT ALL
SUM OF ALL RESPONSES TO PHQ9 QUESTIONS 1 AND 2: 0
7. TROUBLE CONCENTRATING ON THINGS, SUCH AS READING THE NEWSPAPER OR WATCHING TELEVISION: NOT AT ALL
4. FEELING TIRED OR HAVING LITTLE ENERGY: NOT AT ALL
SUM OF ALL RESPONSES TO PHQ QUESTIONS 1-9: 0

## 2024-06-17 ASSESSMENT — COGNITIVE AND FUNCTIONAL STATUS - GENERAL
PERSONAL GROOMING: A LITTLE
SUGGESTED CMS G CODE MODIFIER DAILY ACTIVITY: CJ
TURNING FROM BACK TO SIDE WHILE IN FLAT BAD: A LITTLE
WALKING IN HOSPITAL ROOM: A LITTLE
SUGGESTED CMS G CODE MODIFIER MOBILITY: CK
STANDING UP FROM CHAIR USING ARMS: A LITTLE
DAILY ACTIVITIY SCORE: 20
MOBILITY SCORE: 17
MOVING FROM LYING ON BACK TO SITTING ON SIDE OF FLAT BED: A LITTLE
DRESSING REGULAR UPPER BODY CLOTHING: A LITTLE
TOILETING: A LITTLE
MOVING TO AND FROM BED TO CHAIR: A LITTLE
CLIMB 3 TO 5 STEPS WITH RAILING: A LOT
HELP NEEDED FOR BATHING: A LITTLE

## 2024-06-17 ASSESSMENT — FIBROSIS 4 INDEX
FIB4 SCORE: 1.42

## 2024-06-17 ASSESSMENT — PAIN DESCRIPTION - PAIN TYPE: TYPE: ACUTE PAIN

## 2024-06-17 NOTE — ED TRIAGE NOTES
Chief Complaint   Patient presents with    ETOH Withdrawal     Pt reports here for medical clearance to be [place]. Pt reports last drink being 1 day ago. Pt denies seizures during withdrawal but does feel shaky. Tremors are noted in triage room.      Vitals:    06/17/24 1242   BP: (!) 129/95   Pulse: (!) 137   Resp: 18   Temp: 36.7 °C (98.1 °F)   SpO2: 98%     Pt ambulatory to triage for above complaint. Pt placed in wheelchair for safety. EKG completed. Charge RN notified of pt.

## 2024-06-17 NOTE — ED NOTES
Med rec updated and complete. Allergies reviewed.  Confirmed name and date of birth . Pt recently  discharged from [facility]. Pt stated she has not been taking any of the discharge medications she was given. Removed all medications from med rec.  Pt has only used the albuterol inhaler since her discharge from  [facility].  PT SHOULD BE TAKING LIBRIUM, FOLIC ACID and THIAMINE.   Pt stated she  has not felt well enough to take her medications.      Home pharmacy  [facility] = [phone]    Discharge medications filled at [facility] [phone]

## 2024-06-17 NOTE — ED PROVIDER NOTES
ER Provider Note    Scribed for Colton Cruz D.O. by Srinivasan Ruiz. 6/17/2024  1:07 PM    Primary Care Provider: Karo Middleton M.D.    CHIEF COMPLAINT  Chief Complaint   Patient presents with    ETOH Withdrawal     Pt reports here for medical clearance to be able to go to cross roads. Pt reports last drink being 1 day ago. Pt denies seizures during withdrawal but does feel shaky. Tremors are noted in triage room.        HPI/ROS  Rhonda Gould is a 53 y.o. female who presents to the Emergency Department for ETOH withdrawal. Patient reports she is here for a medical clearance to be able to go to [employer] program. Patient last drink was 1 day ago. Denies seizures during withdrawal but does feel shaky. Denies experiencing any abdominal pian or diarrhea but states to feel some nausea. Patient reports she is quitting drinking alcohol for her own good.     ROS as per HPI.    PAST MEDICAL HISTORY  Past Medical History:   Diagnosis Date    Chronic obstructive pulmonary disease (HCC)     Hypertension        SURGICAL HISTORY  Past Surgical History:   Procedure Laterality Date    HYSTERECTOMY LAPAROSCOPY  6/2005       FAMILY HISTORY  Family History   Problem Relation Age of Onset    Diabetes Mother     Hypertension Mother     Hypertension Father     Diabetes Father     Heart Disease Maternal Grandmother     Cancer Maternal Grandfather        SOCIAL HISTORY   reports that she has been smoking. She has never used smokeless tobacco. She reports current alcohol use. She reports that she does not currently use drugs after having used the following drugs: Inhaled and Marijuana. Frequency: 2.00 times per week.    CURRENT MEDICATIONS  Previous Medications    ALBUTEROL 108 (90 BASE) MCG/ACT AERO SOLN INHALATION AEROSOL    Inhale 2 Puffs every four hours as needed for Shortness of Breath.    AMLODIPINE (NORVASC) 5 MG TAB    Take 1 Tablet by mouth every day.    LISINOPRIL (PRINIVIL) 10 MG TAB    Take 1 Tablet by mouth every  "day.       ALLERGIES  Fish allergy, Shellfish allergy, and Codeine    PHYSICAL EXAM  BP (!) 129/95   Pulse (!) 137   Temp 36.7 °C (98.1 °F) (Temporal)   Resp 18   Ht 1.753 m (5' 9\")   Wt 54.7 kg (120 lb 9.5 oz)   LMP 06/11/2005   SpO2 98%   BMI 17.81 kg/m²     General: No acute distress.  HENT: Normocephalic, Mucus membranes are moist.   Chest: Lungs have even and unlabored respirations, Clear to auscultation.   Cardiovascular: Tachycardic rhythm, No peripheral cyanosis.  Abdomen: Non distended,   Neuro: Awake, Conversive, Able to relay recent events, tremors  Psychiatric: Calm and cooperative, mildly anxious, CIWA 9    INITIAL ASSESSMENT  Patient quit drinking alcohol 1.5 days ago, CIWA is 9.Labs will be done for alcohol level and electrolyte.     ED Observation Status? No; Patient does not meet criteria for ED Observation.     DIAGNOSTIC STUDIES    Labs:   Results for orders placed or performed during the hospital encounter of 06/17/24   CBC WITH DIFFERENTIAL   Result Value Ref Range    WBC 4.8 4.8 - 10.8 K/uL    RBC 3.74 (L) 4.20 - 5.40 M/uL    Hemoglobin 11.6 (L) 12.0 - 16.0 g/dL    Hematocrit 36.3 (L) 37.0 - 47.0 %    MCV 97.1 81.4 - 97.8 fL    MCH 31.0 27.0 - 33.0 pg    MCHC 32.0 (L) 32.2 - 35.5 g/dL    RDW 57.7 (H) 35.9 - 50.0 fL    Platelet Count 339 164 - 446 K/uL    MPV 9.3 9.0 - 12.9 fL    Neutrophils-Polys 69.90 44.00 - 72.00 %    Lymphocytes 22.70 22.00 - 41.00 %    Monocytes 5.60 0.00 - 13.40 %    Eosinophils 1.20 0.00 - 6.90 %    Basophils 0.40 0.00 - 1.80 %    Immature Granulocytes 0.20 0.00 - 0.90 %    Nucleated RBC 0.00 0.00 - 0.20 /100 WBC    Neutrophils (Absolute) 3.36 1.82 - 7.42 K/uL    Lymphs (Absolute) 1.09 1.00 - 4.80 K/uL    Monos (Absolute) 0.27 0.00 - 0.85 K/uL    Eos (Absolute) 0.06 0.00 - 0.51 K/uL    Baso (Absolute) 0.02 0.00 - 0.12 K/uL    Immature Granulocytes (abs) 0.01 0.00 - 0.11 K/uL    NRBC (Absolute) 0.00 K/uL   COMP METABOLIC PANEL   Result Value Ref Range    Sodium " 137 135 - 145 mmol/L    Potassium 4.4 3.6 - 5.5 mmol/L    Chloride 100 96 - 112 mmol/L    Co2 21 20 - 33 mmol/L    Anion Gap 16.0 7.0 - 16.0    Glucose 167 (H) 65 - 99 mg/dL    Bun 10 8 - 22 mg/dL    Creatinine 0.83 0.50 - 1.40 mg/dL    Calcium 9.6 8.5 - 10.5 mg/dL    Correct Calcium 9.4 8.5 - 10.5 mg/dL    AST(SGOT) 43 12 - 45 U/L    ALT(SGPT) 24 2 - 50 U/L    Alkaline Phosphatase 118 (H) 30 - 99 U/L    Total Bilirubin 0.7 0.1 - 1.5 mg/dL    Albumin 4.2 3.2 - 4.9 g/dL    Total Protein 6.8 6.0 - 8.2 g/dL    Globulin 2.6 1.9 - 3.5 g/dL    A-G Ratio 1.6 g/dL   ESTIMATED GFR   Result Value Ref Range    GFR (CKD-EPI) 83 >60 mL/min/1.73 m 2   MAGNESIUM   Result Value Ref Range    Magnesium 1.5 1.5 - 2.5 mg/dL   PHOSPHORUS   Result Value Ref Range    Phosphorus 3.9 2.5 - 4.5 mg/dL   POC BREATHALIZER   Result Value Ref Range    POC Breathalizer 0.00 0.00 - 0.01 Percent   EKG (NOW)   Result Value Ref Range    Report       [facility] Emergency Dept[facility]    Test Date:  2024  Pt Name:    JASON SCHMITZ                Department: ER  MRN:        [id number]                      Room:  Gender:     Female                       Technician: [id number]  :        1970                   Requested By:ER TRIAGE PROTOCOL  Order #:    [id number]                    Reading MD: IVETTE RODRIGUEZ D.O.    Measurements  Intervals                                Axis  Rate:       114                          P:          86  KS:         134                          QRS:        59  QRSD:       88                           T:          48  QT:         321  QTc:        443    Interpretive Statements  Sinus tachycardia    Compared to ECG 2024 14:53:09    Sinus rhythm no longer present  ST (T wave) deviation no longer present  Electronically Signed On 2024 14:59:06 PDT by IVETTE RODRIGUEZ D.O.          EKG:   I have independently interpreted the above EKG.    COURSE & MEDICAL DECISION MAKING     COURSE AND PLAN  1:07  PM - Patient seen and examined at bedside. Discussed plan of care, including blood work for further evaluation. Patient agrees to the plan of care. The patient will be resuscitated with 1L NS IV and medicated with Valium 5 mg, Zofran 4 mg. Ordered for Estimated GFR, CBC w/diff, CMP, POC breathalyzer to evaluate her symptoms.     2:58 PM - Patient shake are getting worse. Will admit her for Etoh withdrawal.     3:46 PM Spoke with Dr. Camacho (Hospitalist) regarding the patient. They have accepted the patient for admission.      ED Summary: Patient presented with alcohol withdrawal symptoms.  Her CIWA score on arrival was 9, her tremors worsened so IV Valium was given to treat her withdrawal.  She was planning to go to a social withdrawal center, but due to her significant symptoms medications will be required for her alcohol withdrawal.  She is admitted for further evaluation and treatment.    Decision tools and prescription drugs considered including, but not limited to: None    DISPOSITION AND DISCUSSIONS  I have discussed management of the patient with the following physicians and FABI's: Dr. Camacho (Hospitalist)       Barriers to care at this time, including but not limited to: None     DISPOSITION:  Patient will be hospitalized by Dr. Camacho (Hospitalist)  in guarded condition.     FINAL DIAGNOSIS  1. Alcohol withdrawal syndrome with complication (HCC)      Srinivasan STARR (Olaf), am scribing for, and in the presence of, Colton Cruz D.O..    Electronically signed by: Srinivasan Ruiz (Olaf), 6/17/2024    Colton STARR D.O. personally performed the services described in this documentation, as scribed by Srinivasan Ruzi in my presence, and it is both accurate and complete.     The note accurately reflects work and decisions made by me.  Colton Cruz D.O.  6/17/2024  5:37 PM

## 2024-06-18 LAB
ALBUMIN SERPL BCP-MCNC: 3.4 G/DL (ref 3.2–4.9)
ALBUMIN/GLOB SERPL: 1.7 G/DL
ALP SERPL-CCNC: 96 U/L (ref 30–99)
ALT SERPL-CCNC: 18 U/L (ref 2–50)
ANION GAP SERPL CALC-SCNC: 11 MMOL/L (ref 7–16)
AST SERPL-CCNC: 31 U/L (ref 12–45)
BASOPHILS # BLD AUTO: 0.3 % (ref 0–1.8)
BASOPHILS # BLD: 0.01 K/UL (ref 0–0.12)
BILIRUB SERPL-MCNC: 0.3 MG/DL (ref 0.1–1.5)
BUN SERPL-MCNC: 7 MG/DL (ref 8–22)
CALCIUM ALBUM COR SERPL-MCNC: 9.2 MG/DL (ref 8.5–10.5)
CALCIUM SERPL-MCNC: 8.7 MG/DL (ref 8.5–10.5)
CHLORIDE SERPL-SCNC: 107 MMOL/L (ref 96–112)
CO2 SERPL-SCNC: 23 MMOL/L (ref 20–33)
CREAT SERPL-MCNC: 0.61 MG/DL (ref 0.5–1.4)
EOSINOPHIL # BLD AUTO: 0.11 K/UL (ref 0–0.51)
EOSINOPHIL NFR BLD: 3.1 % (ref 0–6.9)
ERYTHROCYTE [DISTWIDTH] IN BLOOD BY AUTOMATED COUNT: 57.3 FL (ref 35.9–50)
GFR SERPLBLD CREATININE-BSD FMLA CKD-EPI: 105 ML/MIN/1.73 M 2
GLOBULIN SER CALC-MCNC: 2 G/DL (ref 1.9–3.5)
GLUCOSE SERPL-MCNC: 112 MG/DL (ref 65–99)
HCT VFR BLD AUTO: 30.2 % (ref 37–47)
HGB BLD-MCNC: 10.2 G/DL (ref 12–16)
IMM GRANULOCYTES # BLD AUTO: 0.01 K/UL (ref 0–0.11)
IMM GRANULOCYTES NFR BLD AUTO: 0.3 % (ref 0–0.9)
LYMPHOCYTES # BLD AUTO: 1.53 K/UL (ref 1–4.8)
LYMPHOCYTES NFR BLD: 42.5 % (ref 22–41)
MAGNESIUM SERPL-MCNC: 1.9 MG/DL (ref 1.5–2.5)
MCH RBC QN AUTO: 32.6 PG (ref 27–33)
MCHC RBC AUTO-ENTMCNC: 33.8 G/DL (ref 32.2–35.5)
MCV RBC AUTO: 96.5 FL (ref 81.4–97.8)
MONOCYTES # BLD AUTO: 0.23 K/UL (ref 0–0.85)
MONOCYTES NFR BLD AUTO: 6.4 % (ref 0–13.4)
NEUTROPHILS # BLD AUTO: 1.71 K/UL (ref 1.82–7.42)
NEUTROPHILS NFR BLD: 47.4 % (ref 44–72)
NRBC # BLD AUTO: 0 K/UL
NRBC BLD-RTO: 0 /100 WBC (ref 0–0.2)
PHOSPHATE SERPL-MCNC: 3.7 MG/DL (ref 2.5–4.5)
PLATELET # BLD AUTO: 267 K/UL (ref 164–446)
PMV BLD AUTO: 9.2 FL (ref 9–12.9)
POTASSIUM SERPL-SCNC: 3.8 MMOL/L (ref 3.6–5.5)
PROT SERPL-MCNC: 5.4 G/DL (ref 6–8.2)
RBC # BLD AUTO: 3.13 M/UL (ref 4.2–5.4)
SODIUM SERPL-SCNC: 141 MMOL/L (ref 135–145)
WBC # BLD AUTO: 3.6 K/UL (ref 4.8–10.8)

## 2024-06-18 PROCEDURE — 700111 HCHG RX REV CODE 636 W/ 250 OVERRIDE (IP): Performed by: INTERNAL MEDICINE

## 2024-06-18 PROCEDURE — 700111 HCHG RX REV CODE 636 W/ 250 OVERRIDE (IP): Mod: JZ | Performed by: STUDENT IN AN ORGANIZED HEALTH CARE EDUCATION/TRAINING PROGRAM

## 2024-06-18 PROCEDURE — 700102 HCHG RX REV CODE 250 W/ 637 OVERRIDE(OP): Performed by: INTERNAL MEDICINE

## 2024-06-18 PROCEDURE — 99406 BEHAV CHNG SMOKING 3-10 MIN: CPT

## 2024-06-18 PROCEDURE — 80053 COMPREHEN METABOLIC PANEL: CPT

## 2024-06-18 PROCEDURE — A9270 NON-COVERED ITEM OR SERVICE: HCPCS | Performed by: STUDENT IN AN ORGANIZED HEALTH CARE EDUCATION/TRAINING PROGRAM

## 2024-06-18 PROCEDURE — A9270 NON-COVERED ITEM OR SERVICE: HCPCS | Performed by: INTERNAL MEDICINE

## 2024-06-18 PROCEDURE — 99233 SBSQ HOSP IP/OBS HIGH 50: CPT | Performed by: INTERNAL MEDICINE

## 2024-06-18 PROCEDURE — 85025 COMPLETE CBC W/AUTO DIFF WBC: CPT

## 2024-06-18 PROCEDURE — 700105 HCHG RX REV CODE 258: Performed by: INTERNAL MEDICINE

## 2024-06-18 PROCEDURE — 700102 HCHG RX REV CODE 250 W/ 637 OVERRIDE(OP): Performed by: STUDENT IN AN ORGANIZED HEALTH CARE EDUCATION/TRAINING PROGRAM

## 2024-06-18 PROCEDURE — 84100 ASSAY OF PHOSPHORUS: CPT

## 2024-06-18 PROCEDURE — 83735 ASSAY OF MAGNESIUM: CPT

## 2024-06-18 PROCEDURE — 770006 HCHG ROOM/CARE - MED/SURG/GYN SEMI*

## 2024-06-18 RX ORDER — LORAZEPAM 2 MG/ML
0.5 INJECTION INTRAMUSCULAR EVERY 4 HOURS PRN
Status: DISCONTINUED | OUTPATIENT
Start: 2024-06-18 | End: 2024-06-18

## 2024-06-18 RX ORDER — LORAZEPAM 2 MG/ML
0.5 INJECTION INTRAMUSCULAR EVERY 4 HOURS PRN
Status: DISCONTINUED | OUTPATIENT
Start: 2024-06-18 | End: 2024-06-19 | Stop reason: HOSPADM

## 2024-06-18 RX ORDER — SODIUM CHLORIDE 9 MG/ML
INJECTION, SOLUTION INTRAVENOUS CONTINUOUS
Status: DISCONTINUED | OUTPATIENT
Start: 2024-06-18 | End: 2024-06-19 | Stop reason: HOSPADM

## 2024-06-18 RX ORDER — OXYCODONE HYDROCHLORIDE 5 MG/1
5 TABLET ORAL EVERY 4 HOURS PRN
Status: DISCONTINUED | OUTPATIENT
Start: 2024-06-18 | End: 2024-06-19 | Stop reason: HOSPADM

## 2024-06-18 RX ADMIN — ONDANSETRON 4 MG: 2 INJECTION INTRAMUSCULAR; INTRAVENOUS at 14:01

## 2024-06-18 RX ADMIN — MIRTAZAPINE 7.5 MG: 7.5 TABLET, FILM COATED ORAL at 20:55

## 2024-06-18 RX ADMIN — CHLORDIAZEPOXIDE HYDROCHLORIDE 50 MG: 25 CAPSULE ORAL at 00:10

## 2024-06-18 RX ADMIN — LORAZEPAM 0.5 MG: 2 INJECTION INTRAMUSCULAR; INTRAVENOUS at 16:46

## 2024-06-18 RX ADMIN — THERA TABS 1 TABLET: TAB at 06:06

## 2024-06-18 RX ADMIN — FOLIC ACID 1 MG: 1 TABLET ORAL at 06:06

## 2024-06-18 RX ADMIN — OXYCODONE HYDROCHLORIDE 5 MG: 5 TABLET ORAL at 13:26

## 2024-06-18 RX ADMIN — AMLODIPINE BESYLATE 5 MG: 5 TABLET ORAL at 06:06

## 2024-06-18 RX ADMIN — SODIUM CHLORIDE: 9 INJECTION, SOLUTION INTRAVENOUS at 17:45

## 2024-06-18 RX ADMIN — CHLORDIAZEPOXIDE HYDROCHLORIDE 50 MG: 25 CAPSULE ORAL at 06:06

## 2024-06-18 RX ADMIN — Medication 100 MG: at 06:06

## 2024-06-18 RX ADMIN — CHLORDIAZEPOXIDE HYDROCHLORIDE 50 MG: 25 CAPSULE ORAL at 11:49

## 2024-06-18 RX ADMIN — PROCHLORPERAZINE EDISYLATE 10 MG: 5 INJECTION INTRAMUSCULAR; INTRAVENOUS at 16:46

## 2024-06-18 ASSESSMENT — COPD QUESTIONNAIRES
DURING THE PAST 4 WEEKS HOW MUCH DID YOU FEEL SHORT OF BREATH: NONE/LITTLE OF THE TIME
COPD SCREENING SCORE: 3
DO YOU EVER COUGH UP ANY MUCUS OR PHLEGM?: NO/ONLY WITH OCCASIONAL COLDS OR INFECTIONS
HAVE YOU SMOKED AT LEAST 100 CIGARETTES IN YOUR ENTIRE LIFE: YES
IN THE PAST 12 MONTHS DO YOU DO LESS THAN YOU USED TO BECAUSE OF YOUR BREATHING PROBLEMS: DISAGREE/UNSURE

## 2024-06-18 ASSESSMENT — ENCOUNTER SYMPTOMS
NAUSEA: 0
FEVER: 0
BLURRED VISION: 0
WEIGHT LOSS: 0
CHILLS: 0
COUGH: 0
SPEECH CHANGE: 0
DOUBLE VISION: 0
HEMOPTYSIS: 0
CLAUDICATION: 0
MYALGIAS: 0
ORTHOPNEA: 0
VOMITING: 0
CONSTIPATION: 0
WEAKNESS: 0
DIARRHEA: 0
NERVOUS/ANXIOUS: 1
DIZZINESS: 0
ABDOMINAL PAIN: 0
DEPRESSION: 1
PALPITATIONS: 0
PHOTOPHOBIA: 0
NECK PAIN: 0

## 2024-06-18 ASSESSMENT — LIFESTYLE VARIABLES
PAROXYSMAL SWEATS: NO SWEAT VISIBLE
NAUSEA AND VOMITING: NO NAUSEA AND NO VOMITING
VISUAL DISTURBANCES: NOT PRESENT
ORIENTATION AND CLOUDING OF SENSORIUM: ORIENTED AND CAN DO SERIAL ADDITIONS
ORIENTATION AND CLOUDING OF SENSORIUM: ORIENTED AND CAN DO SERIAL ADDITIONS
ANXIETY: *
NAUSEA AND VOMITING: NO NAUSEA AND NO VOMITING
PAROXYSMAL SWEATS: NO SWEAT VISIBLE
AUDITORY DISTURBANCES: NOT PRESENT
ANXIETY: NO ANXIETY (AT EASE)
TREMOR: *
ORIENTATION AND CLOUDING OF SENSORIUM: ORIENTED AND CAN DO SERIAL ADDITIONS
VISUAL DISTURBANCES: NOT PRESENT
VISUAL DISTURBANCES: NOT PRESENT
NAUSEA AND VOMITING: NO NAUSEA AND NO VOMITING
HEADACHE, FULLNESS IN HEAD: MODERATE
TREMOR: *
HEADACHE, FULLNESS IN HEAD: NOT PRESENT
AGITATION: NORMAL ACTIVITY
PAROXYSMAL SWEATS: NO SWEAT VISIBLE
AUDITORY DISTURBANCES: NOT PRESENT
TREMOR: *
AGITATION: NORMAL ACTIVITY
NAUSEA AND VOMITING: INTERMITTENT NAUSEA WITH DRY HEAVES
ORIENTATION AND CLOUDING OF SENSORIUM: ORIENTED AND CAN DO SERIAL ADDITIONS
TOTAL SCORE: 4
AUDITORY DISTURBANCES: NOT PRESENT
SUBSTANCE_ABUSE: 1
TOTAL SCORE: 4
VISUAL DISTURBANCES: NOT PRESENT
ANXIETY: *
AUDITORY DISTURBANCES: NOT PRESENT
VISUAL DISTURBANCES: NOT PRESENT
NAUSEA AND VOMITING: MILD NAUSEA WITH NO VOMITING
HEADACHE, FULLNESS IN HEAD: NOT PRESENT
TREMOR: *
TOTAL SCORE: 4
TOTAL SCORE: 4
PAROXYSMAL SWEATS: NO SWEAT VISIBLE
TREMOR: *
HEADACHE, FULLNESS IN HEAD: NOT PRESENT
NAUSEA AND VOMITING: NO NAUSEA AND NO VOMITING
PAROXYSMAL SWEATS: NO SWEAT VISIBLE
ORIENTATION AND CLOUDING OF SENSORIUM: ORIENTED AND CAN DO SERIAL ADDITIONS
AGITATION: NORMAL ACTIVITY
AGITATION: NORMAL ACTIVITY
ANXIETY: MILDLY ANXIOUS
ANXIETY: NO ANXIETY (AT EASE)
VISUAL DISTURBANCES: NOT PRESENT
AUDITORY DISTURBANCES: NOT PRESENT
AUDITORY DISTURBANCES: NOT PRESENT
TREMOR: *
TOTAL SCORE: 4
HEADACHE, FULLNESS IN HEAD: NOT PRESENT
AGITATION: NORMAL ACTIVITY
PAROXYSMAL SWEATS: NO SWEAT VISIBLE
ORIENTATION AND CLOUDING OF SENSORIUM: ORIENTED AND CAN DO SERIAL ADDITIONS
AGITATION: SOMEWHAT MORE THAN NORMAL ACTIVITY
TOTAL SCORE: 9
HEADACHE, FULLNESS IN HEAD: NOT PRESENT
ANXIETY: NO ANXIETY (AT EASE)

## 2024-06-18 NOTE — HOSPITAL COURSE
53-year-old female with history of alcoholism, hypertension and COPD who presented 6/17 for withdrawal symptoms.  Patient has multiple admission to the hospital for detox and withdrawal.  Patient wanted to go to rehab however started having alcohol withdrawal with a tremor, no significant nausea or vomiting or fever.  Patient was found to have tachycardia, labs around baseline, stable protocols initiated.

## 2024-06-18 NOTE — CARE PLAN
The patient is Stable - Low risk of patient condition declining or worsening    Shift Goals  Clinical Goals: ciwa  Patient Goals: support to be able to quit  Family Goals: severo      Patient is not progressing towards the following goals:      Problem: Fall Risk  Goal: Patient will remain free from falls  Description: Target End Date:  Prior to discharge or change in level of care    Document interventions on the Torri El Fall Risk Assessment    1.  Assess for fall risk factors  2.  Implement fall precautions  6/18/2024 1613 by Michael Zheng R.N.  Outcome: Not Progressing  6/18/2024 1613 by Michael Zheng R.N.  Reactivated

## 2024-06-18 NOTE — PROGRESS NOTES
The patient is Watcher - Medium risk of patient condition declining or worsening     Shift Goals  Clinical Goals: Monitor CIWA and maintain safety throughout the shift.   Patient Goals: CIWA 4-13, Lorazepam and Librium tab. PO given (see MAR), maintained padded side rails up, hourly rounding, bed alarm on, bed wheels locked and in low position. No occurrence of fall/injury.  Family Goals: none present     Progress made toward(s) clinical / shift goals:    Patient is not progressing towards the following goals: N/A

## 2024-06-18 NOTE — DIETARY
"Nutrition services: Day 1 of admit.  Rhonda Gould is a 53 y.o. female with admitting DX of alcohol withdrawal syndrome, uncomplicated.    Consult received for supplements, BMI <19, and MST score of 2 for reported unintentional 2-13 lb wt loss x 3 months and decreased appetite.    Assessment:  Height: 175.3 cm (5' 9\")  Weight: 54.7 kg (120 lb 9.5 oz) taken via stand up scale on 6/17  Body mass index is 17.81 kg/m2, BMI classification: underweight  Diet/Intake: Regular, Ensure Plus TID / PO intake per ADLs has been % x 1 meal    Evaluation:   Pt presented to ED for ETOH withdrawal. PMHx includes COPD and HTN.  Pt just evaluated by [facility] RD on 6/9/24, was found to meet criteria for moderate malnutrition, findings of \"moderate fat loss evidenced slight hollowness to orbitals and buccal region; moderate muscle loss evidenced by some scooping to temporals and some prominence to zygomatic arches.\"   Difficult to determine accuracy of any weight loss given most of pt's weights are estimated, stated, or from other healthcare provider.  Wt Readings from Last 12 Encounters:   06/17/24 56.6 kg (124 lb 12.5 oz)   06/14/24 54.5 kg (120 lb 2.4 oz)   06/13/24 61.2 kg (135 lb)   06/12/24 57.2 kg (126 lb)   06/08/24 57.5 kg (126 lb 12.2 oz)   06/03/24 63.5 kg (140 lb)   05/30/24 63.5 kg (140 lb)   05/19/24 56.7 kg (125 lb)   05/16/24 59.9 kg (132 lb)   03/12/24 61.2 kg (135 lb)   12/09/23 68.9 kg (152 lb)   06/23/23 68.9 kg (152 lb)   Labs: glucose 112, BUN 7  Meds: Rally Bag IV, thiamine, folic acid, multivitamin, BM protocol  Last BM: 6/17    Malnutrition Risk: As per RD assessment done 6/9: \"Pt with moderate, chronic malnutrition related to ETOH abuse, AEB physcial markers for moderate fat and moderate muscle loss as noted above.\"    Recommendations/Plan:  Continue with TID supplements.   Encourage intake of >50% of meals.  Document intake of all meals as % taken in ADL's to provide interdisciplinary communication " across all shifts.   Monitor weight.  Nutrition rep will continue to see patient for ongoing meal and snack preferences.       RD following

## 2024-06-18 NOTE — H&P
Hospital Medicine History & Physical Note    Date of Service  6/17/2024    Primary Care Physician  Karo Middleton M.D.    Consultants  None    Code Status  Full Code    Chief Complaint  Chief Complaint   Patient presents with    ETOH Withdrawal     Pt reports here for medical clearance to [facility]. Pt reports last drink being 1 day ago. Pt denies seizures during withdrawal but does feel shaky. Tremors are noted in triage room.        History of Presenting Illness  Rhonda Gould is a 53 y.o. female with ETOH abuse, HTN, COPD who presented 6/17/2024 with wanting to detox to get to rehab.    Patient has a long history of ETOH abuse and would like to enter into a rehab, prior to this she will need to detox safely in the hospital monitored setting. She was starting to be in withdrawals already in the ED. Denies any fevers/chills headache or vision changes, chest pain , dyspnea, cough, N/V/C/D, no dysuria.   In the ED she was quite tachycardic. Labs reassuring.  Subsequently referred to hospitalist for admission.    I discussed the plan of care with patient, bedside RN, charge RN, , and pharmacy.    Review of Systems  Review of Systems   Psychiatric/Behavioral:  Positive for depression and substance abuse. The patient is nervous/anxious.        Past Medical History   has a past medical history of Chronic obstructive pulmonary disease (HCC) and Hypertension.    Surgical History   has a past surgical history that includes hysterectomy laparoscopy (6/2005).     Family History  family history includes Cancer in her maternal grandfather; Diabetes in her father and mother; Heart Disease in her maternal grandmother; Hypertension in her father and mother.     Social History   reports that she has been smoking. She has never used smokeless tobacco. She reports current alcohol use. She reports that she does not currently use drugs after having used the following drugs: Inhaled and Marijuana.  Frequency: 2.00 times per week.    Allergies  Allergies   Allergen Reactions    Fish Allergy Anaphylaxis and Shortness of Breath     Swelling to throat.    Shellfish Allergy Anaphylaxis     Strict No Shellfish (avoids most fish, cod OK)      Codeine Hives and Itching     Tolerated morphine previously  Allergy updated from 2015, morphine given in 2016       Medications  Prior to Admission Medications   Prescriptions Last Dose Informant Patient Reported? Taking?   albuterol 108 (90 Base) MCG/ACT Aero Soln inhalation aerosol 6/17/2024 at 1000 Patient Yes Yes   Sig: Inhale 2 Puffs every four hours as needed for Shortness of Breath.   amLODIPine (NORVASC) 5 MG Tab 6/13/2024 at 0930 Patient No No   Sig: Take 1 Tablet by mouth every day.   lisinopril (PRINIVIL) 10 MG Tab 6/13/2024 at 0930 Patient No No   Sig: Take 1 Tablet by mouth every day.      Facility-Administered Medications: None       Physical Exam  Temp:  [36.5 °C (97.7 °F)-36.7 °C (98.1 °F)] 36.5 °C (97.7 °F)  Pulse:  [] 79  Resp:  [16-18] 18  BP: (105-129)/(53-95) 105/76  SpO2:  [89 %-98 %] 97 %  Blood Pressure: 105/76   Temperature: 36.5 °C (97.7 °F)   Pulse: 79   Respiration: 18   Pulse Oximetry: 97 %       Physical Exam  Vitals and nursing note reviewed.   Constitutional:       General: She is not in acute distress.     Appearance: She is not toxic-appearing.      Comments: Frail appearing   HENT:      Head: Normocephalic and atraumatic.      Nose: Nose normal. No rhinorrhea.      Mouth/Throat:      Mouth: Mucous membranes are moist.      Pharynx: Oropharynx is clear.   Eyes:      General: No scleral icterus.     Extraocular Movements: Extraocular movements intact.      Conjunctiva/sclera: Conjunctivae normal.   Cardiovascular:      Rate and Rhythm: Normal rate and regular rhythm.      Pulses: Normal pulses.   Pulmonary:      Effort: Pulmonary effort is normal. No respiratory distress.      Breath sounds: Normal breath sounds. No wheezing, rhonchi or  "rales.   Abdominal:      General: Bowel sounds are normal.      Palpations: Abdomen is soft.      Tenderness: There is no abdominal tenderness. There is no guarding or rebound.   Musculoskeletal:         General: No tenderness. Normal range of motion.      Cervical back: Normal range of motion and neck supple. No rigidity.   Skin:     General: Skin is warm and dry.      Capillary Refill: Capillary refill takes less than 2 seconds.   Neurological:      General: No focal deficit present.      Mental Status: She is alert and oriented to person, place, and time. Mental status is at baseline.      Cranial Nerves: No cranial nerve deficit.      Sensory: No sensory deficit.      Motor: No weakness.      Coordination: Coordination normal.   Psychiatric:         Behavior: Behavior normal.         Thought Content: Thought content normal.         Judgment: Judgment normal.      Comments: anxious       Laboratory:  Recent Labs     06/17/24  1320 06/18/24  0306   WBC 4.8 3.6*   RBC 3.74* 3.13*   HEMOGLOBIN 11.6* 10.2*   HEMATOCRIT 36.3* 30.2*   MCV 97.1 96.5   MCH 31.0 32.6   MCHC 32.0* 33.8   RDW 57.7* 57.3*   PLATELETCT 339 267   MPV 9.3 9.2     Recent Labs     06/17/24  1320 06/18/24  0306   SODIUM 137 141   POTASSIUM 4.4 3.8   CHLORIDE 100 107   CO2 21 23   GLUCOSE 167* 112*   BUN 10 7*   CREATININE 0.83 0.61   CALCIUM 9.6 8.7     Recent Labs     06/17/24  1320 06/18/24  0306   ALTSGPT 24 18   ASTSGOT 43 31   ALKPHOSPHAT 118* 96   TBILIRUBIN 0.7 0.3   GLUCOSE 167* 112*         No results for input(s): \"NTPROBNP\" in the last 72 hours.      No results for input(s): \"TROPONINT\" in the last 72 hours.    Imaging:  No orders to display       EKG:  I have personally reviewed the images and compared with prior images.    Assessment/Plan:  Justification for Admission Status  I anticipate this patient will require at least two midnights for appropriate medical management, necessitating inpatient admission because ETOH " withdrawal/Detox      * Alcohol withdrawal syndrome, uncomplicated (HCC)- (present on admission)  Assessment & Plan  CIWA protocol with serial neuro exams, librium added for long acting coverage  MVI, folate, thiamine  replace magnesium, potassium, phosphorus  Has  outpatient rehab set up for discharge once detoxed.    Moderate protein-calorie malnutrition (HCC)- (present on admission)  Assessment & Plan  Nutrition consult, Ensure.      Tobacco use- (present on admission)  Assessment & Plan  Spent 4 minutes on tobacco cessation counseling including nicotine patches, gum, and dangers of smoking.        VTE prophylaxis: SCDs/TEDs  Total time spent on admission 77 minutes.    This included my review with ER physician, review of ED events, patient's history, outside records, recent records, face to face interview, physical examination; my review of lab results (CBC, chemistry panel), imaging review (CXR) and ECG. Also includes counseling patient on admission, treatment plan, and documentation of encounter.

## 2024-06-18 NOTE — ASSESSMENT & PLAN NOTE
Spent 4 minutes on tobacco cessation counseling including nicotine patches, gum, and dangers of smoking.

## 2024-06-18 NOTE — PROGRESS NOTES
Patient arrived via gurney, A&O x4, and IV on right FA G20, flushed wnl. Oriented to call light system, bed rails up, bed alarm on, bed wheels locked and in low position.

## 2024-06-18 NOTE — RESPIRATORY CARE
"COPD EDUCATION by COPD CLINICAL EDUCATOR  6/18/2024 at 1:05 PM by Lelia Manjarrez RRT     Patient interviewed by COPD education team. Patient refused COPD program at this time. An Action Plan was completed in the EMR to reflect current Respiratory Medication use.                  Smoking Cessation Intervention and education completed, 10 minutes spent on smoking cessation education with patient.  Provided smoking cessation packet with \"Tips to Quit\" and brochure for \"Free Smoking Cessation Classes\". Placed referral to the Tobacco Cessation Program.         COPD Screen  COPD Risk Screening  Do you have a history of COPD?: Yes  Do you have a Pulmonologist?: No  COPD Population Screener  During the past 4 weeks, how much did you feel short of breath?: None/Little of the time  Do you ever cough up any mucus or phlegm?: No/only with occasional colds or infections  In the past 12 months, you do less than you used to because of your breathing problems: Disagree/unsure  Have you smoked at least 100 cigarettes in your entire life?: Yes  How old are you?: 50-59  COPD Screening Score: 3  COPD Coordinator Recommended: Yes    COPD Assessment  COPD Clinical Specialists ONLY  COPD Education Initiated: Yes--Short Intervention (Pt declined COPD literature, given Smoking Cessation literature, will place referral to Tobacco Cessation Program.)  Is this a COPD exacerbation patient?: No  DME Company: none  DME Equipment Type: none  Physician Follow Up Appointment:  (declined apt assistance)  Physician Name: Karo Middleton M.D.  Pulmonologist Name: declined pulmonary referral assistance  Referrals Initiated: Yes  Pulmonary Rehab: N/A  Smoking Cessation: Yes  $ Smoking Cessation 3-10 Minutes: Asymptomatic (10 min)  Hospice: N/A  Home Health Care: N/A  Mobile Urgent Care Services: N/A  Geriatric Specialty Group: N/A  Private In-Home Care Agency: N/A  Interdisciplinary Rounds: Attendance at Rounds (30 Min)    PFT Results    No results found " "for: \"PFT\"    Meds to Beds        MY COPD ACTION PLAN     It is recommended that patients and physicians /healthcare providers complete this action plan together. This plan should be discussed at each physician visit and updated as needed.    The green, yellow and red zones show groups of symptoms of COPD. This list of symptoms is not comprehensive, and you may experience other symptoms. In the \"Actions\" column, your healthcare provider has recommended actions for you to take based on your symptoms.    Patient Name: Rhonda Gould   YOB: 1970   Last Updated on: 6/15/2024  5:23 PM   Green Zone:  I am doing well today Actions     Usual activitiy and exercise level   Take daily medications     Usual amounts of cough and phlegm/mucus   Use oxygen as prescribed     Sleep well at night   Continue regular exercise/diet plan     Appetite is good   At all times avoid cigarette smoke, inhaled irritants     Daily Medications (these medications are taken every day):                Yellow Zone:  I am having a bad day or a COPD flare Actions     More breathless than usual   Continue daily medications     I have less energy for my daily activities   Use quick relief inhaler as ordered     Increased or thicker phlegm/mucus   Use oxygen as prescribed     Using quick relief inhaler/nebulizer more often   Get plenty of rest     Swelling of ankles more than usual   Use pursed lip breathing     More coughing than usual   At all times avoid cigarette smoke, inhaled irritants     I feel like I have a \"chest cold\"     Poor sleep and my symptoms woke me up     My appetite is not good     My medicine is not helping      Call provider immediately if symptoms don’t improve     Continue daily medications, add rescue medications:   Albuterol 2 Puffs Every 4 hours PRN       Medications to be used during a flare up, (as Discussed with Provider):           Additional Information:  Use the spacer with your rescue inhaler.     Red " Zone:  I need urgent medical care Actions     Severe shortness of breath even at rest   Call 911 or seek medical care immediately     Not able to do any activity because of breathing      Fever or shaking chills      Feeling confused or very drowsy       Chest pains      Coughing up blood

## 2024-06-18 NOTE — PROGRESS NOTES
4 Eyes Skin Assessment Completed by Omaira RN and Elizabeth RN.    Head WDL  Ears WDL  Nose WDL  Mouth WDL  Neck WDL  Breast/Chest WDL  Shoulder Blades WDL  Spine WDL  (R) Arm/Elbow/Hand WDL  (L) Arm/Elbow/Hand Redness and Abrasion  Abdomen WDL  Groin WDL  Scrotum/Coccyx/Buttocks WDL  (R) Leg Scar  (L) Leg WDL  (R) Heel/Foot/Toe WDL  (L) Heel/Foot/Toe WDL          Devices In Places Blood Pressure Cuff and Pulse Ox      Interventions In Place NC W/Ear Foams    Possible Skin Injury No    Pictures Uploaded Into Epic Yes  Wound Consult Placed N/A  RN Wound Prevention Protocol Ordered No

## 2024-06-18 NOTE — ED NOTES
Pt transferred to the floor. Report called by Ethel ORTIZ. Pt is on 2L oxygen NC. Vs stable. Pt ready for transport.

## 2024-06-18 NOTE — ASSESSMENT & PLAN NOTE
[facility] protocol with serial neuro exams, librium added for long acting coverage  MVI, folate, thiamine  replace magnesium, potassium, phosphorus  Has  outpatient rehab set up for discharge once detoxed.  Continue Librium  IV fluid

## 2024-06-18 NOTE — ASSESSMENT & PLAN NOTE
Was evaluated blood cell and anemia  Related to chronic alcoholism  Multivitamins encouraged the patient to quit drinking

## 2024-06-18 NOTE — PROGRESS NOTES
Hospital Medicine Daily Progress Note    Date of Service  6/18/2024    Chief Complaint  Rhonda Gould is a 53 y.o. female admitted 6/17/2024 with alcoholism    Hospital Course  53-year-old female with history of alcoholism, hypertension and COPD who presented 6/17 for withdrawal symptoms.  Patient has multiple admission to the hospital for detox and withdrawal.  Patient wanted to go to rehab however started having alcohol withdrawal with a tremor, no significant nausea or vomiting or fever.  Patient was found to have tachycardia, labs around baseline, stable protocols initiated.    Interval Problem Update  -Evaluated examined the patient at bedside, patient still having some tremor with alcohol withdrawal, continue CIWA protocol, IV fluid and multivitamins  -Monitor the patient for another night and possible discharge tomorrow    I have discussed this patient's plan of care and discharge plan at IDT rounds today with Case Management, Nursing, Nursing leadership, and other members of the IDT team.    Consultants/Specialty  None    Code Status  Full Code    Disposition  The patient is not medically cleared for discharge to home or a post-acute facility.  Anticipate discharge to: home with close outpatient follow-up    I have placed the appropriate orders for post-discharge needs.    Review of Systems  Review of Systems   Constitutional:  Positive for malaise/fatigue. Negative for chills, fever and weight loss.   HENT:  Negative for ear pain, hearing loss and tinnitus.    Eyes:  Negative for blurred vision, double vision and photophobia.   Respiratory:  Negative for cough and hemoptysis.    Cardiovascular:  Negative for chest pain, palpitations, orthopnea and claudication.   Gastrointestinal:  Negative for abdominal pain, constipation, diarrhea, nausea and vomiting.   Genitourinary:  Negative for dysuria, frequency and urgency.   Musculoskeletal:  Negative for myalgias and neck pain.   Skin:  Negative for rash.    Neurological:  Negative for dizziness, speech change and weakness.   Psychiatric/Behavioral:  The patient is nervous/anxious.         Physical Exam  Temp:  [36.4 °C (97.5 °F)-36.7 °C (98 °F)] 36.4 °C (97.5 °F)  Pulse:  [74-98] 86  Resp:  [16-18] 18  BP: (105-116)/(53-79) 114/57  SpO2:  [89 %-98 %] 95 %    Physical Exam  Constitutional:       Appearance: She is ill-appearing.   HENT:      Head: Normocephalic and atraumatic.   Cardiovascular:      Rate and Rhythm: Normal rate.      Heart sounds: No murmur heard.  Pulmonary:      Effort: No respiratory distress.      Breath sounds: No wheezing or rales.   Abdominal:      General: Abdomen is flat. Bowel sounds are normal. There is no distension.      Palpations: Abdomen is soft.      Tenderness: There is no abdominal tenderness.   Musculoskeletal:      Right lower leg: No edema.      Left lower leg: No edema.   Skin:     Coloration: Skin is not jaundiced.      Findings: No lesion or rash.   Neurological:      General: No focal deficit present.      Mental Status: She is oriented to person, place, and time. Mental status is at baseline.      Cranial Nerves: No cranial nerve deficit.      Sensory: No sensory deficit.      Motor: No weakness.      Comments: Tremor         Fluids    Intake/Output Summary (Last 24 hours) at 6/18/2024 1446  Last data filed at 6/18/2024 0900  Gross per 24 hour   Intake 490 ml   Output --   Net 490 ml       Laboratory  Recent Labs     06/17/24  1320 06/18/24  0306   WBC 4.8 3.6*   RBC 3.74* 3.13*   HEMOGLOBIN 11.6* 10.2*   HEMATOCRIT 36.3* 30.2*   MCV 97.1 96.5   MCH 31.0 32.6   MCHC 32.0* 33.8   RDW 57.7* 57.3*   PLATELETCT 339 267   MPV 9.3 9.2     Recent Labs     06/17/24  1320 06/18/24  0306   SODIUM 137 141   POTASSIUM 4.4 3.8   CHLORIDE 100 107   CO2 21 23   GLUCOSE 167* 112*   BUN 10 7*   CREATININE 0.83 0.61   CALCIUM 9.6 8.7                   Imaging  No orders to display        Assessment/Plan  * Alcohol withdrawal syndrome,  uncomplicated (HCC)- (present on admission)  Assessment & Plan  CIWA protocol with serial neuro exams, librium added for long acting coverage  MVI, folate, thiamine  replace magnesium, potassium, phosphorus  Has  outpatient rehab set up for discharge once detoxed.  Continue Librium  IV fluid    Moderate protein-calorie malnutrition (HCC)- (present on admission)  Assessment & Plan  Nutrition consult, Ensure.      Pancytopenia (HCC)- (present on admission)  Assessment & Plan  Was evaluated blood cell and anemia  Related to chronic alcoholism  Multivitamins encouraged the patient to quit drinking    Tobacco use- (present on admission)  Assessment & Plan  Spent 4 minutes on tobacco cessation counseling including nicotine patches, gum, and dangers of smoking.    Hypertension- (present on admission)  Assessment & Plan  Continue lisinopril    Anxiety- (present on admission)  Assessment & Plan  Stable  Continue mirtazapine  Encouraged the patient to follow-up with psych         VTE prophylaxis:   SCDs/TEDs   enoxaparin ppx      I have performed a physical exam and reviewed and updated ROS and Plan today (6/18/2024). In review of yesterday's note (6/17/2024), there are no changes except as documented above.    Greater than 51 minutes spent prepping to see patient (e.g. review of tests) obtaining and/or reviewing separately obtained history. Performing a medically appropriate examination and/ evaluation.  Counseling and educating the patient/family/caregiver.  Ordering medications, tests, or procedures.  Referring and communicating with other health care professionals.  Documenting clinical information in EPIC.  Independently interpreting results and communicating results to patient/family/caregiver.  Care coordination

## 2024-06-19 VITALS
HEART RATE: 104 BPM | BODY MASS INDEX: 18.48 KG/M2 | OXYGEN SATURATION: 98 % | WEIGHT: 124.78 LBS | SYSTOLIC BLOOD PRESSURE: 112 MMHG | RESPIRATION RATE: 18 BRPM | HEIGHT: 69 IN | TEMPERATURE: 97.2 F | DIASTOLIC BLOOD PRESSURE: 82 MMHG

## 2024-06-19 LAB
ANION GAP SERPL CALC-SCNC: 11 MMOL/L (ref 7–16)
BASOPHILS # BLD AUTO: 0.3 % (ref 0–1.8)
BASOPHILS # BLD: 0.01 K/UL (ref 0–0.12)
BUN SERPL-MCNC: 6 MG/DL (ref 8–22)
CALCIUM SERPL-MCNC: 8.7 MG/DL (ref 8.5–10.5)
CHLORIDE SERPL-SCNC: 108 MMOL/L (ref 96–112)
CO2 SERPL-SCNC: 22 MMOL/L (ref 20–33)
CREAT SERPL-MCNC: 0.68 MG/DL (ref 0.5–1.4)
EOSINOPHIL # BLD AUTO: 0.06 K/UL (ref 0–0.51)
EOSINOPHIL NFR BLD: 1.7 % (ref 0–6.9)
ERYTHROCYTE [DISTWIDTH] IN BLOOD BY AUTOMATED COUNT: 58.3 FL (ref 35.9–50)
GFR SERPLBLD CREATININE-BSD FMLA CKD-EPI: 102 ML/MIN/1.73 M 2
GLUCOSE SERPL-MCNC: 101 MG/DL (ref 65–99)
HCT VFR BLD AUTO: 30.5 % (ref 37–47)
HGB BLD-MCNC: 10.1 G/DL (ref 12–16)
IMM GRANULOCYTES # BLD AUTO: 0.01 K/UL (ref 0–0.11)
IMM GRANULOCYTES NFR BLD AUTO: 0.3 % (ref 0–0.9)
LYMPHOCYTES # BLD AUTO: 1.19 K/UL (ref 1–4.8)
LYMPHOCYTES NFR BLD: 34.6 % (ref 22–41)
MAGNESIUM SERPL-MCNC: 1.6 MG/DL (ref 1.5–2.5)
MCH RBC QN AUTO: 32.7 PG (ref 27–33)
MCHC RBC AUTO-ENTMCNC: 33.1 G/DL (ref 32.2–35.5)
MCV RBC AUTO: 98.7 FL (ref 81.4–97.8)
MONOCYTES # BLD AUTO: 0.2 K/UL (ref 0–0.85)
MONOCYTES NFR BLD AUTO: 5.8 % (ref 0–13.4)
NEUTROPHILS # BLD AUTO: 1.97 K/UL (ref 1.82–7.42)
NEUTROPHILS NFR BLD: 57.3 % (ref 44–72)
NRBC # BLD AUTO: 0.02 K/UL
NRBC BLD-RTO: 0.6 /100 WBC (ref 0–0.2)
PHOSPHATE SERPL-MCNC: 3.8 MG/DL (ref 2.5–4.5)
PLATELET # BLD AUTO: 263 K/UL (ref 164–446)
PMV BLD AUTO: 9.4 FL (ref 9–12.9)
POTASSIUM SERPL-SCNC: 4.2 MMOL/L (ref 3.6–5.5)
RBC # BLD AUTO: 3.09 M/UL (ref 4.2–5.4)
SODIUM SERPL-SCNC: 141 MMOL/L (ref 135–145)
WBC # BLD AUTO: 3.4 K/UL (ref 4.8–10.8)

## 2024-06-19 PROCEDURE — 84100 ASSAY OF PHOSPHORUS: CPT

## 2024-06-19 PROCEDURE — A9270 NON-COVERED ITEM OR SERVICE: HCPCS | Performed by: STUDENT IN AN ORGANIZED HEALTH CARE EDUCATION/TRAINING PROGRAM

## 2024-06-19 PROCEDURE — 85025 COMPLETE CBC W/AUTO DIFF WBC: CPT

## 2024-06-19 PROCEDURE — 80048 BASIC METABOLIC PNL TOTAL CA: CPT

## 2024-06-19 PROCEDURE — 83735 ASSAY OF MAGNESIUM: CPT

## 2024-06-19 PROCEDURE — 99239 HOSP IP/OBS DSCHRG MGMT >30: CPT | Performed by: INTERNAL MEDICINE

## 2024-06-19 PROCEDURE — 700102 HCHG RX REV CODE 250 W/ 637 OVERRIDE(OP): Performed by: STUDENT IN AN ORGANIZED HEALTH CARE EDUCATION/TRAINING PROGRAM

## 2024-06-19 PROCEDURE — 700105 HCHG RX REV CODE 258: Performed by: INTERNAL MEDICINE

## 2024-06-19 RX ORDER — LANOLIN ALCOHOL/MO/W.PET/CERES
100 CREAM (GRAM) TOPICAL DAILY
Qty: 30 TABLET | Refills: 2 | Status: SHIPPED | OUTPATIENT
Start: 2024-06-20

## 2024-06-19 RX ORDER — FOLIC ACID 1 MG/1
1 TABLET ORAL DAILY
Qty: 30 TABLET | Refills: 2 | Status: SHIPPED | OUTPATIENT
Start: 2024-06-20

## 2024-06-19 RX ADMIN — AMLODIPINE BESYLATE 5 MG: 5 TABLET ORAL at 04:58

## 2024-06-19 RX ADMIN — FOLIC ACID 1 MG: 1 TABLET ORAL at 04:58

## 2024-06-19 RX ADMIN — Medication 100 MG: at 04:58

## 2024-06-19 RX ADMIN — SODIUM CHLORIDE: 9 INJECTION, SOLUTION INTRAVENOUS at 05:01

## 2024-06-19 RX ADMIN — LORAZEPAM 1 MG: 1 TABLET ORAL at 03:56

## 2024-06-19 RX ADMIN — THERA TABS 1 TABLET: TAB at 04:59

## 2024-06-19 ASSESSMENT — LIFESTYLE VARIABLES
HEADACHE, FULLNESS IN HEAD: NOT PRESENT
TREMOR: TREMOR NOT VISIBLE BUT CAN BE FELT, FINGERTIP TO FINGERTIP
ANXIETY: NO ANXIETY (AT EASE)
TOTAL SCORE: 1
TOTAL SCORE: 9
ORIENTATION AND CLOUDING OF SENSORIUM: ORIENTED AND CAN DO SERIAL ADDITIONS
ANXIETY: NO ANXIETY (AT EASE)
VISUAL DISTURBANCES: NOT PRESENT
AGITATION: NORMAL ACTIVITY
PAROXYSMAL SWEATS: NO SWEAT VISIBLE
AUDITORY DISTURBANCES: NOT PRESENT
NAUSEA AND VOMITING: MILD NAUSEA WITH NO VOMITING
ORIENTATION AND CLOUDING OF SENSORIUM: ORIENTED AND CAN DO SERIAL ADDITIONS
TREMOR: *
VISUAL DISTURBANCES: NOT PRESENT
TOTAL SCORE: 3
TREMOR: *
ORIENTATION AND CLOUDING OF SENSORIUM: ORIENTED AND CAN DO SERIAL ADDITIONS
AGITATION: SOMEWHAT MORE THAN NORMAL ACTIVITY
AUDITORY DISTURBANCES: NOT PRESENT
AGITATION: NORMAL ACTIVITY
NAUSEA AND VOMITING: NO NAUSEA AND NO VOMITING
HEADACHE, FULLNESS IN HEAD: NOT PRESENT
PAROXYSMAL SWEATS: NO SWEAT VISIBLE
VISUAL DISTURBANCES: NOT PRESENT
ANXIETY: MODERATELY ANXIOUS OR GUARDED, SO ANXIETY IS INFERRED
PAROXYSMAL SWEATS: NO SWEAT VISIBLE
NAUSEA AND VOMITING: MILD NAUSEA WITH NO VOMITING
AUDITORY DISTURBANCES: NOT PRESENT
HEADACHE, FULLNESS IN HEAD: NOT PRESENT

## 2024-06-19 ASSESSMENT — PAIN DESCRIPTION - PAIN TYPE: TYPE: ACUTE PAIN

## 2024-06-19 NOTE — DISCHARGE SUMMARY
Discharge Summary    CHIEF COMPLAINT ON ADMISSION  Chief Complaint   Patient presents with    ETOH Withdrawal     Pt reports here for medical clearance to [facility]. Pt reports last drink being 1 day ago. Pt denies seizures during withdrawal but does feel shaky. Tremors are noted in triage room.        Reason for Admission  Alcoholism    Admission Date  6/17/2024    CODE STATUS  Full Code    HPI & HOSPITAL COURSE    53-year-old female with history of alcoholism, hypertension and COPD who presented 6/17 for withdrawal symptoms.  Patient has multiple admission to the hospital for detox and withdrawal.  Patient wanted to go to rehab however started having alcohol withdrawal with a tremor, no significant nausea or vomiting or fever.  Patient was found to have tachycardia, labs around baseline, CIWA protocols initiated.  Patient was improved and feels okay for discharge, patient will be discharged with multivitamins and follow-up closely with PCP, patient states she will go to rehab after discharge.  All instruction and education was provided to the patient.    Her labs showed pancytopenia with low white blood cell and hemoglobin around 10, encouraged the patient to quit drinking and follow-up with PCP.    Therefore, she is discharged in good and stable condition to home with close outpatient follow-up.    The patient met 2-midnight criteria for an inpatient stay at the time of discharge.    Discharge Date  06/19/24      FOLLOW UP ITEMS POST DISCHARGE  Follow-up with PCP    DISCHARGE DIAGNOSES  Principal Problem:    Alcohol withdrawal syndrome, uncomplicated (HCC) (POA: Yes)  Active Problems:    Anxiety (Chronic) (POA: Yes)      Overview: Anxiety in past few months    Hypertension (POA: Yes)      Overview: Hypertension since 2010.    Tobacco use (Chronic) (POA: Yes)    Pancytopenia (HCC) (POA: Yes)    Moderate protein-calorie malnutrition (HCC) (POA: Yes)  Resolved Problems:    * No resolved hospital  problems. *      FOLLOW UP  Karo Middleton M.D.  [address] [facility] [id number] [phone]    Follow up in 1 week(s)        MEDICATIONS ON DISCHARGE     Medication List        START taking these medications        Instructions   folic acid 1 MG Tabs  Start taking on: June 20, 2024  Commonly known as: Folvite   Take 1 Tablet by mouth every day.  Dose: 1 mg     multivitamin Tabs  Start taking on: June 20, 2024   Take 1 Tablet by mouth every day.  Dose: 1 Tablet     thiamine 100 MG tablet  Start taking on: June 20, 2024  Commonly known as: Thiamine   Take 1 Tablet by mouth every day.  Dose: 100 mg            CONTINUE taking these medications        Instructions   albuterol 108 (90 Base) MCG/ACT Aers inhalation aerosol   Inhale 2 Puffs every four hours as needed for Shortness of Breath.  Dose: 2 Puff     amLODIPine 5 MG Tabs  Commonly known as: Norvasc   Take 1 Tablet by mouth every day.  Dose: 5 mg     lisinopril 10 MG Tabs  Commonly known as: Prinivil   Take 1 Tablet by mouth every day.  Dose: 10 mg              Allergies  Allergies   Allergen Reactions    Fish Allergy Anaphylaxis and Shortness of Breath     Swelling to throat.    Shellfish Allergy Anaphylaxis     Strict No Shellfish (avoids most fish, cod OK)      Codeine Hives and Itching     Tolerated morphine previously  Allergy updated from 2015, morphine given in 2016       DIET  Orders Placed This Encounter   Procedures    Diet Order Diet: Regular     Standing Status:   Standing     Number of Occurrences:   1     Order Specific Question:   Diet:     Answer:   Regular [1]       ACTIVITY  As tolerated.  Weight bearing as tolerated    CONSULTATIONS  None    PROCEDURES  None    LABORATORY  Lab Results   Component Value Date    SODIUM 141 06/19/2024    POTASSIUM 4.2 06/19/2024    CHLORIDE 108 06/19/2024    CO2 22 06/19/2024    GLUCOSE 101 (H) 06/19/2024    BUN 6 (L) 06/19/2024    CREATININE 0.68 06/19/2024        Lab Results   Component Value Date    WBC 3.4  (L) 06/19/2024    HEMOGLOBIN 10.1 (L) 06/19/2024    HEMATOCRIT 30.5 (L) 06/19/2024    PLATELETCT 263 06/19/2024        Total time of the discharge process exceeds 35 minutes.

## 2024-06-19 NOTE — PROGRESS NOTES
Received report, assumed pt care. Pt a&o x 4, VSS, Assessment completed. Resting comfortably in bed with call light, bedside table in reach. No c/o at this time. CIWA 0. Side rails up x 2, seizure precautions in place. Pt PIV infiltrated. Stopped fluids at this time. LBM today. Instructed to use call light when needing to get OOB verbalized understanding. Bed alarm off, pt up self and steady, bed in low position. Will continue to monitor.

## 2024-06-19 NOTE — PROGRESS NOTES
Pt arrived to the DCL without a PIV. Armband removed. Discussed dc instructions including  medications, and follow-up appointments. Per pharmacy, Thiamine and Folic Acid were filled 4 days ago. Pt verified this and stated that she will  the multivitamin OTC.

## 2024-06-19 NOTE — CARE PLAN
The patient is Watcher - Medium risk of patient condition declining or worsening    Shift Goals  Clinical Goals: Monitor CIWA and maintain safety throughout the shift.  Patient Goals: will go to [facility] tomorrow.  Family Goals: none present    Progress made toward(s) clinical / shift goals: CIWA 3-4, kept padded side rails up, bed alarm, hourly rounding, bed wheels locked and in low position. No occurrence of fall/injury.    Patient is not progressing towards the following goals: N/A

## 2024-06-19 NOTE — PROGRESS NOTES
PIV Discontinued. All belonging in patient's possession. Pt down to discharge lounge with transport with no incident.

## 2024-06-22 ENCOUNTER — HOSPITAL ENCOUNTER (EMERGENCY)
Facility: MEDICAL CENTER | Age: 54
End: 2024-06-23
Attending: STUDENT IN AN ORGANIZED HEALTH CARE EDUCATION/TRAINING PROGRAM
Payer: MEDICAID

## 2024-06-22 ENCOUNTER — APPOINTMENT (OUTPATIENT)
Dept: RADIOLOGY | Facility: MEDICAL CENTER | Age: 54
End: 2024-06-22
Attending: STUDENT IN AN ORGANIZED HEALTH CARE EDUCATION/TRAINING PROGRAM
Payer: MEDICAID

## 2024-06-22 DIAGNOSIS — T78.2XXA ANAPHYLAXIS, INITIAL ENCOUNTER: ICD-10-CM

## 2024-06-22 LAB
ALBUMIN SERPL BCP-MCNC: 3.9 G/DL (ref 3.2–4.9)
ALBUMIN/GLOB SERPL: 1.6 G/DL
ALP SERPL-CCNC: 87 U/L (ref 30–99)
ALT SERPL-CCNC: 23 U/L (ref 2–50)
ANION GAP SERPL CALC-SCNC: 14 MMOL/L (ref 7–16)
AST SERPL-CCNC: 30 U/L (ref 12–45)
BASOPHILS # BLD AUTO: 0.2 % (ref 0–1.8)
BASOPHILS # BLD: 0.01 K/UL (ref 0–0.12)
BILIRUB SERPL-MCNC: 0.3 MG/DL (ref 0.1–1.5)
BUN SERPL-MCNC: 6 MG/DL (ref 8–22)
CALCIUM ALBUM COR SERPL-MCNC: 9.3 MG/DL (ref 8.5–10.5)
CALCIUM SERPL-MCNC: 9.2 MG/DL (ref 8.5–10.5)
CHLORIDE SERPL-SCNC: 108 MMOL/L (ref 96–112)
CO2 SERPL-SCNC: 19 MMOL/L (ref 20–33)
CREAT SERPL-MCNC: 0.77 MG/DL (ref 0.5–1.4)
EOSINOPHIL # BLD AUTO: 0.07 K/UL (ref 0–0.51)
EOSINOPHIL NFR BLD: 1.6 % (ref 0–6.9)
ERYTHROCYTE [DISTWIDTH] IN BLOOD BY AUTOMATED COUNT: 60.6 FL (ref 35.9–50)
GFR SERPLBLD CREATININE-BSD FMLA CKD-EPI: 91 ML/MIN/1.73 M 2
GLOBULIN SER CALC-MCNC: 2.4 G/DL (ref 1.9–3.5)
GLUCOSE SERPL-MCNC: 122 MG/DL (ref 65–99)
HCT VFR BLD AUTO: 30.9 % (ref 37–47)
HGB BLD-MCNC: 10.4 G/DL (ref 12–16)
IMM GRANULOCYTES # BLD AUTO: 0.01 K/UL (ref 0–0.11)
IMM GRANULOCYTES NFR BLD AUTO: 0.2 % (ref 0–0.9)
LYMPHOCYTES # BLD AUTO: 1.99 K/UL (ref 1–4.8)
LYMPHOCYTES NFR BLD: 44.7 % (ref 22–41)
MCH RBC QN AUTO: 33 PG (ref 27–33)
MCHC RBC AUTO-ENTMCNC: 33.7 G/DL (ref 32.2–35.5)
MCV RBC AUTO: 98.1 FL (ref 81.4–97.8)
MONOCYTES # BLD AUTO: 0.32 K/UL (ref 0–0.85)
MONOCYTES NFR BLD AUTO: 7.2 % (ref 0–13.4)
NEUTROPHILS # BLD AUTO: 2.05 K/UL (ref 1.82–7.42)
NEUTROPHILS NFR BLD: 46.1 % (ref 44–72)
NRBC # BLD AUTO: 0 K/UL
NRBC BLD-RTO: 0 /100 WBC (ref 0–0.2)
PLATELET # BLD AUTO: 308 K/UL (ref 164–446)
PMV BLD AUTO: 9.6 FL (ref 9–12.9)
POTASSIUM SERPL-SCNC: 4.3 MMOL/L (ref 3.6–5.5)
PROT SERPL-MCNC: 6.3 G/DL (ref 6–8.2)
RBC # BLD AUTO: 3.15 M/UL (ref 4.2–5.4)
SODIUM SERPL-SCNC: 141 MMOL/L (ref 135–145)
WBC # BLD AUTO: 4.5 K/UL (ref 4.8–10.8)

## 2024-06-22 PROCEDURE — 700101 HCHG RX REV CODE 250: Performed by: STUDENT IN AN ORGANIZED HEALTH CARE EDUCATION/TRAINING PROGRAM

## 2024-06-22 PROCEDURE — 99285 EMERGENCY DEPT VISIT HI MDM: CPT

## 2024-06-22 PROCEDURE — 700111 HCHG RX REV CODE 636 W/ 250 OVERRIDE (IP): Mod: JZ | Performed by: STUDENT IN AN ORGANIZED HEALTH CARE EDUCATION/TRAINING PROGRAM

## 2024-06-22 PROCEDURE — 80053 COMPREHEN METABOLIC PANEL: CPT

## 2024-06-22 PROCEDURE — 71045 X-RAY EXAM CHEST 1 VIEW: CPT

## 2024-06-22 PROCEDURE — 94644 CONT INHLJ TX 1ST HOUR: CPT

## 2024-06-22 PROCEDURE — 96374 THER/PROPH/DIAG INJ IV PUSH: CPT

## 2024-06-22 PROCEDURE — 93005 ELECTROCARDIOGRAM TRACING: CPT | Performed by: STUDENT IN AN ORGANIZED HEALTH CARE EDUCATION/TRAINING PROGRAM

## 2024-06-22 PROCEDURE — 85025 COMPLETE CBC W/AUTO DIFF WBC: CPT

## 2024-06-22 PROCEDURE — 36415 COLL VENOUS BLD VENIPUNCTURE: CPT

## 2024-06-22 PROCEDURE — A9270 NON-COVERED ITEM OR SERVICE: HCPCS | Performed by: STUDENT IN AN ORGANIZED HEALTH CARE EDUCATION/TRAINING PROGRAM

## 2024-06-22 PROCEDURE — 700102 HCHG RX REV CODE 250 W/ 637 OVERRIDE(OP): Performed by: STUDENT IN AN ORGANIZED HEALTH CARE EDUCATION/TRAINING PROGRAM

## 2024-06-22 PROCEDURE — 96372 THER/PROPH/DIAG INJ SC/IM: CPT

## 2024-06-22 RX ORDER — DIAZEPAM 5 MG
5 TABLET ORAL ONCE
Status: COMPLETED | OUTPATIENT
Start: 2024-06-22 | End: 2024-06-22

## 2024-06-22 RX ORDER — EPINEPHRINE 1 MG/ML(1)
0.3 AMPUL (ML) INJECTION ONCE
Status: COMPLETED | OUTPATIENT
Start: 2024-06-22 | End: 2024-06-22

## 2024-06-22 RX ORDER — BENZOCAINE/MENTHOL 6 MG-10 MG
1 LOZENGE MUCOUS MEMBRANE 2 TIMES DAILY
Qty: 14 G | Refills: 0 | Status: SHIPPED | OUTPATIENT
Start: 2024-06-22

## 2024-06-22 RX ORDER — DEXAMETHASONE SODIUM PHOSPHATE 4 MG/ML
16 INJECTION, SOLUTION INTRA-ARTICULAR; INTRALESIONAL; INTRAMUSCULAR; INTRAVENOUS; SOFT TISSUE ONCE
Status: COMPLETED | OUTPATIENT
Start: 2024-06-22 | End: 2024-06-22

## 2024-06-22 RX ORDER — HYDROXYZINE HYDROCHLORIDE 25 MG/1
25 TABLET, FILM COATED ORAL ONCE
Status: COMPLETED | OUTPATIENT
Start: 2024-06-22 | End: 2024-06-22

## 2024-06-22 RX ORDER — PREDNISONE 20 MG/1
60 TABLET ORAL DAILY
Qty: 12 TABLET | Refills: 0 | Status: SHIPPED | OUTPATIENT
Start: 2024-06-22 | End: 2024-06-26

## 2024-06-22 RX ORDER — FAMOTIDINE 20 MG/1
20 TABLET, FILM COATED ORAL ONCE
Status: COMPLETED | OUTPATIENT
Start: 2024-06-22 | End: 2024-06-22

## 2024-06-22 RX ORDER — CETIRIZINE HYDROCHLORIDE 10 MG/1
10 TABLET ORAL ONCE
Status: COMPLETED | OUTPATIENT
Start: 2024-06-22 | End: 2024-06-22

## 2024-06-22 RX ORDER — BENZOCAINE/MENTHOL 6 MG-10 MG
LOZENGE MUCOUS MEMBRANE ONCE
Status: COMPLETED | OUTPATIENT
Start: 2024-06-22 | End: 2024-06-22

## 2024-06-22 RX ADMIN — HYDROXYZINE HYDROCHLORIDE 25 MG: 25 TABLET ORAL at 22:13

## 2024-06-22 RX ADMIN — Medication 10 MG/HR: at 19:34

## 2024-06-22 RX ADMIN — HYDROCORTISONE: 0.01 CREAM TOPICAL at 22:57

## 2024-06-22 RX ADMIN — EPINEPHRINE 0.3 MG: 1 INJECTION INTRAMUSCULAR; INTRAVENOUS; SUBCUTANEOUS at 19:13

## 2024-06-22 RX ADMIN — CETIRIZINE HYDROCHLORIDE 10 MG: 10 TABLET, FILM COATED ORAL at 19:20

## 2024-06-22 RX ADMIN — DIAZEPAM 5 MG: 5 TABLET ORAL at 22:13

## 2024-06-22 RX ADMIN — FAMOTIDINE 20 MG: 20 TABLET, FILM COATED ORAL at 19:20

## 2024-06-22 RX ADMIN — DEXAMETHASONE SODIUM PHOSPHATE 16 MG: 4 INJECTION, SOLUTION INTRAMUSCULAR; INTRAVENOUS at 19:19

## 2024-06-22 RX ADMIN — IPRATROPIUM BROMIDE 0.5 MG: 0.5 SOLUTION RESPIRATORY (INHALATION) at 19:34

## 2024-06-22 ASSESSMENT — FIBROSIS 4 INDEX: FIB4 SCORE: 1.53

## 2024-06-23 VITALS
HEIGHT: 69 IN | BODY MASS INDEX: 18.51 KG/M2 | WEIGHT: 125 LBS | DIASTOLIC BLOOD PRESSURE: 68 MMHG | TEMPERATURE: 97.1 F | RESPIRATION RATE: 23 BRPM | HEART RATE: 96 BPM | OXYGEN SATURATION: 91 % | SYSTOLIC BLOOD PRESSURE: 104 MMHG

## 2024-06-23 LAB — EKG IMPRESSION: NORMAL

## 2024-06-23 NOTE — ED PROVIDER NOTES
"ED Provider Note    CHIEF COMPLAINT  Chief Complaint   Patient presents with    Allergic Reaction     The pt reports that groupmates were eating shellfish but the pt was not. The pt had sudden onset itchiness and shortness of breath. EMS noted wheezes and gave albuterol x2 and 50 mg benadryl IM    Flu Like Symptoms     The pt reports that she is feeling \"off\" all day       EXTERNAL RECORDS REVIEWED  Inpatient Notes discharge summary from 6/19/2024 noted history of alcoholism hypertension COPD who is admitted for treatment of alcohol withdrawal.    HPI/ROS  LIMITATION TO HISTORY     OUTSIDE HISTORIAN(S):      Rhonda Gould is a 53 y.o. female who presents with concern for allergic reaction.  Patient says she was exposed to shellfish and had onset of itchy rash, cough and shortness of breath.  Patient says she has scratchy feeling in her throat.  Patient says this started about an hour prior to emergency room arrival.  Patient received intramuscular Benadryl from EMS.  Patient says she has recently been treated for alcohol withdrawal but otherwise denies recent illness including fever, chills, chest pain or abdominal pain.    PAST MEDICAL HISTORY   has a past medical history of Chronic obstructive pulmonary disease (HCC) and Hypertension.    SURGICAL HISTORY   has a past surgical history that includes hysterectomy laparoscopy (6/2005).    FAMILY HISTORY  Family History   Problem Relation Age of Onset    Diabetes Mother     Hypertension Mother     Hypertension Father     Diabetes Father     Heart Disease Maternal Grandmother     Cancer Maternal Grandfather        SOCIAL HISTORY  Social History     Tobacco Use    Smoking status: Every Day    Smokeless tobacco: Never   Vaping Use    Vaping status: Never Used   Substance and Sexual Activity    Alcohol use: Not Currently     Comment: 1 pint a day, last drink 6/15    Drug use: Not Currently     Frequency: 2.0 times per week     Types: Inhaled, Marijuana     Comment: " "marijuana    Sexual activity: Not Currently       CURRENT MEDICATIONS  Home Medications       Reviewed by Mark Acosta R.N. (Registered Nurse) on 06/22/24 at 1818  Med List Status: Partial     Medication Last Dose Status   albuterol 108 (90 Base) MCG/ACT Aero Soln inhalation aerosol  Active   amLODIPine (NORVASC) 5 MG Tab  Active   folic acid (FOLVITE) 1 MG Tab  Active   lisinopril (PRINIVIL) 10 MG Tab  Active   multivitamin Tab  Active   thiamine (THIAMINE) 100 MG tablet  Active                    ALLERGIES  Allergies   Allergen Reactions    Fish Allergy Anaphylaxis and Shortness of Breath     Swelling to throat.    Shellfish Allergy Anaphylaxis     Strict No Shellfish (avoids most fish, cod OK)      Codeine Hives and Itching     Tolerated morphine previously  Allergy updated from 2015, morphine given in 2016       PHYSICAL EXAM  VITAL SIGNS: /68   Pulse 96   Temp 36.2 °C (97.1 °F) (Temporal)   Resp (!) 23   Ht 1.753 m (5' 9\")   Wt 56.7 kg (125 lb)   LMP 06/11/2005   SpO2 91%   BMI 18.46 kg/m²    Constitutional: Mild distress  HENT: No signs of trauma, Bilateral external ears normal, Nose normal.   Eyes: Pupils are equal and reactive, Conjunctiva normal, Non-icteric.   Neck: Normal range of motion, No tenderness, Supple, No stridor.   Lymphatic: No lymphadenopathy noted.   Cardiovascular: Regular rate and rhythm, no murmurs.   Thorax & Lungs: Mild dyspnea, bilateral wheezing  Abdomen: Bowel sounds normal, Soft, No tenderness, No masses, No pulsatile masses.   Skin: Warm, Dry, No erythema, scattered urticaria across left neck, bilateral lower extremities  Back: No bony tenderness, No CVA tenderness.   Extremities: Intact distal pulses, No edema, No tenderness, No cyanosis  Musculoskeletal: Good range of motion in all major joints. No tenderness to palpation or major deformities noted.   Neurologic: Alert , Normal motor function, Normal sensory function, No focal deficits noted.       EKG/LABS  Labs " Reviewed   CBC WITH DIFFERENTIAL - Abnormal; Notable for the following components:       Result Value    WBC 4.5 (*)     RBC 3.15 (*)     Hemoglobin 10.4 (*)     Hematocrit 30.9 (*)     MCV 98.1 (*)     RDW 60.6 (*)     Lymphocytes 44.70 (*)     All other components within normal limits   COMP METABOLIC PANEL - Abnormal; Notable for the following components:    Co2 19 (*)     Glucose 122 (*)     Bun 6 (*)     All other components within normal limits   ESTIMATED GFR     Results for orders placed or performed during the hospital encounter of 24   EKG   Result Value Ref Range    Report       [facility] Emergency Dept.    Test Date:  2024  Pt Name:    JASON SCHMITZ                Department: ER  MRN:        [id number]                      Room:       [facility]  Gender:     Female                       Technician: [id number]  :        1970                   Requested By:ER TRIAGE PROTOCOL  Order #:    [id number]                    Reading MD:    Measurements  Intervals                                Axis  Rate:       98                           P:          84  WI:         139                          QRS:        58  QRSD:       100                          T:          49  QT:         353  QTc:        451    Interpretive Statements  Sinus rhythm  Probable left atrial enlargement  Compared to ECG 2024 12:48:40  Sinus tachycardia no longer present         I have independently interpreted this EKG    RADIOLOGY/PROCEDURES   I have independently interpreted the diagnostic imaging associated with this visit and am waiting the final reading from the radiologist.   My preliminary interpretation is as follows: No pneumothorax    Radiologist interpretation:  DX-CHEST-PORTABLE (1 VIEW)   Final Result         No acute cardiac or pulmonary abnormality is identified.          COURSE & MEDICAL DECISION MAKING    ASSESSMENT, COURSE AND PLAN  Care Narrative: On arrival patient noted to be mildly  dyspneic with bilateral wheezing, urticaria after onset of exposure to shellfish.  Presentation consistent with anaphylaxis patient was given intramuscular epinephrine, bronchodilators, steroids, H1/H2 blockers.  Patient fortunately with stable hemodynamics.  Patient was closely reassessed at bedside to determine need for additional epinephrine or airway management.  Patient had steady improvement in respiratory status and resolution of throat discomfort.  Patient did have some ongoing pleuritic urticaria across her legs.  Patient given additional dose of antihistamine low-dose of benzodiazepine and hydrocortisone cream which point she had significant improvement in pruritus.  Patient was closely monitored for over 5 hours post epinephrine with no recurrent respiratory distress, hemodynamic instability.  Patient given prescription for EpiPen as well as steroid cream.  Discussed with patient strict return precautions and close outpatient follow-up plan which she is comfortable with.    CRITICAL CARE  The very real possibilty of a deterioration of this patient's condition required the highest level of my preparedness for sudden, emergent intervention.  I provided critical care services, which included medication orders, frequent reevaluations of the patient's condition and response to treatment, ordering and reviewing test results, and discussing the case with various consultants.  The critical care time associated with the care of the patient was 32 minutes. Review chart for interventions. This time is exclusive of any other billable procedures.              ADDITIONAL PROBLEMS MANAGED      DISPOSITION AND DISCUSSIONS  Barriers to care at this time, including but not limited to: Patient does not have established PCP.     Decision tools and prescription drugs considered including, but not limited to:  Steroids .    FINAL DIAGNOSIS  1. Anaphylaxis, initial encounter           Electronically signed by: Sam Lopez  GILBERT Cabrera, 6/22/2024 6:33 PM

## 2024-06-23 NOTE — DISCHARGE PLANNING
This nurse provided a cab voucher for patient to return to [address] [place] [address].  Voucher # [id number]. Voucher was provided to the RN and CM updated to patient to the above.  CM also called the facility to confirm patient will be allowed to return-CM spoke with Rufus

## 2024-06-23 NOTE — ED TRIAGE NOTES
"Chief Complaint   Patient presents with    Allergic Reaction     The pt reports that groupmates were eating shellfish but the pt was not. The pt had sudden onset itchiness and shortness of breath. EMS noted wheezes and gave albuterol x2 and 50 mg benadryl IM    Flu Like Symptoms     The pt reports that she is feeling \"off\" all day       BIB EMS to 5, pt on monitor and in gown, labs drawn and sent.    Medications given en route:50 mg benadryl IM, albuterol x2    Ht 1.753 m (5' 9\")   Wt 56.7 kg (125 lb)   LMP 06/11/2005   BMI 18.46 kg/m²     "

## 2024-06-23 NOTE — ED NOTES
The pt is alert and oriented. The pt reports shortness of breath and itchiness. Vitals stable on room air

## 2024-06-23 NOTE — ED NOTES
The pt is alert and oriented and reports that she feels a little better. RN medicated per mar. Vitals stable. The pt is calm and cooperative

## 2024-06-23 NOTE — ED NOTES
"Pt discharged home via taxi to [place]. [place] requested that the pt come back via taxi. The pt is alert and oriented, calm and cooperative, talks with clear coherent speech, ambulates with a steady gait, and moves extremities to dress self. Vitals stable on the monitor. Breathing is even and unlabored. No indicators of pain. IV discontinued and gauze placed, pt in possession of belongings. Pt provided discharge education and information pertaining to medications and follow up appointments. Pt received copy of discharge instructions and verbalized understanding. /68   Pulse 99   Temp 36.2 °C (97.1 °F) (Temporal)   Resp 14   Ht 1.753 m (5' 9\")   Wt 56.7 kg (125 lb)   LMP 06/11/2005   SpO2 94%   BMI 18.46 kg/m²     "

## 2024-06-23 NOTE — ED NOTES
The pt is alert and oriented. Vitals stable on room air. RN medicates per mar. The pt tearful at this time

## 2024-06-23 NOTE — ED NOTES
Assist RN: Pt to restroom and back via wheelchair. Pt up to wheelchair and toilet with one person assist.

## 2024-06-23 NOTE — ED NOTES
The pt is alert and oriented. The pt is tearful and reports continued itchiness. ERP notified. Lotion provided to pt

## 2024-06-23 NOTE — DISCHARGE INSTRUCTIONS
We have given you prescription for EpiPen, course of steroids and hydrocortisone cream.  If you have recurrent shortness of breath, passing out, chest pain you should return to the emergency room.  You should follow-up with your primary care provider.

## 2024-06-26 ENCOUNTER — TELEPHONE (OUTPATIENT)
Dept: VASCULAR LAB | Facility: MEDICAL CENTER | Age: 54
End: 2024-06-26

## 2024-06-26 ENCOUNTER — TELEPHONE (OUTPATIENT)
Dept: URGENT CARE | Facility: CLINIC | Age: 54
End: 2024-06-26

## 2024-06-26 ENCOUNTER — OCCUPATIONAL MEDICINE (OUTPATIENT)
Dept: URGENT CARE | Facility: CLINIC | Age: 54
End: 2024-06-26
Payer: COMMERCIAL

## 2024-06-26 VITALS
BODY MASS INDEX: 17.82 KG/M2 | OXYGEN SATURATION: 96 % | HEART RATE: 97 BPM | SYSTOLIC BLOOD PRESSURE: 118 MMHG | DIASTOLIC BLOOD PRESSURE: 62 MMHG | RESPIRATION RATE: 20 BRPM | WEIGHT: 120.3 LBS | HEIGHT: 69 IN | TEMPERATURE: 97.1 F

## 2024-06-26 DIAGNOSIS — S46.912A STRAIN OF LEFT SHOULDER, INITIAL ENCOUNTER: ICD-10-CM

## 2024-06-26 RX ORDER — TIZANIDINE 4 MG/1
4 TABLET ORAL EVERY 6 HOURS PRN
Qty: 30 TABLET | Refills: 0 | Status: SHIPPED | OUTPATIENT
Start: 2024-06-26 | End: 2024-06-26 | Stop reason: SDUPTHER

## 2024-06-26 RX ORDER — TIZANIDINE 4 MG/1
4 TABLET ORAL EVERY 6 HOURS PRN
Qty: 30 TABLET | Refills: 0 | Status: SHIPPED | OUTPATIENT
Start: 2024-06-26

## 2024-06-26 RX ORDER — KETOROLAC TROMETHAMINE 30 MG/ML
15 INJECTION, SOLUTION INTRAMUSCULAR; INTRAVENOUS ONCE
Status: COMPLETED | OUTPATIENT
Start: 2024-06-26 | End: 2024-06-26

## 2024-06-26 RX ADMIN — KETOROLAC TROMETHAMINE 15 MG: 30 INJECTION, SOLUTION INTRAMUSCULAR; INTRAVENOUS at 14:25

## 2024-06-26 ASSESSMENT — ENCOUNTER SYMPTOMS
FOCAL WEAKNESS: 0
FEVER: 0
CHILLS: 0

## 2024-06-26 ASSESSMENT — FIBROSIS 4 INDEX: FIB4 SCORE: 1.12

## 2024-06-26 NOTE — TELEPHONE ENCOUNTER
[facility]     Received smoking cessation referral from Dr. Santiago on 6/18/24.     Called and spoke to patient. She was unable to schedule initial visit and request for c/b at a later time.    MCM sent.    Sent letter.     Insurance: Esis  PCP: External  Locations to be seen: [facility]     If no response by 7/18/24 (1 month from referral date) OR 2 no shows/cancellations, will remove from referral list and send FYI to referring provider    Yon Cohn, PharmD, BCACP  [facility]  Phone: [phone], Fax [phone]

## 2024-06-26 NOTE — PROGRESS NOTES
"Subjective:   Rhonda Gould  is a 53 y.o. female who presents for Follow-Up (Is still in pain no medication is helping relieve her pain.)      HPI    DOI 2/27/2023-  Patient was evaluated for shoulder pain last year and ultimately failed conservative treatment.  She was referred to orthopedics.  She returns to urgent care with complaints of continued shoulder pain.  Of note patient's been evaluated 8 times over the last 3 weeks to the emergency room for acute alcohol intoxication withdrawal syndromes and suicidal ideation -she is currently in [facility] rehabilitation facility.  She reports that she has had issues with performing her daily chores secondary to shoulder pain complaints.  She states that she did end up having left shoulder surgery with orthopedics this last spring.  She has been going to physical therapy but states she is missed her physical therapy appointments over the last few weeks due to transportation issues.  She requests updated restrictions and pain control measures.  She notes she has had worsened pain to left shoulder over the last 1-1/2 weeks.  She does have follow-up with orthopedics on July 12, 16 days from now.    Review of Systems   Constitutional:  Negative for chills and fever.   Musculoskeletal:  Positive for joint pain (left shoulder pain).   Neurological:  Negative for focal weakness.       Allergies   Allergen Reactions    Fish Allergy Anaphylaxis and Shortness of Breath     Swelling to throat.    Shellfish Allergy Anaphylaxis     Strict No Shellfish (avoids most fish, cod OK)      Codeine Hives and Itching     Tolerated morphine previously  Allergy updated from 2015, morphine given in 2016        Objective:   /62   Pulse 97   Temp 36.2 °C (97.1 °F) (Temporal)   Resp 20   Ht 1.753 m (5' 9\")   Wt 54.6 kg (120 lb 4.8 oz)   LMP 06/11/2005   SpO2 96%   BMI 17.77 kg/m²     Physical Exam  Vitals and nursing note reviewed.   Constitutional:       General: She is not in " acute distress.     Appearance: She is well-developed. She is not diaphoretic.   HENT:      Head: Normocephalic and atraumatic.      Right Ear: External ear normal.      Left Ear: External ear normal.      Nose: Nose normal.   Eyes:      General: Lids are normal. No scleral icterus.        Right eye: No discharge.         Left eye: No discharge.      Conjunctiva/sclera: Conjunctivae normal.   Pulmonary:      Effort: Pulmonary effort is normal. No respiratory distress.   Musculoskeletal:      Right shoulder: Normal pulse.      Left shoulder: Tenderness (global, laterally and anteriorly) present. No swelling, deformity, effusion, laceration or crepitus. Decreased range of motion. Decreased strength (difficult to asses, pt tearful). Normal pulse.      Cervical back: Neck supple.   Skin:     General: Skin is warm and dry.      Coloration: Skin is not pale.      Findings: No erythema.   Neurological:      Mental Status: She is alert and oriented to person, place, and time. She is not disoriented.   Psychiatric:         Speech: Speech normal.         Behavior: Behavior normal.       Toradol 15 mg IM-tolerates well    Assessment/Plan:   1. Strain of left shoulder, initial encounter  - ketorolac (Toradol) injection 15 mg  - tizanidine (ZANAFLEX) 4 MG Tab; Take 1 Tablet by mouth every 6 hours as needed (spasm / pain).  Dispense: 30 Tablet; Refill: 0    Restricted duty, 10 pound lifting restriction in the left upper extremity, limit use of left shoulder, no overhead arm activity, follow-up with orthopedics.  Recommend ER evaluation for pain control sooner.  Patient given Toradol in clinic and sent with Zanaflex, caution for sedation with medication.  ER precautions with any worsening symptoms are reviewed with patient/caregiver and they do express understanding      I have worn an N95 mask, gloves and eye protection for the entire encounter with this patient.     Differential diagnosis, natural history, supportive care, and  indications for immediate follow-up discussed.    My total time spent caring for the patient on the day of the encounter was 31 minutes.   This does not include time spent on separately billable procedures/tests.

## 2024-06-26 NOTE — LETTER
[address] [facility] [address]        Dear Rhonda Gould ,    We have been unsuccessful in our attempts to contact you regarding your Clinical Pharmacist referral.  Please contact us if you would like to schedule an appointment for help managing your tobacco cessation.    Please contact our clinic so we may assist you.  We are open Monday-Friday 8 am until 5 pm.  You may reach our Service at [phone].        Sincerely,    Kendall Cadena, NoeD, [facility]  Clinic Supervisor  [facility]  Outpatient Anticoagulation Service

## 2024-06-26 NOTE — TELEPHONE ENCOUNTER
She cannot  at the [facility] pharmacy as her insurance is not contracted, she was hoping you could send to [facility]!

## 2024-07-03 ENCOUNTER — TELEPHONE (OUTPATIENT)
Dept: VASCULAR LAB | Facility: MEDICAL CENTER | Age: 54
End: 2024-07-03

## 2024-07-17 ENCOUNTER — DOCUMENTATION (OUTPATIENT)
Dept: VASCULAR LAB | Facility: MEDICAL CENTER | Age: 54
End: 2024-07-17

## 2024-08-26 ENCOUNTER — OFFICE VISIT (OUTPATIENT)
Dept: URGENT CARE | Facility: CLINIC | Age: 54
End: 2024-08-26
Payer: COMMERCIAL

## 2024-08-26 VITALS
HEART RATE: 105 BPM | SYSTOLIC BLOOD PRESSURE: 118 MMHG | OXYGEN SATURATION: 98 % | TEMPERATURE: 96.8 F | BODY MASS INDEX: 18.66 KG/M2 | WEIGHT: 126 LBS | HEIGHT: 69 IN | DIASTOLIC BLOOD PRESSURE: 66 MMHG | RESPIRATION RATE: 14 BRPM

## 2024-08-26 DIAGNOSIS — J06.9 UPPER RESPIRATORY INFECTION, ACUTE: ICD-10-CM

## 2024-08-26 PROCEDURE — 3074F SYST BP LT 130 MM HG: CPT

## 2024-08-26 PROCEDURE — 99212 OFFICE O/P EST SF 10 MIN: CPT

## 2024-08-26 PROCEDURE — 3078F DIAST BP <80 MM HG: CPT

## 2024-08-26 ASSESSMENT — ENCOUNTER SYMPTOMS
CHILLS: 0
FEVER: 0
SHORTNESS OF BREATH: 0
COUGH: 0

## 2024-08-26 ASSESSMENT — FIBROSIS 4 INDEX: FIB4 SCORE: 1.12

## 2024-08-26 NOTE — LETTER
LALITHA  [facility] [place] [address] [facility] [address]     August 26, 2024    Patient: Rhonda Gould   YOB: 1970   Date of Visit: 8/26/2024       To Whom It May Concern:    Rhonda Gould was seen and treated in our department on 8/26/2024. Per cdc guidelines 5 days from onset of symptoms or 24 hours fever free, patient is no longer considered contagious.      Sincerely,     GABRIELLA Lainez.

## 2024-08-26 NOTE — PROGRESS NOTES
Chief Complaint   Patient presents with    Other     Patient took a COVID test x6 days ago and came back positive, thinks was a false positive but is wanting to make sure. Only has a slight cough no other symptoms         HISTORY OF PRESENT ILLNESS: Patient is a pleasant 53 y.o. female who presents to urgent care today patient tested positive for COVID 6 days ago, she comes in today seeking retesting for her rehab facility.  She denies any current symptoms, no fever for over 24 hours.    Patient Active Problem List    Diagnosis Date Noted    Alcohol withdrawal syndrome, uncomplicated ([facility]) 06/17/2024    Alcohol abuse with withdrawal ([facility]) 06/14/2024    Hypoxia 06/14/2024    Anemia 06/09/2024    Acute alcohol intoxication with alcoholism with delirium ([facility]) 06/08/2024    Moderate protein-calorie malnutrition ([facility]) 06/08/2024    Hypophosphatemia 06/08/2024    S/P hysterectomy 01/14/2022    S/P cardiac catheterization 01/14/2022    GERD (gastroesophageal reflux disease) 01/14/2022    Respiratory failure ([facility]) 06/22/2021    Reactive airway disease 03/03/2020    Below ideal body weight range 01/29/2020    Smoking 12/11/2019    Dyslipidemia 09/11/2019    Cannabis abuse 09/11/2019    Alcohol abuse, in remission 09/11/2019    HLD (hyperlipidemia) 08/29/2019    Pancytopenia ([facility]) 08/29/2019    Low TSH level 08/24/2019    Lactic acidosis 08/23/2019    Alcoholism ([facility]) 08/23/2019    Simple chronic bronchitis ([facility]) 08/23/2019    Hypoglycemia 08/23/2019    History of hypertension 08/23/2019    Tobacco abuse disorder 08/23/2019    Alopecia 01/15/2019    Mild intermittent asthma without complication 08/24/2017    FH: HTN (hypertension) 08/24/2017    FH: diabetes mellitus 08/24/2017    Allergic rhinitis due to pollen 08/24/2017    COPD exacerbation ([facility]) 07/13/2017    Tobacco dependence 04/20/2017    Gout 04/20/2017    TIA (transient ischemic attack) 08/04/2016    Left leg numbness 07/22/2015    Tobacco use 07/22/2015    Itching  2015    Environmental allergies 2015    Vitamin D deficiency 2015    Anxiety 2014    Insomnia 2014    Hypertension 2014    Headache 2014    Chest pain 2014       Allergies:Fish allergy, Shellfish allergy, and Codeine    Current Outpatient Medications Ordered in Epic   Medication Sig Dispense Refill    tizanidine (ZANAFLEX) 4 MG Tab Take 1 Tablet by mouth every 6 hours as needed (spasm / pain). 30 Tablet 0    EPINEPHrine 0.3 MG/0.3ML Solution Prefilled Syringe Inject the contents of the epipen into the thigh, hold for 3 seconds and release from thigh as needed for anaphylaxis. 1 Each 0    hydrocortisone 1 % Cream Apply 1 Application topically 2 times a day. 14 g 0    thiamine (THIAMINE) 100 MG tablet Take 1 Tablet by mouth every day. 30 Tablet 2    multivitamin Tab Take 1 Tablet by mouth every day. 30 Tablet 2    folic acid (FOLVITE) 1 MG Tab Take 1 Tablet by mouth every day. 30 Tablet 2    albuterol 108 (90 Base) MCG/ACT Aero Soln inhalation aerosol Inhale 2 Puffs every four hours as needed for Shortness of Breath.      lisinopril (PRINIVIL) 10 MG Tab Take 1 Tablet by mouth every day. 30 Tablet 0    amLODIPine (NORVASC) 5 MG Tab Take 1 Tablet by mouth every day. 30 Tablet 0     No current Epic-ordered facility-administered medications on file.       Past Medical History:   Diagnosis Date    Chronic obstructive pulmonary disease (HCC)     Hypertension        Social History     Tobacco Use    Smoking status: Every Day    Smokeless tobacco: Never   Vaping Use    Vaping status: Never Used   Substance Use Topics    Alcohol use: Not Currently     Comment: 1 pint a day, last drink 6/15    Drug use: Not Currently     Frequency: 2.0 times per week     Types: Inhaled, Marijuana     Comment: marijuana       Family Status   Relation Name Status    Mo  Alive    Fa  Alive    Sis  Alive    Bro  Alive    MGMo      MGFa     No partnership data on file     Family History  "  Problem Relation Age of Onset    Diabetes Mother     Hypertension Mother     Hypertension Father     Diabetes Father     Heart Disease Maternal Grandmother     Cancer Maternal Grandfather        Review of Systems   Constitutional:  Negative for chills, fever and malaise/fatigue.   HENT:  Negative for congestion.    Respiratory:  Negative for cough and shortness of breath.    Cardiovascular:  Negative for chest pain and leg swelling.       Exam:  /66   Pulse (!) 105   Temp 36 °C (96.8 °F) (Temporal)   Resp 14   Ht 1.753 m (5' 9\")   Wt 57.2 kg (126 lb)   SpO2 98%   Physical Exam  Vitals reviewed.   Constitutional:       General: She is not in acute distress.     Appearance: Normal appearance. She is normal weight. She is not toxic-appearing.   HENT:      Head: Normocephalic.      Right Ear: Tympanic membrane, ear canal and external ear normal. There is no impacted cerumen.      Left Ear: Tympanic membrane, ear canal and external ear normal. There is no impacted cerumen.      Nose: No nasal tenderness or mucosal edema.      Mouth/Throat:      Mouth: Mucous membranes are moist.      Tonsils: No tonsillar exudate. 1+ on the right. 1+ on the left.   Eyes:      General:         Right eye: No discharge.         Left eye: No discharge.      Extraocular Movements: Extraocular movements intact.      Conjunctiva/sclera: Conjunctivae normal.      Pupils: Pupils are equal, round, and reactive to light.   Cardiovascular:      Rate and Rhythm: Normal rate and regular rhythm.      Pulses: Normal pulses.      Heart sounds: Normal heart sounds. No murmur heard.  Pulmonary:      Effort: Pulmonary effort is normal. No respiratory distress.      Breath sounds: Normal breath sounds.   Musculoskeletal:         General: No swelling, tenderness, deformity or signs of injury. Normal range of motion.      Cervical back: Normal range of motion and neck supple. No tenderness.   Skin:     General: Skin is warm and dry.      " Findings: No bruising, erythema, lesion or rash.   Neurological:      General: No focal deficit present.      Mental Status: She is alert.      Sensory: No sensory deficit.      Motor: No weakness.      Coordination: Coordination normal.      Gait: Gait normal.   Psychiatric:         Mood and Affect: Mood normal.         Behavior: Behavior normal.         Assessment/Plan:  1. Upper respiratory infection, acute    Based on recommendations from CDC guidelines, patient is medically clear from my point of view, her physical exam is otherwise benign.  Per CDC guidelines 5 days from the onset of symptoms are 24 hours fever free both of which patient has accomplished.  Physical exam is within normal limits.  Note was provided.  Testing at this point is not recommended as patient may continue to be positive despite the fact that she is no longer contagious.  I did review this at length with the patient.    Supportive care, differential diagnoses, and indications for immediate follow-up discussed with patient.   Pathogenesis of diagnosis discussed including typical length and natural progression.   Instructed to return to clinic or nearest emergency department for any change in condition, further concerns, or worsening of symptoms.  Patient states understanding of the plan of care and discharge instructions.  Instructed to make an appointment, for follow up, with  primary care provider.    Please note that this dictation was created using voice recognition software. I have made every reasonable attempt to correct obvious errors, but I expect that there are errors of grammar and possibly content that I did not discover before finalizing the note.      Kaley MAYERS

## 2024-10-31 ENCOUNTER — HOSPITAL ENCOUNTER (EMERGENCY)
Facility: MEDICAL CENTER | Age: 54
End: 2024-10-31
Attending: EMERGENCY MEDICINE
Payer: COMMERCIAL

## 2024-10-31 ENCOUNTER — APPOINTMENT (OUTPATIENT)
Dept: RADIOLOGY | Facility: MEDICAL CENTER | Age: 54
End: 2024-10-31
Attending: EMERGENCY MEDICINE
Payer: COMMERCIAL

## 2024-10-31 VITALS
HEIGHT: 69 IN | SYSTOLIC BLOOD PRESSURE: 156 MMHG | WEIGHT: 126 LBS | TEMPERATURE: 97.8 F | BODY MASS INDEX: 18.66 KG/M2 | HEART RATE: 57 BPM | OXYGEN SATURATION: 93 % | RESPIRATION RATE: 16 BRPM | DIASTOLIC BLOOD PRESSURE: 95 MMHG

## 2024-10-31 DIAGNOSIS — J18.9 COMMUNITY ACQUIRED PNEUMONIA OF RIGHT LOWER LOBE OF LUNG: ICD-10-CM

## 2024-10-31 DIAGNOSIS — R55 SYNCOPE, UNSPECIFIED SYNCOPE TYPE: ICD-10-CM

## 2024-10-31 DIAGNOSIS — R10.13 EPIGASTRIC ABDOMINAL PAIN: ICD-10-CM

## 2024-10-31 DIAGNOSIS — R11.2 NAUSEA AND VOMITING, UNSPECIFIED VOMITING TYPE: ICD-10-CM

## 2024-10-31 LAB
ALBUMIN SERPL BCP-MCNC: 4.3 G/DL (ref 3.2–4.9)
ALBUMIN/GLOB SERPL: 1.5 G/DL
ALP SERPL-CCNC: 78 U/L (ref 30–99)
ALT SERPL-CCNC: 11 U/L (ref 2–50)
ANION GAP SERPL CALC-SCNC: 12 MMOL/L (ref 7–16)
APPEARANCE UR: CLEAR
AST SERPL-CCNC: 21 U/L (ref 12–45)
BACTERIA #/AREA URNS HPF: ABNORMAL /HPF
BASOPHILS # BLD AUTO: 0.5 % (ref 0–1.8)
BASOPHILS # BLD: 0.02 K/UL (ref 0–0.12)
BILIRUB SERPL-MCNC: 0.4 MG/DL (ref 0.1–1.5)
BILIRUB UR QL STRIP.AUTO: NEGATIVE
BUN SERPL-MCNC: 10 MG/DL (ref 8–22)
CALCIUM ALBUM COR SERPL-MCNC: 9.8 MG/DL (ref 8.5–10.5)
CALCIUM SERPL-MCNC: 10 MG/DL (ref 8.5–10.5)
CASTS URNS QL MICRO: ABNORMAL /LPF (ref 0–2)
CHLORIDE SERPL-SCNC: 102 MMOL/L (ref 96–112)
CO2 SERPL-SCNC: 21 MMOL/L (ref 20–33)
COLOR UR: YELLOW
CREAT SERPL-MCNC: 0.76 MG/DL (ref 0.5–1.4)
EKG IMPRESSION: NORMAL
EOSINOPHIL # BLD AUTO: 0.31 K/UL (ref 0–0.51)
EOSINOPHIL NFR BLD: 7.2 % (ref 0–6.9)
EPITHELIAL CELLS 1715: ABNORMAL /HPF (ref 0–5)
ERYTHROCYTE [DISTWIDTH] IN BLOOD BY AUTOMATED COUNT: 47.1 FL (ref 35.9–50)
ETHANOL BLD-MCNC: <10.1 MG/DL
GFR SERPLBLD CREATININE-BSD FMLA CKD-EPI: 92 ML/MIN/1.73 M 2
GLOBULIN SER CALC-MCNC: 2.9 G/DL (ref 1.9–3.5)
GLUCOSE SERPL-MCNC: 95 MG/DL (ref 65–99)
GLUCOSE UR STRIP.AUTO-MCNC: NEGATIVE MG/DL
HCT VFR BLD AUTO: 37.6 % (ref 37–47)
HGB BLD-MCNC: 12.3 G/DL (ref 12–16)
IMM GRANULOCYTES # BLD AUTO: 0.01 K/UL (ref 0–0.11)
IMM GRANULOCYTES NFR BLD AUTO: 0.2 % (ref 0–0.9)
KETONES UR STRIP.AUTO-MCNC: NEGATIVE MG/DL
LEUKOCYTE ESTERASE UR QL STRIP.AUTO: ABNORMAL
LIPASE SERPL-CCNC: 23 U/L (ref 11–82)
LYMPHOCYTES # BLD AUTO: 1.27 K/UL (ref 1–4.8)
LYMPHOCYTES NFR BLD: 29.7 % (ref 22–41)
MAGNESIUM SERPL-MCNC: 2 MG/DL (ref 1.5–2.5)
MCH RBC QN AUTO: 28.5 PG (ref 27–33)
MCHC RBC AUTO-ENTMCNC: 32.7 G/DL (ref 32.2–35.5)
MCV RBC AUTO: 87 FL (ref 81.4–97.8)
MICRO URNS: ABNORMAL
MONOCYTES # BLD AUTO: 0.26 K/UL (ref 0–0.85)
MONOCYTES NFR BLD AUTO: 6.1 % (ref 0–13.4)
NEUTROPHILS # BLD AUTO: 2.41 K/UL (ref 1.82–7.42)
NEUTROPHILS NFR BLD: 56.3 % (ref 44–72)
NITRITE UR QL STRIP.AUTO: NEGATIVE
NRBC # BLD AUTO: 0 K/UL
NRBC BLD-RTO: 0 /100 WBC (ref 0–0.2)
PH UR STRIP.AUTO: 5.5 [PH] (ref 5–8)
PLATELET # BLD AUTO: 294 K/UL (ref 164–446)
PMV BLD AUTO: 9.1 FL (ref 9–12.9)
POTASSIUM SERPL-SCNC: 5 MMOL/L (ref 3.6–5.5)
PROT SERPL-MCNC: 7.2 G/DL (ref 6–8.2)
PROT UR QL STRIP: NEGATIVE MG/DL
RBC # BLD AUTO: 4.32 M/UL (ref 4.2–5.4)
RBC # URNS HPF: ABNORMAL /HPF (ref 0–2)
RBC UR QL AUTO: NEGATIVE
SODIUM SERPL-SCNC: 135 MMOL/L (ref 135–145)
SP GR UR STRIP.AUTO: 1.01
TROPONIN T SERPL-MCNC: <6 NG/L (ref 6–19)
UROBILINOGEN UR STRIP.AUTO-MCNC: 0.2 EU/DL
VIT B12 SERPL-MCNC: 527 PG/ML (ref 211–911)
WBC # BLD AUTO: 4.3 K/UL (ref 4.8–10.8)
WBC #/AREA URNS HPF: ABNORMAL /HPF

## 2024-10-31 PROCEDURE — 81015 MICROSCOPIC EXAM OF URINE: CPT

## 2024-10-31 PROCEDURE — 700105 HCHG RX REV CODE 258: Performed by: EMERGENCY MEDICINE

## 2024-10-31 PROCEDURE — 82607 VITAMIN B-12: CPT

## 2024-10-31 PROCEDURE — 85025 COMPLETE CBC W/AUTO DIFF WBC: CPT

## 2024-10-31 PROCEDURE — 81003 URINALYSIS AUTO W/O SCOPE: CPT

## 2024-10-31 PROCEDURE — 96374 THER/PROPH/DIAG INJ IV PUSH: CPT

## 2024-10-31 PROCEDURE — 93005 ELECTROCARDIOGRAM TRACING: CPT | Performed by: EMERGENCY MEDICINE

## 2024-10-31 PROCEDURE — 71045 X-RAY EXAM CHEST 1 VIEW: CPT

## 2024-10-31 PROCEDURE — 99285 EMERGENCY DEPT VISIT HI MDM: CPT

## 2024-10-31 PROCEDURE — 96375 TX/PRO/DX INJ NEW DRUG ADDON: CPT

## 2024-10-31 PROCEDURE — 82077 ASSAY SPEC XCP UR&BREATH IA: CPT

## 2024-10-31 PROCEDURE — 700111 HCHG RX REV CODE 636 W/ 250 OVERRIDE (IP): Performed by: EMERGENCY MEDICINE

## 2024-10-31 PROCEDURE — 83690 ASSAY OF LIPASE: CPT

## 2024-10-31 PROCEDURE — 80053 COMPREHEN METABOLIC PANEL: CPT

## 2024-10-31 PROCEDURE — 84484 ASSAY OF TROPONIN QUANT: CPT

## 2024-10-31 PROCEDURE — 36415 COLL VENOUS BLD VENIPUNCTURE: CPT

## 2024-10-31 PROCEDURE — 83735 ASSAY OF MAGNESIUM: CPT

## 2024-10-31 PROCEDURE — 93005 ELECTROCARDIOGRAM TRACING: CPT

## 2024-10-31 RX ORDER — AMOXICILLIN 500 MG/1
1000 CAPSULE ORAL 3 TIMES DAILY
Qty: 30 CAPSULE | Refills: 0 | Status: ACTIVE | OUTPATIENT
Start: 2024-10-31 | End: 2024-11-05

## 2024-10-31 RX ORDER — ONDANSETRON 4 MG/1
4 TABLET, ORALLY DISINTEGRATING ORAL EVERY 6 HOURS PRN
Qty: 10 TABLET | Refills: 0 | Status: SHIPPED | OUTPATIENT
Start: 2024-10-31

## 2024-10-31 RX ORDER — SODIUM CHLORIDE 9 MG/ML
1000 INJECTION, SOLUTION INTRAVENOUS ONCE
Status: COMPLETED | OUTPATIENT
Start: 2024-10-31 | End: 2024-10-31

## 2024-10-31 RX ORDER — MORPHINE SULFATE 4 MG/ML
4 INJECTION INTRAVENOUS ONCE
Status: COMPLETED | OUTPATIENT
Start: 2024-10-31 | End: 2024-10-31

## 2024-10-31 RX ORDER — ONDANSETRON 2 MG/ML
4 INJECTION INTRAMUSCULAR; INTRAVENOUS ONCE
Status: COMPLETED | OUTPATIENT
Start: 2024-10-31 | End: 2024-10-31

## 2024-10-31 RX ADMIN — SODIUM CHLORIDE 1000 ML: 9 INJECTION, SOLUTION INTRAVENOUS at 12:18

## 2024-10-31 RX ADMIN — MORPHINE SULFATE 4 MG: 4 INJECTION INTRAVENOUS at 12:17

## 2024-10-31 RX ADMIN — ONDANSETRON 4 MG: 2 INJECTION INTRAMUSCULAR; INTRAVENOUS at 12:17

## 2024-10-31 ASSESSMENT — FIBROSIS 4 INDEX: FIB4 SCORE: 1.12

## 2024-10-31 NOTE — ED PROVIDER NOTES
ED Provider Note    CHIEF COMPLAINT  Chief Complaint   Patient presents with    Syncope     Patient reports while attempting to stand up she felt dizzy and then passed out for about 5-10 seconds.     Dizziness    Abdominal Pain     Mid upper abd pain started this morning.     N/V     X 2 today.      EXTERNAL RECORDS REVIEWED  Records reviewed show that the patient was last seen here on June 2024 for an allergic reaction. She was admitted in June due to alcohol withdrawal.     HPI/ROS  LIMITATION TO HISTORY   Select: : None  OUTSIDE HISTORIAN(S):  None.    Rhonda Gould is a 53 y.o. female who presents to the Emergency Department via EMS for evaluation after a syncopal episode onset this morning. She describes that she was feeling nauseous and developed mid upper abdominal pain and then had 2 episodes of emesis. The patient then developed diarrhea. She went to stand up to go to her room and while attempting to stand up she felt dizzy and then had a syncopal episode for about 5 to 10 seconds. The patient notes that she fell into a chair that was near by, so she denies any fall or trauma. The patient states she also has headache, but denies any hematemesis. Per nursing note, the patient was given 1 gram of Tylenol and 600 mg of Motrin prior to arrival. The patient does note that she does have some people close to her that have had similar symptoms, such as nausea and diarrhea. She denies any history of abdominal surgeries. The patient reports that she stopped drinking alcohol about 5 months ago.    PAST MEDICAL HISTORY  Past Medical History:   Diagnosis Date    Chronic obstructive pulmonary disease (HCC)     Hypertension         SURGICAL HISTORY  Past Surgical History:   Procedure Laterality Date    HYSTERECTOMY LAPAROSCOPY  6/2005        FAMILY HISTORY  Family History   Problem Relation Age of Onset    Diabetes Mother     Hypertension Mother     Hypertension Father     Diabetes Father     Heart Disease Maternal  "Grandmother     Cancer Maternal Grandfather        SOCIAL HISTORY   reports that she has been smoking. She has never used smokeless tobacco. She reports that she does not currently use alcohol. She reports that she does not currently use drugs after having used the following drugs: Inhaled and Marijuana. Frequency: 2.00 times per week.      CURRENT MEDICATIONS  Discharge Medication List as of 10/31/2024  2:05 PM        CONTINUE these medications which have NOT CHANGED    Details   tizanidine (ZANAFLEX) 4 MG Tab Take 1 Tablet by mouth every 6 hours as needed (spasm / pain)., Disp-30 Tablet, R-0, Normal      EPINEPHrine 0.3 MG/0.3ML Solution Prefilled Syringe Inject the contents of the epipen into the thigh, hold for 3 seconds and release from thigh as needed for anaphylaxis., Disp-1 Each, R-0, Normal      hydrocortisone 1 % Cream Apply 1 Application topically 2 times a day., Disp-14 g, R-0, Normal      thiamine (THIAMINE) 100 MG tablet Take 1 Tablet by mouth every day., Disp-30 Tablet, R-2, Normal      multivitamin Tab Take 1 Tablet by mouth every day., Disp-30 Tablet, R-2, Normal      folic acid (FOLVITE) 1 MG Tab Take 1 Tablet by mouth every day., Disp-30 Tablet, R-2, Normal      albuterol 108 (90 Base) MCG/ACT Aero Soln inhalation aerosol Inhale 2 Puffs every four hours as needed for Shortness of Breath., Historical Med      lisinopril (PRINIVIL) 10 MG Tab Take 1 Tablet by mouth every day., Disp-30 Tablet, R-0, Normal      amLODIPine (NORVASC) 5 MG Tab Take 1 Tablet by mouth every day., Disp-30 Tablet, R-0, Normal             ALLERGIES  Fish allergy, Shellfish allergy, and Codeine      PHYSICAL EXAM  BP (!) 154/99   Pulse (!) 55   Temp 36.1 °C (96.9 °F) (Oral)   Resp 17   Ht 1.753 m (5' 9\")   Wt 57.2 kg (126 lb)   SpO2 96%      Constitutional: Nontoxic appearing. Alert in no apparent distress.  HENT: Normocephalic, Atraumatic. Bilateral external ears normal. Nose normal.  Moist mucous membranes.  Oropharynx " clear.  Eyes: Pupils are equal and reactive. Conjunctiva normal.   Neck: Supple, full range of motion  Heart: Regular rate and rhythm.  No murmurs.    Lungs: No respiratory distress, normal work of breathing. Lungs clear to auscultation bilaterally.  Abdomen Soft, no distention.  Mild epigastric tenderness to palpation.  Musculoskeletal: Atraumatic. No obvious deformities noted.  No lower extremity edema.  Skin: Warm, Dry.  No erythema, No rash.   Neurologic: Alert and oriented x3. Moving all extremities spontaneously without focal deficits.  Psychiatric: Affect normal, Mood normal, Appears appropriate and not intoxicated.      DIAGNOSTIC STUDIES / PROCEDURES    EKG  I have independently interpreted this EKG  Results for orders placed or performed during the hospital encounter of 10/31/24   EKG   Result Value Ref Range    Report       [facility] Emergency [facility]    Test Date:  2024-10-31  Pt Name:    JASON SCHMITZ                Department: ER  MRN:        [id number]                      Room:       [facility]  Gender:     Female                       Technician:  :        [id number]                   Requested By:ER TRIAGE PROTOCOL  Order #:    [id number]                    Reading MD: Ama Capellan MD    Measurements  Intervals                                Axis  Rate:       52                           P:          70  UT:         198                          QRS:        43  QRSD:       103                          T:          52  QT:         444  QTc:        413    Interpretive Statements  Sinus bradycardia  Normal intervals, no ectopy  RSR' in V1 or V2, probably normal variant  No ST or T wave change  Compared to ECG 2024 18:41:07  RSR' in V1 or V2 now present    Electronically Signed On 10- 13:31:21 PDT by Ama Capellan MD         LABS  Labs Reviewed   CBC WITH DIFFERENTIAL - Abnormal; Notable for the following components:       Result Value    WBC 4.3 (*)     Eosinophils 7.20 (*)      All other components within normal limits   URINALYSIS - Abnormal; Notable for the following components:    Leukocyte Esterase Small (*)     All other components within normal limits   URINE MICROSCOPIC (W/UA) - Abnormal; Notable for the following components:    Bacteria Moderate (*)     Epithelial Cells 6-10 (*)     All other components within normal limits   COMP METABOLIC PANEL   LIPASE   TROPONIN   DIAGNOSTIC ALCOHOL   MAGNESIUM   VITAMIN B12   ESTIMATED GFR         RADIOLOGY  I have independently interpreted the diagnostic imaging associated with this visit and am waiting the final reading from the radiologist.   My preliminary interpretation is as follows: Infiltrate in right lower lobe    Radiologist interpretation:  DX-CHEST-PORTABLE (1 VIEW)   Final Result      1. Ill-defined patchy area of opacity in the medial aspect of the right lung base could represent atelectasis or infiltrate. Imaging follow-up is recommended to views of the chest.   2. The remainder of the chest radiography is within normal limits.            COURSE & MEDICAL DECISION MAKING    10:33 AM - Patient seen and examined at bedside. Discussed plan of care, including performing lab work and imaging. Patient agrees to the plan of care. Ordered for an EKG, Troponin, UA, Lipase, CMP, CBC w/ Diff., and DX-Chest to evaluate her symptoms.     Hydration: Based on the patient's presentation of Acute Vomiting and Dehydration the patient was given IV fluids. IV Hydration was used because oral hydration was not adequate alone. Upon recheck following hydration, the patient was improved.  11:02 AM - The patient will be medicated with morphine 4 mg, Zofran 4 mg and a liter of NS fluids.     1:29 PM - The patient was reevaluated at bedside. Will PO challenge the patient. If successful, I anticipate discharge.     ASSESSMENT, COURSE AND PLAN  Care Narrative: Relatively healthy patient who presents with acute onset of vomiting, diarrhea and epigastric  abdominal pain today.  She has had sick contacts with similar symptoms at home.  She also sustained a syncopal episode after getting up off the toilet.  She is afebrile, mildly hypertensive on arrival with otherwise reassuring vital signs.  Her EKG does not demonstrate ischemia or arrhythmia.  I suspect vasovagal syncope or possibly related to dehydration.  I did consider further imaging however her abdominal exam is not concerning for surgical process such as appendicitis, diverticulitis, cholecystitis, obstruction or perforation.  Labs demonstrate mild neutropenia which is chronic.  No evidence of renal dysfunction, electrolyte abnormality.  No transaminitis or elevated lipase concerning for pancreatitis.  Her chest x-ray does show a questionable right lower lobe infiltrate and patient does report a cough that is been ongoing for the last few weeks therefore will cover with antibiotics in case of infection.  Will also treat symptomatically for likely viral gastroenteritis followed by reassessment.      2:15 - Upon reassessment, patient is resting comfortably with normal vital signs.  No new complaints at this time. She was able to tolerate PO challenge. She will be discharged home with Zofran for at home management of nausea/vomiting, as well as amoxicillin for her right lower lobe pneumonia. Discussed results with patient and/or family as well as importance of primary care follow up.  Patient understands plan of care and strict return precautions for new or changing symptoms.     ADDITIONAL PROBLEM LIST  Problem #1: Epigastric abdominal pain -symptoms improved, exam reassuring    Problem #2: Vomiting and diarrhea -likely due to viral process, discharged with Zofran    Problem #3: Syncope - likely vasovagal related to dehydration, given IV fluids    Problem #4: Right lower lobe infiltrate -questionable infectious versus infiltrate, will discharge with amoxicillin as patient has been somewhat  symptomatic    DISPOSITION AND DISCUSSIONS    Escalation of care considered, and ultimately not performed:diagnostic imaging and acute inpatient care management, however at this time, the patient is most appropriate for outpatient management      The patient will return for new or worsening symptoms and is stable at the time of discharge.    DISPOSITION:  Patient will be discharged home in stable condition.    FOLLOW UP:  Karo Middleton M.D.  [address] [facility] [address]  [phone]    Schedule an appointment as soon as possible for a visit       Karo Middleton M.D.  [address] [place] [id number]  [phone]          [facility], Emergency Dept  [address] [facility] [address] [phone]    If symptoms worsen      OUTPATIENT MEDICATIONS:  Discharge Medication List as of 10/31/2024  2:05 PM        START taking these medications    Details   ondansetron (ZOFRAN ODT) 4 MG TABLET DISPERSIBLE Take 1 Tablet by mouth every 6 hours as needed for Nausea/Vomiting., Disp-10 Tablet, R-0, Normal      amoxicillin (AMOXIL) 500 MG Cap Take 2 Capsules by mouth 3 times a day for 5 days., Disp-30 Capsule, R-0, Normal              FINAL DIAGNOSIS  1. Epigastric abdominal pain    2. Nausea and vomiting, unspecified vomiting type    3. Syncope, unspecified syncope type    4. Community acquired pneumonia of right lower lobe of lung      The note accurately reflects work and decisions made by me.  Ama Capellan M.D.  11/1/2024  9:40 AM     IFelecia (Olaf), am scribing for, and in the presence of, Ama Capellan M.D..    Electronically signed by: Felecia Hooker (Olaf), 10/31/2024    IAma M.D. personally performed the services described in this documentation, as scribed by Felecia Hooker in my presence, and it is both accurate and complete.

## 2024-10-31 NOTE — ED TRIAGE NOTES
Chief Complaint   Patient presents with    Syncope     Patient reports while attempting to stand up she felt dizzy and then passed out for about 5-10 seconds.     Dizziness    Abdominal Pain     Mid upper abd pain started this morning.     N/V     X 2 today.      Patient given 1G Tylenol and 600 mg Motrin by EMS PTA.

## 2024-10-31 NOTE — DISCHARGE INSTRUCTIONS
You were seen in the Emergency Department for syncopal episode in the setting of vomiting and abdominal pain.    EKG, labs, urinalysis were completed without significant acute abnormalities.    Please use 1,000mg of tylenol or 600mg of ibuprofen every 6 hours as needed for pain.  Take nausea medication as directed and drink plenty of fluids.    Will treat with antibiotics in case of infection seen on chest x-ray.  Please follow-up with your primary doctor to repeat this x-ray in the next 1 to 2 months.    Please follow up with your primary care physician.    Return to the Emergency Department with uncontrolled pain or vomiting, fevers, recurrent episodes of passing out, or other concerns.

## 2024-11-07 ENCOUNTER — HOSPITAL ENCOUNTER (EMERGENCY)
Facility: MEDICAL CENTER | Age: 54
End: 2024-11-07
Attending: EMERGENCY MEDICINE
Payer: COMMERCIAL

## 2024-11-07 ENCOUNTER — APPOINTMENT (OUTPATIENT)
Dept: RADIOLOGY | Facility: MEDICAL CENTER | Age: 54
End: 2024-11-07
Attending: EMERGENCY MEDICINE
Payer: COMMERCIAL

## 2024-11-07 ENCOUNTER — PHARMACY VISIT (OUTPATIENT)
Dept: PHARMACY | Facility: MEDICAL CENTER | Age: 54
End: 2024-11-07
Payer: COMMERCIAL

## 2024-11-07 VITALS
HEIGHT: 69 IN | RESPIRATION RATE: 18 BRPM | HEART RATE: 65 BPM | DIASTOLIC BLOOD PRESSURE: 84 MMHG | BODY MASS INDEX: 20.14 KG/M2 | WEIGHT: 136 LBS | TEMPERATURE: 97.3 F | OXYGEN SATURATION: 91 % | SYSTOLIC BLOOD PRESSURE: 123 MMHG

## 2024-11-07 DIAGNOSIS — R11.0 NAUSEA: ICD-10-CM

## 2024-11-07 DIAGNOSIS — J18.9 PNEUMONIA OF RIGHT LOWER LOBE DUE TO INFECTIOUS ORGANISM: ICD-10-CM

## 2024-11-07 LAB
ALBUMIN SERPL BCP-MCNC: 4.6 G/DL (ref 3.2–4.9)
ALBUMIN/GLOB SERPL: 1.6 G/DL
ALP SERPL-CCNC: 73 U/L (ref 30–99)
ALT SERPL-CCNC: 10 U/L (ref 2–50)
ANION GAP SERPL CALC-SCNC: 13 MMOL/L (ref 7–16)
AST SERPL-CCNC: 16 U/L (ref 12–45)
BASOPHILS # BLD AUTO: 0.4 % (ref 0–1.8)
BASOPHILS # BLD: 0.02 K/UL (ref 0–0.12)
BILIRUB SERPL-MCNC: 0.4 MG/DL (ref 0.1–1.5)
BUN SERPL-MCNC: 13 MG/DL (ref 8–22)
CALCIUM ALBUM COR SERPL-MCNC: 9.4 MG/DL (ref 8.5–10.5)
CALCIUM SERPL-MCNC: 9.9 MG/DL (ref 8.5–10.5)
CHLORIDE SERPL-SCNC: 101 MMOL/L (ref 96–112)
CO2 SERPL-SCNC: 22 MMOL/L (ref 20–33)
CREAT SERPL-MCNC: 0.82 MG/DL (ref 0.5–1.4)
EKG IMPRESSION: NORMAL
EOSINOPHIL # BLD AUTO: 0.24 K/UL (ref 0–0.51)
EOSINOPHIL NFR BLD: 4.9 % (ref 0–6.9)
ERYTHROCYTE [DISTWIDTH] IN BLOOD BY AUTOMATED COUNT: 47.7 FL (ref 35.9–50)
GFR SERPLBLD CREATININE-BSD FMLA CKD-EPI: 84 ML/MIN/1.73 M 2
GLOBULIN SER CALC-MCNC: 2.9 G/DL (ref 1.9–3.5)
GLUCOSE SERPL-MCNC: 90 MG/DL (ref 65–99)
HCT VFR BLD AUTO: 39.5 % (ref 37–47)
HGB BLD-MCNC: 12.6 G/DL (ref 12–16)
IMM GRANULOCYTES # BLD AUTO: 0.02 K/UL (ref 0–0.11)
IMM GRANULOCYTES NFR BLD AUTO: 0.4 % (ref 0–0.9)
LIPASE SERPL-CCNC: 22 U/L (ref 11–82)
LYMPHOCYTES # BLD AUTO: 1.57 K/UL (ref 1–4.8)
LYMPHOCYTES NFR BLD: 32.3 % (ref 22–41)
MCH RBC QN AUTO: 27.9 PG (ref 27–33)
MCHC RBC AUTO-ENTMCNC: 31.9 G/DL (ref 32.2–35.5)
MCV RBC AUTO: 87.6 FL (ref 81.4–97.8)
MONOCYTES # BLD AUTO: 0.24 K/UL (ref 0–0.85)
MONOCYTES NFR BLD AUTO: 4.9 % (ref 0–13.4)
NEUTROPHILS # BLD AUTO: 2.77 K/UL (ref 1.82–7.42)
NEUTROPHILS NFR BLD: 57.1 % (ref 44–72)
NRBC # BLD AUTO: 0 K/UL
NRBC BLD-RTO: 0 /100 WBC (ref 0–0.2)
PLATELET # BLD AUTO: 341 K/UL (ref 164–446)
PMV BLD AUTO: 8.3 FL (ref 9–12.9)
POTASSIUM SERPL-SCNC: 4.4 MMOL/L (ref 3.6–5.5)
PROT SERPL-MCNC: 7.5 G/DL (ref 6–8.2)
RBC # BLD AUTO: 4.51 M/UL (ref 4.2–5.4)
SODIUM SERPL-SCNC: 136 MMOL/L (ref 135–145)
WBC # BLD AUTO: 4.9 K/UL (ref 4.8–10.8)

## 2024-11-07 PROCEDURE — 96374 THER/PROPH/DIAG INJ IV PUSH: CPT

## 2024-11-07 PROCEDURE — 83690 ASSAY OF LIPASE: CPT

## 2024-11-07 PROCEDURE — 85025 COMPLETE CBC W/AUTO DIFF WBC: CPT

## 2024-11-07 PROCEDURE — 80053 COMPREHEN METABOLIC PANEL: CPT

## 2024-11-07 PROCEDURE — 99284 EMERGENCY DEPT VISIT MOD MDM: CPT

## 2024-11-07 PROCEDURE — 93005 ELECTROCARDIOGRAM TRACING: CPT | Performed by: EMERGENCY MEDICINE

## 2024-11-07 PROCEDURE — 36415 COLL VENOUS BLD VENIPUNCTURE: CPT

## 2024-11-07 PROCEDURE — RXMED WILLOW AMBULATORY MEDICATION CHARGE: Performed by: EMERGENCY MEDICINE

## 2024-11-07 PROCEDURE — 71045 X-RAY EXAM CHEST 1 VIEW: CPT

## 2024-11-07 PROCEDURE — 93005 ELECTROCARDIOGRAM TRACING: CPT

## 2024-11-07 PROCEDURE — 700111 HCHG RX REV CODE 636 W/ 250 OVERRIDE (IP): Mod: JZ | Performed by: EMERGENCY MEDICINE

## 2024-11-07 RX ORDER — ONDANSETRON 2 MG/ML
4 INJECTION INTRAMUSCULAR; INTRAVENOUS ONCE
Status: COMPLETED | OUTPATIENT
Start: 2024-11-07 | End: 2024-11-07

## 2024-11-07 RX ORDER — AZITHROMYCIN 250 MG/1
TABLET, FILM COATED ORAL
Qty: 6 TABLET | Refills: 0 | Status: ACTIVE | OUTPATIENT
Start: 2024-11-07

## 2024-11-07 RX ORDER — ONDANSETRON 4 MG/1
4 TABLET, ORALLY DISINTEGRATING ORAL EVERY 8 HOURS PRN
Qty: 10 TABLET | Refills: 0 | Status: SHIPPED | OUTPATIENT
Start: 2024-11-07

## 2024-11-07 RX ADMIN — ONDANSETRON 4 MG: 2 INJECTION INTRAMUSCULAR; INTRAVENOUS at 11:49

## 2024-11-07 ASSESSMENT — FIBROSIS 4 INDEX
FIB4 SCORE: 1.18
FIB4 SCORE: 1.18

## 2024-11-07 ASSESSMENT — PAIN DESCRIPTION - PAIN TYPE: TYPE: ACUTE PAIN

## 2024-11-07 NOTE — ED TRIAGE NOTES
Chief Complaint   Patient presents with    Dizziness    Abdominal Pain    Weakness     Pt states 5 months sober.    Headache     Pt states sx started last week.     EKG done prior to triage

## 2024-11-07 NOTE — ED PROVIDER NOTES
ER Provider Note    Scribed for René Meade M.D. by Aparna Lomeli. 11/7/2024   11:23 AM    Primary Care Provider: Karo Middleton M.D.    CHIEF COMPLAINT  Chief Complaint   Patient presents with    Dizziness    Abdominal Pain    Weakness     Pt states 5 months sober.    Headache     Pt states sx started last week.     EXTERNAL RECORDS REVIEWED  Care everywhere Patient was here one week ago after an epsiode of syncope. Found to have pneumonia, and treated with amoxicillin      HPI/ROS  LIMITATION TO HISTORY   Select: : None  OUTSIDE HISTORIAN(S):  Friend sitting at bedside    Rhonda Gould is a 53 y.o. female who presents to the ED for evaluation of dizziness onset one week ago. Patient finished her round of antibiotics that she was given last week. Reports nausea, cough, headache, lightheaded, and dizziness. Denies vomiting.    PAST MEDICAL HISTORY  Past Medical History:   Diagnosis Date    Chronic obstructive pulmonary disease (HCC)     Hypertension        SURGICAL HISTORY  Past Surgical History:   Procedure Laterality Date    HYSTERECTOMY LAPAROSCOPY  6/2005       FAMILY HISTORY  Family History   Problem Relation Age of Onset    Diabetes Mother     Hypertension Mother     Hypertension Father     Diabetes Father     Heart Disease Maternal Grandmother     Cancer Maternal Grandfather        SOCIAL HISTORY   reports that she has been smoking. She has never used smokeless tobacco. She reports that she does not currently use alcohol. She reports that she does not currently use drugs after having used the following drugs: Inhaled and Marijuana. Frequency: 2.00 times per week.    CURRENT MEDICATIONS  Previous Medications    ALBUTEROL 108 (90 BASE) MCG/ACT AERO SOLN INHALATION AEROSOL    Inhale 2 Puffs every four hours as needed for Shortness of Breath.    AMLODIPINE (NORVASC) 5 MG TAB    Take 1 Tablet by mouth every day.    EPINEPHRINE 0.3 MG/0.3ML SOLUTION PREFILLED SYRINGE    Inject the contents of the  "epipen into the thigh, hold for 3 seconds and release from thigh as needed for anaphylaxis.    FOLIC ACID (FOLVITE) 1 MG TAB    Take 1 Tablet by mouth every day.    HYDROCORTISONE 1 % CREAM    Apply 1 Application topically 2 times a day.    LISINOPRIL (PRINIVIL) 10 MG TAB    Take 1 Tablet by mouth every day.    MULTIVITAMIN TAB    Take 1 Tablet by mouth every day.    ONDANSETRON (ZOFRAN ODT) 4 MG TABLET DISPERSIBLE    Take 1 Tablet by mouth every 6 hours as needed for Nausea/Vomiting.    THIAMINE (THIAMINE) 100 MG TABLET    Take 1 Tablet by mouth every day.    TIZANIDINE (ZANAFLEX) 4 MG TAB    Take 1 Tablet by mouth every 6 hours as needed (spasm / pain).       ALLERGIES  Allergies   Allergen Reactions    Fish Allergy Anaphylaxis and Shortness of Breath     Swelling to throat.    Shellfish Allergy Anaphylaxis     Strict No Shellfish (avoids most fish, cod OK)      Codeine Hives and Itching     Tolerated morphine previously  Allergy updated from 2015, morphine given in 2016        PHYSICAL EXAM  BP (!) 137/96   Pulse 69   Temp 36.3 °C (97.3 °F) (Temporal)   Resp 18   Ht 1.753 m (5' 9\")   Wt 61.7 kg (136 lb)   LMP 06/11/2005   SpO2 99%   BMI 20.08 kg/m²      Nursing note and vitals reviewed.  Constitutional: Well-developed and well-nourished. No distress.   HENT: Head is normocephalic and atraumatic. Oropharynx is clear and moist without exudate or erythema.   Eyes: Pupils are equal, round, and reactive to light. Conjunctiva are normal.   Cardiovascular: Normal rate and regular rhythm. No murmur heard. Normal radial pulses.   Pulmonary/Chest: Breath sounds normal. No wheezes or rales. No chest wall tenderness.   Abdominal: Soft and non-tender. No distention   Musculoskeletal: Extremities exhibit normal range of motion without edema or tenderness. No calf tenderness or palpable cords.   Neurological: Awake, alert and oriented to person, place, and time. No focal deficits noted.  Skin: Skin is warm and dry. No " rash.   Psychiatric: Normal mood and affect. Appropriate for clinical situation    DIAGNOSTIC STUDIES    Labs:   Results for orders placed or performed during the hospital encounter of 24   EKG    Collection Time: 24 11:00 AM   Result Value Ref Range    Report       [facility] Emergency Dept.    Test Date:  2024  Pt Name:    JASON SCHMITZ                Department: ER  MRN:        [id number]                      Room:  Gender:     Female                       Technician: [id number]  :        1970                   Requested By:ER TRIAGE PROTOCOL  Order #:    [id number]                    Reading MD: RAUL ESQUIVEL MD    Measurements  Intervals                                Axis  Rate:       60                           P:          66  CT:         188                          QRS:        49  QRSD:       73                           T:          60  QT:         440  QTc:        440    Interpretive Statements  Sinus rhythm  Probable left atrial enlargement  RSR' in V1 or V2, probably normal variant  Baseline wander in lead(s) I,II,aVR,aVF  Compared to ECG 10/31/2024 10:21:05  Sinus bradycardia no longer present  Electronically Signed On 2024 12:08:23 PST by RAUL ESQUIVEL MD     CBC with Differential    Collection Time: 24 11:13 AM   Result Value Ref Range    WBC 4.9 4.8 - 10.8 K/uL    RBC 4.51 4.20 - 5.40 M/uL    Hemoglobin 12.6 12.0 - 16.0 g/dL    Hematocrit 39.5 37.0 - 47.0 %    MCV 87.6 81.4 - 97.8 fL    MCH 27.9 27.0 - 33.0 pg    MCHC 31.9 (L) 32.2 - 35.5 g/dL    RDW 47.7 35.9 - 50.0 fL    Platelet Count 341 164 - 446 K/uL    MPV 8.3 (L) 9.0 - 12.9 fL    Neutrophils-Polys 57.10 44.00 - 72.00 %    Lymphocytes 32.30 22.00 - 41.00 %    Monocytes 4.90 0.00 - 13.40 %    Eosinophils 4.90 0.00 - 6.90 %    Basophils 0.40 0.00 - 1.80 %    Immature Granulocytes 0.40 0.00 - 0.90 %    Nucleated RBC 0.00 0.00 - 0.20 /100 WBC    Neutrophils (Absolute) 2.77 1.82 - 7.42 K/uL     Lymphs (Absolute) 1.57 1.00 - 4.80 K/uL    Monos (Absolute) 0.24 0.00 - 0.85 K/uL    Eos (Absolute) 0.24 0.00 - 0.51 K/uL    Baso (Absolute) 0.02 0.00 - 0.12 K/uL    Immature Granulocytes (abs) 0.02 0.00 - 0.11 K/uL    NRBC (Absolute) 0.00 K/uL   Complete Metabolic Panel    Collection Time: 24 11:13 AM   Result Value Ref Range    Sodium 136 135 - 145 mmol/L    Potassium 4.4 3.6 - 5.5 mmol/L    Chloride 101 96 - 112 mmol/L    Co2 22 20 - 33 mmol/L    Anion Gap 13.0 7.0 - 16.0    Glucose 90 65 - 99 mg/dL    Bun 13 8 - 22 mg/dL    Creatinine 0.82 0.50 - 1.40 mg/dL    Calcium 9.9 8.5 - 10.5 mg/dL    Correct Calcium 9.4 8.5 - 10.5 mg/dL    AST(SGOT) 16 12 - 45 U/L    ALT(SGPT) 10 2 - 50 U/L    Alkaline Phosphatase 73 30 - 99 U/L    Total Bilirubin 0.4 0.1 - 1.5 mg/dL    Albumin 4.6 3.2 - 4.9 g/dL    Total Protein 7.5 6.0 - 8.2 g/dL    Globulin 2.9 1.9 - 3.5 g/dL    A-G Ratio 1.6 g/dL   Lipase    Collection Time: 24 11:13 AM   Result Value Ref Range    Lipase 22 11 - 82 U/L   ESTIMATED GFR    Collection Time: 24 11:13 AM   Result Value Ref Range    GFR (CKD-EPI) 84 >60 mL/min/1.73 m 2       EKG:   I have independently interpreted this EKG as detailed above.  Results for orders placed or performed during the hospital encounter of 24   EKG   Result Value Ref Range    Report       [facility] Emergency Dept.    Test Date:  2024  Pt Name:    JASON SCHMITZ                Department: ER  MRN:        [id number]                      Room:  Gender:     Female                       Technician: [id number]  :        1970                   Requested By:ER TRIAGE PROTOCOL  Order #:    [id number]                    Beka MD: ROSS A SIERRA, MD    Measurements  Intervals                                Axis  Rate:       60                           P:          66  WV:         188                          QRS:        49  QRSD:       73                           T:          60  QT:          440  QTc:        440    Interpretive Statements  Sinus rhythm  Probable left atrial enlargement  RSR' in V1 or V2, probably normal variant  Baseline wander in lead(s) I,II,aVR,aVF  Compared to ECG 10/31/2024 10:21:05  Sinus bradycardia no longer present  Electronically Signed On 11- 12:08:23 PST by RAUL ESQUIVEL MD            Radiology:   This attending emergency physician has independently interpreted the diagnostic imaging associated with this visit and is awaiting the final reading from the radiologist.   Preliminary interpretation is a follows: right medial basilar opacity for pneumonia    Radiologist interpretation:   DX-CHEST-PORTABLE (1 VIEW)   Final Result      Unchanged RIGHT medial basilar opacity suspicious for pneumonia. Underlying nodule not excluded.           INITIAL ASSESSMENT AND PLAN    11:23 AM - Patient was evaluated at bedside. Ordered for EKG, Urinalysis, Lipase, CMP, CBC w/ diff, and DX-Chest to evaluate. The patient will be medicated with Zofran 4 mg IV for her symptoms. Patient verbalizes understanding and support with my plan of care.  Differential diagnoses include but not limited to: Viral syndrome, Pneumonia, Dehydration, Anemia, Electrolyte abnormality     12:06 PM - Xray of chest shows right medial basilar opacity for pneumonia. I will switch her antibiotic therapy.     1:15 PM - I reevaluated the patient at bedside. The patient informs me they feel improved following zofran administration. I discussed the patient's diagnostic study results which shows pneumonia. I discussed plan for discharge and follow up as outlined below. The patient is stable for discharge at this time and will return for any new or worsening symptoms. Patient verbalizes understanding and support with my plan for discharge.       The patient was treated with Zofran for nausea.  Placed on a cardiac monitor to monitor for any arrhythmia associated with Zofran and QT prolongation.    DISPOSITION AND  DISCUSSIONS    I have discussed management of the patient with the following physicians and FABI's:  None    Discussion of management with other [facility] or appropriate source(s): None     Escalation of care considered, and ultimately not performed: acute inpatient care management, however at this time, the patient is most appropriate for outpatient management.  Patient does not have an oxygen requirement.  Normal work of breathing.  Good outpatient treatment candidate.    Decision tools and prescription drugs considered including, but not limited to: Antibiotics Augmentin 875-125 mg .    The patient will return for new or worsening symptoms and is stable at the time of discharge.      DISPOSITION:  Patient will be discharged home in stable condition.    FOLLOW UP:  Karo Middleton M.D.  [address] [facility] [address] [phone]    Schedule an appointment as soon as possible for a visit       [facility], Emergency Dept  [address] [facility] [address] [phone]    If symptoms worsen      OUTPATIENT MEDICATIONS:  New Prescriptions    AMOXICILLIN-CLAVULANATE (AUGMENTIN) 875-125 MG TAB    Take 1 Tablet by mouth 2 times a day for 7 days.    AZITHROMYCIN (ZITHROMAX) 250 MG TAB    Take 2 tablets (500 mg) by mouth on day 1 and then take 1 tablet (250 mg) daily on 2-5    ONDANSETRON (ZOFRAN ODT) 4 MG TABLET DISPERSIBLE    Take 1 Tablet by mouth every 8 hours as needed for Nausea/Vomiting.         FINAL DIAGNOSIS  1. Pneumonia of right lower lobe due to infectious organism    2. Nausea         Aparna STARR), am scribing for, and in the presence of, René Meade M.D..    Electronically signed by: Aparna Lomeli (Olaf), 11/7/2024    René STARR M.D. personally performed the services described in this documentation, as scribed by Aparna Lomeli in my presence, and it is both accurate and complete.      The note accurately reflects work and decisions made by me.  René LOCKETT  KAY Meade  11/7/2024  1:26 PM

## 2024-11-07 NOTE — ED NOTES
Pt placed on cardiac and o2 monitor in gown, RN assessment complete. Fall precautions in place and call light within reach

## 2024-11-07 NOTE — ED NOTES
Pt states she found a ride and does not need bus pass. Iv removed and pt able to ambulate to ED sancho

## 2024-11-26 ENCOUNTER — OFFICE VISIT (OUTPATIENT)
Dept: URGENT CARE | Facility: CLINIC | Age: 54
End: 2024-11-26
Payer: COMMERCIAL

## 2024-11-26 ENCOUNTER — APPOINTMENT (OUTPATIENT)
Dept: RADIOLOGY | Facility: IMAGING CENTER | Age: 54
End: 2024-11-26
Payer: COMMERCIAL

## 2024-11-26 ENCOUNTER — PHARMACY VISIT (OUTPATIENT)
Dept: PHARMACY | Facility: MEDICAL CENTER | Age: 54
End: 2024-11-26
Payer: COMMERCIAL

## 2024-11-26 VITALS
DIASTOLIC BLOOD PRESSURE: 68 MMHG | HEIGHT: 69 IN | BODY MASS INDEX: 20.32 KG/M2 | RESPIRATION RATE: 20 BRPM | HEART RATE: 81 BPM | WEIGHT: 137.2 LBS | TEMPERATURE: 97.6 F | OXYGEN SATURATION: 99 % | SYSTOLIC BLOOD PRESSURE: 118 MMHG

## 2024-11-26 DIAGNOSIS — R05.9 COUGH, UNSPECIFIED TYPE: ICD-10-CM

## 2024-11-26 DIAGNOSIS — R07.89 SENSATION OF CHEST PRESSURE: ICD-10-CM

## 2024-11-26 PROCEDURE — 99214 OFFICE O/P EST MOD 30 MIN: CPT

## 2024-11-26 PROCEDURE — RXMED WILLOW AMBULATORY MEDICATION CHARGE

## 2024-11-26 PROCEDURE — 3078F DIAST BP <80 MM HG: CPT

## 2024-11-26 PROCEDURE — 71046 X-RAY EXAM CHEST 2 VIEWS: CPT | Mod: TC | Performed by: RADIOLOGY

## 2024-11-26 PROCEDURE — 3074F SYST BP LT 130 MM HG: CPT

## 2024-11-26 RX ORDER — METHYLPREDNISOLONE 4 MG/1
TABLET ORAL
Qty: 21 TABLET | Refills: 0 | Status: SHIPPED | OUTPATIENT
Start: 2024-11-26

## 2024-11-26 ASSESSMENT — FIBROSIS 4 INDEX: FIB4 SCORE: 0.83

## 2024-11-26 NOTE — LETTER
November 26, 2024    To Whom It May Concern:         This is confirmation that Rhonda Gould attended her scheduled appointment with RED Abdul on 11/26/24. Ok to return to program/facility.          If you have any questions please do not hesitate to call me at the phone number listed below.    Sincerely,          GABRIELLA Abdul.  [phone]

## 2024-11-26 NOTE — PROGRESS NOTES
CHIEF COMPLAINT  Chest fullness, congestion and cough  Subjective:   Rhonda Gould is a 54 y.o. female who presents to urgent care with concerns for chest fullness and shortness of breath with cough.  Patient reports that she was seen in ER approximately 2 weeks ago and had x-ray completed which showed pneumonia.  She reports completing antibiotics as prescribed and noted symptom improvement.  She woke up this morning feeling similar fullness in her chest and was concerned that pneumonia was returning.  Denies any symptoms of fever or chills.  Does report a pertinent history of COPD.  Patient states she has been taking her inhaled steroid and albuterol as prescribed.       Review of Systems   Constitutional:  Negative for chills and fever.   Respiratory:  Positive for cough.        PAST MEDICAL HISTORY  Patient Active Problem List    Diagnosis Date Noted    Alcohol withdrawal syndrome, uncomplicated ([facility]) 06/17/2024    Alcohol abuse with withdrawal ([facility]) 06/14/2024    Hypoxia 06/14/2024    Anemia 06/09/2024    Acute alcohol intoxication with alcoholism with delirium ([facility]) 06/08/2024    Moderate protein-calorie malnutrition ([facility]) 06/08/2024    Hypophosphatemia 06/08/2024    S/P hysterectomy 01/14/2022    S/P cardiac catheterization 01/14/2022    GERD (gastroesophageal reflux disease) 01/14/2022    Respiratory failure ([facility]) 06/22/2021    Reactive airway disease 03/03/2020    Below ideal body weight range 01/29/2020    Smoking 12/11/2019    Dyslipidemia 09/11/2019    Cannabis abuse 09/11/2019    Alcohol abuse, in remission 09/11/2019    HLD (hyperlipidemia) 08/29/2019    Pancytopenia ([facility]) 08/29/2019    Low TSH level 08/24/2019    Lactic acidosis 08/23/2019    Alcoholism ([facility]) 08/23/2019    Simple chronic bronchitis ([facility]) 08/23/2019    Hypoglycemia 08/23/2019    History of hypertension 08/23/2019    Tobacco abuse disorder 08/23/2019    Alopecia 01/15/2019    Mild intermittent asthma without complication  08/24/2017    FH: HTN (hypertension) 08/24/2017    FH: diabetes mellitus 08/24/2017    Allergic rhinitis due to pollen 08/24/2017    COPD exacerbation (HCC) 07/13/2017    Tobacco dependence 04/20/2017    Gout 04/20/2017    TIA (transient ischemic attack) 08/04/2016    Left leg numbness 07/22/2015    Tobacco use 07/22/2015    Itching 06/11/2015    Environmental allergies 02/23/2015    Vitamin D deficiency 01/13/2015    Anxiety 12/09/2014    Insomnia 12/09/2014    Hypertension 12/09/2014    Headache 12/09/2014    Chest pain 09/23/2014       SURGICAL HISTORY   has a past surgical history that includes hysterectomy laparoscopy (6/2005).    ALLERGIES  Allergies   Allergen Reactions    Fish Allergy Anaphylaxis and Shortness of Breath     Swelling to throat.    Shellfish Allergy Anaphylaxis     Strict No Shellfish (avoids most fish, cod OK)      Codeine Hives and Itching     Tolerated morphine previously  Allergy updated from 2015, morphine given in 2016       CURRENT MEDICATIONS  Home Medications       Reviewed by Kevyn Sun'dary (Medical Assistant) on 11/26/24 at 1314  Med List Status: <None>     Medication Last Dose Status   albuterol 108 (90 Base) MCG/ACT Aero Soln inhalation aerosol Taking Active   amLODIPine (NORVASC) 5 MG Tab Taking Active   azithromycin (ZITHROMAX) 250 MG Tab Taking Active   EPINEPHrine 0.3 MG/0.3ML Solution Prefilled Syringe Taking Active   folic acid (FOLVITE) 1 MG Tab Taking Active   hydrocortisone 1 % Cream Taking Active   lisinopril (PRINIVIL) 10 MG Tab Taking Active   multivitamin Tab Taking Active   ondansetron (ZOFRAN ODT) 4 MG TABLET DISPERSIBLE Taking Active   ondansetron (ZOFRAN ODT) 4 MG TABLET DISPERSIBLE Taking Active   thiamine (THIAMINE) 100 MG tablet Taking Active   tizanidine (ZANAFLEX) 4 MG Tab Taking Active                    SOCIAL HISTORY  Social History     Tobacco Use    Smoking status: Some Days     Types: Cigarettes    Smokeless tobacco: Never   Vaping Use     "Vaping status: Never Used   Substance and Sexual Activity    Alcohol use: Not Currently     Comment: 1 pint a day, last drink 6/15    Drug use: Not Currently     Frequency: 2.0 times per week     Types: Inhaled, Marijuana     Comment: marijuana    Sexual activity: Not Currently       FAMILY HISTORY  Family History   Problem Relation Age of Onset    Diabetes Mother     Hypertension Mother     Hypertension Father     Diabetes Father     Heart Disease Maternal Grandmother     Cancer Maternal Grandfather          Medications, Allergies, and current problem list reviewed today in Epic.     Objective:     /68   Pulse 81   Temp 36.4 °C (97.6 °F) (Temporal)   Resp 20   Ht 1.753 m (5' 9\")   Wt 62.2 kg (137 lb 3.2 oz)   SpO2 99%     Physical Exam  Vitals reviewed.   Constitutional:       General: She is not in acute distress.     Appearance: Normal appearance. She is not ill-appearing or toxic-appearing.   HENT:      Head: Normocephalic.      Nose: Congestion present.      Mouth/Throat:      Mouth: Mucous membranes are moist.      Pharynx: Oropharynx is clear.   Cardiovascular:      Rate and Rhythm: Normal rate and regular rhythm.      Pulses: Normal pulses.      Heart sounds: Normal heart sounds.   Pulmonary:      Effort: Pulmonary effort is normal. No respiratory distress.      Breath sounds: No stridor. No wheezing, rhonchi or rales.   Musculoskeletal:      Cervical back: Neck supple.   Skin:     General: Skin is warm.      Capillary Refill: Capillary refill takes less than 2 seconds.   Neurological:      General: No focal deficit present.      Mental Status: She is alert.   Psychiatric:         Mood and Affect: Mood normal.              Assessment/Plan:     Diagnosis and associated orders:     1. Cough, unspecified type  DX-CHEST-2 VIEWS    methylPREDNISolone (MEDROL DOSEPAK) 4 MG Tablet Therapy Pack      2. Sensation of chest pressure  EKG - Clinic Performed         Comments/MDM:     Patient presents to " urgent care with symptoms of chest fullness, cough and congestion.  Patient reports symptoms feel similar to earlier this month when she was diagnosed with pneumonia.  She denies any fevers or chills.  She does have a pertinent history of COPD.  Upon physical exam patient is alert apparent signs of distress.  She is clear to auscultation bilaterally.  No crackles, rhonchi or wheezes appreciated.  Normal respiratory effort.  Vital signs are stable in clinic.  EKG:  Comparison:  No change from previous comparison  Rhythm: Sinus rhythm  Ectopy: None  Rate: 67  QRS Axis: Normal  Conduction: Normal  ST segment: No ST segment elevation or depression noted  Clinical impression: Normal EKG, no change from previous comparison   Chest x-ray shows no acute cardiopulmonary process.  Discussed findings with patient today in clinic.  Medrol Dosepak sent to [facility] pharmacy for treatment of acute cough.  Advised abstain from NSAIDs.  Strict ER precautions discussed with patient today in clinic.  Advised to follow-up immediately ER with worsening or concerning symptoms for further evaluation.  Patient verbalized understanding is agreeable to plan.         Differential diagnosis, natural history, supportive care, and indications for immediate follow-up discussed.    Advised the patient to follow-up with the primary care physician for recheck, reevaluation, and consideration of further management.    Please note that this dictation was created using voice recognition software. I have made a reasonable attempt to correct obvious errors, but I expect that there are errors of grammar and possibly content that I did not discover before finalizing the note.    This note was electronically signed by RED Abdul

## 2024-11-27 NOTE — PROGRESS NOTES
Provider requested an EKG be done on the patient and left without placing order. EKG was scanned into patients chart.

## 2024-11-28 ASSESSMENT — ENCOUNTER SYMPTOMS
CHILLS: 0
COUGH: 1
FEVER: 0

## 2024-12-30 ENCOUNTER — HOSPITAL ENCOUNTER (EMERGENCY)
Facility: MEDICAL CENTER | Age: 54
End: 2024-12-30
Attending: EMERGENCY MEDICINE
Payer: COMMERCIAL

## 2024-12-30 ENCOUNTER — APPOINTMENT (OUTPATIENT)
Dept: RADIOLOGY | Facility: MEDICAL CENTER | Age: 54
End: 2024-12-30
Attending: EMERGENCY MEDICINE
Payer: COMMERCIAL

## 2024-12-30 VITALS
OXYGEN SATURATION: 94 % | TEMPERATURE: 98.5 F | RESPIRATION RATE: 47 BRPM | BODY MASS INDEX: 20.73 KG/M2 | HEIGHT: 69 IN | SYSTOLIC BLOOD PRESSURE: 143 MMHG | HEART RATE: 77 BPM | DIASTOLIC BLOOD PRESSURE: 95 MMHG | WEIGHT: 140 LBS

## 2024-12-30 DIAGNOSIS — J44.1 ACUTE EXACERBATION OF CHRONIC OBSTRUCTIVE PULMONARY DISEASE (COPD) (HCC): ICD-10-CM

## 2024-12-30 DIAGNOSIS — R06.02 SHORTNESS OF BREATH: ICD-10-CM

## 2024-12-30 LAB
ALBUMIN SERPL BCP-MCNC: 4.1 G/DL (ref 3.2–4.9)
ALBUMIN/GLOB SERPL: 1.8 G/DL
ALP SERPL-CCNC: 79 U/L (ref 30–99)
ALT SERPL-CCNC: 9 U/L (ref 2–50)
ANION GAP SERPL CALC-SCNC: 13 MMOL/L (ref 7–16)
AST SERPL-CCNC: 15 U/L (ref 12–45)
BASOPHILS # BLD AUTO: 0.4 % (ref 0–1.8)
BASOPHILS # BLD: 0.02 K/UL (ref 0–0.12)
BILIRUB SERPL-MCNC: 0.3 MG/DL (ref 0.1–1.5)
BUN SERPL-MCNC: 12 MG/DL (ref 8–22)
CALCIUM ALBUM COR SERPL-MCNC: 9.3 MG/DL (ref 8.5–10.5)
CALCIUM SERPL-MCNC: 9.4 MG/DL (ref 8.5–10.5)
CHLORIDE SERPL-SCNC: 102 MMOL/L (ref 96–112)
CO2 SERPL-SCNC: 24 MMOL/L (ref 20–33)
CREAT SERPL-MCNC: 0.79 MG/DL (ref 0.5–1.4)
EKG IMPRESSION: NORMAL
EOSINOPHIL # BLD AUTO: 0.24 K/UL (ref 0–0.51)
EOSINOPHIL NFR BLD: 4.7 % (ref 0–6.9)
ERYTHROCYTE [DISTWIDTH] IN BLOOD BY AUTOMATED COUNT: 47.6 FL (ref 35.9–50)
FLUAV RNA SPEC QL NAA+PROBE: NEGATIVE
FLUBV RNA SPEC QL NAA+PROBE: NEGATIVE
GFR SERPLBLD CREATININE-BSD FMLA CKD-EPI: 88 ML/MIN/1.73 M 2
GLOBULIN SER CALC-MCNC: 2.3 G/DL (ref 1.9–3.5)
GLUCOSE SERPL-MCNC: 124 MG/DL (ref 65–99)
HCT VFR BLD AUTO: 35.8 % (ref 37–47)
HGB BLD-MCNC: 11.8 G/DL (ref 12–16)
IMM GRANULOCYTES # BLD AUTO: 0.05 K/UL (ref 0–0.11)
IMM GRANULOCYTES NFR BLD AUTO: 1 % (ref 0–0.9)
LYMPHOCYTES # BLD AUTO: 1.87 K/UL (ref 1–4.8)
LYMPHOCYTES NFR BLD: 36.9 % (ref 22–41)
MCH RBC QN AUTO: 28.4 PG (ref 27–33)
MCHC RBC AUTO-ENTMCNC: 33 G/DL (ref 32.2–35.5)
MCV RBC AUTO: 86.3 FL (ref 81.4–97.8)
MONOCYTES # BLD AUTO: 0.45 K/UL (ref 0–0.85)
MONOCYTES NFR BLD AUTO: 8.9 % (ref 0–13.4)
NEUTROPHILS # BLD AUTO: 2.44 K/UL (ref 1.82–7.42)
NEUTROPHILS NFR BLD: 48.1 % (ref 44–72)
NRBC # BLD AUTO: 0 K/UL
NRBC BLD-RTO: 0 /100 WBC (ref 0–0.2)
NT-PROBNP SERPL IA-MCNC: 90 PG/ML (ref 0–125)
PLATELET # BLD AUTO: 318 K/UL (ref 164–446)
PMV BLD AUTO: 8.6 FL (ref 9–12.9)
POTASSIUM SERPL-SCNC: 3.6 MMOL/L (ref 3.6–5.5)
PROT SERPL-MCNC: 6.4 G/DL (ref 6–8.2)
RBC # BLD AUTO: 4.15 M/UL (ref 4.2–5.4)
RSV RNA SPEC QL NAA+PROBE: NEGATIVE
SARS-COV-2 RNA RESP QL NAA+PROBE: NOTDETECTED
SODIUM SERPL-SCNC: 139 MMOL/L (ref 135–145)
TROPONIN T SERPL-MCNC: <6 NG/L (ref 6–19)
WBC # BLD AUTO: 5.1 K/UL (ref 4.8–10.8)

## 2024-12-30 PROCEDURE — 85025 COMPLETE CBC W/AUTO DIFF WBC: CPT

## 2024-12-30 PROCEDURE — 99284 EMERGENCY DEPT VISIT MOD MDM: CPT

## 2024-12-30 PROCEDURE — 0241U HCHG SARS-COV-2 COVID-19 NFCT DS RESP RNA 4 TRGT ED POC: CPT

## 2024-12-30 PROCEDURE — 71045 X-RAY EXAM CHEST 1 VIEW: CPT

## 2024-12-30 PROCEDURE — 96374 THER/PROPH/DIAG INJ IV PUSH: CPT

## 2024-12-30 PROCEDURE — 700101 HCHG RX REV CODE 250: Performed by: EMERGENCY MEDICINE

## 2024-12-30 PROCEDURE — 36415 COLL VENOUS BLD VENIPUNCTURE: CPT

## 2024-12-30 PROCEDURE — 84484 ASSAY OF TROPONIN QUANT: CPT

## 2024-12-30 PROCEDURE — 94640 AIRWAY INHALATION TREATMENT: CPT

## 2024-12-30 PROCEDURE — 83880 ASSAY OF NATRIURETIC PEPTIDE: CPT

## 2024-12-30 PROCEDURE — 700101 HCHG RX REV CODE 250

## 2024-12-30 PROCEDURE — 700111 HCHG RX REV CODE 636 W/ 250 OVERRIDE (IP): Mod: JZ | Performed by: EMERGENCY MEDICINE

## 2024-12-30 PROCEDURE — 80053 COMPREHEN METABOLIC PANEL: CPT

## 2024-12-30 PROCEDURE — 93005 ELECTROCARDIOGRAM TRACING: CPT | Mod: TC | Performed by: EMERGENCY MEDICINE

## 2024-12-30 RX ORDER — IPRATROPIUM BROMIDE AND ALBUTEROL SULFATE 2.5; .5 MG/3ML; MG/3ML
3 SOLUTION RESPIRATORY (INHALATION) ONCE
Status: COMPLETED | OUTPATIENT
Start: 2024-12-30 | End: 2024-12-30

## 2024-12-30 RX ORDER — PREDNISONE 20 MG/1
20 TABLET ORAL DAILY
Qty: 4 TABLET | Refills: 0 | Status: SHIPPED | OUTPATIENT
Start: 2024-12-30 | End: 2025-01-03

## 2024-12-30 RX ORDER — IPRATROPIUM BROMIDE AND ALBUTEROL SULFATE 2.5; .5 MG/3ML; MG/3ML
SOLUTION RESPIRATORY (INHALATION)
Status: COMPLETED
Start: 2024-12-30 | End: 2024-12-30

## 2024-12-30 RX ORDER — METHYLPREDNISOLONE SODIUM SUCCINATE 125 MG/2ML
62.5 INJECTION, POWDER, LYOPHILIZED, FOR SOLUTION INTRAMUSCULAR; INTRAVENOUS ONCE
Status: COMPLETED | OUTPATIENT
Start: 2024-12-30 | End: 2024-12-30

## 2024-12-30 RX ADMIN — IPRATROPIUM BROMIDE AND ALBUTEROL SULFATE 3 ML: .5; 2.5 SOLUTION RESPIRATORY (INHALATION) at 09:36

## 2024-12-30 RX ADMIN — IPRATROPIUM BROMIDE AND ALBUTEROL SULFATE 3 ML: .5; 2.5 SOLUTION RESPIRATORY (INHALATION) at 12:17

## 2024-12-30 RX ADMIN — IPRATROPIUM BROMIDE AND ALBUTEROL SULFATE 3 ML: 2.5; .5 SOLUTION RESPIRATORY (INHALATION) at 12:17

## 2024-12-30 RX ADMIN — METHYLPREDNISOLONE SODIUM SUCCINATE 62.5 MG: 125 INJECTION, POWDER, FOR SOLUTION INTRAMUSCULAR; INTRAVENOUS at 09:33

## 2024-12-30 ASSESSMENT — FIBROSIS 4 INDEX: FIB4 SCORE: 0.83

## 2024-12-30 ASSESSMENT — COPD QUESTIONNAIRES
COPD SCREENING SCORE: 3
HAVE YOU SMOKED AT LEAST 100 CIGARETTES IN YOUR ENTIRE LIFE: YES
DURING THE PAST 4 WEEKS HOW MUCH DID YOU FEEL SHORT OF BREATH: NONE/LITTLE OF THE TIME
DO YOU EVER COUGH UP ANY MUCUS OR PHLEGM?: NO/ONLY WITH OCCASIONAL COLDS OR INFECTIONS

## 2024-12-30 NOTE — ED PROVIDER NOTES
Emergency Physician Note    Chief Concern:  Chief Complaint   Patient presents with    Shortness of Breath     Pt BIB EMS from [facility] for above complaint. Pt report SOB x1wk. Hx of COPD. Per pt she was Hopes to get a breathing tx but SOB was getting worse so then staff at the clinic called 911. Pt received duoneb by EMS PTA, pt report relief from breathing tx but still has expiratory wheezing.          External Records Reviewed:  Outpatient records reviewed: Patient was seen in urgent care 11/26/2024.  APRN note reviewed from that visit.  She reported chest fullness and shortness of breath, stated she had been seen in the emergency department 2 weeks prior with chest x-ray showing pneumonia.  After antibiotics, symptoms improved, however she woke up again feeling short of breath.  Reports history of COPD.  Pulmonary exam noted to be clear bilaterally with no wheezes.  Medrol Dosepak prescribed for treatment of acute cough.     HPI/ROS       HPI:  Rhonda Gould is a 54 y.o. female who presents to the emergency department today for evaluation of shortness of breath.  She has a history of COPD, has been struggling with uncontrolled shortness of breath for about the past week.  She has been seen by her primary care physician at [facility], who prescribed her a nebulizer, however she has been working with home health to get the documentation necessary to receive that machine.  She also completed a course of doxycycline that was prescribed last week, with minimal improvement.  Her breathing became worse again today.  She states COPD is likely brought on by smoking, she is currently in the process of trying to quit smoking.  She reports no associated chest pain, no thoracic back pain, has not had any recent fevers, but has had an ongoing dry cough.  She has no leg pain or leg swelling.    PAST MEDICAL HISTORY  Past Medical History:   Diagnosis Date    Chronic obstructive pulmonary disease (HCC)      Hypertension        SURGICAL HISTORY  Past Surgical History:   Procedure Laterality Date    HYSTERECTOMY LAPAROSCOPY  6/2005       FAMILY HISTORY  Family History   Problem Relation Age of Onset    Diabetes Mother     Hypertension Mother     Hypertension Father     Diabetes Father     Heart Disease Maternal Grandmother     Cancer Maternal Grandfather        SOCIAL HISTORY   reports that she has been smoking cigarettes. She has never used smokeless tobacco. She reports that she does not currently use alcohol. She reports that she does not currently use drugs after having used the following drugs: Inhaled and Marijuana. Frequency: 2.00 times per week.    CURRENT MEDICATIONS  Discharge Medication List as of 12/30/2024 12:34 PM        CONTINUE these medications which have NOT CHANGED    Details   azithromycin (ZITHROMAX) 250 MG Tab Take 2 tablets (500 mg) by mouth on day 1 and then take 1 tablet (250 mg) daily on 2-5, Disp-6 Tablet, R-0, Normal      !! ondansetron (ZOFRAN ODT) 4 MG TABLET DISPERSIBLE Take 1 Tablet by mouth every 8 hours as needed for Nausea/Vomiting., Disp-10 Tablet, R-0, Normal      !! ondansetron (ZOFRAN ODT) 4 MG TABLET DISPERSIBLE Take 1 Tablet by mouth every 6 hours as needed for Nausea/Vomiting., Disp-10 Tablet, R-0, Normal      tizanidine (ZANAFLEX) 4 MG Tab Take 1 Tablet by mouth every 6 hours as needed (spasm / pain)., Disp-30 Tablet, R-0, Normal      EPINEPHrine 0.3 MG/0.3ML Solution Prefilled Syringe Inject the contents of the epipen into the thigh, hold for 3 seconds and release from thigh as needed for anaphylaxis., Disp-1 Each, R-0, Normal      hydrocortisone 1 % Cream Apply 1 Application topically 2 times a day., Disp-14 g, R-0, Normal      thiamine (THIAMINE) 100 MG tablet Take 1 Tablet by mouth every day., Disp-30 Tablet, R-2, Normal      multivitamin Tab Take 1 Tablet by mouth every day., Disp-30 Tablet, R-2, Normal      folic acid (FOLVITE) 1 MG Tab Take 1 Tablet by mouth every day.,  "Disp-30 Tablet, R-2, Normal      albuterol 108 (90 Base) MCG/ACT Aero Soln inhalation aerosol Inhale 2 Puffs every four hours as needed for Shortness of Breath., Historical Med      lisinopril (PRINIVIL) 10 MG Tab Take 1 Tablet by mouth every day., Disp-30 Tablet, R-0, Normal      amLODIPine (NORVASC) 5 MG Tab Take 1 Tablet by mouth every day., Disp-30 Tablet, R-0, Normal       !! - Potential duplicate medications found. Please discuss with provider.          ALLERGIES  Fish allergy, Shellfish allergy, and Codeine    PHYSICAL EXAM  Vital Signs: BP (!) 143/95   Pulse 77   Temp 36.9 °C (98.5 °F) (Temporal)   Resp (!) 47   Ht 1.753 m (5' 9\")   Wt 63.5 kg (140 lb)   LMP 2005   SpO2 94%   BMI 20.67 kg/m²   Constitutional: Alert, no acute distress  HENT: Normocephalic, atraumatic.  Cardiovascular: No tachycardia, regular rate and rhythm, no murmur appreciated  Pulmonary: Increased work of breathing with moderate to severe wheezing throughout all lung fields, breath sounds equal bilaterally, room air oxygen saturation is 93%, she does have mild tachypnea.  Abdomen: Soft, non tender, no peritoneal signs.  Skin: Warm, dry, no rashes or lesions  Musculoskeletal: Normal range of motion in all extremities, no swelling or deformity noted  Neurologic: Alert, oriented, normal motor function, no speech deficits    Diagnostic Studies & Procedures    Labs:  All labs reviewed by me as noted below.    EK Lead EKG interpreted by me as noted below  Results for orders placed or performed during the hospital encounter of 24   EKG   Result Value Ref Range    Report       [facility] Emergency Dept.    Test Date:  2024  Pt Name:    JASON SCHMITZ                Department: ER  MRN:        [id number]                      Room:       RD 08  Gender:     Female                       Technician: [id number]  :        1970                   Requested By:ER TRIAGE PROTOCOL  Order #:    [id number]     "                Reading MD: GREGORY GRIFFIN MD    Measurements  Intervals                                Axis  Rate:       74                           P:          69  KY:         221                          QRS:        47  QRSD:       95                           T:          43  QT:         407  QTc:        452    Interpretive Statements  Rate 74, sinus rhythm, no ST elevation, she does have some KY depression,  there is some artifact somewhat limiting interpretation.  Electronically Signed On 12- 09:30:32 PST by GREGORY GRIFFIN MD           Radiology:  The attending Emergency Physician has independently interpreted the following imaging:  I independently interpreted the chest x-ray, no focal infiltrates concerning for bacterial pneumonia.    DX-CHEST-PORTABLE (1 VIEW)   Final Result      No acute cardiopulmonary disease evident.          Course and Medical Decision Making    Initial Assessment and Plan:  Ms. Gould presents to the emergency department today for evaluation of shortness of breath.  She states she was previously in her primary care clinic, however due to shortness of breath they sent her to the emergency department.  She has a past medical history significant for COPD as documented above.  On arrival she has no fever, no recent flulike symptoms, no cough.  She did recently complete a course of doxycycline.  She is currently smoking, and is working on cessation at this time.  She is afebrile on arrival with no tachycardia, no hypotension, she is mildly tachypneic.  On physical examination she has diffuse wheezing throughout all lung fields consistent with COPD exacerbation.    On laboratory evaluation influenza, COVID, and RSV PCR resulted negative.  CBC demonstrates normal white blood count, CMP shows no abnormalities.  High-sensitivity troponin and proBNP ordered according to triage protocol resulted negative.    She was given 2 DuoNeb treatments, with significant improvement in symptoms.   She was also given a dose of Solu-Medrol.  On reassessment,  work of breathing has significantly improved, pulmonary auscultation reveals only mild wheezes at this time.  Tachypnea has resolved.  She has no hypoxia.  At this time I believe she is stable for discharge home.  She already has long-acting inhaled medications, as well as rescue albuterol.  I will provide a short course of prednisone.  No indication for antibiotics as she has no evidence of infection and recently completed a course of doxycycline.  Plan at this time is for discharge home with close outpatient follow-up. Return precautions were discussed with the patient, and provided in written form with the patient's discharge instructions.     Additional Problems and Disposition    Escalation of care considered, and ultimately not performed:  1.  Hospitalization -given increased work breathing and severe wheezing on arrival, hospitalization was initially considered.  However symptoms were well-controlled and she had good response to therapeutic intervention in the emergency department.  Believe she is stable for discharge home with outpatient follow-up and return precautions.    Prescription medication considerations and management:  1.  Antibiotics -antibiotics considered, however she recently completed a course of doxycycline, and has no evidence of bacterial infection at this time with normal white blood count and normal chest x-ray.    Smoking Cessation:  Marisa Orozco M.D. spent 4 minutes discussing smoking cessation with the patient.  She is a plan to quit, additional resources were offered.  Nicotine replacement offered, however patient has a prescription for Chantix at this time.    Disposition:  Discharged in stable condition    FINAL IMPRESSION   1. Acute exacerbation of chronic obstructive pulmonary disease (COPD) ([facility])    2. Shortness of breath        FOLLOW UP:  Karo Middleton M.D.  [address] [place] [id number] [phone]    Call  in 1 day      [facility], Emergency Dept  [address]  [place] [id number] [phone]  Go to   If symptoms worsen      OUTPATIENT MEDICATIONS:  Discharge Medication List as of 12/30/2024 12:34 PM        START taking these medications    Details   predniSONE (DELTASONE) 20 MG Tab Take 1 Tablet by mouth every day for 4 days., Disp-4 Tablet, R-0, Normal

## 2024-12-30 NOTE — DISCHARGE INSTRUCTIONS
1.  Please take the steroids as prescribed.  You may start that prescription tomorrow, your first dose was given here in the emergency department today.    2.  Return to the emergency department if you develop any new or worsening symptoms including worsening shortness of breath, fever, cough, congestion, or if you have any further concerns.    3.  Please contact your primary care clinic today for further assistance with obtaining a nebulizer.

## 2025-01-12 ENCOUNTER — HOSPITAL ENCOUNTER (EMERGENCY)
Facility: MEDICAL CENTER | Age: 55
End: 2025-01-12
Attending: STUDENT IN AN ORGANIZED HEALTH CARE EDUCATION/TRAINING PROGRAM
Payer: COMMERCIAL

## 2025-01-12 ENCOUNTER — PHARMACY VISIT (OUTPATIENT)
Dept: PHARMACY | Facility: MEDICAL CENTER | Age: 55
End: 2025-01-12
Payer: COMMERCIAL

## 2025-01-12 VITALS
HEART RATE: 81 BPM | WEIGHT: 143.3 LBS | BODY MASS INDEX: 21.22 KG/M2 | RESPIRATION RATE: 16 BRPM | DIASTOLIC BLOOD PRESSURE: 67 MMHG | SYSTOLIC BLOOD PRESSURE: 100 MMHG | HEIGHT: 69 IN | TEMPERATURE: 98 F | OXYGEN SATURATION: 93 %

## 2025-01-12 DIAGNOSIS — G89.29 CHRONIC LEFT SHOULDER PAIN: ICD-10-CM

## 2025-01-12 DIAGNOSIS — M25.512 CHRONIC LEFT SHOULDER PAIN: ICD-10-CM

## 2025-01-12 PROCEDURE — 96372 THER/PROPH/DIAG INJ SC/IM: CPT

## 2025-01-12 PROCEDURE — 700111 HCHG RX REV CODE 636 W/ 250 OVERRIDE (IP): Mod: JZ | Performed by: STUDENT IN AN ORGANIZED HEALTH CARE EDUCATION/TRAINING PROGRAM

## 2025-01-12 PROCEDURE — RXMED WILLOW AMBULATORY MEDICATION CHARGE: Performed by: STUDENT IN AN ORGANIZED HEALTH CARE EDUCATION/TRAINING PROGRAM

## 2025-01-12 PROCEDURE — RXOTC WILLOW AMBULATORY OTC CHARGE

## 2025-01-12 PROCEDURE — A9270 NON-COVERED ITEM OR SERVICE: HCPCS | Performed by: STUDENT IN AN ORGANIZED HEALTH CARE EDUCATION/TRAINING PROGRAM

## 2025-01-12 PROCEDURE — 700101 HCHG RX REV CODE 250: Performed by: STUDENT IN AN ORGANIZED HEALTH CARE EDUCATION/TRAINING PROGRAM

## 2025-01-12 PROCEDURE — 700102 HCHG RX REV CODE 250 W/ 637 OVERRIDE(OP): Performed by: STUDENT IN AN ORGANIZED HEALTH CARE EDUCATION/TRAINING PROGRAM

## 2025-01-12 PROCEDURE — 99283 EMERGENCY DEPT VISIT LOW MDM: CPT

## 2025-01-12 RX ORDER — METHOCARBAMOL 500 MG/1
1000 TABLET, FILM COATED ORAL ONCE
Status: COMPLETED | OUTPATIENT
Start: 2025-01-12 | End: 2025-01-12

## 2025-01-12 RX ORDER — LIDOCAINE 50 MG/G
1 PATCH TOPICAL EVERY 24 HOURS
Qty: 10 PATCH | Refills: 0 | Status: SHIPPED | OUTPATIENT
Start: 2025-01-12

## 2025-01-12 RX ORDER — METHOCARBAMOL 750 MG/1
750 TABLET, FILM COATED ORAL 4 TIMES DAILY
Qty: 120 TABLET | Refills: 0 | Status: SHIPPED | OUTPATIENT
Start: 2025-01-12

## 2025-01-12 RX ORDER — LIDOCAINE 4 G/G
1 PATCH TOPICAL ONCE
Status: DISCONTINUED | OUTPATIENT
Start: 2025-01-12 | End: 2025-01-12 | Stop reason: HOSPADM

## 2025-01-12 RX ORDER — KETOROLAC TROMETHAMINE 15 MG/ML
15 INJECTION, SOLUTION INTRAMUSCULAR; INTRAVENOUS ONCE
Status: COMPLETED | OUTPATIENT
Start: 2025-01-12 | End: 2025-01-12

## 2025-01-12 RX ORDER — ACETAMINOPHEN 500 MG
1000 TABLET ORAL ONCE
Status: COMPLETED | OUTPATIENT
Start: 2025-01-12 | End: 2025-01-12

## 2025-01-12 RX ADMIN — ACETAMINOPHEN 1000 MG: 500 TABLET ORAL at 04:42

## 2025-01-12 RX ADMIN — KETOROLAC TROMETHAMINE 15 MG: 15 INJECTION, SOLUTION INTRAMUSCULAR; INTRAVENOUS at 04:42

## 2025-01-12 RX ADMIN — METHOCARBAMOL 1000 MG: 500 TABLET ORAL at 04:42

## 2025-01-12 RX ADMIN — LIDOCAINE PAIN RELIEF 1 PATCH: 560 PATCH TOPICAL at 04:40

## 2025-01-12 ASSESSMENT — FIBROSIS 4 INDEX: FIB4 SCORE: 0.88

## 2025-01-12 NOTE — LETTER
PHYSICIAN’S AND CHIROPRACTIC PHYSICIAN'S   PROGRESS REPORT   CERTIFICATION OF DISABILITY Claim Number:     Social Security Number:    Patient’s Name: Rhonda Gould Date of Injury: 2/27/2023   Employer: [employer] Name of [facility] [employer] (if applicable):      Patient’s Job Description/Occupation: CNA       Previous Injuries/Diseases/Surgeries Contributing to the Condition:  Work Injury in Feb 2023      Diagnosis: (M25.512,  G89.29) Chronic left shoulder pain      Related to the Industrial Injury? Yes     Explain: Worsening left shoulder pain      Objective Medical Findings:  Unable to perform passive or active range of motion of the left shoulder neuro vastly intactUnable to perform passive or active range of motion of the left shoulder neuro vastly intact         None - Discharged                         Stable  No                 Ratable  No        Generally Improved                      X   Condition Worsened                  Condition Same  May Have Suffered a Permanent Disability No     Treatment Plan:    Plan to follow-up with occupational healthPlan to follow-up with occupational health       x  No Change in Therapy                  PT/OT Prescribed                      Medication May be Used While Working        Case Management                          PT/OT Discontinued    Consultation    Further Diagnostic Studies:    Prescription(s)  No     No     No     Released to FULL DUTY /No Restrictions on (Date):     X  Certified TOTALLY TEMPORARILY DISABLED (Indicate Dates) From:   To:      Released to RESTRICTED/Modified Duty on (Date): From:   To:    Restrictions Are:        x No Sitting   x No Standing  x  No Pulling Other: Patient cannot return to work until cleared by occupational health     x  No Bending at Waist  x   No Stooping  x   No Lifting      x  No Carrying  x   No Walking Lifting Restricted to (lbs.):        x  No Pushing      x  No Climbing   x  No Reaching Above Shoulders       Date of Next  Visit:    Date of this Exam: 1/12/2025 Physician/Chiropractic Physician Name: Jessica Whitt Physician/Chiropractic Physician Signature:  JESSICA Gar M.D.   [place]:  [address] [place] [address], [address] [place], [place] [address] - Telephone [phone] [place]:  [address] [place] [address], [address] [place], [place] [address] - Telephone [phone]    [email or web address]  D-39 (Rev. 10/24)
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
## 2025-01-12 NOTE — ED TRIAGE NOTES
Chief Complaint   Patient presents with    Shoulder Pain     Left shoulder pain since  yesterday morning    Work related injury 2/2023 - left shoulder     Pain:  9/10  Ambulatory:  Yes  Alert and Oriented: x 4  Oxygen Treatment: No    Pt came in to triage for the above complaints.     Pt is speaking in full sentences, follows commands and responds appropriately to questions.     Respirations are even and unlabored.    Pt placed in lobby. Pt educated on triage process.     Pt encouraged to inform staff for any changes in condition or if needs help while waiting to be room in.      Vitals:    01/12/25 0148   BP: 98/78   Pulse: 83   Resp: 18   Temp: 36.3 °C (97.3 °F)   SpO2: 96%

## 2025-01-12 NOTE — LETTER
PHYSICIAN’S AND CHIROPRACTIC PHYSICIAN'S   PROGRESS REPORT   CERTIFICATION OF DISABILITY Claim Number:     Social Security Number:        Patient’s Name: Rhonda Gould Date of Injury: 2/27/2023 02/27/2023   Employer: [employer] Name of MCO (if applicable):      Patient’s Job Description/Occupation: CNA       Previous Injuries/Diseases/Surgeries Contributing to the Condition:  Work Injury in Feb 2023      Diagnosis: (M25.512,  G89.29) Chronic left shoulder pain      Related to the Industrial Injury? Yes     Explain:        Objective Medical Findings:           None - Discharged                         Stable  No                 Ratable  No        Generally Improved                         Condition Worsened                  Condition Same  May Have Suffered a Permanent Disability No     Treatment Plan:              No Change in Therapy                  PT/OT Prescribed                      Medication May be Used While Working        Case Management                          PT/OT Discontinued    Consultation    Further Diagnostic Studies:    Prescription(s)                 Released to FULL DUTY /No Restrictions on (Date):       Certified TOTALLY TEMPORARILY DISABLED (Indicate Dates) From:   To:      Released to RESTRICTED/Modified Duty on (Date): From:   To:    Restrictions Are:         No Sitting    No Standing    No Pulling Other: Patient cannot return to work until cleared by occupational health       No Bending at Waist     No Stooping     No Lifting        No Carrying     No Walking Lifting Restricted to (lbs.):          No Pushing        No Climbing     No Reaching Above Shoulders       Date of Next Visit:    Date of this Exam: 1/12/2025 Physician/Chiropractic Physician Name:  Physician/Chiropractic Physician Signature:  JEVON Gar M.D.   [place]:  [address] [place] [address], [address] [place], [place] [address] - Telephone [phone] [place]:  [address]  [facility], [address]  [place], [place] [address] - Telephone [phone]    [email or web address]  D-39 (Rev. 10/24)

## 2025-02-26 ENCOUNTER — APPOINTMENT (OUTPATIENT)
Dept: RADIOLOGY | Facility: MEDICAL CENTER | Age: 55
End: 2025-02-26
Attending: EMERGENCY MEDICINE
Payer: COMMERCIAL

## 2025-02-26 ENCOUNTER — APPOINTMENT (OUTPATIENT)
Dept: RADIOLOGY | Facility: MEDICAL CENTER | Age: 55
End: 2025-02-26
Payer: COMMERCIAL

## 2025-02-26 ENCOUNTER — HOSPITAL ENCOUNTER (EMERGENCY)
Facility: MEDICAL CENTER | Age: 55
End: 2025-02-26
Attending: EMERGENCY MEDICINE
Payer: COMMERCIAL

## 2025-02-26 VITALS
SYSTOLIC BLOOD PRESSURE: 133 MMHG | HEIGHT: 69 IN | DIASTOLIC BLOOD PRESSURE: 94 MMHG | TEMPERATURE: 97.4 F | BODY MASS INDEX: 22.07 KG/M2 | OXYGEN SATURATION: 93 % | HEART RATE: 90 BPM | WEIGHT: 149 LBS | RESPIRATION RATE: 21 BRPM

## 2025-02-26 DIAGNOSIS — J44.1 ACUTE EXACERBATION OF CHRONIC OBSTRUCTIVE PULMONARY DISEASE (COPD) (HCC): ICD-10-CM

## 2025-02-26 LAB
ALBUMIN SERPL BCP-MCNC: 4.7 G/DL (ref 3.2–4.9)
ALBUMIN/GLOB SERPL: 1.5 G/DL
ALP SERPL-CCNC: 77 U/L (ref 30–99)
ALT SERPL-CCNC: 12 U/L (ref 2–50)
ANION GAP SERPL CALC-SCNC: 17 MMOL/L (ref 7–16)
AST SERPL-CCNC: 21 U/L (ref 12–45)
BASOPHILS # BLD AUTO: 0.6 % (ref 0–1.8)
BASOPHILS # BLD: 0.03 K/UL (ref 0–0.12)
BILIRUB SERPL-MCNC: 0.3 MG/DL (ref 0.1–1.5)
BUN SERPL-MCNC: 9 MG/DL (ref 8–22)
CALCIUM ALBUM COR SERPL-MCNC: 9.3 MG/DL (ref 8.5–10.5)
CALCIUM SERPL-MCNC: 9.9 MG/DL (ref 8.5–10.5)
CHLORIDE SERPL-SCNC: 101 MMOL/L (ref 96–112)
CO2 SERPL-SCNC: 20 MMOL/L (ref 20–33)
CREAT SERPL-MCNC: 0.87 MG/DL (ref 0.5–1.4)
EKG IMPRESSION: NORMAL
EKG IMPRESSION: NORMAL
EOSINOPHIL # BLD AUTO: 0.29 K/UL (ref 0–0.51)
EOSINOPHIL NFR BLD: 6.1 % (ref 0–6.9)
ERYTHROCYTE [DISTWIDTH] IN BLOOD BY AUTOMATED COUNT: 49.9 FL (ref 35.9–50)
GFR SERPLBLD CREATININE-BSD FMLA CKD-EPI: 78 ML/MIN/1.73 M 2
GLOBULIN SER CALC-MCNC: 3.2 G/DL (ref 1.9–3.5)
GLUCOSE SERPL-MCNC: 87 MG/DL (ref 65–99)
HCT VFR BLD AUTO: 41.9 % (ref 37–47)
HGB BLD-MCNC: 13.3 G/DL (ref 12–16)
IMM GRANULOCYTES # BLD AUTO: 0.03 K/UL (ref 0–0.11)
IMM GRANULOCYTES NFR BLD AUTO: 0.6 % (ref 0–0.9)
LYMPHOCYTES # BLD AUTO: 1.61 K/UL (ref 1–4.8)
LYMPHOCYTES NFR BLD: 33.9 % (ref 22–41)
MCH RBC QN AUTO: 28.1 PG (ref 27–33)
MCHC RBC AUTO-ENTMCNC: 31.7 G/DL (ref 32.2–35.5)
MCV RBC AUTO: 88.6 FL (ref 81.4–97.8)
MONOCYTES # BLD AUTO: 0.36 K/UL (ref 0–0.85)
MONOCYTES NFR BLD AUTO: 7.6 % (ref 0–13.4)
NEUTROPHILS # BLD AUTO: 2.43 K/UL (ref 1.82–7.42)
NEUTROPHILS NFR BLD: 51.2 % (ref 44–72)
NRBC # BLD AUTO: 0 K/UL
NRBC BLD-RTO: 0 /100 WBC (ref 0–0.2)
NT-PROBNP SERPL IA-MCNC: <36 PG/ML (ref 0–125)
PLATELET # BLD AUTO: 334 K/UL (ref 164–446)
PMV BLD AUTO: 8.8 FL (ref 9–12.9)
POTASSIUM SERPL-SCNC: 4 MMOL/L (ref 3.6–5.5)
PROT SERPL-MCNC: 7.9 G/DL (ref 6–8.2)
RBC # BLD AUTO: 4.73 M/UL (ref 4.2–5.4)
SODIUM SERPL-SCNC: 138 MMOL/L (ref 135–145)
TROPONIN T SERPL-MCNC: <6 NG/L (ref 6–19)
WBC # BLD AUTO: 4.8 K/UL (ref 4.8–10.8)

## 2025-02-26 PROCEDURE — 700102 HCHG RX REV CODE 250 W/ 637 OVERRIDE(OP): Performed by: EMERGENCY MEDICINE

## 2025-02-26 PROCEDURE — 71045 X-RAY EXAM CHEST 1 VIEW: CPT

## 2025-02-26 PROCEDURE — 94640 AIRWAY INHALATION TREATMENT: CPT

## 2025-02-26 PROCEDURE — 93005 ELECTROCARDIOGRAM TRACING: CPT | Mod: TC

## 2025-02-26 PROCEDURE — 36415 COLL VENOUS BLD VENIPUNCTURE: CPT

## 2025-02-26 PROCEDURE — 83880 ASSAY OF NATRIURETIC PEPTIDE: CPT

## 2025-02-26 PROCEDURE — 84484 ASSAY OF TROPONIN QUANT: CPT

## 2025-02-26 PROCEDURE — 96375 TX/PRO/DX INJ NEW DRUG ADDON: CPT

## 2025-02-26 PROCEDURE — 80053 COMPREHEN METABOLIC PANEL: CPT

## 2025-02-26 PROCEDURE — 93005 ELECTROCARDIOGRAM TRACING: CPT | Mod: TC | Performed by: EMERGENCY MEDICINE

## 2025-02-26 PROCEDURE — 96374 THER/PROPH/DIAG INJ IV PUSH: CPT

## 2025-02-26 PROCEDURE — 85025 COMPLETE CBC W/AUTO DIFF WBC: CPT

## 2025-02-26 PROCEDURE — 99285 EMERGENCY DEPT VISIT HI MDM: CPT

## 2025-02-26 PROCEDURE — 700111 HCHG RX REV CODE 636 W/ 250 OVERRIDE (IP): Performed by: EMERGENCY MEDICINE

## 2025-02-26 PROCEDURE — 700101 HCHG RX REV CODE 250: Performed by: EMERGENCY MEDICINE

## 2025-02-26 PROCEDURE — A9270 NON-COVERED ITEM OR SERVICE: HCPCS | Performed by: EMERGENCY MEDICINE

## 2025-02-26 PROCEDURE — 700105 HCHG RX REV CODE 258: Performed by: EMERGENCY MEDICINE

## 2025-02-26 RX ORDER — AZITHROMYCIN 250 MG/1
500 TABLET, FILM COATED ORAL ONCE
Status: COMPLETED | OUTPATIENT
Start: 2025-02-26 | End: 2025-02-26

## 2025-02-26 RX ORDER — DOXYCYCLINE 100 MG/1
100 CAPSULE ORAL 2 TIMES DAILY
Qty: 20 CAPSULE | Refills: 0 | Status: ON HOLD | OUTPATIENT
Start: 2025-02-26 | End: 2025-03-04

## 2025-02-26 RX ORDER — SODIUM CHLORIDE 9 MG/ML
1000 INJECTION, SOLUTION INTRAVENOUS ONCE
Status: COMPLETED | OUTPATIENT
Start: 2025-02-26 | End: 2025-02-26

## 2025-02-26 RX ORDER — DEXAMETHASONE SODIUM PHOSPHATE 4 MG/ML
12 INJECTION, SOLUTION INTRA-ARTICULAR; INTRALESIONAL; INTRAMUSCULAR; INTRAVENOUS; SOFT TISSUE ONCE
Status: COMPLETED | OUTPATIENT
Start: 2025-02-26 | End: 2025-02-26

## 2025-02-26 RX ORDER — PREDNISONE 20 MG/1
60 TABLET ORAL DAILY
Qty: 15 TABLET | Refills: 0 | Status: ON HOLD | OUTPATIENT
Start: 2025-02-26 | End: 2025-03-04

## 2025-02-26 RX ORDER — IPRATROPIUM BROMIDE AND ALBUTEROL SULFATE 2.5; .5 MG/3ML; MG/3ML
6 SOLUTION RESPIRATORY (INHALATION) ONCE
Status: COMPLETED | OUTPATIENT
Start: 2025-02-26 | End: 2025-02-26

## 2025-02-26 RX ORDER — CEFTRIAXONE 1 G/1
1000 INJECTION, POWDER, FOR SOLUTION INTRAMUSCULAR; INTRAVENOUS ONCE
Status: COMPLETED | OUTPATIENT
Start: 2025-02-26 | End: 2025-02-26

## 2025-02-26 RX ADMIN — IPRATROPIUM BROMIDE AND ALBUTEROL SULFATE 6 ML: .5; 2.5 SOLUTION RESPIRATORY (INHALATION) at 18:17

## 2025-02-26 RX ADMIN — AZITHROMYCIN DIHYDRATE 500 MG: 250 TABLET ORAL at 18:17

## 2025-02-26 RX ADMIN — CEFTRIAXONE SODIUM 1000 MG: 1 INJECTION, POWDER, FOR SOLUTION INTRAMUSCULAR; INTRAVENOUS at 18:17

## 2025-02-26 RX ADMIN — DEXAMETHASONE SODIUM PHOSPHATE 12 MG: 4 INJECTION INTRA-ARTICULAR; INTRALESIONAL; INTRAMUSCULAR; INTRAVENOUS; SOFT TISSUE at 18:16

## 2025-02-26 RX ADMIN — IPRATROPIUM BROMIDE AND ALBUTEROL SULFATE 6 ML: .5; 2.5 SOLUTION RESPIRATORY (INHALATION) at 19:54

## 2025-02-26 RX ADMIN — SODIUM CHLORIDE 1000 ML: 9 INJECTION, SOLUTION INTRAVENOUS at 18:17

## 2025-02-26 ASSESSMENT — FIBROSIS 4 INDEX: FIB4 SCORE: 0.88

## 2025-02-26 NOTE — ED TRIAGE NOTES
"Chief Complaint   Patient presents with    Shortness of Breath     Sent by [facility] for COPD exacerbation workup. Pt used multiple breathing and albuterol tx today as well as azithromycin and prednisone   Increased work of breathing and expiratory wheezes noted   Pt speaking 4-5 word sentences   /88   Pulse 88   Temp 35.9 °C (96.6 °F)   Resp (!) 24   Ht 1.753 m (5' 9\")   Wt 67.6 kg (149 lb)   LMP 06/11/2005   SpO2 98%   BMI 22.00 kg/m²     Pt is alert/oriented and follows commands. Pt speaking in full sentences and responds appropriately to questions. No acute distress noted in triage and respirations are even and unlabored.      Pt placed in lobby and educated on triage process. Pt encouraged to alert staff for any changes in condition.      Charge RN made aware   "

## 2025-02-26 NOTE — ED NOTES
Report to [facility] at bedside taking patient to [facility], all belongings and chart (including legal 2000) [facility] VSS, no apparent distress, pt calm and cooperative, report previously given to [facility] by nightshift RN.   Hypertrophy of breast

## 2025-02-27 NOTE — DISCHARGE INSTRUCTIONS
Take your medication as prescribed.  Return to Emergency Department if needed.  Follow-up with primary care.

## 2025-02-27 NOTE — ED PROVIDER NOTES
ER Provider Note    Scribed for  Dallin Cedeno D.O. by Nicolas Thomas. 2/26/2025  6:02 PM    Primary Care Provider: Karo Middleton M.D.    CHIEF COMPLAINT  Chief Complaint   Patient presents with    Shortness of Breath     Sent by [facility] for COPD exacerbation workup. Pt used multiple breathing and albuterol tx today as well as azithromycin and prednisone   Increased work of breathing and expiratory wheezes noted     EXTERNAL RECORDS REVIEWED  Outpatient Notes Patient seen at urgent care 11/26/2024 for shortness of breath and cough.     HPI/ROS  LIMITATION TO HISTORY   Select: : None  OUTSIDE HISTORIAN(S):  None    Rhonda Gould is a 54 y.o. female who presents to the ED for evaluation of shortness of breath and cough, onset 2 days ago. The patient reports a history of COPD and was advised to present to the ED after seeing her PCP in clinic earlier. She states she has used multiple breathing, albuterol treatments, and prednisone, all with no relief. She reports she recently quit smoking 2 weeks ago.       PAST MEDICAL HISTORY  Past Medical History:   Diagnosis Date    Chronic obstructive pulmonary disease (HCC)     Hypertension        SURGICAL HISTORY  Past Surgical History:   Procedure Laterality Date    HYSTERECTOMY LAPAROSCOPY  6/2005       FAMILY HISTORY  Family History   Problem Relation Age of Onset    Diabetes Mother     Hypertension Mother     Hypertension Father     Diabetes Father     Heart Disease Maternal Grandmother     Cancer Maternal Grandfather        SOCIAL HISTORY   reports that she has been smoking cigarettes. She has never used smokeless tobacco. She reports that she does not currently use alcohol. She reports that she does not currently use drugs after having used the following drugs: Inhaled and Marijuana. Frequency: 2.00 times per week.    CURRENT MEDICATIONS  Previous Medications    ALBUTEROL 108 (90 BASE) MCG/ACT AERO SOLN INHALATION AEROSOL    Inhale 2 Puffs every four hours as  "needed for Shortness of Breath.    AMLODIPINE (NORVASC) 5 MG TAB    Take 1 Tablet by mouth every day.    AZITHROMYCIN (ZITHROMAX) 250 MG TAB    Take 2 tablets (500 mg) by mouth on day 1 and then take 1 tablet (250 mg) daily on 2-5    EPINEPHRINE 0.3 MG/0.3ML SOLUTION PREFILLED SYRINGE    Inject the contents of the epipen into the thigh, hold for 3 seconds and release from thigh as needed for anaphylaxis.    FOLIC ACID (FOLVITE) 1 MG TAB    Take 1 Tablet by mouth every day.    HYDROCORTISONE 1 % CREAM    Apply 1 Application topically 2 times a day.    LIDOCAINE (LIDODERM) 5 % PATCH    Place 1 Patch on the skin every 24 hours.    LISINOPRIL (PRINIVIL) 10 MG TAB    Take 1 Tablet by mouth every day.    METHOCARBAMOL (ROBAXIN) 750 MG TAB    Take 1 Tablet by mouth 4 times a day.    MULTIVITAMIN TAB    Take 1 Tablet by mouth every day.    ONDANSETRON (ZOFRAN ODT) 4 MG TABLET DISPERSIBLE    Take 1 Tablet by mouth every 6 hours as needed for Nausea/Vomiting.    ONDANSETRON (ZOFRAN ODT) 4 MG TABLET DISPERSIBLE    Take 1 Tablet by mouth every 8 hours as needed for Nausea/Vomiting.    THIAMINE (THIAMINE) 100 MG TABLET    Take 1 Tablet by mouth every day.    TIZANIDINE (ZANAFLEX) 4 MG TAB    Take 1 Tablet by mouth every 6 hours as needed (spasm / pain).       ALLERGIES  Allergies   Allergen Reactions    Fish Allergy Anaphylaxis and Shortness of Breath     Swelling to throat.    Shellfish Allergy Anaphylaxis     Strict No Shellfish (avoids most fish, cod OK)      Codeine Hives and Itching     Tolerated morphine previously  Allergy updated from 2015, morphine given in 2016        PHYSICAL EXAM  /88   Pulse 88   Temp 35.9 °C (96.6 °F)   Resp (!) 24   Ht 1.753 m (5' 9\")   Wt 67.6 kg (149 lb)   LMP 06/11/2005   SpO2 98%   BMI 22.00 kg/m²    General: Mild respiratory distress.  Neuro: Awake alert and oriented, muscle strength sensation normal  Neck: Supple  Cardiac: Regular rate and rhythm  Pulmonary: Wheezing " bilaterally.  Mild respiratory distress.  Abdomen: Soft nontender nondistended  Back: Nontender  Psych: Normal  Skin: Pink warm dry  Extremities: Full range of motion, muscle strength sensation intact 2+ pulses        DIAGNOSTIC STUDIES/PROCEDURES  Labs:   Labs Reviewed   CBC WITH DIFFERENTIAL - Abnormal; Notable for the following components:       Result Value    MCHC 31.7 (*)     MPV 8.8 (*)     All other components within normal limits   COMP METABOLIC PANEL - Abnormal; Notable for the following components:    Anion Gap 17.0 (*)     All other components within normal limits   PROBRAIN NATRIURETIC PEPTIDE, NT   TROPONIN   ESTIMATED GFR     I have independently interpreted the above labs    EKG:   Results for orders placed or performed during the hospital encounter of 25   EKG (NOW)   Result Value Ref Range    Report       [facility] [place] Emergency Dept.    Test Date:  2025  Pt Name:    JASON SCHMITZ                Department: ER  MRN:        [id number]                      Room:  Gender:     Female                       Technician: [id number]  :        1970                   Requested By:ER TRIAGE PROTOCOL  Order #:    [id number]                    Reading MD:    Measurements  Intervals                                Axis  Rate:       70                           P:          63  MD:         175                          QRS:        59  QRSD:       64                           T:          57  QT:         403  QTc:        435    Interpretive Statements  Sinus rhythm  Probable left atrial enlargement  Compared to ECG 2024 09:05:53  ST (T wave) deviation no longer present     EKG   Result Value Ref Range    Report       [facility] [place] Emergency Dept.    Test Date:  2025  Pt Name:    JASON SCHMITZ                Department: ER  MRN:        [id number]                      Room:  Gender:     Female                       Technician: [id number]  :        1970                    Requested By:ER TRIAGE PROTOCOL  Order #:    [id number]                    Reading MD:    Measurements  Intervals                                Axis  Rate:       70                           P:          63  GA:         175                          QRS:        59  QRSD:       64                           T:          57  QT:         403  QTc:        435    Interpretive Statements  Sinus rhythm  Probable left atrial enlargement  Compared to ECG 12/30/2024 09:05:53  ST (T wave) deviation no longer present       I have independently interpreted this EKG    Radiology:   This attending emergency physician has independently interpreted the diagnostic imaging associated with this visit and is awaiting the final reading from the radiologist.   Preliminary interpretation is a follows: No acute disease  Radiologist interpretation:   DX-CHEST-PORTABLE (1 VIEW)   Final Result      No acute cardiac or pulmonary abnormalities are identified.           COURSE & MEDICAL DECISION MAKING     ED OBS: No; Patient does not meet criteria for ED Observation.     INITIAL ASSESSMENT, COURSE AND PLAN  Differential diagnoses include but not limited to: COPD exacerbation, pneumonia, viral illness      Care Narrative: 54-year-old female is presenting complaining of shortness of breath, history of COPD, taking her prescribed medication for COPD.  However not improving.  Quite wheezy on exam.    5:56 PM - Patient was first seen and evaluated at bedside. Patient presents to the ED for shortness of breath for the last 2 days with associated cough. Ordered for EKG, Troponin, pBNP, CMP, CBC w/ diff, and DX-Chest to evaluate. The patient will be medicated with NS 1 L infusion, Zithromax 500 mg PO, Rocephin 1 g injection, Decadron 12 mg injection, and Duoneb nebulizer for her symptoms. Patient verbalizes understanding and support with my plan of care.     7:36 PM - Patient was reevaluated at bedside. She is still wheezy, but improved. She is  satting well. She will be medicated with a second duoneb.     8:25 PM - Patient was reevaluated at bedside. She is feeling much improved. She is speaking in full sentences and is not in distress. I discussed with the patient the plan for discharge and to follow up with her PCP. I informed the patient I am prescribing her prednisone and doxycycline and to take both as prescribed. Patient was given the opportunity for questions. I addressed all questions or concerns at this time and they verbalize agreement to the plan of care.        ED COURSE AND ADDITIONAL PROBLEMS    COPD exacerbation: Patient presenting with wheeze.  Failure of outpatient management.  Minimal respiratory distress.  6 mg of DuoNeb therapy given upon arrival.  As well as steroids and antibiotics.  And a liter of crystalloid.  Patient still with significant wheeze on exam however improved no longer any respiratory distress no retractions speaking in full sentences.  Another 6 mg of DuoNeb was given.  On reevaluation patient feeling much better only scant end expiratory wheeze.  Prescribed antibiotics steroids.  She can continue to take her outpatient chronic medication and inhalers.    DISPOSITION AND DISCUSSIONS    I have discussed management of the patient with the following physicians and FABI's:  None    Discussion of management with other QHP or appropriate source(s): Respiratory therapy.    Escalation of care considered, and ultimately not performed: acute inpatient care management, however at this time, the patient is most appropriate for outpatient management.    Barriers to care at this time, including but not limited to:  None .     Decision tools and prescription drugs considered including, but not limited to:  Prednisone and doxycycline  .  We considered prescribing the patient acute rescue inhaler however she has 1 of those at home as well as a steroid inhaler.    The patient will return for new or worsening symptoms and is stable at the  time of discharge.    The patient is referred to a primary physician for blood pressure management, diabetic screening, and for all other preventative health concerns.      DISPOSITION:  Patient will be discharged home in stable condition.    FOLLOW UP:  Karo Middleton M.D.  [address] [facility] [id number] [phone]    Schedule an appointment as soon as possible for a visit in 2 days      [facility], Emergency Dept  [address] [facility] [address] [phone]    As needed, If symptoms worsen      OUTPATIENT MEDICATIONS:  New Prescriptions    DOXYCYCLINE (MONODOX) 100 MG CAPSULE    Take 1 Capsule by mouth 2 times a day for 10 days.    PREDNISONE (DELTASONE) 20 MG TAB    Take 3 Tablets by mouth every day for 5 days.       FINAL DIAGNOSIS  1. Acute exacerbation of chronic obstructive pulmonary disease (COPD) (Formerly [facility])        Nicolas STARR (Scribkrzysztof), am scribing for, and in the presence of, Dallin Cedeno D.O..    Electronically signed by: Nicolas Thomas (Olaf), 2/26/2025    Dallin STARR D.O. personally performed the services described in this documentation, as scribed by Nicolas Thomas in my presence, and it is both accurate and complete.      The note accurately reflects work and decisions made by me.  Dallin Cedeno D.O.  2/27/2025  1:41 AM

## 2025-02-27 NOTE — ED NOTES
Bedside report received from off going RN/tech: Mary ORTIZ , assumed care of patient.  POC discussed with patient. Call light within reach, all needs addressed at this time.       Fall risk interventions in place: Patient's personal possessions are with in their safe reach and Keep floor surfaces clean and dry (all applicable per [facility] Fall risk assessment)   Continuous monitoring: Cardiac Leads, Pulse Ox, or Blood Pressure  IVF/IV medications: Infusion per MAR (List Med(s)) NS  Oxygen: Room Air  Bedside sitter: Not Applicable   Isolation: Not Applicable

## 2025-03-01 ENCOUNTER — HOSPITAL ENCOUNTER (INPATIENT)
Facility: MEDICAL CENTER | Age: 55
LOS: 2 days | DRG: 189 | End: 2025-03-04
Attending: EMERGENCY MEDICINE | Admitting: STUDENT IN AN ORGANIZED HEALTH CARE EDUCATION/TRAINING PROGRAM
Payer: COMMERCIAL

## 2025-03-01 ENCOUNTER — APPOINTMENT (OUTPATIENT)
Dept: RADIOLOGY | Facility: MEDICAL CENTER | Age: 55
DRG: 189 | End: 2025-03-01
Attending: EMERGENCY MEDICINE
Payer: COMMERCIAL

## 2025-03-01 DIAGNOSIS — J44.1 ACUTE EXACERBATION OF CHRONIC OBSTRUCTIVE PULMONARY DISEASE (COPD) (HCC): ICD-10-CM

## 2025-03-01 DIAGNOSIS — G89.29 CHRONIC LEFT SHOULDER PAIN: ICD-10-CM

## 2025-03-01 DIAGNOSIS — J96.01 ACUTE RESPIRATORY FAILURE WITH HYPOXIA (HCC): ICD-10-CM

## 2025-03-01 DIAGNOSIS — J45.31 MILD PERSISTENT REACTIVE AIRWAY DISEASE WITH ACUTE EXACERBATION: ICD-10-CM

## 2025-03-01 DIAGNOSIS — M25.512 CHRONIC LEFT SHOULDER PAIN: ICD-10-CM

## 2025-03-01 DIAGNOSIS — R06.03 ACUTE RESPIRATORY DISTRESS: ICD-10-CM

## 2025-03-01 LAB
BASOPHILS # BLD AUTO: 0.1 % (ref 0–1.8)
BASOPHILS # BLD: 0.01 K/UL (ref 0–0.12)
EKG IMPRESSION: NORMAL
EOSINOPHIL # BLD AUTO: 0 K/UL (ref 0–0.51)
EOSINOPHIL NFR BLD: 0 % (ref 0–6.9)
ERYTHROCYTE [DISTWIDTH] IN BLOOD BY AUTOMATED COUNT: 48.3 FL (ref 35.9–50)
HCT VFR BLD AUTO: 37.2 % (ref 37–47)
HGB BLD-MCNC: 12.3 G/DL (ref 12–16)
IMM GRANULOCYTES # BLD AUTO: 0.11 K/UL (ref 0–0.11)
IMM GRANULOCYTES NFR BLD AUTO: 0.9 % (ref 0–0.9)
LYMPHOCYTES # BLD AUTO: 1.95 K/UL (ref 1–4.8)
LYMPHOCYTES NFR BLD: 16.8 % (ref 22–41)
MCH RBC QN AUTO: 28.7 PG (ref 27–33)
MCHC RBC AUTO-ENTMCNC: 33.1 G/DL (ref 32.2–35.5)
MCV RBC AUTO: 86.9 FL (ref 81.4–97.8)
MONOCYTES # BLD AUTO: 0.75 K/UL (ref 0–0.85)
MONOCYTES NFR BLD AUTO: 6.5 % (ref 0–13.4)
NEUTROPHILS # BLD AUTO: 8.78 K/UL (ref 1.82–7.42)
NEUTROPHILS NFR BLD: 75.7 % (ref 44–72)
NRBC # BLD AUTO: 0 K/UL
NRBC BLD-RTO: 0 /100 WBC (ref 0–0.2)
PLATELET # BLD AUTO: 305 K/UL (ref 164–446)
PMV BLD AUTO: 9.1 FL (ref 9–12.9)
RBC # BLD AUTO: 4.28 M/UL (ref 4.2–5.4)
WBC # BLD AUTO: 11.6 K/UL (ref 4.8–10.8)

## 2025-03-01 PROCEDURE — 0241U HCHG SARS-COV-2 COVID-19 NFCT DS RESP RNA 4 TRGT ED POC: CPT

## 2025-03-01 PROCEDURE — 83036 HEMOGLOBIN GLYCOSYLATED A1C: CPT

## 2025-03-01 PROCEDURE — 94644 CONT INHLJ TX 1ST HOUR: CPT

## 2025-03-01 PROCEDURE — 71045 X-RAY EXAM CHEST 1 VIEW: CPT

## 2025-03-01 PROCEDURE — 93005 ELECTROCARDIOGRAM TRACING: CPT | Mod: TC | Performed by: EMERGENCY MEDICINE

## 2025-03-01 PROCEDURE — 36415 COLL VENOUS BLD VENIPUNCTURE: CPT

## 2025-03-01 PROCEDURE — 85025 COMPLETE CBC W/AUTO DIFF WBC: CPT

## 2025-03-01 PROCEDURE — 99285 EMERGENCY DEPT VISIT HI MDM: CPT

## 2025-03-01 PROCEDURE — 700101 HCHG RX REV CODE 250: Performed by: EMERGENCY MEDICINE

## 2025-03-01 PROCEDURE — 700111 HCHG RX REV CODE 636 W/ 250 OVERRIDE (IP): Performed by: EMERGENCY MEDICINE

## 2025-03-01 PROCEDURE — 96365 THER/PROPH/DIAG IV INF INIT: CPT

## 2025-03-01 RX ORDER — MAGNESIUM SULFATE HEPTAHYDRATE 40 MG/ML
2 INJECTION, SOLUTION INTRAVENOUS ONCE
Status: COMPLETED | OUTPATIENT
Start: 2025-03-01 | End: 2025-03-02

## 2025-03-01 RX ADMIN — MAGNESIUM SULFATE HEPTAHYDRATE 2 G: 2 INJECTION, SOLUTION INTRAVENOUS at 23:36

## 2025-03-01 RX ADMIN — Medication 10 MG/HR: at 23:35

## 2025-03-01 ASSESSMENT — FIBROSIS 4 INDEX: FIB4 SCORE: 1.02

## 2025-03-02 PROBLEM — R73.9 HYPERGLYCEMIA: Status: ACTIVE | Noted: 2025-03-02

## 2025-03-02 PROBLEM — J96.01 ACUTE RESPIRATORY FAILURE WITH HYPOXIA (HCC): Status: ACTIVE | Noted: 2025-03-02

## 2025-03-02 PROBLEM — E87.6 HYPOKALEMIA: Status: ACTIVE | Noted: 2025-03-02

## 2025-03-02 LAB
ANION GAP SERPL CALC-SCNC: 15 MMOL/L (ref 7–16)
BUN SERPL-MCNC: 15 MG/DL (ref 8–22)
CALCIUM SERPL-MCNC: 9.1 MG/DL (ref 8.5–10.5)
CHLORIDE SERPL-SCNC: 103 MMOL/L (ref 96–112)
CO2 SERPL-SCNC: 19 MMOL/L (ref 20–33)
CREAT SERPL-MCNC: 0.85 MG/DL (ref 0.5–1.4)
EST. AVERAGE GLUCOSE BLD GHB EST-MCNC: 160 MG/DL
FLUAV RNA SPEC QL NAA+PROBE: NEGATIVE
FLUBV RNA SPEC QL NAA+PROBE: NEGATIVE
GFR SERPLBLD CREATININE-BSD FMLA CKD-EPI: 80 ML/MIN/1.73 M 2
GLUCOSE BLD STRIP.AUTO-MCNC: 143 MG/DL (ref 65–99)
GLUCOSE BLD STRIP.AUTO-MCNC: 166 MG/DL (ref 65–99)
GLUCOSE BLD STRIP.AUTO-MCNC: 175 MG/DL (ref 65–99)
GLUCOSE BLD STRIP.AUTO-MCNC: 238 MG/DL (ref 65–99)
GLUCOSE SERPL-MCNC: 248 MG/DL (ref 65–99)
HBA1C MFR BLD: 7.2 % (ref 4–5.6)
MAGNESIUM SERPL-MCNC: 6.8 MG/DL (ref 1.5–2.5)
POTASSIUM SERPL-SCNC: 3.4 MMOL/L (ref 3.6–5.5)
RSV RNA SPEC QL NAA+PROBE: NEGATIVE
SARS-COV-2 RNA RESP QL NAA+PROBE: NOTDETECTED
SODIUM SERPL-SCNC: 137 MMOL/L (ref 135–145)

## 2025-03-02 PROCEDURE — 80048 BASIC METABOLIC PNL TOTAL CA: CPT

## 2025-03-02 PROCEDURE — A9270 NON-COVERED ITEM OR SERVICE: HCPCS | Performed by: STUDENT IN AN ORGANIZED HEALTH CARE EDUCATION/TRAINING PROGRAM

## 2025-03-02 PROCEDURE — 700102 HCHG RX REV CODE 250 W/ 637 OVERRIDE(OP): Performed by: STUDENT IN AN ORGANIZED HEALTH CARE EDUCATION/TRAINING PROGRAM

## 2025-03-02 PROCEDURE — 770006 HCHG ROOM/CARE - MED/SURG/GYN SEMI*

## 2025-03-02 PROCEDURE — 99254 IP/OBS CNSLTJ NEW/EST MOD 60: CPT | Performed by: INTERNAL MEDICINE

## 2025-03-02 PROCEDURE — 94669 MECHANICAL CHEST WALL OSCILL: CPT

## 2025-03-02 PROCEDURE — 82962 GLUCOSE BLOOD TEST: CPT | Mod: 91

## 2025-03-02 PROCEDURE — 83735 ASSAY OF MAGNESIUM: CPT

## 2025-03-02 PROCEDURE — 700102 HCHG RX REV CODE 250 W/ 637 OVERRIDE(OP): Performed by: EMERGENCY MEDICINE

## 2025-03-02 PROCEDURE — 700101 HCHG RX REV CODE 250: Performed by: STUDENT IN AN ORGANIZED HEALTH CARE EDUCATION/TRAINING PROGRAM

## 2025-03-02 PROCEDURE — A9270 NON-COVERED ITEM OR SERVICE: HCPCS | Performed by: EMERGENCY MEDICINE

## 2025-03-02 PROCEDURE — 700111 HCHG RX REV CODE 636 W/ 250 OVERRIDE (IP): Mod: JZ,TB | Performed by: STUDENT IN AN ORGANIZED HEALTH CARE EDUCATION/TRAINING PROGRAM

## 2025-03-02 PROCEDURE — 36415 COLL VENOUS BLD VENIPUNCTURE: CPT

## 2025-03-02 PROCEDURE — 700101 HCHG RX REV CODE 250: Performed by: EMERGENCY MEDICINE

## 2025-03-02 PROCEDURE — 700111 HCHG RX REV CODE 636 W/ 250 OVERRIDE (IP): Performed by: STUDENT IN AN ORGANIZED HEALTH CARE EDUCATION/TRAINING PROGRAM

## 2025-03-02 PROCEDURE — 94664 DEMO&/EVAL PT USE INHALER: CPT

## 2025-03-02 PROCEDURE — 94640 AIRWAY INHALATION TREATMENT: CPT

## 2025-03-02 PROCEDURE — 99223 1ST HOSP IP/OBS HIGH 75: CPT | Performed by: STUDENT IN AN ORGANIZED HEALTH CARE EDUCATION/TRAINING PROGRAM

## 2025-03-02 PROCEDURE — 94645 CONT INHLJ TX EACH ADDL HOUR: CPT

## 2025-03-02 PROCEDURE — 700101 HCHG RX REV CODE 250: Performed by: INTERNAL MEDICINE

## 2025-03-02 RX ORDER — DOXYCYCLINE 100 MG/1
100 TABLET ORAL EVERY 12 HOURS
Status: DISCONTINUED | OUTPATIENT
Start: 2025-03-02 | End: 2025-03-04 | Stop reason: HOSPADM

## 2025-03-02 RX ORDER — HYDRALAZINE HYDROCHLORIDE 20 MG/ML
10 INJECTION INTRAMUSCULAR; INTRAVENOUS EVERY 4 HOURS PRN
Status: DISCONTINUED | OUTPATIENT
Start: 2025-03-02 | End: 2025-03-04 | Stop reason: HOSPADM

## 2025-03-02 RX ORDER — POLYETHYLENE GLYCOL 3350 17 G/17G
1 POWDER, FOR SOLUTION ORAL DAILY
Status: DISCONTINUED | OUTPATIENT
Start: 2025-03-03 | End: 2025-03-04 | Stop reason: HOSPADM

## 2025-03-02 RX ORDER — LISINOPRIL 10 MG/1
10 TABLET ORAL DAILY
Status: DISCONTINUED | OUTPATIENT
Start: 2025-03-02 | End: 2025-03-02

## 2025-03-02 RX ORDER — METHYLPREDNISOLONE SODIUM SUCCINATE 125 MG/2ML
62.5 INJECTION, POWDER, LYOPHILIZED, FOR SOLUTION INTRAMUSCULAR; INTRAVENOUS ONCE
Status: COMPLETED | OUTPATIENT
Start: 2025-03-02 | End: 2025-03-02

## 2025-03-02 RX ORDER — VARENICLINE TARTRATE 1 MG/1
1 TABLET, FILM COATED ORAL DAILY
COMMUNITY

## 2025-03-02 RX ORDER — ALBUTEROL SULFATE 5 MG/ML
2.5 SOLUTION RESPIRATORY (INHALATION)
Status: DISCONTINUED | OUTPATIENT
Start: 2025-03-02 | End: 2025-03-02

## 2025-03-02 RX ORDER — ACETAMINOPHEN 500 MG
1000 TABLET ORAL ONCE
Status: COMPLETED | OUTPATIENT
Start: 2025-03-02 | End: 2025-03-02

## 2025-03-02 RX ORDER — AZITHROMYCIN 250 MG/1
500 TABLET, FILM COATED ORAL DAILY
Status: DISCONTINUED | OUTPATIENT
Start: 2025-03-02 | End: 2025-03-02

## 2025-03-02 RX ORDER — FLUTICASONE FUROATE, UMECLIDINIUM BROMIDE AND VILANTEROL TRIFENATATE 100; 62.5; 25 UG/1; UG/1; UG/1
1 POWDER RESPIRATORY (INHALATION) DAILY
OUTPATIENT
Start: 2025-03-02

## 2025-03-02 RX ORDER — POTASSIUM CHLORIDE 1500 MG/1
40 TABLET, EXTENDED RELEASE ORAL ONCE
Status: COMPLETED | OUTPATIENT
Start: 2025-03-02 | End: 2025-03-02

## 2025-03-02 RX ORDER — DEXTROSE MONOHYDRATE 25 G/50ML
25 INJECTION, SOLUTION INTRAVENOUS
Status: DISCONTINUED | OUTPATIENT
Start: 2025-03-02 | End: 2025-03-04 | Stop reason: HOSPADM

## 2025-03-02 RX ORDER — LEVALBUTEROL INHALATION SOLUTION 1.25 MG/3ML
1.25 SOLUTION RESPIRATORY (INHALATION)
Status: DISCONTINUED | OUTPATIENT
Start: 2025-03-02 | End: 2025-03-03

## 2025-03-02 RX ORDER — TRAZODONE HYDROCHLORIDE 50 MG/1
50 TABLET ORAL
Status: DISCONTINUED | OUTPATIENT
Start: 2025-03-02 | End: 2025-03-04 | Stop reason: HOSPADM

## 2025-03-02 RX ORDER — LEVALBUTEROL 1.25 MG/.5ML
1.25 SOLUTION, CONCENTRATE RESPIRATORY (INHALATION) EVERY 4 HOURS PRN
OUTPATIENT
Start: 2025-03-02

## 2025-03-02 RX ORDER — IPRATROPIUM BROMIDE AND ALBUTEROL SULFATE 2.5; .5 MG/3ML; MG/3ML
3 SOLUTION RESPIRATORY (INHALATION)
Status: DISCONTINUED | OUTPATIENT
Start: 2025-03-02 | End: 2025-03-02

## 2025-03-02 RX ORDER — NALTREXONE 380 MG
380 KIT INTRAMUSCULAR
COMMUNITY

## 2025-03-02 RX ORDER — ACETAMINOPHEN 325 MG/1
650 TABLET ORAL EVERY 6 HOURS PRN
Status: DISCONTINUED | OUTPATIENT
Start: 2025-03-02 | End: 2025-03-04 | Stop reason: HOSPADM

## 2025-03-02 RX ORDER — TRAZODONE HYDROCHLORIDE 50 MG/1
50 TABLET ORAL
COMMUNITY

## 2025-03-02 RX ORDER — ESCITALOPRAM OXALATE 10 MG/1
10 TABLET ORAL DAILY
Status: DISCONTINUED | OUTPATIENT
Start: 2025-03-02 | End: 2025-03-04 | Stop reason: HOSPADM

## 2025-03-02 RX ORDER — CETIRIZINE HYDROCHLORIDE 1 MG/ML
10 SOLUTION ORAL DAILY
Status: DISCONTINUED | OUTPATIENT
Start: 2025-03-02 | End: 2025-03-04 | Stop reason: HOSPADM

## 2025-03-02 RX ORDER — ENOXAPARIN SODIUM 100 MG/ML
40 INJECTION SUBCUTANEOUS DAILY
Status: DISCONTINUED | OUTPATIENT
Start: 2025-03-02 | End: 2025-03-04 | Stop reason: HOSPADM

## 2025-03-02 RX ORDER — HYDROCODONE BITARTRATE AND ACETAMINOPHEN 5; 325 MG/1; MG/1
1 TABLET ORAL EVERY 6 HOURS PRN
Refills: 0 | Status: DISCONTINUED | OUTPATIENT
Start: 2025-03-02 | End: 2025-03-04 | Stop reason: HOSPADM

## 2025-03-02 RX ORDER — INSULIN LISPRO 100 [IU]/ML
1-6 INJECTION, SOLUTION INTRAVENOUS; SUBCUTANEOUS
Status: DISCONTINUED | OUTPATIENT
Start: 2025-03-02 | End: 2025-03-04 | Stop reason: HOSPADM

## 2025-03-02 RX ORDER — PREDNISONE 20 MG/1
40 TABLET ORAL DAILY
Status: DISCONTINUED | OUTPATIENT
Start: 2025-03-02 | End: 2025-03-04 | Stop reason: HOSPADM

## 2025-03-02 RX ORDER — CETIRIZINE HYDROCHLORIDE 10 MG/1
10 TABLET ORAL DAILY
COMMUNITY

## 2025-03-02 RX ORDER — ESCITALOPRAM OXALATE 10 MG/1
1 TABLET ORAL DAILY
COMMUNITY

## 2025-03-02 RX ADMIN — IPRATROPIUM BROMIDE 0.5 MG: 0.5 SOLUTION RESPIRATORY (INHALATION) at 09:26

## 2025-03-02 RX ADMIN — HYDROCODONE BITARTRATE AND ACETAMINOPHEN 1 TABLET: 5; 325 TABLET ORAL at 17:43

## 2025-03-02 RX ADMIN — DOXYCYCLINE 100 MG: 100 TABLET, FILM COATED ORAL at 05:29

## 2025-03-02 RX ADMIN — ACETAMINOPHEN 1000 MG: 500 TABLET ORAL at 01:48

## 2025-03-02 RX ADMIN — LEVALBUTEROL 1.25 MG: 1.25 SOLUTION RESPIRATORY (INHALATION) at 18:49

## 2025-03-02 RX ADMIN — IBUPROFEN 800 MG: 200 TABLET, FILM COATED ORAL at 01:48

## 2025-03-02 RX ADMIN — METHYLPREDNISOLONE SODIUM SUCCINATE 62.5 MG: 125 INJECTION, POWDER, FOR SOLUTION INTRAMUSCULAR; INTRAVENOUS at 10:19

## 2025-03-02 RX ADMIN — DOXYCYCLINE 100 MG: 100 TABLET, FILM COATED ORAL at 17:43

## 2025-03-02 RX ADMIN — INSULIN LISPRO 1 UNITS: 100 INJECTION, SOLUTION INTRAVENOUS; SUBCUTANEOUS at 13:47

## 2025-03-02 RX ADMIN — INSULIN LISPRO 2 UNITS: 100 INJECTION, SOLUTION INTRAVENOUS; SUBCUTANEOUS at 08:33

## 2025-03-02 RX ADMIN — TRAZODONE HYDROCHLORIDE 50 MG: 50 TABLET ORAL at 21:18

## 2025-03-02 RX ADMIN — PREDNISONE 40 MG: 20 TABLET ORAL at 03:08

## 2025-03-02 RX ADMIN — ESCITALOPRAM OXALATE 10 MG: 10 TABLET ORAL at 05:29

## 2025-03-02 RX ADMIN — IPRATROPIUM BROMIDE AND ALBUTEROL SULFATE 3 ML: .5; 2.5 SOLUTION RESPIRATORY (INHALATION) at 07:01

## 2025-03-02 RX ADMIN — IPRATROPIUM BROMIDE AND ALBUTEROL SULFATE 3 ML: .5; 2.5 SOLUTION RESPIRATORY (INHALATION) at 02:58

## 2025-03-02 RX ADMIN — MOMETASONE FUROATE AND FORMOTEROL FUMARATE DIHYDRATE 2 PUFF: 200; 5 AEROSOL RESPIRATORY (INHALATION) at 07:14

## 2025-03-02 RX ADMIN — POTASSIUM CHLORIDE 40 MEQ: 1500 TABLET, EXTENDED RELEASE ORAL at 03:07

## 2025-03-02 RX ADMIN — ALBUTEROL SULFATE 2.5 MG: 2.5 SOLUTION RESPIRATORY (INHALATION) at 07:24

## 2025-03-02 RX ADMIN — Medication 10 MG/HR: at 01:14

## 2025-03-02 RX ADMIN — CETIRIZINE HYDROCHLORIDE 10 MG: 1 SOLUTION ORAL at 05:29

## 2025-03-02 RX ADMIN — IPRATROPIUM BROMIDE 0.5 MG: 0.5 SOLUTION RESPIRATORY (INHALATION) at 23:17

## 2025-03-02 RX ADMIN — LEVALBUTEROL 1.25 MG: 1.25 SOLUTION RESPIRATORY (INHALATION) at 09:26

## 2025-03-02 RX ADMIN — HYDROCODONE BITARTRATE AND ACETAMINOPHEN 1 TABLET: 5; 325 TABLET ORAL at 10:19

## 2025-03-02 RX ADMIN — ENOXAPARIN SODIUM 40 MG: 100 INJECTION SUBCUTANEOUS at 17:43

## 2025-03-02 RX ADMIN — IPRATROPIUM BROMIDE 0.5 MG: 0.5 SOLUTION RESPIRATORY (INHALATION) at 15:06

## 2025-03-02 RX ADMIN — LEVALBUTEROL 1.25 MG: 1.25 SOLUTION RESPIRATORY (INHALATION) at 15:06

## 2025-03-02 RX ADMIN — INSULIN LISPRO 1 UNITS: 100 INJECTION, SOLUTION INTRAVENOUS; SUBCUTANEOUS at 17:41

## 2025-03-02 RX ADMIN — ACETAMINOPHEN 650 MG: 325 TABLET ORAL at 03:07

## 2025-03-02 RX ADMIN — LEVALBUTEROL 1.25 MG: 1.25 SOLUTION RESPIRATORY (INHALATION) at 23:17

## 2025-03-02 RX ADMIN — IPRATROPIUM BROMIDE 0.5 MG: 0.5 SOLUTION RESPIRATORY (INHALATION) at 18:48

## 2025-03-02 ASSESSMENT — ENCOUNTER SYMPTOMS
FEVER: 0
NAUSEA: 0
CHILLS: 0
ABDOMINAL PAIN: 0
DIARRHEA: 0
VOMITING: 0
WHEEZING: 1
COUGH: 1
SHORTNESS OF BREATH: 1

## 2025-03-02 ASSESSMENT — PATIENT HEALTH QUESTIONNAIRE - PHQ9
2. FEELING DOWN, DEPRESSED, IRRITABLE, OR HOPELESS: NOT AT ALL
1. LITTLE INTEREST OR PLEASURE IN DOING THINGS: NOT AT ALL
SUM OF ALL RESPONSES TO PHQ9 QUESTIONS 1 AND 2: 0

## 2025-03-02 ASSESSMENT — LIFESTYLE VARIABLES
CONSUMPTION TOTAL: NEGATIVE
HOW MANY TIMES IN THE PAST YEAR HAVE YOU HAD 5 OR MORE DRINKS IN A DAY: 0
TOTAL SCORE: 0
EVER FELT BAD OR GUILTY ABOUT YOUR DRINKING: NO
DOES PATIENT WANT TO STOP DRINKING: NO
HAVE YOU EVER FELT YOU SHOULD CUT DOWN ON YOUR DRINKING: NO
EVER HAD A DRINK FIRST THING IN THE MORNING TO STEADY YOUR NERVES TO GET RID OF A HANGOVER: NO
HAVE PEOPLE ANNOYED YOU BY CRITICIZING YOUR DRINKING: NO
ON A TYPICAL DAY WHEN YOU DRINK ALCOHOL HOW MANY DRINKS DO YOU HAVE: 0
ALCOHOL_USE: NO
TOTAL SCORE: 0
AVERAGE NUMBER OF DAYS PER WEEK YOU HAVE A DRINK CONTAINING ALCOHOL: 0
TOTAL SCORE: 0

## 2025-03-02 ASSESSMENT — SOCIAL DETERMINANTS OF HEALTH (SDOH)
WITHIN THE PAST 12 MONTHS, THE FOOD YOU BOUGHT JUST DIDN'T LAST AND YOU DIDN'T HAVE MONEY TO GET MORE: NEVER TRUE
WITHIN THE LAST YEAR, HAVE TO BEEN RAPED OR FORCED TO HAVE ANY KIND OF SEXUAL ACTIVITY BY YOUR PARTNER OR EX-PARTNER?: NO
WITHIN THE LAST YEAR, HAVE YOU BEEN KICKED, HIT, SLAPPED, OR OTHERWISE PHYSICALLY HURT BY YOUR PARTNER OR EX-PARTNER?: NO
IN THE PAST 12 MONTHS, HAS THE ELECTRIC, GAS, OIL, OR WATER COMPANY THREATENED TO SHUT OFF SERVICE IN YOUR HOME?: NO
WITHIN THE LAST YEAR, HAVE YOU BEEN HUMILIATED OR EMOTIONALLY ABUSED IN OTHER WAYS BY YOUR PARTNER OR EX-PARTNER?: NO
WITHIN THE LAST YEAR, HAVE YOU BEEN AFRAID OF YOUR PARTNER OR EX-PARTNER?: NO
WITHIN THE PAST 12 MONTHS, YOU WORRIED THAT YOUR FOOD WOULD RUN OUT BEFORE YOU GOT THE MONEY TO BUY MORE: NEVER TRUE

## 2025-03-02 ASSESSMENT — PAIN DESCRIPTION - PAIN TYPE
TYPE: ACUTE PAIN

## 2025-03-02 ASSESSMENT — FIBROSIS 4 INDEX
FIB4 SCORE: 1.11
FIB4 SCORE: 1.11

## 2025-03-02 NOTE — H&P
Hospital Medicine History & Physical Note    Date of Service  3/2/2025    Primary Care Physician  Karo Middleton M.D.    Code Status  Full Code    Chief Complaint  Chief Complaint   Patient presents with    Shortness of Breath     Reports started at 9:45 tonight. Denies any CP or fevers.        History of Presenting Illness  Rhonda Gould is a 54 y.o. female who presented 3/1/2025 with shortness of breath.  PMH of COPD, anxiety, former alcohol and tobacco use.  Coming in with wheezing, shortness of breath.  She was in the ED on 2/26 with similar complaints discharged after breathing treatments with prednisone and doxycycline which she has been taking.  Symptoms improved initially but over the past couple days worsening again.  She quit smoking a few weeks ago and is on Chantix currently, says symptoms have been worse since quitting smoking.  She has a dry cough, no hemoptysis, no fever/chills.  PCP started her on Spiriva couple months ago but it has not helped much.    In the ED afebrile, tachycardic and tachypneic.  On 6 L O2.  Labs show glucose in the 200s, potassium 3.4.    I discussed the plan of care with patient, bedside RN, and edp .    Review of Systems  Review of Systems   Constitutional:  Negative for chills and fever.   Respiratory:  Positive for shortness of breath.    Cardiovascular:  Negative for chest pain.   Gastrointestinal:  Negative for abdominal pain, diarrhea, nausea and vomiting.   Genitourinary:  Negative for dysuria and urgency.     Past Medical History   has a past medical history of Chronic obstructive pulmonary disease (HCC) and Hypertension.    Surgical History   has a past surgical history that includes hysterectomy laparoscopy (6/2005).     Family History  family history includes Cancer in her maternal grandfather; Diabetes in her father and mother; Heart Disease in her maternal grandmother; Hypertension in her father and mother.   Family history reviewed with patient. There is no  family history that is pertinent to the chief complaint.     Social History   reports that she quit smoking about 4 weeks ago. Her smoking use included cigarettes. She has never used smokeless tobacco. She reports that she does not currently use alcohol. She reports that she does not currently use drugs after having used the following drugs: Inhaled and Marijuana. Frequency: 2.00 times per week.    Allergies  Allergies   Allergen Reactions    Fish Allergy Anaphylaxis and Shortness of Breath     Swelling to throat.    Shellfish Allergy Anaphylaxis     Strict No Shellfish (avoids most fish, cod OK)      Codeine Hives and Itching     Tolerated morphine previously  Allergy updated from 2015, morphine given in 2016       Medications  Prior to Admission Medications   Prescriptions Last Dose Informant Patient Reported? Taking?   EPINEPHrine 0.3 MG/0.3ML Solution Prefilled Syringe   No No   Sig: Inject the contents of the epipen into the thigh, hold for 3 seconds and release from thigh as needed for anaphylaxis.   albuterol 108 (90 Base) MCG/ACT Aero Soln inhalation aerosol  Patient Yes No   Sig: Inhale 2 Puffs every four hours as needed for Shortness of Breath.   amLODIPine (NORVASC) 5 MG Tab  Patient No No   Sig: Take 1 Tablet by mouth every day.   azithromycin (ZITHROMAX) 250 MG Tab   No No   Sig: Take 2 tablets (500 mg) by mouth on day 1 and then take 1 tablet (250 mg) daily on 2-5   doxycycline (MONODOX) 100 MG capsule   No No   Sig: Take 1 Capsule by mouth 2 times a day for 10 days.   folic acid (FOLVITE) 1 MG Tab   No No   Sig: Take 1 Tablet by mouth every day.   hydrocortisone 1 % Cream   No No   Sig: Apply 1 Application topically 2 times a day.   lidocaine (LIDODERM) 5 % Patch   No No   Sig: Place 1 Patch on the skin every 24 hours.   lisinopril (PRINIVIL) 10 MG Tab  Patient No No   Sig: Take 1 Tablet by mouth every day.   methocarbamol (ROBAXIN) 750 MG Tab   No No   Sig: Take 1 Tablet by mouth 4 times a day.    multivitamin Tab   No No   Sig: Take 1 Tablet by mouth every day.   ondansetron (ZOFRAN ODT) 4 MG TABLET DISPERSIBLE   No No   Sig: Take 1 Tablet by mouth every 6 hours as needed for Nausea/Vomiting.   ondansetron (ZOFRAN ODT) 4 MG TABLET DISPERSIBLE   No No   Sig: Take 1 Tablet by mouth every 8 hours as needed for Nausea/Vomiting.   predniSONE (DELTASONE) 20 MG Tab   No No   Sig: Take 3 Tablets by mouth every day for 5 days.   thiamine (THIAMINE) 100 MG tablet   No No   Sig: Take 1 Tablet by mouth every day.   tizanidine (ZANAFLEX) 4 MG Tab   No No   Sig: Take 1 Tablet by mouth every 6 hours as needed (spasm / pain).      Facility-Administered Medications: None       Physical Exam  Temp:  [36.5 °C (97.7 °F)] 36.5 °C (97.7 °F)  Pulse:  [104-120] 109  Resp:  [14-25] 15  BP: (121-165)/(86-91) 147/91  SpO2:  [92 %-99 %] 97 %  Blood Pressure: (!) 165/91   Temperature: 36.5 °C (97.7 °F)   Pulse: (!) 111   Respiration: (!) 25   Pulse Oximetry: 93 %       Physical Exam  Constitutional:       Appearance: Normal appearance.   HENT:      Head: Normocephalic and atraumatic.      Mouth/Throat:      Mouth: Mucous membranes are moist.      Pharynx: No oropharyngeal exudate or posterior oropharyngeal erythema.   Cardiovascular:      Rate and Rhythm: Normal rate and regular rhythm.      Pulses: Normal pulses.      Heart sounds: Normal heart sounds. No murmur heard.  Pulmonary:      Effort: Respiratory distress present.      Breath sounds: Wheezing present.   Musculoskeletal:         General: No swelling or tenderness. Normal range of motion.   Skin:     General: Skin is warm and dry.   Neurological:      General: No focal deficit present.      Mental Status: She is alert and oriented to person, place, and time.   Psychiatric:         Mood and Affect: Mood normal.         Laboratory:  Recent Labs     03/01/25  2330   WBC 11.6*   RBC 4.28   HEMOGLOBIN 12.3   HEMATOCRIT 37.2   MCV 86.9   MCH 28.7   MCHC 33.1   RDW 48.3   PLATELETCT  "305   MPV 9.1     Recent Labs     03/02/25  0005   SODIUM 137   POTASSIUM 3.4*   CHLORIDE 103   CO2 19*   GLUCOSE 248*   BUN 15   CREATININE 0.85   CALCIUM 9.1     Recent Labs     03/02/25  0005   GLUCOSE 248*         No results for input(s): \"NTPROBNP\" in the last 72 hours.      No results for input(s): \"TROPONINT\" in the last 72 hours.    Imaging:  DX-CHEST-PORTABLE (1 VIEW)   Final Result      No acute cardiopulmonary disease.          X-Ray:  I have personally reviewed the images and compared with prior images. and My impression is: No acute process  EKG:  I have personally reviewed the images and compared with prior images. and My impression is: Sinus tachycardia    Assessment/Plan:  Justification for Admission Status  I anticipate this patient will require at least two midnights for appropriate medical management, necessitating inpatient admission because acute hypoxemic respiratory failure secondary to COPD exacerbation    * Acute exacerbation of chronic obstructive pulmonary disease (COPD) (HCC)- (present on admission)  Assessment & Plan  Shortness of breath, wheezing is worse in the past couple days.  Patient presentation to the ED on 2/26 and again a couple months prior to that  She just quit smoking 2 weeks ago and has been feeling worse since then  No PFTs on file  Continue prednisone and doxycycline  She is on Spiriva and albuterol at home which she has been compliant with.  Spiriva started a couple months ago by her PCP but not helping much.  Will start on Dulera      Acute respiratory failure with hypoxia (HCC)- (present on admission)  Assessment & Plan  Secondary to COPD exacerbation.  See above    Hyperglycemia- (present on admission)  Assessment & Plan  Glucose in the 250s on presentation, no history of diabetes.  Will check A1c  Secondary to steroids.  Sliding scale insulin ordered    Hypokalemia- (present on admission)  Assessment & Plan  3.4 presentation.  Replete as needed check " magnesium    Anxiety- (present on admission)  Assessment & Plan  Continue cetirizine, escitalopram, trazodone        VTE prophylaxis: enoxaparin ppx

## 2025-03-02 NOTE — ASSESSMENT & PLAN NOTE
Shortness of breath, wheezing is worse in the past couple days.  Patient presentation to the ED on 2/26 and again a couple months prior to that  She just quit smoking 2 weeks ago and has been feeling worse since then  No PFTs on file  Continue prednisone and doxycycline  She is on Spiriva and albuterol at home which she has been compliant with.  Spiriva started a couple months ago by her PCP but not helping much.  Will start on Dulera

## 2025-03-02 NOTE — ASSESSMENT & PLAN NOTE
Glucose in the 250s on presentation, no history of diabetes.  Will check A1c  Secondary to steroids.  Sliding scale insulin ordered

## 2025-03-02 NOTE — ED NOTES
Medication history reviewed with patient.  Med rec is complete.  Allergies reviewed.   Patient currently on a course of Doxycycline    Anticoagulants taken in the last 14 days? No      Conchita Resendiz PhT

## 2025-03-02 NOTE — ED NOTES
Patient done with breathing tx. Patient reports still having feeling short of breath. ERP notified.

## 2025-03-02 NOTE — PROGRESS NOTES
4 Eyes Skin Assessment Completed by Dallin RN and DIANA Mcgregor.    Head WDL  Ears WDL  Nose WDL  Mouth WDL  Neck WDL  Breast/Chest WDL  Shoulder Blades WDL  Spine WDL  (R) Arm/Elbow/Hand WDL  (L) Arm/Elbow/Hand WDL  Abdomen WDL  Groin WDL  Scrotum/Coccyx/Buttocks WDL  (R) Leg WDL  (L) Leg WDL  (R) Heel/Foot/Toe WDL  (L) Heel/Foot/Toe WDL          Devices In Places Blood Pressure Cuff      Interventions In Place Pillows    Possible Skin Injury No    Pictures Uploaded Into Epic Yes  Wound Consult Placed N/A  RN Wound Prevention Protocol Ordered No

## 2025-03-02 NOTE — ED NOTES
Transport at bedside, patient and all personal belongings to floor. Continuing breathing tx to floor.

## 2025-03-02 NOTE — ED TRIAGE NOTES
Chief Complaint   Patient presents with    Shortness of Breath     Reports started at 9:45 tonight. Denies any CP or fevers.      Patient BIBA from home for above complaint. Has a hx of COPD.    Given 3 duonebs and 2 albuterol txs, as well as 125 solumedrol, and 4 zofran en route.     EKG ordered per protocol. ERP at bedside.

## 2025-03-02 NOTE — ED NOTES
Patient medicated per MAR, resting comfortably with breathing tx mask in place. No further needs at this time.

## 2025-03-02 NOTE — CONSULTS
Pulmonary Consultation    Date of consult: 3/2/2025    Referring Physician  Zachariah Slater M.D.    Reason for Consultation  Acute exacerbation COPD    History of Presenting Illness  54 y.o. female who presented 3/1/2025 with shortness of breath and wheezing. She is a recovering alcoholic, lives in the transitional housing through the [facility], lots of sick contacts where she lives. Hx of COPD but no formal PFTs. 15 pack years, quit 1 month ago. Started chantix and spiriva at that time. Was doing well until she got sick with URI symptoms.     No prior PFTs, no outpatient pulmonary followup  CT chest 3/2024 personally reviewed showing trace upper lobe emphysematous changes    Presented to  for these symptoms, treated with prednisone and doxycycline with no improvement  In [facility] ER requiring 6LPM to maintain adequate sats  WBC 11.6  CXR no infiltrates    Started on doxycycline, duonebs, prednisone and has done quite well, weaned to RA this AM but still wheezing    Last COPD exacerbation: 2021  No illcit drug use  Works as CNA    Code Status  Full Code    Review of Systems  Review of Systems   Constitutional:  Negative for chills and fever.        +sick contacts   Respiratory:  Positive for cough, shortness of breath and wheezing.    All other systems reviewed and are negative.    Past Medical History   has a past medical history of Chronic obstructive pulmonary disease (HCC) and Hypertension.    Surgical History   has a past surgical history that includes hysterectomy laparoscopy (6/2005).    Family History  family history includes Cancer in her maternal grandfather; Diabetes in her father and mother; Heart Disease in her maternal grandmother; Hypertension in her father and mother.    Social History   reports that she quit smoking about 4 weeks ago. Her smoking use included cigarettes. She has never used smokeless tobacco. She reports that she does not currently use alcohol. She reports that she does not currently use  drugs after having used the following drugs: Inhaled and Marijuana. Frequency: 2.00 times per week.    Medications  Home Medications       Reviewed by Dallin Boone R.N. (Registered Nurse) on 03/02/25 at 0244  Med List Status: Complete     Medication Last Dose Status   cetirizine (ZYRTEC) 10 MG Tab 3/1/2025 Active   doxycycline (MONODOX) 100 MG capsule 3/1/2025 Active   EPINEPHrine 0.3 MG/0.3ML Solution Prefilled Syringe  Active   escitalopram (LEXAPRO) 10 MG Tab 2/28/2025 Active   lidocaine (LIDODERM) 5 % Patch 2/15/2025 Active   methocarbamol (ROBAXIN) 750 MG Tab 2/15/2025 Active   naltrexone (VIVITROL) 380 MG Recon Susp 2/28/2025 Active   predniSONE (DELTASONE) 20 MG Tab 3/1/2025 Active   traZODone (DESYREL) 50 MG Tab 2/28/2025 Active   varenicline (CHANTIX) 1 MG tablet 3/1/2025 Active                  Audit from Redirected Encounters    **Home medications have not yet been reviewed for this encounter**       Current Facility-Administered Medications   Medication Dose Route Frequency Provider Last Rate Last Admin    Respiratory Therapy Consult   Nebulization Continuous RT Zachariah Slater M.D.        ipratropium-albuterol (DUONEB) nebulizer solution  3 mL Nebulization Q4HRS (RT) Zachariah Slater M.D.   3 mL at 03/02/25 0701    albuterol (Proventil) 2.5mg/0.5ml nebulizer solution 2.5 mg  2.5 mg Nebulization Q2HRS PRN (RT) Zachariah Slater M.D.        acetaminophen (Tylenol) tablet 650 mg  650 mg Oral Q6HRS PRN Zachariah Slater M.D.   650 mg at 03/02/25 0307    enoxaparin (Lovenox) inj 40 mg  40 mg Subcutaneous DAILY AT 1800 Zachariah Slater M.D.        hydrALAZINE (Apresoline) injection 10 mg  10 mg Intravenous Q4HRS PRN Zachariah Slater M.D.        predniSONE (Deltasone) tablet 40 mg  40 mg Oral DAILY Zachariah Slater M.D.   40 mg at 03/02/25 0308    insulin lispro (HumaLOG,AdmeLOG) subcutaneous injection  1-6 Units Subcutaneous 4X/DAY KARTHIK Slater M.D.        And    dextrose 50% (D50W) injection 25 g  25 g  Intravenous Q15 MIN PRN Zachariah Slater M.D.        traZODone (Desyrel) tablet 50 mg  50 mg Oral QHS Zachariah Slater M.D.        cetirizine (ZyrTEC) oral solution 10 mg  10 mg Oral DAILY Zachariah Slater M.D.   10 mg at 03/02/25 0529    mometasone-formoterol (Dulera) 200-5 MCG/ACT inhaler 2 Puff  2 Puff Inhalation BID (RT) Zachariah Slater M.D.        doxycycline monohydrate (Adoxa) tablet 100 mg  100 mg Oral Q12HRS Zachariah Slater M.D.   100 mg at 03/02/25 0529    escitalopram (Lexapro) tablet 10 mg  10 mg Oral DAILY Zachariah Slater M.D.   10 mg at 03/02/25 0529     Allergies  Allergies   Allergen Reactions    Fish Allergy Anaphylaxis and Shortness of Breath     Swelling to throat.    Shellfish Allergy Anaphylaxis     Strict No Shellfish (avoids most fish, cod OK)      Codeine Hives and Itching     Tolerated morphine previously  Allergy updated from 2015, morphine given in 2016     Vital Signs last 24 hours  Temp:  [36.5 °C (97.7 °F)] 36.5 °C (97.7 °F)  Pulse:  [104-120] 113  Resp:  [14-25] 24  BP: (121-165)/(86-99) 138/99  SpO2:  [90 %-99 %] 90 %    Physical Exam  Physical Exam  Vitals and nursing note reviewed.   Constitutional:       Appearance: She is ill-appearing.   HENT:      Mouth/Throat:      Mouth: Mucous membranes are moist.   Eyes:      Pupils: Pupils are equal, round, and reactive to light.   Cardiovascular:      Rate and Rhythm: Tachycardia present.   Pulmonary:      Effort: Respiratory distress present.      Breath sounds: Wheezing present.   Skin:     General: Skin is warm.      Coloration: Skin is not jaundiced.   Neurological:      General: No focal deficit present.      Mental Status: She is alert and oriented to person, place, and time. Mental status is at baseline.      Comments: tremulous       Fluids    Intake/Output Summary (Last 24 hours) at 3/2/2025 0713  Last data filed at 3/2/2025 0300  Gross per 24 hour   Intake 340 ml   Output --   Net 340 ml     Laboratory  Recent Results (from the past  48 hours)   CBC WITH DIFFERENTIAL    Collection Time: 25 11:30 PM   Result Value Ref Range    WBC 11.6 (H) 4.8 - 10.8 K/uL    RBC 4.28 4.20 - 5.40 M/uL    Hemoglobin 12.3 12.0 - 16.0 g/dL    Hematocrit 37.2 37.0 - 47.0 %    MCV 86.9 81.4 - 97.8 fL    MCH 28.7 27.0 - 33.0 pg    MCHC 33.1 32.2 - 35.5 g/dL    RDW 48.3 35.9 - 50.0 fL    Platelet Count 305 164 - 446 K/uL    MPV 9.1 9.0 - 12.9 fL    Neutrophils-Polys 75.70 (H) 44.00 - 72.00 %    Lymphocytes 16.80 (L) 22.00 - 41.00 %    Monocytes 6.50 0.00 - 13.40 %    Eosinophils 0.00 0.00 - 6.90 %    Basophils 0.10 0.00 - 1.80 %    Immature Granulocytes 0.90 0.00 - 0.90 %    Nucleated RBC 0.00 0.00 - 0.20 /100 WBC    Neutrophils (Absolute) 8.78 (H) 1.82 - 7.42 K/uL    Lymphs (Absolute) 1.95 1.00 - 4.80 K/uL    Monos (Absolute) 0.75 0.00 - 0.85 K/uL    Eos (Absolute) 0.00 0.00 - 0.51 K/uL    Baso (Absolute) 0.01 0.00 - 0.12 K/uL    Immature Granulocytes (abs) 0.11 0.00 - 0.11 K/uL    NRBC (Absolute) 0.00 K/uL   HEMOGLOBIN A1C    Collection Time: 25 11:30 PM   Result Value Ref Range    Glycohemoglobin 7.2 (H) 4.0 - 5.6 %    Est Avg Glucose 160 mg/dL   EKG    Collection Time: 25 11:36 PM   Result Value Ref Range    Report       [facility] Emergency Dept.    Test Date:  2025  Pt Name:    JASON SCHMITZ                Department: ER  MRN:        [id number]                      Room:       [facility]  Gender:     Female                       Technician: [id number]  :        1970                   Requested By:DEANNE CARRILLO  Order #:    [id number]                    Reading MD: Deanne Carrillo MD    Measurements  Intervals                                Axis  Rate:       120                          P:          84  MT:         153                          QRS:        66  QRSD:       99                           T:          58  QT:         322  QTc:        455    Interpretive Statements  Sinus tachycardia rate of 120 left atrial  enlargement otherwise no ST  elevations or ST depressions no abnormal T wave versions or Q waves mild PVC  otherwise normal and was normal axis  Compared to ECG 02/26/2025 15:48:41  PVC, left atrial enlargement  Electronically Signed On 03- 23:36:49 PST by Autumn Monroe MD     POC CoV-2, FLU A/B, RSV by PCR    Collection Time: 03/01/25 11:45 PM   Result Value Ref Range    POC Influenza A RNA, PCR Negative Negative    POC Influenza B RNA, PCR Negative Negative    POC RSV, by PCR Negative Negative    POC SARS-CoV-2, PCR NotDetected NotDetected   Basic Metabolic Panel    Collection Time: 03/02/25 12:05 AM   Result Value Ref Range    Sodium 137 135 - 145 mmol/L    Potassium 3.4 (L) 3.6 - 5.5 mmol/L    Chloride 103 96 - 112 mmol/L    Co2 19 (L) 20 - 33 mmol/L    Glucose 248 (H) 65 - 99 mg/dL    Bun 15 8 - 22 mg/dL    Creatinine 0.85 0.50 - 1.40 mg/dL    Calcium 9.1 8.5 - 10.5 mg/dL    Anion Gap 15.0 7.0 - 16.0   ESTIMATED GFR    Collection Time: 03/02/25 12:05 AM   Result Value Ref Range    GFR (CKD-EPI) 80 >60 mL/min/1.73 m 2   MAGNESIUM    Collection Time: 03/02/25 12:05 AM   Result Value Ref Range    Magnesium 6.8 (H) 1.5 - 2.5 mg/dL     Imaging  DX-CHEST-PORTABLE (1 VIEW)   Final Result      No acute cardiopulmonary disease.        Assessment/Plan    #Acute COPD exacerbation likely 2/2 URI  #Acute hypoxemic respiratory failure  #Former tobacco use    -- DC Dulera -> start Trelegy 100 with meds-to-beds prescription at discharge  -- Tremulous with duonebs -> switch to xopenex/ipratropium scheduled  -- cont prednisone, empiric abx  -- Titrate supplemental O2 to maintain saturations 88-92%  -- advised continued tobacco cessation  -- advised seasonal respiratory vaccinations for flu/covid/rsv  -- does not meet USPSTF criteria for lung cancer screening due to low pack years (< 20)  -- will work to arrange outpatient f/u in pulmonary clinic    We will sign off. Please do not hesitate to contact us if we can be of  any further assistance. I am most easily reached via Voalte secure messaging application.    Discussed patient condition and risk of morbidity and/or mortality with RN and Patient.      This note was generated using voice recognition software which has a chance of producing errors of grammar and content.  I have made every reasonable attempt to find and correct any errors, but it should be expected that some may not be found prior to finalization of this note.  __________  Jerry Miller MD  Pulmonary and Critical Care Medicine  [facility]

## 2025-03-02 NOTE — RESPIRATORY CARE
COPD EDUCATION by COPD CLINICAL EDUCATOR  3/2/2025  at  12:26 PM by Yesy Hinson RRT     Patient interviewed by education team.  Patient declined or is unable to participate in the full program.  Therefore, a short intervention has been conducted.  A comprehensive packet including information about COPD, types of treatments to manage their disease and safe home Oxygen usage was provided and reviewed with patient at the bedside. Trelegy was prescribed for discharge message was left with scheduling for follow up with Pulmonary per IP Pulmonologist notes.       COPD Screen  COPD Risk Screening  Do you have a history of COPD?: Yes  Do you have a Pulmonologist?: Yes    COPD Assessment  COPD Clinical Specialists ONLY  COPD Education Initiated: Yes--Short Intervention (Pt is very eagar to learn how to better manage her COPD, started on Trelegy, needs new nebulizer, oredered, patient was given COPD booklet, spacer, flutter and action plan.)  Is this a COPD exacerbation patient?: Yes (Yes, Order set used, seen by inpatient Pulm)  DME Company: none at this time  DME Equipment Type: nebulizer, she was gjiven. Pt will need new nebulizer ordered.  Physician Follow Up Appointment:  (none at this time)  Physician Name: Karo Middleton M.D.  Pulmonary Follow Up Appointment:  (none at this time)  Pulmonologist Name: left message with scheduling  Referrals Initiated: Yes  Pulmonary Rehab: Declined (still works)  Smoking Cessation: No (Patient is currently on Chantix and has been smoke free for 1 month)  Hospice: N/A  Home Health Care: N/A  Mobile Urgent Care Services: N/A (sees PCP regularly at [facility])  [facility]iatric Special[facility] Group: N/A  Private In-Home Care Agency: N/A  $ Demo/Eval of SVN's, MDI's and Aerosols: Yes (Spacer with instruciton, Flutter device and Action Plan)  (OP) Pulmonary Function Testing:  (referral will be placed)  Interdisciplinary Rounds: Attendance at Rounds (30 Min)    PFT Results    No results found  "for: \"PFT\"    Meds to Beds  [facility] provides bedside medication delivery for all eligible patients at discharge and you have been automatically enrolled in the Meds to Beds Program. Would you like to opt out of this program for any reason?: No - Stay Opted In     MY COPD ACTION PLAN     It is recommended that patients and physicians/healthcare providers complete this action plan together. This plan should be discussed at each physician visit and updated as needed.    The green, yellow and red zones show groups of symptoms of COPD. This list of symptoms is not comprehensive, and you may experience other symptoms. In the \"Actions\" column, your healthcare provider has recommended actions for you to take based on your symptoms.    Patient Name: Rhonda Gould   YOB: 1970   Last Updated on: 3/2/2025 12:16 PM   Green Zone:  I am doing well today Actions     Usual activitiy and exercise level   Take daily medications     Usual amounts of cough and phlegm/mucus   Use oxygen as prescribed     Sleep well at night   Continue regular exercise/diet plan     Appetite is good   At all times avoid cigarette smoke, inhaled irritants     Daily Medications (these medications are taken every day):   Fluticasone and Umeclidinium and Vilanterol (Trelogy) 1 Puff Once daily     Additional Information:  Take as directed, rinse mouth well with water and spit out after each use.     Yellow Zone:  I am having a bad day or a COPD flare Actions     More breathless than usual   Continue daily medications     I have less energy for my daily activities   Use quick relief inhaler as ordered     Increased or thicker phlegm/mucus   Use oxygen as prescribed     Using quick relief inhaler/nebulizer more often   Get plenty of rest     Swelling of ankles more than usual   Use pursed lip breathing     More coughing than usual   At all times avoid cigarette smoke, inhaled irritants     I feel like I have a \"chest cold\"     Poor sleep and " my symptoms woke me up     My appetite is not good     My medicine is not helping      Call provider immediately if symptoms don’t improve     Continue daily medications, add rescue medications:   Albuterol  Levalbuterol (Xopenex) 2 Puffs  1.25mg/3mL via nebulizer Every 4 hours PRN  Every 4 hours PRN       Medications to be used during a flare up, (as Discussed with Provider):           Additional Information:  Use the spacer with your rescue inhaler.   Use your nebulizer when possible, follow instructions to keep clean.     Red Zone:  I need urgent medical care Actions     Severe shortness of breath even at rest   Call 911 or seek medical care immediately     Not able to do any activity because of breathing      Fever or shaking chills      Feeling confused or very drowsy       Chest pains      Coughing up blood

## 2025-03-02 NOTE — ED PROVIDER NOTES
ED Provider Note    CHIEF COMPLAINT  Chief Complaint   Patient presents with    Shortness of Breath     Reports started at 9:45 tonight. Denies any CP or fevers.        EXTERNAL RECORDS REVIEWED  Other patient was seen in our emergency department on 2/26 diagnosed acute COPD exacerbation was sent home on oral steroids as well as doxycycline    HPI/ROS  LIMITATION TO HISTORY   Select: : None  OUTSIDE HISTORIAN(S):  EMS called for worsening shortness of breath.  Patient has been using her nebulizers at home when fire arrived she was 92 to 93% on room air she was treated with 2 albuterol's and 3 DuoNeb prior to arrival as well as 125 mg of IV Solu-Medrol    Rhonda Gould is a 54 y.o. female who presents to the emergency room with worsening shortness of breath.  She states she is been taking the steroids at home as well as her nebulizers but things are just worsening.  She feels like they have been worse for several weeks but last couple of days they have just progressed despite the medications at home.  She stopped smoking cigarettes a few weeks ago she denies any productive cough or hemoptysis no chest pain just feels like she cannot quite catch her breath.  No unilateral or bilateral leg swelling.  She is not on oxygen at baseline    PAST MEDICAL HISTORY   has a past medical history of Chronic obstructive pulmonary disease (HCC) and Hypertension.    SURGICAL HISTORY   has a past surgical history that includes hysterectomy laparoscopy (6/2005).    FAMILY HISTORY  Family History   Problem Relation Age of Onset    Diabetes Mother     Hypertension Mother     Hypertension Father     Diabetes Father     Heart Disease Maternal Grandmother     Cancer Maternal Grandfather        SOCIAL HISTORY  Social History     Tobacco Use    Smoking status: Some Days     Types: Cigarettes    Smokeless tobacco: Never   Vaping Use    Vaping status: Never Used   Substance and Sexual Activity    Alcohol use: Not Currently     Comment: 1  "pint a day, last drink 6/15    Drug use: Not Currently     Frequency: 2.0 times per week     Types: Inhaled, Marijuana     Comment: marijuana    Sexual activity: Not Currently       CURRENT MEDICATIONS  Home Medications    **Home medications have not yet been reviewed for this encounter**         ALLERGIES  Allergies   Allergen Reactions    Fish Allergy Anaphylaxis and Shortness of Breath     Swelling to throat.    Shellfish Allergy Anaphylaxis     Strict No Shellfish (avoids most fish, cod OK)      Codeine Hives and Itching     Tolerated morphine previously  Allergy updated from 2015, morphine given in 2016       PHYSICAL EXAM  VITAL SIGNS: /88   Pulse (!) 120   Temp 36.5 °C (97.7 °F) (Oral)   Resp 20   Ht 1.753 m (5' 9\")   Wt 67.6 kg (149 lb)   LMP 06/11/2005   SpO2 95%   BMI 22.00 kg/m²    Pulse OX: Pulse Oxygen level is within normal limits on room air  Constitutional: Alert in mild distress  HENT: Normocephalic, Atraumatic, MMM  Eyes: PERound. Conjunctiva normal, non-icteric.   Heart: Regular rhythm tachycardic intact distal pulses   Lungs: Symmetrical movement, no resp distress prolonged expiratory phase with significant wheezing heard throughout bilaterally with mild tachypnea and accessory muscle use of the ribs  Abdomen: Non-tender, non-distended, normal bowel sounds  EXT/Back no significant edema  Skin: Warm, Dry, No erythema, No rash.   Neurologic: Alert and oriented, Grossly non-focal.       EKG/LABS  Labs Reviewed   CBC WITH DIFFERENTIAL - Abnormal; Notable for the following components:       Result Value    WBC 11.6 (*)     Neutrophils-Polys 75.70 (*)     Lymphocytes 16.80 (*)     Neutrophils (Absolute) 8.78 (*)     All other components within normal limits   BASIC METABOLIC PANEL - Abnormal; Notable for the following components:    Potassium 3.4 (*)     Co2 19 (*)     Glucose 248 (*)     All other components within normal limits    Narrative:     SPECIMEN IS A RECOLLECT   HEMOGLOBIN A1C " - Abnormal; Notable for the following components:    Glycohemoglobin 7.2 (*)     All other components within normal limits   MAGNESIUM - Abnormal; Notable for the following components:    Magnesium 6.8 (*)     All other components within normal limits    Narrative:     SPECIMEN IS A RECOLLECT   ESTIMATED GFR    Narrative:     SPECIMEN IS A RECOLLECT   POCT COV-2, FLU A/B, RSV BY PCR   POC COV-2, FLU A/B, RSV BY PCR     Results for orders placed or performed during the hospital encounter of 25   EKG   Result Value Ref Range    Report       [facility] Emergency Dept.    Test Date:  2025  Pt Name:    JASON SCHMITZ                Department: ER  MRN:        [id number]                      Room:        12  Gender:     Female                       Technician: [id number]  :        1970                   Requested By:DEANNE CARRILLO  Order #:    [id number]                    Reading MD: Deanne Carrillo MD    Measurements  Intervals                                Axis  Rate:       120                          P:          84  AK:         153                          QRS:        66  QRSD:       99                           T:          58  QT:         322  QTc:        455    Interpretive Statements  Sinus tachycardia rate of 120 left atrial enlargement otherwise no ST  elevations or ST depressions no abnormal T wave versions or Q waves mild PVC  otherwise normal and was normal axis  Compared to ECG 2025 15:48:41  PVC, left atrial enlargement  Electronically Signed On 2025 23:36:49 PST by Autumn Carrillo MD         I have independently interpreted this EKG    RADIOLOGY/PROCEDURES   I have independently interpreted the diagnostic imaging associated with this visit and am waiting the final reading from the radiologist.   My preliminary interpretation is as follows:     CXR -no evidence of bacterial pneumonia    Radiologist interpretation:  DX-CHEST-PORTABLE (1 VIEW)   Final Result       No acute cardiopulmonary disease.          COURSE & MEDICAL DECISION MAKING    ASSESSMENT, COURSE AND PLAN  Care Narrative:     Patient is a 54-year-old female history of COPD and tobacco use presents emerged part with continued worsening respiratory distress despite at home steroids for the last few days.  She is minimally tachypneic on exam with minimal accessory muscle use but has diffuse wheezing with prolonged expiratory phase.  Low concern for ACS or pulmonary embolism just based on her physical examination significant with COPD and asthma.  She be treated with an hour-long breathing treatment here in the ED as well as started on 2 g of magnesium as she is already received steroids.    I discussed the case with RT in the coming of the bedside.  EKG is unremarkable and unchanged from prior    DISPOSITION AND DISCUSSIONS    1:02 AM  Reassessed after first neb she still wheezing pretty significantly with a prolonged expiratory phase.  Her heart rates come down a bit but she is needs to be treated with an every nebulizer at this time.    1:42 AM  Spoke w Dr Posada with the medicine service for hospitalization in setting of COPD with acute exacerbation.  Although she is not hypoxic she is already seen multiple nebs with EMS and is now on her second hour-long with our service.  She is going to be admitted for around-the-clock nebs and steroids and he has accepted the patient to his service.        I have discussed management of the patient with the following physicians and FABI's:  Bryon    Discussion of management with other QHP or appropriate source(s): RT for breathing tx      FINAL DIAGNOSIS  1. Acute exacerbation of chronic obstructive pulmonary disease (COPD) (HCC)    2. Acute respiratory distress    3. Acute respiratory failure with hypoxia (HCC)    4. Hypokalemia      Electronically signed by: Deanne Monroe M.D., 3/1/2025 11:25 PM

## 2025-03-02 NOTE — CARE PLAN
The patient is Stable - Low risk of patient condition declining or worsening     Pt will remain > 90% oxygenated during the shift    Progress made toward(s) clinical / shift goals:  Pt remained oxyganated with breathing treatments and supplemental oxygen PRN      Problem: Pain - Standard  Goal: Alleviation of pain or a reduction in pain to the patient’s comfort goal  Outcome: Progressing     Problem: Knowledge Deficit - Standard  Goal: Patient and family/care givers will demonstrate understanding of plan of care, disease process/condition, diagnostic tests and medications  Outcome: Progressing     Problem: Knowledge Deficit - COPD  Goal: Patient/significant other demonstrates understanding of disease process, utilization of the Action Plan, medications and discharge instruction  Outcome: Progressing     Problem: Risk for Infection - COPD  Goal: Patient will remain free from signs and symptoms of infection  Outcome: Progressing       Patient is not progressing towards the following goals:

## 2025-03-03 LAB
ANION GAP SERPL CALC-SCNC: 15 MMOL/L (ref 7–16)
BUN SERPL-MCNC: 19 MG/DL (ref 8–22)
CALCIUM SERPL-MCNC: 9.5 MG/DL (ref 8.5–10.5)
CHLORIDE SERPL-SCNC: 102 MMOL/L (ref 96–112)
CO2 SERPL-SCNC: 22 MMOL/L (ref 20–33)
CREAT SERPL-MCNC: 0.89 MG/DL (ref 0.5–1.4)
GFR SERPLBLD CREATININE-BSD FMLA CKD-EPI: 76 ML/MIN/1.73 M 2
GLUCOSE BLD STRIP.AUTO-MCNC: 151 MG/DL (ref 65–99)
GLUCOSE BLD STRIP.AUTO-MCNC: 152 MG/DL (ref 65–99)
GLUCOSE BLD STRIP.AUTO-MCNC: 155 MG/DL (ref 65–99)
GLUCOSE BLD STRIP.AUTO-MCNC: 201 MG/DL (ref 65–99)
GLUCOSE SERPL-MCNC: 121 MG/DL (ref 65–99)
POTASSIUM SERPL-SCNC: 4.7 MMOL/L (ref 3.6–5.5)
SODIUM SERPL-SCNC: 139 MMOL/L (ref 135–145)

## 2025-03-03 PROCEDURE — A9270 NON-COVERED ITEM OR SERVICE: HCPCS | Performed by: STUDENT IN AN ORGANIZED HEALTH CARE EDUCATION/TRAINING PROGRAM

## 2025-03-03 PROCEDURE — 700102 HCHG RX REV CODE 250 W/ 637 OVERRIDE(OP): Performed by: INTERNAL MEDICINE

## 2025-03-03 PROCEDURE — A9270 NON-COVERED ITEM OR SERVICE: HCPCS | Performed by: INTERNAL MEDICINE

## 2025-03-03 PROCEDURE — 700102 HCHG RX REV CODE 250 W/ 637 OVERRIDE(OP): Performed by: STUDENT IN AN ORGANIZED HEALTH CARE EDUCATION/TRAINING PROGRAM

## 2025-03-03 PROCEDURE — 770006 HCHG ROOM/CARE - MED/SURG/GYN SEMI*

## 2025-03-03 PROCEDURE — 94640 AIRWAY INHALATION TREATMENT: CPT

## 2025-03-03 PROCEDURE — 700101 HCHG RX REV CODE 250: Performed by: STUDENT IN AN ORGANIZED HEALTH CARE EDUCATION/TRAINING PROGRAM

## 2025-03-03 PROCEDURE — 700111 HCHG RX REV CODE 636 W/ 250 OVERRIDE (IP): Mod: JZ | Performed by: STUDENT IN AN ORGANIZED HEALTH CARE EDUCATION/TRAINING PROGRAM

## 2025-03-03 PROCEDURE — 99233 SBSQ HOSP IP/OBS HIGH 50: CPT | Performed by: STUDENT IN AN ORGANIZED HEALTH CARE EDUCATION/TRAINING PROGRAM

## 2025-03-03 PROCEDURE — 94669 MECHANICAL CHEST WALL OSCILL: CPT

## 2025-03-03 PROCEDURE — 80048 BASIC METABOLIC PNL TOTAL CA: CPT

## 2025-03-03 PROCEDURE — 82962 GLUCOSE BLOOD TEST: CPT | Mod: 91

## 2025-03-03 PROCEDURE — 700101 HCHG RX REV CODE 250: Performed by: INTERNAL MEDICINE

## 2025-03-03 RX ORDER — LEVALBUTEROL INHALATION SOLUTION 1.25 MG/3ML
1.25 SOLUTION RESPIRATORY (INHALATION)
Status: DISCONTINUED | OUTPATIENT
Start: 2025-03-03 | End: 2025-03-04

## 2025-03-03 RX ORDER — GUAIFENESIN 600 MG/1
600 TABLET, EXTENDED RELEASE ORAL EVERY 12 HOURS
Status: DISCONTINUED | OUTPATIENT
Start: 2025-03-03 | End: 2025-03-04 | Stop reason: HOSPADM

## 2025-03-03 RX ADMIN — IPRATROPIUM BROMIDE 0.5 MG: 0.5 SOLUTION RESPIRATORY (INHALATION) at 03:44

## 2025-03-03 RX ADMIN — ENOXAPARIN SODIUM 40 MG: 100 INJECTION SUBCUTANEOUS at 16:21

## 2025-03-03 RX ADMIN — ESCITALOPRAM OXALATE 10 MG: 10 TABLET ORAL at 05:45

## 2025-03-03 RX ADMIN — TRAZODONE HYDROCHLORIDE 50 MG: 50 TABLET ORAL at 20:48

## 2025-03-03 RX ADMIN — LEVALBUTEROL 1.25 MG: 1.25 SOLUTION RESPIRATORY (INHALATION) at 07:11

## 2025-03-03 RX ADMIN — FLUTICASONE FUROATE, UMECLIDINIUM BROMIDE AND VILANTEROL TRIFENATATE 1 PUFF: 100; 62.5; 25 POWDER RESPIRATORY (INHALATION) at 07:11

## 2025-03-03 RX ADMIN — LEVALBUTEROL 1.25 MG: 1.25 SOLUTION RESPIRATORY (INHALATION) at 14:01

## 2025-03-03 RX ADMIN — POLYETHYLENE GLYCOL 3350 1 PACKET: 17 POWDER, FOR SOLUTION ORAL at 05:45

## 2025-03-03 RX ADMIN — LEVALBUTEROL 1.25 MG: 1.25 SOLUTION RESPIRATORY (INHALATION) at 19:28

## 2025-03-03 RX ADMIN — CETIRIZINE HYDROCHLORIDE 10 MG: 1 SOLUTION ORAL at 05:45

## 2025-03-03 RX ADMIN — IPRATROPIUM BROMIDE 0.5 MG: 0.5 SOLUTION RESPIRATORY (INHALATION) at 14:01

## 2025-03-03 RX ADMIN — HYDROCODONE BITARTRATE AND ACETAMINOPHEN 1 TABLET: 5; 325 TABLET ORAL at 13:06

## 2025-03-03 RX ADMIN — DOXYCYCLINE 100 MG: 100 TABLET, FILM COATED ORAL at 16:21

## 2025-03-03 RX ADMIN — INSULIN LISPRO 1 UNITS: 100 INJECTION, SOLUTION INTRAVENOUS; SUBCUTANEOUS at 20:48

## 2025-03-03 RX ADMIN — LEVALBUTEROL 1.25 MG: 1.25 SOLUTION RESPIRATORY (INHALATION) at 03:44

## 2025-03-03 RX ADMIN — INSULIN LISPRO 1 UNITS: 100 INJECTION, SOLUTION INTRAVENOUS; SUBCUTANEOUS at 08:16

## 2025-03-03 RX ADMIN — IPRATROPIUM BROMIDE 0.5 MG: 0.5 SOLUTION RESPIRATORY (INHALATION) at 10:53

## 2025-03-03 RX ADMIN — HYDROCODONE BITARTRATE AND ACETAMINOPHEN 1 TABLET: 5; 325 TABLET ORAL at 20:53

## 2025-03-03 RX ADMIN — LEVALBUTEROL 1.25 MG: 1.25 SOLUTION RESPIRATORY (INHALATION) at 10:53

## 2025-03-03 RX ADMIN — IPRATROPIUM BROMIDE 0.5 MG: 0.5 SOLUTION RESPIRATORY (INHALATION) at 07:11

## 2025-03-03 RX ADMIN — IPRATROPIUM BROMIDE 0.5 MG: 0.5 SOLUTION RESPIRATORY (INHALATION) at 19:28

## 2025-03-03 RX ADMIN — GUAIFENESIN 600 MG: 600 TABLET, EXTENDED RELEASE ORAL at 16:21

## 2025-03-03 RX ADMIN — INSULIN LISPRO 2 UNITS: 100 INJECTION, SOLUTION INTRAVENOUS; SUBCUTANEOUS at 13:03

## 2025-03-03 RX ADMIN — INSULIN LISPRO 1 UNITS: 100 INJECTION, SOLUTION INTRAVENOUS; SUBCUTANEOUS at 16:21

## 2025-03-03 RX ADMIN — PREDNISONE 40 MG: 20 TABLET ORAL at 05:45

## 2025-03-03 RX ADMIN — DOXYCYCLINE 100 MG: 100 TABLET, FILM COATED ORAL at 05:45

## 2025-03-03 ASSESSMENT — PAIN DESCRIPTION - PAIN TYPE
TYPE: ACUTE PAIN

## 2025-03-03 NOTE — CARE PLAN
The patient is Watcher - Medium risk of patient condition declining or worsening    Shift Goals  Clinical Goals: Pt will use IS at least 10 times every hour during waking hours.  Patient Goals: feel less sob, breathe easier.  Family Goals: LENNIE    Progress made toward(s) clinical / shift goals:  Pt did use IS at least 10 times every hour during waking hours.    Patient is not progressing towards the following goals:progressing.

## 2025-03-03 NOTE — CARE PLAN
The patient is Stable - Low risk of patient condition declining or worsening    Shift Goals  Clinical Goals: Monitor BG, maintain O2 sat and use IS  Patient Goals: Rest  Family Goals: LENNIE    Progress made toward(s) clinical / shift goals:  Patient alert and oriented x4. Respirations even and unlabored on 1L NC, O2 sat maintain above 90% throughout shift, receiving respiratory tx. Patient able to take medications per MAR, , no insulin required. Patient OOB to bathroom. Able to make needs known, bed in lowest position, call light within reach.      Problem: Pain - Standard  Goal: Alleviation of pain or a reduction in pain to the patient’s comfort goal  Outcome: Progressing     Problem: Knowledge Deficit - Standard  Goal: Patient and family/care givers will demonstrate understanding of plan of care, disease process/condition, diagnostic tests and medications  Outcome: Progressing     Problem: Impaired Gas Exchange  Goal: Patient will demonstrate improved ventilation and adequate oxygenation and participate in treatment regimen within the level of ability/situation.  Outcome: Progressing       Patient is not progressing towards the following goals:

## 2025-03-03 NOTE — CARE PLAN
Problem: Bronchoconstriction  Goal: Improve in air movement and diminished wheezing  Description: Target End Date:  2 to 3 days    1.  Implement inhaled treatments  2.  Evaluate and manage medication effects  Outcome: Progressing   Atrovent/xopenex/flutter qid

## 2025-03-03 NOTE — PROGRESS NOTES
Admitted by my colleague earlier this morning for dyspnea and COPD exacerbation.  Replace electrolytes and trend, quite wheezy this morning repeatedly given additional dose IV steroids, RT following. Full not to follow.    Nik Camacho M.D.

## 2025-03-03 NOTE — CARE PLAN
Problem: Bronchoconstriction  Goal: Improve in air movement and diminished wheezing  Description: Target End Date:  2 to 3 days    1.  Implement inhaled treatments  2.  Evaluate and manage medication effects  Outcome: Progressing   Xopenex/atrovent Q4  Trelagy Qday  Problem: Bronchopulmonary Hygiene  Goal: Increase mobilization of retained secretions  Description: Target End Date:  2 to 3 days    1.  Perform bronchopulmonary therapy as indicated by assessment  2.  Perform airway suctioning  3.  Perform actions to maintain patient airway  Outcome: Progressing   Flutter QID

## 2025-03-03 NOTE — PROGRESS NOTES
Communication with Nik Camacho MD,   Pt asking for another breathing treatment and sounds wheezy throughout. She is shaky and c/o sob. Oxygen at 89-90% RA and hr  110. She has COPD, do you think I should give her supplemental oxygen? States pt okay for another treatment and review pt is on steroids.    Also, she is complaining of a headache 9/10 in mid head. I have nothing to give to her medication. review of apap given times and non pharmacological interventions.     plan to notify RT pt wanting breathing treatments. Jerry Miller MD to come to bedside, states pt okay for supplemental oxygen. pt on  and review of saturations.   notified RT  Pt asking for another breathing treatment. Jerry miller md said okay to give and he'll co-sign. Xopenex is what he recommends. He'll put in order he said. RT to communicate to this RN that he can change to copenex and atrovent per protocol and will order it.

## 2025-03-04 ENCOUNTER — PHARMACY VISIT (OUTPATIENT)
Dept: PHARMACY | Facility: MEDICAL CENTER | Age: 55
End: 2025-03-04
Payer: COMMERCIAL

## 2025-03-04 VITALS
BODY MASS INDEX: 22.82 KG/M2 | WEIGHT: 154.1 LBS | SYSTOLIC BLOOD PRESSURE: 127 MMHG | OXYGEN SATURATION: 92 % | RESPIRATION RATE: 16 BRPM | TEMPERATURE: 98.2 F | DIASTOLIC BLOOD PRESSURE: 84 MMHG | HEIGHT: 69 IN | HEART RATE: 91 BPM

## 2025-03-04 PROBLEM — J96.01 ACUTE RESPIRATORY FAILURE WITH HYPOXIA (HCC): Status: RESOLVED | Noted: 2025-03-02 | Resolved: 2025-03-04

## 2025-03-04 LAB
ANION GAP SERPL CALC-SCNC: 11 MMOL/L (ref 7–16)
BASOPHILS # BLD AUTO: 0.1 % (ref 0–1.8)
BASOPHILS # BLD: 0.01 K/UL (ref 0–0.12)
BUN SERPL-MCNC: 31 MG/DL (ref 8–22)
CALCIUM SERPL-MCNC: 9.2 MG/DL (ref 8.5–10.5)
CHLORIDE SERPL-SCNC: 101 MMOL/L (ref 96–112)
CO2 SERPL-SCNC: 27 MMOL/L (ref 20–33)
CREAT SERPL-MCNC: 1.07 MG/DL (ref 0.5–1.4)
EOSINOPHIL # BLD AUTO: 0.03 K/UL (ref 0–0.51)
EOSINOPHIL NFR BLD: 0.3 % (ref 0–6.9)
ERYTHROCYTE [DISTWIDTH] IN BLOOD BY AUTOMATED COUNT: 49.2 FL (ref 35.9–50)
GFR SERPLBLD CREATININE-BSD FMLA CKD-EPI: 61 ML/MIN/1.73 M 2
GLUCOSE BLD STRIP.AUTO-MCNC: 136 MG/DL (ref 65–99)
GLUCOSE BLD STRIP.AUTO-MCNC: 203 MG/DL (ref 65–99)
GLUCOSE SERPL-MCNC: 102 MG/DL (ref 65–99)
HCT VFR BLD AUTO: 37.9 % (ref 37–47)
HGB BLD-MCNC: 11.9 G/DL (ref 12–16)
IMM GRANULOCYTES # BLD AUTO: 0.16 K/UL (ref 0–0.11)
IMM GRANULOCYTES NFR BLD AUTO: 1.6 % (ref 0–0.9)
LYMPHOCYTES # BLD AUTO: 2.16 K/UL (ref 1–4.8)
LYMPHOCYTES NFR BLD: 22.2 % (ref 22–41)
MCH RBC QN AUTO: 27.8 PG (ref 27–33)
MCHC RBC AUTO-ENTMCNC: 31.4 G/DL (ref 32.2–35.5)
MCV RBC AUTO: 88.6 FL (ref 81.4–97.8)
MONOCYTES # BLD AUTO: 0.87 K/UL (ref 0–0.85)
MONOCYTES NFR BLD AUTO: 8.9 % (ref 0–13.4)
NEUTROPHILS # BLD AUTO: 6.51 K/UL (ref 1.82–7.42)
NEUTROPHILS NFR BLD: 66.9 % (ref 44–72)
NRBC # BLD AUTO: 0 K/UL
NRBC BLD-RTO: 0 /100 WBC (ref 0–0.2)
PLATELET # BLD AUTO: 312 K/UL (ref 164–446)
PMV BLD AUTO: 8.9 FL (ref 9–12.9)
POTASSIUM SERPL-SCNC: 4.4 MMOL/L (ref 3.6–5.5)
RBC # BLD AUTO: 4.28 M/UL (ref 4.2–5.4)
SODIUM SERPL-SCNC: 139 MMOL/L (ref 135–145)
WBC # BLD AUTO: 9.7 K/UL (ref 4.8–10.8)

## 2025-03-04 PROCEDURE — A9270 NON-COVERED ITEM OR SERVICE: HCPCS | Performed by: STUDENT IN AN ORGANIZED HEALTH CARE EDUCATION/TRAINING PROGRAM

## 2025-03-04 PROCEDURE — 700101 HCHG RX REV CODE 250: Performed by: STUDENT IN AN ORGANIZED HEALTH CARE EDUCATION/TRAINING PROGRAM

## 2025-03-04 PROCEDURE — 700102 HCHG RX REV CODE 250 W/ 637 OVERRIDE(OP): Performed by: STUDENT IN AN ORGANIZED HEALTH CARE EDUCATION/TRAINING PROGRAM

## 2025-03-04 PROCEDURE — RXMED WILLOW AMBULATORY MEDICATION CHARGE: Performed by: FAMILY MEDICINE

## 2025-03-04 PROCEDURE — 85025 COMPLETE CBC W/AUTO DIFF WBC: CPT

## 2025-03-04 PROCEDURE — 700111 HCHG RX REV CODE 636 W/ 250 OVERRIDE (IP): Performed by: STUDENT IN AN ORGANIZED HEALTH CARE EDUCATION/TRAINING PROGRAM

## 2025-03-04 PROCEDURE — 80048 BASIC METABOLIC PNL TOTAL CA: CPT

## 2025-03-04 PROCEDURE — 82962 GLUCOSE BLOOD TEST: CPT

## 2025-03-04 PROCEDURE — 94640 AIRWAY INHALATION TREATMENT: CPT

## 2025-03-04 PROCEDURE — 99239 HOSP IP/OBS DSCHRG MGMT >30: CPT | Performed by: FAMILY MEDICINE

## 2025-03-04 RX ORDER — LIDOCAINE 50 MG/G
1 PATCH TOPICAL EVERY 24 HOURS
Qty: 10 PATCH | Refills: 0 | Status: SHIPPED | OUTPATIENT
Start: 2025-03-04

## 2025-03-04 RX ORDER — METHOCARBAMOL 750 MG/1
750 TABLET, FILM COATED ORAL 4 TIMES DAILY
Qty: 120 TABLET | Refills: 0 | Status: SHIPPED | OUTPATIENT
Start: 2025-03-04

## 2025-03-04 RX ORDER — LEVALBUTEROL INHALATION SOLUTION 1.25 MG/3ML
1.25 SOLUTION RESPIRATORY (INHALATION)
Status: DISCONTINUED | OUTPATIENT
Start: 2025-03-04 | End: 2025-03-04 | Stop reason: HOSPADM

## 2025-03-04 RX ORDER — LEVALBUTEROL INHALATION SOLUTION 1.25 MG/3ML
1.25 SOLUTION RESPIRATORY (INHALATION) 4 TIMES DAILY
Qty: 300 ML | Refills: 0 | Status: SHIPPED | OUTPATIENT
Start: 2025-03-04

## 2025-03-04 RX ORDER — DOXYCYCLINE 100 MG/1
100 TABLET ORAL EVERY 12 HOURS
Qty: 5 TABLET | Refills: 0 | Status: ACTIVE | OUTPATIENT
Start: 2025-03-04 | End: 2025-03-07

## 2025-03-04 RX ORDER — PREDNISONE 20 MG/1
TABLET ORAL
Qty: 2 TABLET | Refills: 0 | Status: SHIPPED | OUTPATIENT
Start: 2025-03-05

## 2025-03-04 RX ADMIN — ESCITALOPRAM OXALATE 10 MG: 10 TABLET ORAL at 05:53

## 2025-03-04 RX ADMIN — IPRATROPIUM BROMIDE 0.5 MG: 0.5 SOLUTION RESPIRATORY (INHALATION) at 10:30

## 2025-03-04 RX ADMIN — LEVALBUTEROL 1.25 MG: 1.25 SOLUTION RESPIRATORY (INHALATION) at 03:40

## 2025-03-04 RX ADMIN — DOXYCYCLINE 100 MG: 100 TABLET, FILM COATED ORAL at 05:52

## 2025-03-04 RX ADMIN — INSULIN LISPRO 2 UNITS: 100 INJECTION, SOLUTION INTRAVENOUS; SUBCUTANEOUS at 12:43

## 2025-03-04 RX ADMIN — CETIRIZINE HYDROCHLORIDE 10 MG: 1 SOLUTION ORAL at 05:53

## 2025-03-04 RX ADMIN — LEVALBUTEROL 1.25 MG: 1.25 SOLUTION RESPIRATORY (INHALATION) at 10:30

## 2025-03-04 RX ADMIN — PREDNISONE 40 MG: 20 TABLET ORAL at 05:53

## 2025-03-04 RX ADMIN — GUAIFENESIN 600 MG: 600 TABLET, EXTENDED RELEASE ORAL at 05:52

## 2025-03-04 NOTE — DISCHARGE SUMMARY
Discharge Summary    CHIEF COMPLAINT ON ADMISSION  Chief Complaint   Patient presents with    Shortness of Breath     Reports started at 9:45 tonight. Denies any CP or fevers.        Reason for Admission  EMS     Admission Date  3/1/2025    CODE STATUS  Full Code    HPI & HOSPITAL COURSE  Rhonda Gould is a 54 y.o. female who presented 3/1/2025 with shortness of breath.  PMH of COPD, anxiety, former alcohol and tobacco use.  Coming in with wheezing, shortness of breath.  She was in the ED on 2/26 with similar complaints discharged after breathing treatments with prednisone and doxycycline which she has been taking.  Symptoms improved initially but over the past couple days worsening again.  She quit smoking a few weeks ago and is on Chantix currently, says symptoms have been worse since quitting smoking.  She has a dry cough, no hemoptysis, no fever/chills.  PCP started her on Spiriva couple months ago but it has not helped much.   In the ED afebrile, tachycardic and tachypneic.  On 6 L O2.  Labs show glucose in the 200s, potassium 3.4.  Quite wheezy on 3/2, given additional IV steroids as well as multiple additional breathing treatments.     Interval Problem Update  3/3  Afebrile normal pulse and respiratory rate systolic blood pressure from 120s to 150s pulse ox 87 to 93% on 2 L nasal cannula.  Hyperglycemia improved, glucose 121, breathing treatments as needed, p.o. prednisone no additional IV steroids required.     Patient was seen and examined on 3/4/2025 and she is markedly improved.  Patient has very upbeat spirit and is excited to be discharged home.  Patient did a rest walk and was not requiring oxygen with ambulation.  Patient is requesting that she be discharged home with nebulizer treatments so that she can continue to have good breathing ability.  I explained to her that I will order all of these for her and continue some of the other medications that she has been taking at home and while in the  hospital as well.    Meds were ordered to bedside to make sure that she has no problems x-ray obtaining her medications.    Patient is very grateful for the care she received here.  Patient was seen and examined she is afebrile her vitals are stable her O2 sats are great and she is stable for discharge home       Therefore, she is discharged in good and stable condition to home with close outpatient follow-up.    The patient met 2-midnight criteria for an inpatient stay at the time of discharge.    Discharge Date  3/4/2025    FOLLOW UP ITEMS POST DISCHARGE  Follow-up primary care provider    DISCHARGE DIAGNOSES  Principal Problem:    Acute exacerbation of chronic obstructive pulmonary disease (COPD) (HCC) (POA: Yes)  Active Problems:    Anxiety (Chronic) (POA: Yes)      Overview: Anxiety in past few months    Hyperglycemia (POA: Yes)    Hypokalemia (POA: Yes)  Resolved Problems:    Acute respiratory failure with hypoxia (HCC) (POA: Yes)      FOLLOW UP  Future Appointments   Date Time Provider Department Center   3/12/2025  1:40 PM [facility] None     No follow-up provider specified.    MEDICATIONS ON DISCHARGE     Medication List        START taking these medications        Instructions   ipratropium 0.02 % Soln  Commonly known as: Atrovent   Take 2.5 mL by nebulization 4 times a day.  Dose: 0.5 mg     levalbuterol 1.25 MG/3ML Nebu  Commonly known as: Xopenex   Take 3 mL by nebulization 4 times a day.  Dose: 1.25 mg            CHANGE how you take these medications        Instructions   * doxycycline 100 MG capsule  What changed: Another medication with the same name was added. Make sure you understand how and when to take each.  Commonly known as: Monodox   Take 1 Capsule by mouth 2 times a day for 10 days.  Dose: 100 mg     * doxycycline monohydrate 100 MG tablet  What changed: You were already taking a medication with the same name, and this prescription was added. Make sure you understand how  and when to take each.  Commonly known as: Adoxa   Take 1 Tablet by mouth every 12 hours for 5 doses.  Dose: 100 mg     lidocaine 5 % Ptch  What changed: additional instructions  Commonly known as: Lidoderm   Place 1 Patch on the skin every 24 hours.  Dose: 1 Patch     methocarbamol 750 MG Tabs  What changed: additional instructions  Commonly known as: Robaxin   Take 1 Tablet by mouth 4 times a day.  Dose: 750 mg     predniSONE 20 MG Tabs  Start taking on: March 5, 2025  What changed:   how much to take  how to take this  when to take this  additional instructions  Commonly known as: Deltasone   1 tab daily x 2 days           * This list has 2 medication(s) that are the same as other medications prescribed for you. Read the directions carefully, and ask your doctor or other care provider to review them with you.                CONTINUE taking these medications        Instructions   cetirizine 10 MG Tabs  Commonly known as: ZyrTEC   Take 10 mg by mouth every day.  Dose: 10 mg     Chantix 1 MG tablet  Generic drug: varenicline   Take 1 mg by mouth every day.  Dose: 1 mg     EPINEPHrine 0.3 MG/0.3ML Sosy   Inject the contents of the epipen into the thigh, hold for 3 seconds and release from thigh as needed for anaphylaxis.     escitalopram 10 MG Tabs  Commonly known as: Lexapro   Take 1 Tablet by mouth every day.  Dose: 1 Tablet     traZODone 50 MG Tabs  Commonly known as: Desyrel   Take 50 mg by mouth at bedtime.  Dose: 50 mg     Vivitrol 380 MG Susr  Generic drug: naltrexone   Inject 380 mg into the shoulder, thigh, or buttocks every 28 days.  Dose: 380 mg              Allergies  Allergies   Allergen Reactions    Fish Allergy Anaphylaxis and Shortness of Breath     Swelling to throat.    Shellfish Allergy Anaphylaxis     Strict No Shellfish (avoids most fish, cod OK)      Codeine Hives and Itching     Tolerated morphine previously  Allergy updated from 2015, morphine given in 2016       DIET  Orders Placed This  Encounter   Procedures    Diet Order Diet: Regular     Standing Status:   Standing     Number of Occurrences:   1     Diet::   Regular [1]       ACTIVITY  As tolerated.  Weight bearing as tolerated    CONSULTATIONS  None    PROCEDURES  None    LABORATORY  Lab Results   Component Value Date    SODIUM 139 03/04/2025    POTASSIUM 4.4 03/04/2025    CHLORIDE 101 03/04/2025    CO2 27 03/04/2025    GLUCOSE 102 (H) 03/04/2025    BUN 31 (H) 03/04/2025    CREATININE 1.07 03/04/2025        Lab Results   Component Value Date    WBC 9.7 03/04/2025    HEMOGLOBIN 11.9 (L) 03/04/2025    HEMATOCRIT 37.9 03/04/2025    PLATELETCT 312 03/04/2025      Patient seen and examined on 3/4/2025.  Patients vitals are stable patient is stable for discharge home    Total time of the discharge process exceeds 30   minutes.

## 2025-03-04 NOTE — CARE PLAN
The patient is Watcher - Medium risk of patient condition declining or worsening    Shift Goals  Clinical Goals: Pt will use IS at least 10 times every hour during waking hours.  Patient Goals: feel less sob, breathe easier.  Family Goals: LENNIE    Progress made toward(s) clinical / shift goals:  Pt did use IS at least 10 times every hour during waking hours.    Patient is not progressing towards the following goals:

## 2025-03-04 NOTE — PROGRESS NOTES
MD and CM state pt is okay to DC home with no needs. Plan for DC lounge after DC orders are placed. Pt states readiness to DC at this time

## 2025-03-04 NOTE — CARE PLAN
The patient is Stable - Low risk of patient condition declining or worsening    Shift Goals  Clinical Goals: Home O2 eval, wean O2  Patient Goals: Rest, breath easier  Family Goals: LENNIE    Progress made toward(s) clinical / shift goals:  Patient alert and oriented x4. Able to take medications per MAR. Home O2 eval completed, able to wean O2. Respirations even and unlabored, requesting respiratory treatment once. Patient OOB ambulating hallway. Able to make needs known, bed in lowest position, call light within reach.      Problem: Pain - Standard  Goal: Alleviation of pain or a reduction in pain to the patient’s comfort goal  Outcome: Progressing     Problem: Knowledge Deficit - Standard  Goal: Patient and family/care givers will demonstrate understanding of plan of care, disease process/condition, diagnostic tests and medications  Outcome: Progressing     Problem: Impaired Gas Exchange  Goal: Patient will demonstrate improved ventilation and adequate oxygenation and participate in treatment regimen within the level of ability/situation.  Outcome: Progressing       Patient is not progressing towards the following goals:

## 2025-03-04 NOTE — PROGRESS NOTES
D/c instructions given, educated on worsening s/s. Pt understands and questions answered. Meds to bed given and verified

## 2025-03-04 NOTE — PROGRESS NOTES
Hospital Medicine Daily Progress Note    Date of Service  3/3/2025    Chief Complaint  Rhonda Gould is a 54 y.o. female admitted 3/1/2025 with dyspnea    Hospital Course  Rhonda Gould is a 54 y.o. female who presented 3/1/2025 with shortness of breath.  PMH of COPD, anxiety, former alcohol and tobacco use.  Coming in with wheezing, shortness of breath.  She was in the ED on 2/26 with similar complaints discharged after breathing treatments with prednisone and doxycycline which she has been taking.  Symptoms improved initially but over the past couple days worsening again.  She quit smoking a few weeks ago and is on Chantix currently, says symptoms have been worse since quitting smoking.  She has a dry cough, no hemoptysis, no fever/chills.  PCP started her on Spiriva couple months ago but it has not helped much.   In the ED afebrile, tachycardic and tachypneic.  On 6 L O2.  Labs show glucose in the 200s, potassium 3.4.  Quite wheezy on 3/2, given additional IV steroids as well as multiple additional breathing treatments.    Interval Problem Update  3/3  Afebrile normal pulse and respiratory rate systolic blood pressure from 120s to 150s pulse ox 87 to 93% on 2 L nasal cannula.  Hyperglycemia improved, glucose 121, breathing treatments as needed, p.o. prednisone no additional IV steroids required.   Monitor respiratory status and continue breathing treatments.  Suspect discharge tomorrow, suspect she will require home oxygen.    I have discussed this patient's plan of care and discharge plan at IDT rounds today with Case Management, Nursing, Nursing leadership, and other members of the IDT team.    Consultants/Specialty  pulmonary    Code Status  Full Code    Disposition  The patient is not medically cleared for discharge to home or a post-acute facility.      I have placed the appropriate orders for post-discharge needs.    Review of Systems  ROS   Review of Systems   Constitutional:  Negative for chills  and fever.   Respiratory:  Positive for shortness of breath.    Cardiovascular:  Negative for chest pain.   Gastrointestinal:  Negative for abdominal pain, diarrhea, nausea and vomiting.   Genitourinary:  Negative for dysuria and urgency.     Physical Exam  Temp:  [36.1 °C (97 °F)-36.3 °C (97.4 °F)] 36.1 °C (97 °F)  Pulse:  [] 95  Resp:  [16-20] 18  BP: (128-167)/() 128/94  SpO2:  [87 %-95 %] 93 %    Physical Exam  Vitals and nursing note reviewed.   Constitutional:       General: She is not in acute distress.     Appearance: She is not ill-appearing or toxic-appearing.   HENT:      Head: Normocephalic and atraumatic.      Nose: Nose normal. No rhinorrhea.      Mouth/Throat:      Mouth: Mucous membranes are moist.      Pharynx: Oropharynx is clear.   Eyes:      General: No scleral icterus.     Extraocular Movements: Extraocular movements intact.      Conjunctiva/sclera: Conjunctivae normal.   Cardiovascular:      Rate and Rhythm: Normal rate and regular rhythm.      Pulses: Normal pulses.   Pulmonary:      Effort: Pulmonary effort is normal. No respiratory distress.      Breath sounds: Wheezing present. No rhonchi or rales.   Abdominal:      General: There is no distension.      Palpations: Abdomen is soft.      Tenderness: There is no abdominal tenderness. There is no guarding or rebound.   Musculoskeletal:         General: No tenderness or deformity. Normal range of motion.      Cervical back: Normal range of motion and neck supple. No rigidity.      Right lower leg: No edema.      Left lower leg: No edema.   Skin:     General: Skin is warm and dry.      Capillary Refill: Capillary refill takes less than 2 seconds.      Findings: No erythema or rash.   Neurological:      General: No focal deficit present.      Mental Status: She is alert and oriented to person, place, and time. Mental status is at baseline.      Cranial Nerves: No cranial nerve deficit.      Sensory: No sensory deficit.      Motor: No  weakness.      Coordination: Coordination normal.   Psychiatric:         Mood and Affect: Mood normal.         Behavior: Behavior normal.         Thought Content: Thought content normal.         Judgment: Judgment normal.         Fluids    Intake/Output Summary (Last 24 hours) at 3/3/2025 1836  Last data filed at 3/3/2025 1410  Gross per 24 hour   Intake 1080 ml   Output --   Net 1080 ml        Laboratory  Recent Labs     03/01/25  2330   WBC 11.6*   RBC 4.28   HEMOGLOBIN 12.3   HEMATOCRIT 37.2   MCV 86.9   MCH 28.7   MCHC 33.1   RDW 48.3   PLATELETCT 305   MPV 9.1     Recent Labs     03/02/25  0005 03/03/25  0425   SODIUM 137 139   POTASSIUM 3.4* 4.7   CHLORIDE 103 102   CO2 19* 22   GLUCOSE 248* 121*   BUN 15 19   CREATININE 0.85 0.89   CALCIUM 9.1 9.5                   Imaging  DX-CHEST-PORTABLE (1 VIEW)   Final Result      No acute cardiopulmonary disease.           Assessment/Plan  * Acute exacerbation of chronic obstructive pulmonary disease (COPD) (HCC)- (present on admission)  Assessment & Plan  Shortness of breath, wheezing is worse in the past couple days.  Patient presentation to the ED on 2/26 and again a couple months prior to that  She just quit smoking 2 weeks ago and has been feeling worse since then  No PFTs on file  Continue prednisone and doxycycline  She is on Spiriva and albuterol at home which she has been compliant with.  Spiriva started a couple months ago by her PCP but not helping much.  Will start on Dulera      Hypokalemia- (present on admission)  Assessment & Plan  3.4 presentation.  Replete as needed check magnesium    Hyperglycemia- (present on admission)  Assessment & Plan  Glucose in the 250s on presentation, no history of diabetes.  Will check A1c  Secondary to steroids.  Sliding scale insulin ordered    Acute respiratory failure with hypoxia (HCC)- (present on admission)  Assessment & Plan  Secondary to COPD exacerbation.  See above    Anxiety- (present on admission)  Assessment &  Plan  Continue cetirizine, escitalopram, trazodone         VTE prophylaxis:    enoxaparin ppx      I have performed a physical exam and reviewed and updated ROS and Plan today (3/3/2025). In review of yesterday's note (3/2/2025), there are no changes except as documented above.  Total time spent 53 minutes. I spent greater than 50% of the time for patient care, counseling, and coordination on this date, including unit/floor time, and face-to-face time with the patient as per interval events, my own review of patient's imaging and lab analysis and developing my assessment and plan above.

## 2025-03-05 NOTE — Clinical Note
REFERRAL APPROVAL NOTICE         Sent on March 5, 2025                   Rhonda Gould  [address] [id number]  [place] [address]                   Dear Ms. Gould,    After a careful review of the medical information and benefit coverage, [facility] has processed your referral. See below for additional details.    If applicable, you must be actively enrolled with your insurance for coverage of the authorized service. If you have any questions regarding your coverage, please contact your insurance directly.    REFERRAL INFORMATION   Referral #:  [id number]  Referred-To Department    Referred-By Provider:  Pulmonary and Sleep Medicine    Zachariah Slater M.D.   [facility]      [address]  [place] [address]  [phone] [id number]  [facility] [address]  [phone]    Referral Start Date:  03/02/2025  Referral End Date:   03/02/2026             SCHEDULING  If you do not already have an appointment, please call [phone] to make an appointment.     MORE INFORMATION  If you do not already have a [employer] account, sign up at: [email or web address]  You can access your medical information, make appointments, see lab results, billing information, and more.  If you have questions regarding this referral, please contact  the [facility] Referrals department at:             [phone]. Monday - Friday 8:00AM - 5:00PM.     Sincerely,    [facility]

## 2025-03-12 ENCOUNTER — OFFICE VISIT (OUTPATIENT)
Dept: SLEEP MEDICINE | Facility: MEDICAL CENTER | Age: 55
End: 2025-03-12
Attending: STUDENT IN AN ORGANIZED HEALTH CARE EDUCATION/TRAINING PROGRAM
Payer: COMMERCIAL

## 2025-03-12 ENCOUNTER — HOSPITAL ENCOUNTER (OUTPATIENT)
Dept: RADIOLOGY | Facility: MEDICAL CENTER | Age: 55
End: 2025-03-12
Attending: FAMILY MEDICINE

## 2025-03-12 VITALS
SYSTOLIC BLOOD PRESSURE: 106 MMHG | BODY MASS INDEX: 22.96 KG/M2 | DIASTOLIC BLOOD PRESSURE: 56 MMHG | OXYGEN SATURATION: 96 % | WEIGHT: 155 LBS | HEIGHT: 69 IN | HEART RATE: 96 BPM

## 2025-03-12 DIAGNOSIS — Z91.09 ENVIRONMENTAL ALLERGIES: ICD-10-CM

## 2025-03-12 DIAGNOSIS — J44.9 CHRONIC OBSTRUCTIVE PULMONARY DISEASE, UNSPECIFIED COPD TYPE (HCC): ICD-10-CM

## 2025-03-12 PROCEDURE — 94761 N-INVAS EAR/PLS OXIMETRY MLT: CPT

## 2025-03-12 PROCEDURE — 99212 OFFICE O/P EST SF 10 MIN: CPT

## 2025-03-12 PROCEDURE — 99215 OFFICE O/P EST HI 40 MIN: CPT

## 2025-03-12 RX ORDER — FLUTICASONE PROPIONATE 50 MCG
SPRAY, SUSPENSION (ML) NASAL
COMMUNITY

## 2025-03-12 RX ORDER — MONTELUKAST SODIUM 10 MG/1
10 TABLET ORAL
Qty: 30 TABLET | Refills: 11 | Status: SHIPPED | OUTPATIENT
Start: 2025-03-12

## 2025-03-12 RX ORDER — TIOTROPIUM BROMIDE INHALATION SPRAY 1.56 UG/1
SPRAY, METERED RESPIRATORY (INHALATION)
COMMUNITY
Start: 2025-02-24 | End: 2025-03-12

## 2025-03-12 RX ORDER — IPRATROPIUM BROMIDE AND ALBUTEROL SULFATE 2.5; .5 MG/3ML; MG/3ML
SOLUTION RESPIRATORY (INHALATION)
COMMUNITY

## 2025-03-12 RX ORDER — ALBUTEROL SULFATE 90 UG/1
INHALANT RESPIRATORY (INHALATION)
COMMUNITY
Start: 2025-03-04

## 2025-03-12 RX ORDER — TIOTROPIUM BROMIDE AND OLODATEROL 3.124; 2.736 UG/1; UG/1
2 SPRAY, METERED RESPIRATORY (INHALATION) DAILY
Qty: 12 G | Refills: 3 | Status: SHIPPED | OUTPATIENT
Start: 2025-03-12

## 2025-03-12 ASSESSMENT — ENCOUNTER SYMPTOMS
DEPRESSION: 0
HEMOPTYSIS: 0
PALPITATIONS: 0
SPUTUM PRODUCTION: 0
WHEEZING: 0
FEVER: 0
WEIGHT LOSS: 0
HEARTBURN: 0
SHORTNESS OF BREATH: 1
STRIDOR: 0
DIZZINESS: 0
CHILLS: 0
COUGH: 0

## 2025-03-12 ASSESSMENT — FIBROSIS 4 INDEX: FIB4 SCORE: 1.09

## 2025-03-12 NOTE — ASSESSMENT & PLAN NOTE
Flonase + Cetirizine + Montelukast  Follows with PCP     Orders:    montelukast (SINGULAIR) 10 MG Tab; Take 1 Tablet by mouth at bedtime.

## 2025-03-12 NOTE — PROGRESS NOTES
Pulmonary Clinic follow up    Date of Service: 3/12/2025    Reason for follow up:  Hospital Follow-up (Acute exacerbation COPD/Dates of hospitalization: 03/01/25 - 03/04/25), Results (DX-CHEST 03/01/25), and Other (Pulmonary consult: 03/02/25 w/ Dr. ABBY Miller)    History of Present Illness:     Rhonda Gould is a 54 y.o. female being evaluated in clinic today for hospital f/u s/p COPD exacerbation admission on 3/1/25 and discharged on 3/4/2025. PMH includes anxiety, HLD, insomnia, anemia, alcohol use in remission.  Patient presented to the emergency department with increasing shortness of breath.  No fever or chills at time of admission.  Patient was placed on oxygen and treated with steroids.  Patient was discharged home on room air, and is stable on room air today, multi ox stable with exertion.  Patient was previously on Spiriva, but reports using albuterol almost every day.  No formal PFTs performed in the past.  Patient quit smoking on 2/1/2025, with only 10.3 pack year smoking history and does not qualify for lung cancer screening, although her PCP reportedly ordered a CT scan that was completed yesterday.  Patient only gets short of breath with hills or a few sets of stairs.  Patient is eager to get back to exercising, after increase of her inhaler to Stiolto.  Today patient denies any shortness of breath, cough, fever, chills.  Patient has baseline clear sputum production, discussion about monitoring for sputum changes and changes in respiratory status that could indicate COPD exacerbation.    Pulmonary Hx: smoking history of 10.3 years, quit 2/1/2025    MMRC Grade:   0- Breathless only during strenuous exercise  1- Short of breath when hurrying or going up a small hill  2- Walks slower than friends due to breathlessness, has to stop at own pace  3- Stops to catch breath on level ground after 100m  4- Breathless with ambulating around house or ADLs     Review of Systems   Constitutional:  Negative for  chills, fever and weight loss.   Respiratory:  Positive for shortness of breath. Negative for cough, hemoptysis, sputum production, wheezing and stridor.    Cardiovascular:  Negative for chest pain, palpitations and leg swelling.   Gastrointestinal:  Negative for heartburn.   Skin:  Negative for rash.   Neurological:  Negative for dizziness.   Endo/Heme/Allergies:  Positive for environmental allergies.   Psychiatric/Behavioral:  Negative for depression.      Current Outpatient Medications on File Prior to Visit   Medication Sig Dispense Refill   • albuterol 108 (90 Base) MCG/ACT Aero Soln inhalation aerosol INHALE 1 PUFF BY MOUTH EVERY 4 HOURS AS NEEDED     • SPIRIVA RESPIMAT 1.25 MCG/ACT Aero Soln INHALE TWO PUFFS BY MOUTH ONCE A DAY     • nicotine polacrilex (NICORETTE) 2 MG Gum CHEW 1 PIECE FOR 30 MINUTES BY MOUTH AS NEEDED FOR UP TO 24 TIMES A DAY     • ipratropium-albuterol (DUONEB) 0.5-2.5 (3) MG/3ML nebulizer solution INHALE 1 VIAL Via nebulizer EVERY 6 HOURS AS NEEDED     • fluticasone (FLONASE) 50 MCG/ACT nasal spray USE ONE SPRAY IN EACH NOSTRIL ONCE A DAY     • lidocaine (LIDODERM) 5 % Patch Place 1 Patch on the skin every 24 hours. Wear for 12 hours, off for 12 hours. 10 Patch 0   • methocarbamol (ROBAXIN) 750 MG Tab Take 1 Tablet by mouth 4 times a day. 120 Tablet 0   • predniSONE (DELTASONE) 20 MG Tab Take 1 tablet by mouth daily x 2 days 2 Tablet 0   • ipratropium (ATROVENT) 0.02 % Solution Take 2.5 mL by nebulization 4 times a day. 300 mL 2   • levalbuterol (XOPENEX) 1.25 MG/3ML Nebu Soln Take 3 mL by nebulization 4 times a day. 300 mL 0   • escitalopram (LEXAPRO) 10 MG Tab Take 1 Tablet by mouth every day.     • naltrexone (VIVITROL) 380 MG Recon Susp Inject 380 mg into the shoulder, thigh, or buttocks every 28 days.     • traZODone (DESYREL) 50 MG Tab Take 50 mg by mouth at bedtime.     • varenicline (CHANTIX) 1 MG tablet Take 1 mg by mouth every day.     • cetirizine (ZYRTEC) 10 MG Tab Take 10 mg  "by mouth every day.     • EPINEPHrine 0.3 MG/0.3ML Solution Prefilled Syringe Inject the contents of the epipen into the thigh, hold for 3 seconds and release from thigh as needed for anaphylaxis. 1 Each 0     No current facility-administered medications on file prior to visit.     Social History     Tobacco Use   • Smoking status: Former     Current packs/day: 0.00     Types: Cigarettes     Quit date: 2025     Years since quittin.1   • Smokeless tobacco: Never   Vaping Use   • Vaping status: Never Used   Substance Use Topics   • Alcohol use: Not Currently     Comment: 1 pint a day, last drink 6/15   • Drug use: Not Currently     Frequency: 2.0 times per week     Types: Inhaled, Marijuana     Comment: marijuana      Past Medical History:   Diagnosis Date   • Chronic obstructive pulmonary disease (HCC)    • Hypertension      Past Surgical History:   Procedure Laterality Date   • HYSTERECTOMY LAPAROSCOPY  2005     Fish allergy, Shellfish allergy, and Codeine  Family History   Problem Relation Age of Onset   • Diabetes Mother    • Hypertension Mother    • Hypertension Father    • Diabetes Father    • Heart Disease Maternal Grandmother    • Cancer Maternal Grandfather      Ambulatory Vitals  Encounter Vitals  Blood Pressure: 106/56  Pulse: 96  Pulse Oximetry: 96 %  Weight: 70.3 kg (155 lb)  Height: 175.3 cm (5' 9\")  BMI (Calculated): 22.89   Physical Exam  Constitutional:       General: She is not in acute distress.  HENT:      Head: Normocephalic.      Nose: Nose normal.      Mouth/Throat:      Mouth: Mucous membranes are moist.   Eyes:      Conjunctiva/sclera: Conjunctivae normal.   Cardiovascular:      Rate and Rhythm: Normal rate and regular rhythm.      Heart sounds: No murmur heard.  Pulmonary:      Effort: No respiratory distress.      Breath sounds: Normal breath sounds. No wheezing.   Abdominal:      General: There is no distension.   Musculoskeletal:      Right lower leg: No edema.      Left lower " leg: No edema.   Skin:     General: Skin is warm and dry.      Capillary Refill: Capillary refill takes less than 2 seconds.      Nails: There is no clubbing.   Neurological:      General: No focal deficit present.      Mental Status: She is alert and oriented to person, place, and time.   Psychiatric:         Mood and Affect: Mood normal.     Results:    CT chest 3/11/2025:      Echocardiogram 3/3/2020:    CONCLUSIONS  Compared to the images of the study done on 08/05/2016 there has been   no significant change.   Left ventricular ejection fraction is visually estimated to be 65%.  Estimated right ventricular systolic pressure  is 30 mmHg.    Vaccinations:    Covid: complete 2024  Flu: complete 2024  Pneumonia: complete  RSV: complete      Assessment & Plan  Chronic obstructive pulmonary disease, unspecified COPD type (HCC)    Chronic 2/2 smoking history   Stable, recovered from acute exacerbation    GOLD Stage: pending PFT results  MMRC Group: 2   Exacerbations in the last year: 1 w hospitalization - no intubation  Change in dyspnea, sputum, or cough: clear, baseline    Routine Tx: Stiolto (no eosinophilia noted on differential, hold ICS for now)  PRN Tx: Albuterol   Encouraged regular exercise as tolerated  Annual covid and influenza vaccinations encouraged  Monitor for hypoxemia of oxygen <88% at home and schedule visit with any changes    Notify clinic with any early signs of exacerbation including change in sputum production or color and increased dyspnea.     Smoking cessation: discussed pharmacotherapy available for treatment and/or nicotine replacement therapy.     Orders:  •  Tiotropium Bromide-Olodaterol (STIOLTO RESPIMAT) 2.5-2.5 MCG/ACT Aero Soln; Inhale 2 Puffs every day.  •  PULMONARY FUNCTION TESTS -Test requested: Complete Pulmonary Function Test; Future  •  Multiple Oximetry; Future    Environmental allergies  Flonase + Cetirizine + Montelukast  Follows with PCP     Orders:  •  montelukast  (SINGULAIR) 10 MG Tab; Take 1 Tablet by mouth at bedtime.      Return in about 3 months (around 6/12/2025), or if symptoms worsen or fail to improve, for f/u on PFT and Stiolto .     This note was generated using voice recognition software which has a chance of producing errors of grammar and possibly content.  I have made every reasonable attempt to find and correct any obvious errors, but it should be expected that some may not be found prior to finalization of this note.    Time spent in record review prior to patient arrival, reviewing results, and in face-to-face encounter totaled 42 min, excluding any procedures if performed.    Keon MAYERS  [facility]

## 2025-03-12 NOTE — PROCEDURES
Multi-Ox Readings  Multi Ox #1 Room air   O2 sat % at rest 96   O2 sat % on exertion 95   O2 sat average on exertion

## 2025-04-03 ENCOUNTER — RESULTS FOLLOW-UP (OUTPATIENT)
Dept: URGENT CARE | Facility: CLINIC | Age: 55
End: 2025-04-03

## 2025-04-03 ENCOUNTER — OFFICE VISIT (OUTPATIENT)
Dept: URGENT CARE | Facility: CLINIC | Age: 55
End: 2025-04-03
Payer: COMMERCIAL

## 2025-04-03 VITALS
TEMPERATURE: 96.8 F | SYSTOLIC BLOOD PRESSURE: 116 MMHG | DIASTOLIC BLOOD PRESSURE: 70 MMHG | BODY MASS INDEX: 22.96 KG/M2 | HEART RATE: 82 BPM | RESPIRATION RATE: 17 BRPM | HEIGHT: 69 IN | OXYGEN SATURATION: 96 % | WEIGHT: 155 LBS

## 2025-04-03 DIAGNOSIS — J06.9 VIRAL URI WITH COUGH: ICD-10-CM

## 2025-04-03 DIAGNOSIS — J02.9 SORE THROAT: ICD-10-CM

## 2025-04-03 PROBLEM — M75.120 FULL THICKNESS ROTATOR CUFF TEAR: Status: ACTIVE | Noted: 2024-08-13

## 2025-04-03 PROBLEM — M19.012 OSTEOARTHRITIS OF LEFT ACROMIOCLAVICULAR JOINT: Status: ACTIVE | Noted: 2024-08-13

## 2025-04-03 PROBLEM — M62.81 MUSCLE WEAKNESS: Status: ACTIVE | Noted: 2024-08-13

## 2025-04-03 PROBLEM — S43.432A ANTERIOR TO POSTERIOR TEAR OF SUPERIOR GLENOID LABRUM OF LEFT SHOULDER: Status: ACTIVE | Noted: 2023-06-12

## 2025-04-03 PROBLEM — M75.42 IMPINGEMENT SYNDROME OF LEFT SHOULDER REGION: Status: ACTIVE | Noted: 2024-08-13

## 2025-04-03 PROBLEM — M75.82 TENDINITIS OF LEFT ROTATOR CUFF: Status: ACTIVE | Noted: 2024-08-13

## 2025-04-03 PROCEDURE — 3074F SYST BP LT 130 MM HG: CPT | Performed by: NURSE PRACTITIONER

## 2025-04-03 PROCEDURE — 99214 OFFICE O/P EST MOD 30 MIN: CPT | Performed by: NURSE PRACTITIONER

## 2025-04-03 PROCEDURE — 3078F DIAST BP <80 MM HG: CPT | Performed by: NURSE PRACTITIONER

## 2025-04-03 PROCEDURE — 87651 STREP A DNA AMP PROBE: CPT | Performed by: NURSE PRACTITIONER

## 2025-04-03 PROCEDURE — 0241U POCT CEPHEID COV-2, FLU A/B, RSV - PCR: CPT | Performed by: NURSE PRACTITIONER

## 2025-04-03 RX ORDER — BENZONATATE 100 MG/1
100 CAPSULE ORAL 3 TIMES DAILY PRN
Qty: 60 CAPSULE | Refills: 0 | Status: SHIPPED | OUTPATIENT
Start: 2025-04-03

## 2025-04-03 ASSESSMENT — FIBROSIS 4 INDEX: FIB4 SCORE: 1.09

## 2025-04-03 NOTE — LETTER
April 3, 2025         Patient: Rhonda Gould   YOB: 1970   Date of Visit: 4/3/2025           To Whom it May Concern:    Rhonda Gould was seen in my clinic on 4/3/2025. She may be excused from work today and tomorrow.     If you have any questions or concerns, please don't hesitate to call.        Sincerely,           GABRIELLA Soria.  Electronically Signed

## 2025-04-03 NOTE — PROGRESS NOTES
Chief Complaint   Patient presents with    Cough     X 0300, diarrhea, headache, sore throat.        HISTORY OF PRESENT ILLNESS: Patient is a pleasant 54 y.o. female who presents today due to symptoms which started this morning with sudden onset. Pt reports a fever, chills, body aches. Reports associated cough, sore throat, diarrhea, headache, and fatigue. Denies blood in sputum, chest pain, shortness of breath, wheezing, vomiting. No other aggravating or alleviating factors. She was exposed to strep recently.       Patient Active Problem List    Diagnosis Date Noted    Hyperglycemia 03/02/2025    Hypokalemia 03/02/2025    Full thickness rotator cuff tear 08/13/2024    Impingement syndrome of left shoulder region 08/13/2024    Muscle weakness 08/13/2024    Osteoarthritis of left acromioclavicular joint 08/13/2024    Tendinitis of left rotator cuff 08/13/2024    Alcohol abuse with withdrawal (Formerly [facility]) 06/14/2024    Hypoxia 06/14/2024    Anemia 06/09/2024    Acute alcohol intoxication with alcoholism with delirium (Formerly [facility]) 06/08/2024    Moderate protein-calorie malnutrition (Formerly [facility]) 06/08/2024    Hypophosphatemia 06/08/2024    Anterior to posterior tear of superior glenoid labrum of left shoulder 06/12/2023    S/P hysterectomy 01/14/2022    S/P cardiac catheterization 01/14/2022    GERD (gastroesophageal reflux disease) 01/14/2022    Respiratory failure (Formerly [facility]) 06/22/2021    Reactive airway disease 03/03/2020    Below ideal body weight range 01/29/2020    Smoking 12/11/2019    Dyslipidemia 09/11/2019    Cannabis abuse 09/11/2019    Alcohol abuse, in remission 09/11/2019    HLD (hyperlipidemia) 08/29/2019    Pancytopenia (HCC) 08/29/2019    Low TSH level 08/24/2019    Lactic acidosis 08/23/2019    Alcoholism (HCC) 08/23/2019    Simple chronic bronchitis (HCC) 08/23/2019    Hypoglycemia 08/23/2019    History of hypertension 08/23/2019    Tobacco abuse disorder 08/23/2019    Alopecia 01/15/2019    Mild intermittent asthma without  complication 08/24/2017    FH: HTN (hypertension) 08/24/2017    FH: diabetes mellitus 08/24/2017    Allergic rhinitis due to pollen 08/24/2017    Acute exacerbation of chronic obstructive pulmonary disease (COPD) (HCC) 07/13/2017    Tobacco dependence 04/20/2017    Gout 04/20/2017    TIA (transient ischemic attack) 08/04/2016    Left leg numbness 07/22/2015    Tobacco use 07/22/2015    Itching 06/11/2015    Environmental allergies 02/23/2015    Vitamin D deficiency 01/13/2015    Anxiety 12/09/2014    Insomnia 12/09/2014    Hypertension 12/09/2014    Headache 12/09/2014    Chest pain 09/23/2014       Allergies:Fish allergy, Shellfish allergy, Codeine, and Oxycodone    Current Outpatient Medications Ordered in Epic   Medication Sig Dispense Refill    albuterol 108 (90 Base) MCG/ACT Aero Soln inhalation aerosol INHALE 1 PUFF BY MOUTH EVERY 4 HOURS AS NEEDED      nicotine polacrilex (NICORETTE) 2 MG Gum CHEW 1 PIECE FOR 30 MINUTES BY MOUTH AS NEEDED FOR UP TO 24 TIMES A DAY      ipratropium-albuterol (DUONEB) 0.5-2.5 (3) MG/3ML nebulizer solution INHALE 1 VIAL Via nebulizer EVERY 6 HOURS AS NEEDED      fluticasone (FLONASE) 50 MCG/ACT nasal spray USE ONE SPRAY IN EACH NOSTRIL ONCE A DAY      Tiotropium Bromide-Olodaterol (STIOLTO RESPIMAT) 2.5-2.5 MCG/ACT Aero Soln Inhale 2 Puffs every day. 12 g 3    montelukast (SINGULAIR) 10 MG Tab Take 1 Tablet by mouth at bedtime. 30 Tablet 11    lidocaine (LIDODERM) 5 % Patch Place 1 Patch on the skin every 24 hours. Wear for 12 hours, off for 12 hours. 10 Patch 0    methocarbamol (ROBAXIN) 750 MG Tab Take 1 Tablet by mouth 4 times a day. 120 Tablet 0    predniSONE (DELTASONE) 20 MG Tab Take 1 tablet by mouth daily x 2 days 2 Tablet 0    ipratropium (ATROVENT) 0.02 % Solution Take 2.5 mL by nebulization 4 times a day. 300 mL 2    levalbuterol (XOPENEX) 1.25 MG/3ML Nebu Soln Take 3 mL by nebulization 4 times a day. 300 mL 0    escitalopram (LEXAPRO) 10 MG Tab Take 1 Tablet by mouth  every day.      naltrexone (VIVITROL) 380 MG Recon Susp Inject 380 mg into the shoulder, thigh, or buttocks every 28 days.      traZODone (DESYREL) 50 MG Tab Take 50 mg by mouth at bedtime.      varenicline (CHANTIX) 1 MG tablet Take 1 mg by mouth every day.      cetirizine (ZYRTEC) 10 MG Tab Take 10 mg by mouth every day.      EPINEPHrine 0.3 MG/0.3ML Solution Prefilled Syringe Inject the contents of the epipen into the thigh, hold for 3 seconds and release from thigh as needed for anaphylaxis. 1 Each 0     No current [facility]-ordered facility-administered medications on file.       Past Medical History:   Diagnosis Date    Chronic obstructive pulmonary disease (HCC)     Hypertension        Social History     Tobacco Use    Smoking status: Former     Average packs/day: 0.3 packs/day for 41.0 years (10.3 ttl pk-yrs)     Types: Cigarettes     Start date: 1984    Smokeless tobacco: Never   Vaping Use    Vaping status: Never Used   Substance Use Topics    Alcohol use: Not Currently     Comment: 1 pint a day, last drink 6/15    Drug use: Not Currently     Frequency: 2.0 times per week     Types: Inhaled, Marijuana     Comment: marijuana       Family Status   Relation Name Status    Mo  Alive    Fa  Alive    Sis  Alive    Bro  Alive    MGMo      MGFa     No partnership data on file     Family History   Problem Relation Age of Onset    Diabetes Mother     Hypertension Mother     Hypertension Father     Diabetes Father     Heart Disease Maternal Grandmother     Cancer Maternal Grandfather        ROS:  Review of Systems   Constitutional: Positive for subjective fever, chills, fatigue, malaise. Negative for weight loss.  HENT: Positive for sore throat. Negative for ear pain, nosebleeds, and neck pain.    Eyes: Negative for vision changes.   Cardiovascular: Negative for chest pain, palpitations, orthopnea and leg swelling.   Respiratory: Positive for cough. Negative for shortness of breath and wheezing.  "  Gastrointestinal: Positive for diarrhea. Negative for abdominal pain, vomiting.  Skin: Negative for rash, diaphoresis.     Exam:  /70   Pulse 82   Temp 36 °C (96.8 °F)   Resp 17   Ht 1.753 m (5' 9\")   Wt 70.3 kg (155 lb)   SpO2 96%   General: well-nourished, well-developed female, appears uncomfortable, non-toxic in appearance.   Head: normocephalic, atraumatic.  Eyes: PERRLA, EOM within normal limits, no conjunctival injection, no scleral icterus, visual fields and acuity grossly intact.  Ears: normal shape and symmetry, no tenderness, no discharge. External canals are without any significant edema or erythema. Tympanic membranes are without any inflammation, no effusion. Gross auditory acuity is intact.  Nose: symmetrical without tenderness, mild discharge, erythema present bilateral nares.  Mouth/Throat: reasonable hygiene, no exudates or tonsillar enlargement. Erythema present.   Neck: no masses, range of motion within normal limits, no tracheal deviation.  Lymph: mild cervical adenopathy. No supraclavicular adenopathy.   Neuro: alert and oriented. Cranial nerves 1-12 grossly intact.   Cardiovascular: regular rate and regular rhythm without murmurs, rubs, or gallops. No edema.   Pulmonary: no distress. Chest is symmetrical with respiration, no wheezes, crackles, or rhonchi.   Musculoskeletal: appropriate muscle tone, gait is stable.  Skin: warm, dry, intact, no clubbing, no cyanosis.   Psych: appropriate mood, affect, judgement.         Lab Results/POC Test Results   Results for orders placed or performed in visit on 04/03/25   POCT CoV-2, Flu A/B, RSV by PCR    Collection Time: 04/03/25  9:00 AM   Result Value Ref Range    SARS-CoV-2 by PCR Negative Negative, Invalid    Influenza virus A RNA Negative Negative, Invalid    Influenza virus B, PCR Negative Negative, Invalid    RSV, PCR Negative Negative, Invalid   POCT GROUP A STREP, PCR    Collection Time: 04/03/25  9:00 AM   Result Value Ref Range "    POC Group A Strep, PCR Not Detected Not Detected, Invalid                   Assessment/Plan:  1. Viral URI with cough  benzonatate (TESSALON) 100 MG Cap      2. Sore throat  POCT CoV-2, Flu A/B, RSV by PCR    POCT GROUP A STREP, PCR                Discussed symptoms most likely viral, self limiting illness. Did not see any evidence of a bacterial process. Discussed natural progression and sx care. Rest, increase fluid intake, hand and respiratory hygiene. OTC cough/cold medications as directed. Tessalon for cough.  Supportive care, differential diagnoses, and indications for immediate follow-up discussed with patient.   Pathogenesis of diagnosis discussed including typical length and natural progression.   Instructed to return to clinic or nearest emergency department for any change in condition, further concerns, or worsening of symptoms.  Patient states understanding of the plan of care and discharge instructions.  Instructed to make an appointment, for follow up, with her primary care provider.            Please note that this dictation was created using voice recognition software. I have made every reasonable attempt to correct obvious errors, but I expect that there are errors of grammar and possibly content that I did not discover before finalizing the note.       GABRIELLA Soria.

## 2025-04-16 PROCEDURE — RXMED WILLOW AMBULATORY MEDICATION CHARGE: Performed by: FAMILY MEDICINE

## 2025-04-17 NOTE — PROGRESS NOTES
Pulmonary Clinic Note    Date of Visit: 4/18/2025     Chief Complaint:  Chief Complaint   Patient presents with    Follow-Up     Last seen 3/12/25 w/ Keon Goldstein for COPD     Results     Pft sched 4/24/25     HPI:   Rhonda Gould is a very pleasant 54 y.o. year old female former smoker (10 pack-years, quit in 1984), with a PMHx of emphysema, environmental allergies, HLD, insomnia, anemia, alcohol use admission who presented to the Pulmonary Clinic for a regular follow up. Last seen in the office on 3/12/2025 with RIANA Fu.     Patient is followed by pulmonary office for emphysema.  No PFTs on file.  CXR in 2024 shows hyperinflation.  CT chest done at [facility] in 2025 shows emphysema but no suspicious nodules or nodes.  Patient is currently on DuoNeb and albuterol.  Patient was unable to obtain the Stiolto inhaler.  She also discontinued Singulair due to mood changes.  Patient also has a history of environmental allergies and is on OTC antihistamines and Flonase.    Interval events:  4/18/2025-  Patient reports that she had a asthma exacerbation at work.  She works at [employer].  She received IM steroids as well as a prednisone taper.  They also started her on Trelegy 100.  She states that she is feeling better, but still has residual shortness of breath, cough, clear sputum production, and wheezing.    Exacerbations this year:  1 (April 2025)    Current medication regimen: Trelegy 100, DuoNeb, albuterol    Oxygen use:  None      Past Medical History:   Diagnosis Date    Chronic obstructive pulmonary disease (HCC)     Hypertension      Past Surgical History:   Procedure Laterality Date    HYSTERECTOMY LAPAROSCOPY  6/2005     Social History     Socioeconomic History    Marital status:      Spouse name: Not on file    Number of children: Not on file    Years of education: Not on file    Highest education level: Not on file   Occupational History    Not on file    Tobacco Use    Smoking status: Former     Average packs/day: 0.3 packs/day for 41.0 years (10.3 ttl pk-yrs)     Types: Cigarettes     Start date: 2/1/1984    Smokeless tobacco: Never   Vaping Use    Vaping status: Never Used   Substance and Sexual Activity    Alcohol use: Not Currently     Comment: 1 pint a day, last drink 6/15    Drug use: Not Currently     Frequency: 2.0 times per week     Types: Inhaled, Marijuana     Comment: marijuana    Sexual activity: Not Currently   Other Topics Concern    Not on file   Social History Narrative    ** Merged History Encounter **          Social Drivers of Health     Financial Resource Strain: Not on file   Food Insecurity: No Food Insecurity (3/2/2025)    Hunger Vital Sign     Worried About Running Out of Food in the Last Year: Never true     Ran Out of Food in the Last Year: Never true   Transportation Needs: No Transportation Needs (3/2/2025)    PRAPARE - Transportation     Lack of Transportation (Medical): No     Lack of Transportation (Non-Medical): No   Physical Activity: Not on file   Stress: Not on file   Social Connections: Not on file   Intimate Partner Violence: Not At Risk (3/2/2025)    Humiliation, Afraid, Rape, and Kick questionnaire     Fear of Current or Ex-Partner: No     Emotionally Abused: No     Physically Abused: No     Sexually Abused: No   Housing Stability: High Risk (3/2/2025)    Housing Stability Vital Sign     Unable to Pay for Housing in the Last Year: No     Number of Times Moved in the Last Year: 0     Homeless in the Last Year: Yes        Family History   Problem Relation Age of Onset    Diabetes Mother     Hypertension Mother     Hypertension Father     Diabetes Father     Heart Disease Maternal Grandmother     Cancer Maternal Grandfather      Current Outpatient Medications on File Prior to Visit   Medication Sig Dispense Refill    benzonatate (TESSALON) 100 MG Cap Take 1 Capsule by mouth 3 times a day as needed for Cough. 60 Capsule 0     albuterol 108 (90 Base) MCG/ACT Aero Soln inhalation aerosol INHALE 1 PUFF BY MOUTH EVERY 4 HOURS AS NEEDED      nicotine polacrilex (NICORETTE) 2 MG Gum CHEW 1 PIECE FOR 30 MINUTES BY MOUTH AS NEEDED FOR UP TO 24 TIMES A DAY      ipratropium-albuterol (DUONEB) 0.5-2.5 (3) MG/3ML nebulizer solution INHALE 1 VIAL Via nebulizer EVERY 6 HOURS AS NEEDED      fluticasone (FLONASE) 50 MCG/ACT nasal spray USE ONE SPRAY IN EACH NOSTRIL ONCE A DAY      Tiotropium Bromide-Olodaterol (STIOLTO RESPIMAT) 2.5-2.5 MCG/ACT Aero Soln Inhale 2 Puffs every day. 12 g 3    montelukast (SINGULAIR) 10 MG Tab Take 1 Tablet by mouth at bedtime. 30 Tablet 11    lidocaine (LIDODERM) 5 % Patch Place 1 Patch on the skin every 24 hours. Wear for 12 hours, off for 12 hours. 10 Patch 0    methocarbamol (ROBAXIN) 750 MG Tab Take 1 Tablet by mouth 4 times a day. 120 Tablet 0    predniSONE (DELTASONE) 20 MG Tab Take 1 tablet by mouth daily x 2 days 2 Tablet 0    ipratropium (ATROVENT) 0.02 % Solution Take 2.5 mL by nebulization 4 times a day. 300 mL 2    levalbuterol (XOPENEX) 1.25 MG/3ML Nebu Soln Take 3 mL by nebulization 4 times a day. 300 mL 0    escitalopram (LEXAPRO) 10 MG Tab Take 1 Tablet by mouth every day.      naltrexone (VIVITROL) 380 MG Recon Susp Inject 380 mg into the shoulder, thigh, or buttocks every 28 days.      traZODone (DESYREL) 50 MG Tab Take 50 mg by mouth at bedtime.      varenicline (CHANTIX) 1 MG tablet Take 1 mg by mouth every day.      cetirizine (ZYRTEC) 10 MG Tab Take 10 mg by mouth every day.      EPINEPHrine 0.3 MG/0.3ML Solution Prefilled Syringe Inject the contents of the epipen into the thigh, hold for 3 seconds and release from thigh as needed for anaphylaxis. 1 Each 0     No current facility-administered medications on file prior to visit.     Allergies: Fish allergy, Shellfish allergy, Codeine, and Oxycodone    ROS:   Review of Systems   Constitutional:  Negative for chills, diaphoresis, fever and  "malaise/fatigue.   HENT:  Negative for congestion and sinus pain.    Respiratory:  Positive for cough, sputum production, shortness of breath and wheezing. Negative for hemoptysis.    Cardiovascular:  Negative for chest pain, palpitations and leg swelling.   Gastrointestinal:  Negative for diarrhea, heartburn, nausea and vomiting.   Musculoskeletal:  Negative for falls and myalgias.   Neurological:  Negative for dizziness, weakness and headaches.       Vitals:  /60 (BP Location: Left arm, Patient Position: Sitting, BP Cuff Size: Adult)   Pulse (!) 110   Ht 1.753 m (5' 9\")   Wt 68 kg (150 lb)   SpO2 94%     Physical Exam:  Physical Exam  Constitutional:       General: She is not in acute distress.     Appearance: Normal appearance. She is not ill-appearing, toxic-appearing or diaphoretic.   Cardiovascular:      Rate and Rhythm: Normal rate and regular rhythm.      Pulses: Normal pulses.      Heart sounds: Normal heart sounds. No murmur heard.     No friction rub. No gallop.   Pulmonary:      Effort: Pulmonary effort is normal. No respiratory distress.      Breath sounds: No stridor. Wheezing present. No rhonchi or rales.   Musculoskeletal:         General: No swelling.      Right lower leg: No edema.      Left lower leg: No edema.   Skin:     General: Skin is warm.   Neurological:      General: No focal deficit present.      Mental Status: She is alert and oriented to person, place, and time.   Psychiatric:         Mood and Affect: Mood normal.         Behavior: Behavior normal.         Thought Content: Thought content normal.         Judgment: Judgment normal.         Laboratory Data:  PFTs:-  None on file      CXR: (Date: 11/26/2024)-  Impression:  Cardiomediastinal contour is within normal limits.  Lungs show hyperinflation.  No focal pulmonary consolidation.  No pleural fluid collection or pneumothorax.  No major bony abnormality is seen.    CT Chest: (Date: 3/11/2025)-          Assessment and " Plan:    Problem List Items Addressed This Visit          Pulmonary/Sleep Medicine Problems    Panlobular emphysema (HCC)    No PFTs on file.  CT chest in 2025 shows emphysema.  Patient is on Trelegy, DuoNebs, and albuterol.  She has had 1 exacerbation.  Symptoms are slowly improving.  --Continue Trelegy 100  --Continue prednisone taper  --Discontinue Singulair due to mood changes  --Continue OTC antihistamine  --PFTs  --Patient can use OTC Mucinex DM while she is recovering.  I advised against long-term use.  --Advised patient to increase exercise as tolerated  --Advised patient to remain up-to-date on vaccines            Other    Environmental and seasonal allergies    As above.         Personal history of tobacco use    Lung cancer screening CT done through [facility] does not show any suspicious nodes or nodules.  --Repeat CT chest in 1 year         BMI 22.0-22.9, adult     Diagnostic studies have been reviewed with the patient.    Return in about 2 months (around 6/19/2025), or if symptoms worsen or fail to improve, for ASTHMA, PFT, aretha/ Keon.     This note was generated using voice recognition software which has a chance of producing errors of grammar and possibly content.  I have made every reasonable attempt to find and correct any obvious errors, but it should be expected that some may not be found prior to finalization of this note.    Time spent in record review prior to patient arrival, reviewing results, and in face-to-face encounter totaled 23 min.  __________  RIANA Weber  Pulmonary Medicine  [facility]

## 2025-04-18 ENCOUNTER — OFFICE VISIT (OUTPATIENT)
Dept: SLEEP MEDICINE | Facility: MEDICAL CENTER | Age: 55
End: 2025-04-18
Payer: COMMERCIAL

## 2025-04-18 VITALS
HEIGHT: 69 IN | BODY MASS INDEX: 22.22 KG/M2 | SYSTOLIC BLOOD PRESSURE: 102 MMHG | HEART RATE: 110 BPM | WEIGHT: 150 LBS | OXYGEN SATURATION: 94 % | DIASTOLIC BLOOD PRESSURE: 60 MMHG

## 2025-04-18 DIAGNOSIS — J30.89 ENVIRONMENTAL AND SEASONAL ALLERGIES: ICD-10-CM

## 2025-04-18 DIAGNOSIS — J43.1 PANLOBULAR EMPHYSEMA (HCC): ICD-10-CM

## 2025-04-18 DIAGNOSIS — Z87.891 PERSONAL HISTORY OF TOBACCO USE: ICD-10-CM

## 2025-04-18 PROCEDURE — 99214 OFFICE O/P EST MOD 30 MIN: CPT

## 2025-04-18 PROCEDURE — 99212 OFFICE O/P EST SF 10 MIN: CPT

## 2025-04-18 ASSESSMENT — ENCOUNTER SYMPTOMS
COUGH: 1
SINUS PAIN: 0
CHILLS: 0
SPUTUM PRODUCTION: 1
SHORTNESS OF BREATH: 1
WEAKNESS: 0
PALPITATIONS: 0
HEADACHES: 0
HEARTBURN: 0
DIZZINESS: 0
DIARRHEA: 0
NAUSEA: 0
DIAPHORESIS: 0
HEMOPTYSIS: 0
VOMITING: 0
MYALGIAS: 0
FALLS: 0
FEVER: 0
WHEEZING: 1

## 2025-04-18 ASSESSMENT — FIBROSIS 4 INDEX: FIB4 SCORE: 1.09

## 2025-04-18 NOTE — ASSESSMENT & PLAN NOTE
Lung cancer screening CT done through [facility] does not show any suspicious nodes or nodules.  --Repeat CT chest in 1 year

## 2025-04-18 NOTE — LETTER
To whom it may concern,    Please allow Rhonda Gould to use Mucinex DM for the next 5 days, as she is recovering.              I know if you have any questions.              Sincerely,          RIANA Weber  Pulmonary & Sleep Medicine  [facility]

## 2025-04-18 NOTE — ASSESSMENT & PLAN NOTE
No PFTs on file.  CT chest in 2025 shows emphysema.  Patient is on Trelegy, DuoNebs, and albuterol.  She has had 1 exacerbation.  Symptoms are slowly improving.  --Continue Trelegy 100  --Continue prednisone taper  --Discontinue Singulair due to mood changes  --Continue OTC antihistamine  --PFTs  --Patient can use OTC Mucinex DM while she is recovering.  I advised against long-term use.  --Advised patient to increase exercise as tolerated  --Advised patient to remain up-to-date on vaccines

## 2025-04-23 ENCOUNTER — PHARMACY VISIT (OUTPATIENT)
Dept: PHARMACY | Facility: MEDICAL CENTER | Age: 55
End: 2025-04-23
Payer: COMMERCIAL

## 2025-04-24 ENCOUNTER — NON-PROVIDER VISIT (OUTPATIENT)
Dept: SLEEP MEDICINE | Facility: MEDICAL CENTER | Age: 55
End: 2025-04-24
Payer: COMMERCIAL

## 2025-04-24 VITALS — WEIGHT: 157 LBS | BODY MASS INDEX: 23.79 KG/M2 | HEIGHT: 68 IN

## 2025-04-24 DIAGNOSIS — J44.9 CHRONIC OBSTRUCTIVE PULMONARY DISEASE, UNSPECIFIED COPD TYPE (HCC): ICD-10-CM

## 2025-04-24 PROCEDURE — 94060 EVALUATION OF WHEEZING: CPT

## 2025-04-24 PROCEDURE — 94729 DIFFUSING CAPACITY: CPT

## 2025-04-24 PROCEDURE — 94060 EVALUATION OF WHEEZING: CPT | Mod: 26 | Performed by: INTERNAL MEDICINE

## 2025-04-24 PROCEDURE — 94729 DIFFUSING CAPACITY: CPT | Mod: 26 | Performed by: INTERNAL MEDICINE

## 2025-04-24 PROCEDURE — 94726 PLETHYSMOGRAPHY LUNG VOLUMES: CPT | Mod: 26 | Performed by: INTERNAL MEDICINE

## 2025-04-24 PROCEDURE — 94726 PLETHYSMOGRAPHY LUNG VOLUMES: CPT

## 2025-04-24 ASSESSMENT — FIBROSIS 4 INDEX: FIB4 SCORE: 1.09

## 2025-04-24 NOTE — PROCEDURES
Technician: Tricia De León RRT, CPFT  Good patient effort & cooperation.  The results of this test meet the ATS/ERS standards for acceptability & reproducibility.  Test was performed on the [employer] Body Plethysmograph-Elite DX system.  Predicted equations for Spirometry are GLI-2012, ITS for lung volumes, and GLI-2017 for DLCO.  The DLCO was uncorrected for Hgb.  A bronchodilator of Ventolin HFA -2puffs via spacer administered.  DLCO performed during dilation period. Patient C/O being claustrophobic, but was able to perform the Pleth maneuver without incident.    Pulmonary function test  Date of study 4/24/2025    Interpretation:  1.  Spirometry is consistent with a mild obstruction  2.  There is no significant change postbronchodilator  3.  The flow-volume loop is consistent with the spirometry data.  There may be some suggestion of vocal cord dysfunction.  4.  Lung volumes are normal  5.  The diffusion capacity is moderately reduced does not correct for alveolar volume, consider pulmonary vascular disease or interstitial lung disease.  6.  There are no prior studies for comparison    *Pulmonary function tests are now interpreted using Z-scores and not ATS Criteria    I, Dr. Katelynn Sage have interpreted and dictated the results of this study.    Katelynn Sage MD RD  Pulmonary and Critical Care    Available on [facility]

## 2025-05-15 ENCOUNTER — TELEPHONE (OUTPATIENT)
Dept: SLEEP MEDICINE | Facility: MEDICAL CENTER | Age: 55
End: 2025-05-15
Payer: COMMERCIAL

## 2025-05-15 DIAGNOSIS — J30.89 ENVIRONMENTAL AND SEASONAL ALLERGIES: ICD-10-CM

## 2025-05-15 DIAGNOSIS — J43.1 PANLOBULAR EMPHYSEMA (HCC): ICD-10-CM

## 2025-05-15 DIAGNOSIS — J44.9 CHRONIC OBSTRUCTIVE PULMONARY DISEASE, UNSPECIFIED COPD TYPE (HCC): Primary | ICD-10-CM

## 2025-05-15 RX ORDER — FLUTICASONE FUROATE, UMECLIDINIUM BROMIDE AND VILANTEROL TRIFENATATE 100; 62.5; 25 UG/1; UG/1; UG/1
1 POWDER RESPIRATORY (INHALATION) DAILY
Qty: 90 EACH | Refills: 3 | Status: SHIPPED | OUTPATIENT
Start: 2025-05-15 | End: 2025-05-22 | Stop reason: SDUPTHER

## 2025-05-15 NOTE — TELEPHONE ENCOUNTER
Pt LVM stating she is calling regarding the last apt she had and I was calling to see if I could get a prescription of what I believe trelegy that her allergist had given her when she had COPD exacerbation.    Pt was last seen on 4/18/2025 with Heidy MAYERS

## 2025-05-15 NOTE — TELEPHONE ENCOUNTER
Per patient Heidy MAYERS RX sent.     [facility] for the informing that the prescription was approve and sent to [facility] pharmacy. I advise the pt to give us a call back if she has any further issues or concerns.

## 2025-05-22 DIAGNOSIS — J44.9 CHRONIC OBSTRUCTIVE PULMONARY DISEASE, UNSPECIFIED COPD TYPE (HCC): ICD-10-CM

## 2025-05-22 DIAGNOSIS — J43.1 PANLOBULAR EMPHYSEMA (HCC): ICD-10-CM

## 2025-05-22 DIAGNOSIS — J30.89 ENVIRONMENTAL AND SEASONAL ALLERGIES: ICD-10-CM

## 2025-05-27 RX ORDER — FLUTICASONE FUROATE, UMECLIDINIUM BROMIDE AND VILANTEROL TRIFENATATE 100; 62.5; 25 UG/1; UG/1; UG/1
1 POWDER RESPIRATORY (INHALATION) DAILY
Qty: 90 EACH | Refills: 3 | Status: SHIPPED | OUTPATIENT
Start: 2025-05-27

## 2025-06-19 ENCOUNTER — APPOINTMENT (OUTPATIENT)
Dept: SLEEP MEDICINE | Facility: MEDICAL CENTER | Age: 55
End: 2025-06-19
Payer: COMMERCIAL

## 2025-06-26 ENCOUNTER — OFFICE VISIT (OUTPATIENT)
Dept: SLEEP MEDICINE | Facility: MEDICAL CENTER | Age: 55
End: 2025-06-26
Payer: COMMERCIAL

## 2025-06-26 VITALS
BODY MASS INDEX: 23.55 KG/M2 | HEIGHT: 69 IN | DIASTOLIC BLOOD PRESSURE: 76 MMHG | RESPIRATION RATE: 14 BRPM | SYSTOLIC BLOOD PRESSURE: 108 MMHG | OXYGEN SATURATION: 92 % | HEART RATE: 81 BPM | WEIGHT: 159 LBS

## 2025-06-26 DIAGNOSIS — J44.9 CHRONIC OBSTRUCTIVE PULMONARY DISEASE, UNSPECIFIED COPD TYPE (HCC): Primary | ICD-10-CM

## 2025-06-26 PROCEDURE — 3074F SYST BP LT 130 MM HG: CPT

## 2025-06-26 PROCEDURE — 99213 OFFICE O/P EST LOW 20 MIN: CPT

## 2025-06-26 PROCEDURE — 99214 OFFICE O/P EST MOD 30 MIN: CPT

## 2025-06-26 PROCEDURE — 3078F DIAST BP <80 MM HG: CPT

## 2025-06-26 RX ORDER — PREDNISONE 10 MG/1
TABLET ORAL
Qty: 18 TABLET | Refills: 1 | Status: SHIPPED | OUTPATIENT
Start: 2025-06-26

## 2025-06-26 ASSESSMENT — ENCOUNTER SYMPTOMS
DEPRESSION: 0
PALPITATIONS: 0
SHORTNESS OF BREATH: 1
COUGH: 0
CHILLS: 0
HEARTBURN: 0
STRIDOR: 0
WEIGHT LOSS: 0
FEVER: 0
DIZZINESS: 0
HEMOPTYSIS: 0
SPUTUM PRODUCTION: 0
WHEEZING: 0

## 2025-06-26 ASSESSMENT — FIBROSIS 4 INDEX: FIB4 SCORE: 1.09

## 2025-06-26 NOTE — PROGRESS NOTES
Pulmonary Clinic follow up    Date of Service: 3/12/2025    Reason for follow up:  Follow-Up (04/18/25 w/ FELIX Eagle APRN/Dx: Panlobular emphysema) and Results (PFT 04/24/25)    History of Present Illness:     Rhonda Gould is a 54 y.o. female being evaluated in clinic today for COPD. PMH includes anxiety, HLD, insomnia, anemia, alcohol use in remission. Family history includes her mother - DM and HTN, her father - DM and HTN.  Patient quit smoking on 2/1/2025, with only 10.3 pack year smoking history and does not qualify for lung cancer screening.  Today patient denies any shortness of breath, cough, fever, chills.  Patient has baseline clear sputum production. One hospital admission for COPD exacerbation admission on 3/1/25 and discharged on 3/4/2025. No history of intubations for COPD. She did have an asthma exacerbation in April 2025 requiring IM steroids and nebulizer treatments. She had PFT shortly after with mild obstruction, possible vocal cord dysfunction, and moderately reduced DLCO. These findings are not likely accurate given the short interval after an exacerbation in which they were performed. Patient was previously on Spiriva, but reported using albuterol almost every day on that treatment. Patient is now using Trelegy 100 and has only used her Albuterol once in the past 7 days. She states she feels very controlled on her current treatment.     MMRC Grade:   0- Breathless only during strenuous exercise  1- Short of breath when hurrying or going up a small hill  2- Walks slower than friends due to breathlessness, has to stop at own pace  3- Stops to catch breath on level ground after 100m  4- Breathless with ambulating around house or ADLs     Review of Systems   Constitutional:  Negative for chills, fever and weight loss.   Respiratory:  Positive for shortness of breath. Negative for cough, hemoptysis, sputum production, wheezing and stridor.    Cardiovascular:  Negative for chest pain,  palpitations and leg swelling.   Gastrointestinal:  Negative for heartburn.   Skin:  Negative for rash.   Neurological:  Negative for dizziness.   Endo/Heme/Allergies:  Positive for environmental allergies.   Psychiatric/Behavioral:  Negative for depression.      Current Outpatient Medications on File Prior to Visit   Medication Sig Dispense Refill    fluticasone-umeclidinium-vilanterol (TRELEGY ELLIPTA) 100-62.5-25 mcg/PUFF inhaler Inhale 1 Puff every day. 90 Each 3    benzonatate (TESSALON) 100 MG Cap Take 1 Capsule by mouth 3 times a day as needed for Cough. 60 Capsule 0    albuterol 108 (90 Base) MCG/ACT Aero Soln inhalation aerosol INHALE 1 PUFF BY MOUTH EVERY 4 HOURS AS NEEDED      nicotine polacrilex (NICORETTE) 2 MG Gum CHEW 1 PIECE FOR 30 MINUTES BY MOUTH AS NEEDED FOR UP TO 24 TIMES A DAY      ipratropium-albuterol (DUONEB) 0.5-2.5 (3) MG/3ML nebulizer solution INHALE 1 VIAL Via nebulizer EVERY 6 HOURS AS NEEDED      fluticasone (FLONASE) 50 MCG/ACT nasal spray USE ONE SPRAY IN EACH NOSTRIL ONCE A DAY      montelukast (SINGULAIR) 10 MG Tab Take 1 Tablet by mouth at bedtime. 30 Tablet 11    lidocaine (LIDODERM) 5 % Patch Place 1 Patch on the skin every 24 hours. Wear for 12 hours, off for 12 hours. 10 Patch 0    methocarbamol (ROBAXIN) 750 MG Tab Take 1 Tablet by mouth 4 times a day. 120 Tablet 0    ipratropium (ATROVENT) 0.02 % Solution Take 2.5 mL by nebulization 4 times a day. 300 mL 2    levalbuterol (XOPENEX) 1.25 MG/3ML Nebu Soln Take 3 mL by nebulization 4 times a day. 300 mL 0    escitalopram (LEXAPRO) 10 MG Tab Take 1 Tablet by mouth every day.      naltrexone (VIVITROL) 380 MG Recon Susp Inject 380 mg into the shoulder, thigh, or buttocks every 28 days.      traZODone (DESYREL) 50 MG Tab Take 50 mg by mouth at bedtime.      varenicline (CHANTIX) 1 MG tablet Take 1 mg by mouth every day.      cetirizine (ZYRTEC) 10 MG Tab Take 10 mg by mouth every day.      EPINEPHrine 0.3 MG/0.3ML Solution  "Prefilled Syringe Inject the contents of the epipen into the thigh, hold for 3 seconds and release from thigh as needed for anaphylaxis. 1 Each 0     No current facility-administered medications on file prior to visit.     Social History     Tobacco Use    Smoking status: Former     Average packs/day: 0.3 packs/day for 41.0 years (10.3 ttl pk-yrs)     Types: Cigarettes     Start date: 2/1/1984    Smokeless tobacco: Never   Vaping Use    Vaping status: Never Used   Substance Use Topics    Alcohol use: Not Currently     Comment: 1 pint a day, last drink 6/15    Drug use: Not Currently     Frequency: 2.0 times per week     Types: Inhaled, Marijuana     Comment: marijuana      Past Medical History:   Diagnosis Date    Chronic obstructive pulmonary disease (HCC)     Hypertension      Past Surgical History:   Procedure Laterality Date    HYSTERECTOMY LAPAROSCOPY  6/2005     Fish allergy, Shellfish allergy, and Codeine  Family History   Problem Relation Age of Onset    Diabetes Mother     Hypertension Mother     Hypertension Father     Diabetes Father     Heart Disease Maternal Grandmother     Cancer Maternal Grandfather      /76 (BP Location: Right arm, Patient Position: Sitting, BP Cuff Size: Adult)   Pulse 81   Resp 14   Ht 1.753 m (5' 9\")   Wt 72.1 kg (159 lb)   SpO2 92%      Physical Exam  Constitutional:       General: She is not in acute distress.  HENT:      Head: Normocephalic.      Nose: Nose normal.      Mouth/Throat:      Mouth: Mucous membranes are moist.   Eyes:      Conjunctiva/sclera: Conjunctivae normal.   Cardiovascular:      Rate and Rhythm: Normal rate and regular rhythm.      Heart sounds: No murmur heard.  Pulmonary:      Effort: No respiratory distress.      Breath sounds: Normal breath sounds. No wheezing.   Abdominal:      General: There is no distension.   Musculoskeletal:      Right lower leg: No edema.      Left lower leg: No edema.   Skin:     General: Skin is warm and dry.      " Capillary Refill: Capillary refill takes less than 2 seconds.      Nails: There is no clubbing.   Neurological:      General: No focal deficit present.      Mental Status: She is alert and oriented to person, place, and time.   Psychiatric:         Mood and Affect: Mood normal.       Results:    CT chest 3/11/2025:    No nodules on ILD present.     Echocardiogram 3/3/2020:    CONCLUSIONS  Compared to the images of the study done on 08/05/2016 there has been   no significant change.   Left ventricular ejection fraction is visually estimated to be 65%.  Estimated right ventricular systolic pressure  is 30 mmHg.    Vaccinations:    Covid: complete 2024  Flu: complete 2024  Pneumonia: complete  RSV: complete   Assessment & Plan  Chronic obstructive pulmonary disease, unspecified COPD type (HCC)    Chronic but stable and very-well controlled. 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 There was obstruction and moderate reduction in DLCO with possible vocal cord dysfunction but this was just after she had an exacerbation so the data is not likely reliable. There was no bronchodilator response, so unsure of the asthma diagnosis but this may be because she is well-controlled on ICS     Plan:   Repeat PFT - consider ENT referral of vocal cord concern persists  Treley 100 managing symptoms well  DLCO will likely recover on healthy PFTs but no evidence of ILD on CT and echocardiogram with normal RVSP and no right heart abnormalities noted    She is pending allergy skin testing at work    Orders:    predniSONE (DELTASONE) 10 MG Tab; Take 30mg x 3 days, then take 20mg x 3 days, then take 10mg x 3 days, with food, then discontinue.    PULMONARY FUNCTION TESTS -Test requested: Complete Pulmonary Function Test; Future      Return in about 6 months (around 12/26/2025), or if symptoms worsen or fail to improve, for f/u on PFT.     This note was  generated using voice recognition software which has a chance of producing errors of grammar and possibly content.  I have made every reasonable attempt to find and correct any obvious errors, but it should be expected that some may not be found prior to finalization of this note.    Time spent in record review prior to patient arrival, reviewing results, and in face-to-face encounter totaled 38 min, excluding any procedures if performed.    Keon MAYERS  [facility]

## 2025-07-01 NOTE — ED PROVIDER NOTES
ED Provider Note    Scribed for Roberto Skaggs M.D. by Bertha Solitario. 7/5/2019, 2:21 PM.    Primary care provider: Kendall Roberts M.D.  Means of arrival: Walk in  History obtained from: Patient  History limited by: None    CHIEF COMPLAINT  Chief Complaint   Patient presents with   • Shoulder Pain       HPI  Rhonda Gould is a 48 y.o. female who presents to the Emergency Department for left shoulder pain onset 2 day ago.   The patient states she had difficulty sleeping last night and was unable to lay on her left side. The pain is exacerbated by movement of her left arm and turning her neck. The patient states she experienced similar pain in 2015 which she and believed it was due to the heavy lifting at work. The patient denies any recent falls or injuries.          REVIEW OF SYSTEMS  Review of Systems   Musculoskeletal: Negative for falls.        Positive for left shoulder pain       PAST MEDICAL HISTORY   has a past medical history of Hypertension.    SURGICAL HISTORY   has a past surgical history that includes hysterectomy laparoscopy (6/2005).    SOCIAL HISTORY  Social History   Substance Use Topics   • Smoking status: Current Every Day Smoker     Packs/day: 0.30     Years: 30.00   • Smokeless tobacco: Never Used   • Alcohol use 2.0 oz/week     4 Cans of beer per week      Comment: socially couple times per week      History   Drug Use No     Comment: marijuana       FAMILY HISTORY  Family History   Problem Relation Age of Onset   • Diabetes Mother    • Hypertension Mother    • Hypertension Father    • Diabetes Father    • Heart Disease Maternal Grandmother    • Cancer Maternal Grandfather        CURRENT MEDICATIONS  Home Medications     Reviewed by Ishmael Triplett R.N. (Registered Nurse) on 07/05/19 at 1329  Med List Status: Not Addressed   Medication Last Dose Status   albuterol 108 (90 BASE) MCG/ACT Aero Soln inhalation aerosol  Active   atorvastatin (LIPITOR) 80 MG tablet  Active  Per MD patient is stable for transition home today. CM offered home care  visiting nurse services, due to CVA, Pt declined stated she can manage her diagnosis, medications and can manage her BP independently. Discharge Notice reviewed, copy given to patient. Pt verbalized understanding and is in agreement with transitioning home today. Family to transport patient home. "  lisinopril (PRINIVIL) 10 MG Tab  Active                ALLERGIES  Allergies   Allergen Reactions   • Codeine Hives, Itching and Swelling     Throat swells closed  RXN=>10 years ago   • Codeine    • Fish Allergy Shortness of Breath     Swelling to throat.       PHYSICAL EXAM  VITAL SIGNS: /90   Pulse 80   Temp 36.7 °C (98 °F) (Temporal)   Resp 12   Ht 1.626 m (5' 4\")   Wt 53.4 kg (117 lb 11.6 oz)   LMP 06/11/2005   SpO2 99%   BMI 20.21 kg/m²     Constitutional: Alert in no apparent distress.  HENT: Normocephalic, Bilateral external ears normal. Nose normal.   Eyes: Pupils are equal and reactive. Conjunctiva normal, non-icteric.   Thorax & Lungs: Easy unlabored respirations  Abdomen:  No gross signs of peritonitis, no pain with movement   Skin: Visualized skin is  Dry, No erythema, No rash.  Musculoskeletal: Tenderness of the proximal trapezius. Cervical paraspinal tenderness. Limited range of motion to left lateral rotation. Passive range of motion of shoulder intact.   Extremities:  No edema, No asymmetry  Neurologic: Alert, Grossly non-focal.   Psychiatric: Affect and Mood normal        COURSE & MEDICAL DECISION MAKING  Nursing notes, VS, PMSFHx reviewed in chart.    2:21 PM - Patient seen and examined at bedside. I informed the patient that she likely strained a muscle. I advised her to rest and use ice as needed to help with healing. She may take Ibuprofen as needed for pain. Her arm will be placed in a sling. She will be discharged home with a prescription for Robaxin. The patient was advised to follow up with her PCP. She is understanding and agreeable to discharge.    The patient will return for new or worsening symptoms and is stable at the time of discharge.    DISPOSITION:  Patient will be discharged home in stable condition.    FOLLOW UP:  Kendall Roberts M.D.  [address] [facility] [id number] [phone]            OUTPATIENT MEDICATIONS:  New Prescriptions    METHOCARBAMOL " (ROBAXIN-750) 750 MG TAB    Take 1 Tab by mouth 4 times a day.         FINAL IMPRESSION  1. Musculoskeletal pain          I, Bertha Solitario (Scribe), am scribing for, and in the presence of, Roberto Skaggs M.D..    Electronically signed by: Bertha Solitario (Scribe), 7/5/2019    I, Roberto Skaggs M.D. personally performed the services described in this documentation, as scribed by Bertha Solitario in my presence, and it is both accurate and complete.  E  The note accurately reflects work and decisions made by me.  Roberto Skaggs  7/5/2019  5:38 PM

## 2025-08-27 ENCOUNTER — OFFICE VISIT (OUTPATIENT)
Dept: URGENT CARE | Facility: CLINIC | Age: 55
End: 2025-08-27
Payer: COMMERCIAL

## 2025-08-27 VITALS
RESPIRATION RATE: 16 BRPM | TEMPERATURE: 96.5 F | DIASTOLIC BLOOD PRESSURE: 74 MMHG | BODY MASS INDEX: 24.14 KG/M2 | SYSTOLIC BLOOD PRESSURE: 120 MMHG | HEART RATE: 70 BPM | WEIGHT: 163 LBS | HEIGHT: 69 IN | OXYGEN SATURATION: 99 %

## 2025-08-27 DIAGNOSIS — S46.912A STRAIN OF LEFT SHOULDER, INITIAL ENCOUNTER: Primary | ICD-10-CM

## 2025-08-27 PROCEDURE — 99213 OFFICE O/P EST LOW 20 MIN: CPT

## 2025-08-27 PROCEDURE — 3078F DIAST BP <80 MM HG: CPT

## 2025-08-27 PROCEDURE — 3074F SYST BP LT 130 MM HG: CPT

## 2025-08-27 RX ORDER — CYCLOBENZAPRINE HCL 5 MG
5-10 TABLET ORAL 3 TIMES DAILY PRN
Qty: 15 TABLET | Refills: 0 | Status: SHIPPED | OUTPATIENT
Start: 2025-08-27

## 2025-08-27 ASSESSMENT — FIBROSIS 4 INDEX: FIB4 SCORE: 1.09
